# Patient Record
Sex: FEMALE | Race: WHITE | Employment: OTHER | URBAN - METROPOLITAN AREA
[De-identification: names, ages, dates, MRNs, and addresses within clinical notes are randomized per-mention and may not be internally consistent; named-entity substitution may affect disease eponyms.]

---

## 2017-01-20 ENCOUNTER — OFFICE VISIT (OUTPATIENT)
Dept: CARDIOLOGY CLINIC | Age: 72
End: 2017-01-20

## 2017-01-20 VITALS
BODY MASS INDEX: 29.68 KG/M2 | SYSTOLIC BLOOD PRESSURE: 130 MMHG | OXYGEN SATURATION: 99 % | DIASTOLIC BLOOD PRESSURE: 62 MMHG | HEART RATE: 73 BPM | RESPIRATION RATE: 18 BRPM | WEIGHT: 151.2 LBS | HEIGHT: 60 IN

## 2017-01-20 DIAGNOSIS — I10 ESSENTIAL HYPERTENSION, BENIGN: Primary | ICD-10-CM

## 2017-01-20 DIAGNOSIS — Z98.61 S/P PTCA (PERCUTANEOUS TRANSLUMINAL CORONARY ANGIOPLASTY): ICD-10-CM

## 2017-01-20 DIAGNOSIS — I25.10 CORONARY ARTERY DISEASE INVOLVING NATIVE CORONARY ARTERY OF NATIVE HEART WITHOUT ANGINA PECTORIS: ICD-10-CM

## 2017-01-20 NOTE — PROGRESS NOTES
[address], [address] [facility]  [phone]     Subjective:      Yamile Urbina is a 70 y.o. female is here for follow-up after increasing her valsartan to 320 mg. The patient denies chest pain/ shortness of breath, orthopnea, PND, LE edema, palpitations, syncope, or presyncope. Patient Active Problem List    Diagnosis Date Noted    CAD (coronary artery disease), native coronary artery 06/04/2015    S/P PTCA (percutaneous transluminal coronary angioplasty) 06/04/2015    Family history of ischemic heart disease 02/15/2013    Essential hypertension, benign 02/15/2013    SVT (supraventricular tachycardia) 02/15/2013    Hyposmolality and/or hyponatremia 02/15/2013    Tobacco use disorder 02/15/2013    Vertigo 05/11/2012      YUVAL Ruiz MD  Past Medical History   Diagnosis Date    CAD (coronary artery disease)      stenting cardiac 6/5/2015    Hypertension     Vertigo 5/11/2012      Past Surgical History   Procedure Laterality Date    Hx heent       tonsils    Hx tubal ligation      Pr breast surgery procedure unlisted       left breast biopsy     Allergies   Allergen Reactions    Eryc [Erythromycin] Hives    Influenza Virus Vaccine [facility] 2012-13 (18+) Cell Aguilar Other (comments)     \"felt like I was on fire\"    Pcn [Penicillins] Hives    Crestor [Rosuvastatin] Myalgia    Hydrochlorothiazide Other (comments)     hyponatremia    Lipitor [Atorvastatin] Myalgia    Proton Pump Inhibitors Other (comments)     dizziness      Family History   Problem Relation Age of Onset    Coronary Artery Disease       mother had stents in 63's or 66's    Heart Disease Mother     Heart Disease Father       Social History     Social History    Marital status: SINGLE     Spouse name: N/A    Number of children: N/A    Years of education: N/A     Occupational History    Not on file.      Social History Main Topics    Smoking status: Current Some Day Smoker     Packs/day: 0.25 Years: 30.00    Smokeless tobacco: Never Used    Alcohol use No    Drug use: No    Sexual activity: Not Currently     Other Topics Concern    Not on file     Social History Narrative      Current Outpatient Prescriptions   Medication Sig    valsartan (DIOVAN) 320 mg tablet TAKE ONE TABLET BY MOUTH ONCE DAILY.  metoprolol tartrate (LOPRESSOR) 25 mg tablet TAKE ONE TABLET BY MOUTH TWICE DAILY    lovastatin (MEVACOR) 40 mg tablet Take 1 Tab by mouth nightly. Increased 6/4/15 due to coronary artery disease    aspirin delayed-release 81 mg tablet Take  by mouth daily.  amLODIPine (NORVASC) 2.5 mg tablet TAKE 1 TABLET BY MOUTH EVERY DAY FOR BLOOD PRESSURE     No current facility-administered medications for this visit. Review of Symptoms:  11 systems reviewed, negative other than as stated in the HPI    Physical ExamPhysical Exam:    Vitals:    01/20/17 1315 01/20/17 1324   BP: 138/64 130/62   Pulse: 73    Resp: 18    SpO2: 99%    Weight: 151 lb 3.2 oz (68.6 kg)    Height: 5' (1.524 m)      Body mass index is 29.53 kg/(m^2). General PE   Gen:  NAD  Mental Status - Alert. General Appearance - Not in acute distress. Chest and Lung Exam   Inspection: Accessory muscles - No use of accessory muscles in breathing. Auscultation:   Breath sounds: - Normal.   Cardiovascular   Inspection: Jugular vein - Bilateral - Inspection Normal.   Palpation/Percussion:   Apical Impulse: - Normal.   Auscultation: Rhythm - Regular. Heart Sounds - S1 WNL and S2 WNL. No S3 or S4. Murmurs & Other Heart Sounds: Auscultation of the heart reveals - No Murmurs. Peripheral Vascular   Upper Extremity: Inspection - Bilateral - No Cyanotic nailbeds or Digital clubbing. Lower Extremity:   Palpation: Edema - Bilateral - No edema. Abdomen:   Soft, non-tender, bowel sounds are active.   Neuro: A&O times 3, CN and motor grossly WNL    Labs:   Lab Results   Component Value Date/Time    Cholesterol, total 128 10/23/2015 08:15 AM Cholesterol, total 128 07/03/2014 07:58 AM    Cholesterol, Total 155 08/28/2013 09:46 AM    HDL Cholesterol 51 10/23/2015 08:15 AM    HDL Cholesterol 56 07/03/2014 07:58 AM    HDL Cholesterol 49 08/28/2013 09:46 AM    LDL, calculated 66 10/23/2015 08:15 AM    LDL, calculated 62 07/03/2014 07:58 AM    LDL Cholesterol 90 08/28/2013 09:46 AM    Triglyceride 54 10/23/2015 08:15 AM    Triglyceride 50 07/03/2014 07:58 AM     Lab Results   Component Value Date/Time    CK 59 08/14/2015 06:08 PM     Lab Results   Component Value Date/Time    Sodium 131 08/14/2015 06:08 PM    Potassium 4.1 08/14/2015 06:08 PM    Chloride 98 08/14/2015 06:08 PM    CO2 26 08/14/2015 06:08 PM    Anion gap 7 08/14/2015 06:08 PM    Glucose 122 08/14/2015 06:08 PM    BUN 13 08/14/2015 06:08 PM    Creatinine 0.53 08/14/2015 06:08 PM    BUN/Creatinine ratio 25 08/14/2015 06:08 PM    GFR est AA >60 08/14/2015 06:08 PM    GFR est non-AA >60 08/14/2015 06:08 PM    Calcium 8.7 08/14/2015 06:08 PM    Bilirubin, total 0.6 08/14/2015 06:08 PM    ALT 36 08/14/2015 06:08 PM    AST 25 08/14/2015 06:08 PM    Alk. phosphatase 50 08/14/2015 06:08 PM    Protein, total 7.6 08/14/2015 06:08 PM    Albumin 3.9 08/14/2015 06:08 PM    Globulin 3.7 08/14/2015 06:08 PM    A-G Ratio 1.1 08/14/2015 06:08 PM      Assessment:     Assessment:      1. Essential hypertension, benign    2. Coronary artery disease involving native coronary artery of native heart without angina pectoris    3. S/P PTCA (percutaneous transluminal coronary angioplasty)        No orders of the defined types were placed in this encounter. Plan:     BP at goal.  Occasionally lightheaded with low BP. If recurrent on home monitoring, she will decrease diovan again. Follow up in 6 months, sooner PRN.       Oliver Solis MD

## 2017-01-20 NOTE — PROGRESS NOTES
Chief Complaint   Patient presents with    Other     1 month and BP check-pt denies any cardiac symptoms

## 2017-03-14 RX ORDER — METOPROLOL TARTRATE 25 MG/1
TABLET, FILM COATED ORAL
Qty: 180 TAB | Refills: 1 | Status: SHIPPED | OUTPATIENT
Start: 2017-03-14 | End: 2017-09-11 | Stop reason: SDUPTHER

## 2017-06-14 RX ORDER — LOVASTATIN 40 MG/1
TABLET ORAL
Qty: 90 TAB | Refills: 0 | Status: SHIPPED | OUTPATIENT
Start: 2017-06-14 | End: 2017-09-26 | Stop reason: SDUPTHER

## 2017-06-19 ENCOUNTER — HOSPITAL ENCOUNTER (OUTPATIENT)
Dept: MAMMOGRAPHY | Age: 72
Discharge: HOME | End: 2017-06-19
Attending: FAMILY MEDICINE
Payer: MEDICARE

## 2017-06-19 DIAGNOSIS — Z12.31 VISIT FOR SCREENING MAMMOGRAM: ICD-10-CM

## 2017-06-19 PROCEDURE — 77067 SCR MAMMO BI INCL CAD: CPT

## 2017-06-23 ENCOUNTER — OFFICE VISIT (OUTPATIENT)
Dept: CARDIOLOGY CLINIC | Age: 72
End: 2017-06-23

## 2017-06-23 VITALS
DIASTOLIC BLOOD PRESSURE: 70 MMHG | HEART RATE: 72 BPM | RESPIRATION RATE: 18 BRPM | HEIGHT: 60 IN | WEIGHT: 154.4 LBS | SYSTOLIC BLOOD PRESSURE: 156 MMHG | OXYGEN SATURATION: 97 % | BODY MASS INDEX: 30.31 KG/M2

## 2017-06-23 DIAGNOSIS — Z82.49 FAMILY HISTORY OF ISCHEMIC HEART DISEASE: ICD-10-CM

## 2017-06-23 DIAGNOSIS — I25.10 CORONARY ARTERY DISEASE INVOLVING NATIVE CORONARY ARTERY OF NATIVE HEART WITHOUT ANGINA PECTORIS: ICD-10-CM

## 2017-06-23 DIAGNOSIS — F17.200 TOBACCO USE DISORDER: ICD-10-CM

## 2017-06-23 DIAGNOSIS — Z98.61 S/P PTCA (PERCUTANEOUS TRANSLUMINAL CORONARY ANGIOPLASTY): ICD-10-CM

## 2017-06-23 DIAGNOSIS — E78.2 MIXED HYPERLIPIDEMIA: ICD-10-CM

## 2017-06-23 DIAGNOSIS — I10 ESSENTIAL HYPERTENSION, BENIGN: Primary | ICD-10-CM

## 2017-06-23 RX ORDER — AMLODIPINE BESYLATE 5 MG/1
TABLET ORAL
Qty: 90 TAB | Refills: 3 | Status: SHIPPED | OUTPATIENT
Start: 2017-06-23 | End: 2018-09-10 | Stop reason: SDUPTHER

## 2017-06-23 NOTE — PROGRESS NOTES
[address] [place] [address], [address] [facility]  [phone]     Subjective:      Placido Connors is a 70 y.o. female is here for routine f/u. The patient denies chest pain/ shortness of breath, orthopnea, PND, LE edema, palpitations, syncope, or presyncope. Patient Active Problem List    Diagnosis Date Noted    Mixed hyperlipidemia 06/23/2017    CAD (coronary artery disease), native coronary artery 06/04/2015    S/P PTCA (percutaneous transluminal coronary angioplasty) 06/04/2015    Family history of ischemic heart disease 02/15/2013    Essential hypertension, benign 02/15/2013    SVT (supraventricular tachycardia) ([facility] [address] 02/15/2013    Hyposmolality and/or hyponatremia 02/15/2013    Tobacco use disorder 02/15/2013    Vertigo 05/11/2012      Orly James MD  Past Medical History:   Diagnosis Date    CAD (coronary artery disease)     stenting cardiac 6/5/2015    Hypertension     Vertigo 5/11/2012      Past Surgical History:   Procedure Laterality Date    BREAST SURGERY PROCEDURE UNLISTED      left breast biopsy    HX BREAST BIOPSY Left     Long time ago --neg    HX HEENT      tonsils    HX TUBAL LIGATION       Allergies   Allergen Reactions    Eryc [Erythromycin] Hives    Influenza Virus Vaccine [facility] 2012-13 (18+) Cell Aguilar Other (comments)     \"felt like I was on fire\"    Pcn [Penicillins] Hives    Crestor [Rosuvastatin] Myalgia    Hydrochlorothiazide Other (comments)     hyponatremia    Lipitor [Atorvastatin] Myalgia    Proton Pump Inhibitors Other (comments)     dizziness      Family History   Problem Relation Age of Onset    Heart Disease Mother     Heart Disease Father     Coronary Artery Disease Other      mother had stents in 63's or 66's      Social History     Social History    Marital status: SINGLE     Spouse name: N/A    Number of children: N/A    Years of education: N/A     Occupational History    Not on file.      Social History Main Topics    Smoking status: Current Some Day Smoker     Packs/day: 0.25     Years: 30.00    Smokeless tobacco: Never Used    Alcohol use No    Drug use: No    Sexual activity: Not Currently     Other Topics Concern    Not on file     Social History Narrative      Current Outpatient Prescriptions   Medication Sig    amLODIPine (NORVASC) 5 mg tablet TAKE 1 TABLET BY MOUTH EVERY DAY FOR BLOOD PRESSURE    lovastatin (MEVACOR) 40 mg tablet TAKE 1 TABLET BY MOUTH NIGHTLY **INCREASED 06/04/15 DUE TO CORONARY ARTERY DISEASE**    metoprolol tartrate (LOPRESSOR) 25 mg tablet TAKE ONE TABLET BY MOUTH TWICE DAILY    valsartan (DIOVAN) 320 mg tablet TAKE ONE TABLET BY MOUTH ONCE DAILY.  aspirin delayed-release 81 mg tablet Take  by mouth daily. No current facility-administered medications for this visit. Review of Symptoms:  11 systems reviewed, negative other than as stated in the HPI    Physical ExamPhysical Exam:    Vitals:    06/23/17 1257   BP: 156/70   Pulse: 72   Resp: 18   SpO2: 97%   Weight: 154 lb 6.4 oz (70 kg)   Height: 5' (1.524 m)     Body mass index is 30.15 kg/(m^2). General PE   Gen:  NAD  Mental Status - Alert. General Appearance - Not in acute distress. Chest and Lung Exam   Inspection: Accessory muscles - No use of accessory muscles in breathing. Auscultation:   Breath sounds: - Normal.   Cardiovascular   Inspection: Jugular vein - Bilateral - Inspection Normal.   Palpation/Percussion:   Apical Impulse: - Normal.   Auscultation: Rhythm - Regular. Heart Sounds - S1 WNL and S2 WNL. No S3 or S4. Murmurs & Other Heart Sounds: Auscultation of the heart reveals - No Murmurs. Peripheral Vascular   Upper Extremity: Inspection - Bilateral - No Cyanotic nailbeds or Digital clubbing. Lower Extremity:   Palpation: Edema - Bilateral - No edema. Abdomen:   Soft, non-tender, bowel sounds are active.   Neuro: A&O times 3, CN and motor grossly WNL    Labs:   Lab Results   Component Value Date/Time Cholesterol, total 128 10/23/2015 08:15 AM    Cholesterol, total 128 07/03/2014 07:58 AM    Cholesterol, Total 155 08/28/2013 09:46 AM    HDL Cholesterol 51 10/23/2015 08:15 AM    HDL Cholesterol 56 07/03/2014 07:58 AM    HDL Cholesterol 49 08/28/2013 09:46 AM    LDL Cholesterol 90 08/28/2013 09:46 AM    LDL, calculated 66 10/23/2015 08:15 AM    LDL, calculated 62 07/03/2014 07:58 AM    Triglyceride 54 10/23/2015 08:15 AM    Triglyceride 50 07/03/2014 07:58 AM    Triglycerides 64 08/28/2013 09:46 AM     Lab Results   Component Value Date/Time    CK 59 08/14/2015 06:08 PM     Lab Results   Component Value Date/Time    Sodium 131 08/14/2015 06:08 PM    Potassium 4.1 08/14/2015 06:08 PM    Chloride 98 08/14/2015 06:08 PM    CO2 26 08/14/2015 06:08 PM    Anion gap 7 08/14/2015 06:08 PM    Glucose 122 08/14/2015 06:08 PM    BUN 13 08/14/2015 06:08 PM    Creatinine 0.53 08/14/2015 06:08 PM    BUN/Creatinine ratio 25 08/14/2015 06:08 PM    GFR est AA >60 08/14/2015 06:08 PM    GFR est non-AA >60 08/14/2015 06:08 PM    Calcium 8.7 08/14/2015 06:08 PM    Bilirubin, total 0.6 08/14/2015 06:08 PM    AST (SGOT) 25 08/14/2015 06:08 PM    Alk. phosphatase 50 08/14/2015 06:08 PM    Protein, total 7.6 08/14/2015 06:08 PM    Albumin 3.9 08/14/2015 06:08 PM    Globulin 3.7 08/14/2015 06:08 PM    A-G Ratio 1.1 08/14/2015 06:08 PM    ALT (SGPT) 36 08/14/2015 06:08 PM       EKG:  NSR     Assessment:        1. Essential hypertension, benign    2. Coronary artery disease involving native coronary artery of native heart without angina pectoris    3. S/P PTCA (percutaneous transluminal coronary angioplasty)    4. Family history of ischemic heart disease    5. Tobacco use disorder    6.  Mixed hyperlipidemia        Orders Placed This Encounter    AMB POC EKG ROUTINE W/ 12 LEADS, INTER & REP     Order Specific Question:   Reason for Exam:     Answer:   routine    amLODIPine (NORVASC) 5 mg tablet     Sig: TAKE 1 TABLET BY MOUTH EVERY DAY FOR BLOOD PRESSURE     Dispense:  90 Tab     Refill:  3        Plan:     Doing well with no cardiac symptoms. CAD stable and asymptomatic. HTN:  Uncontrolled off amlodipine for unclear reasons. Add back amlodipine 5. Follow at home, call if uncontrolled after 2 weeks. Lipids and labs followed by PCP, LDL 43 12/16. Continue current care and f/u in 6 months.     Carter Chester MD

## 2017-09-12 RX ORDER — METOPROLOL TARTRATE 25 MG/1
TABLET, FILM COATED ORAL
Qty: 180 TAB | Refills: 1 | Status: SHIPPED | OUTPATIENT
Start: 2017-09-12 | End: 2018-03-13 | Stop reason: SDUPTHER

## 2017-09-27 RX ORDER — LOVASTATIN 40 MG/1
TABLET ORAL
Qty: 90 TAB | Refills: 0 | Status: SHIPPED | OUTPATIENT
Start: 2017-09-27 | End: 2017-12-31 | Stop reason: SDUPTHER

## 2017-12-20 RX ORDER — VALSARTAN 320 MG/1
TABLET ORAL
Qty: 90 TAB | Refills: 3 | Status: SHIPPED | OUTPATIENT
Start: 2017-12-20 | End: 2018-07-19

## 2018-01-30 ENCOUNTER — HOSPITAL ENCOUNTER (OUTPATIENT)
Dept: CT IMAGING | Age: 73
Discharge: HOME | End: 2018-01-30
Attending: OTOLARYNGOLOGY
Payer: MEDICARE

## 2018-01-30 DIAGNOSIS — K14.8 TONGUE MASS: ICD-10-CM

## 2018-01-30 LAB — CREAT BLD-MCNC: 0.6 MG/DL (ref 0.6–1.3)

## 2018-01-30 PROCEDURE — 70491 CT SOFT TISSUE NECK W/DYE: CPT

## 2018-01-30 PROCEDURE — 74011250636 HC RX REV CODE- 250/636: Performed by: OTOLARYNGOLOGY

## 2018-01-30 PROCEDURE — 74011636320 HC RX REV CODE- 636/320: Performed by: OTOLARYNGOLOGY

## 2018-01-30 PROCEDURE — 82565 ASSAY OF CREATININE: CPT

## 2018-01-30 RX ORDER — SODIUM CHLORIDE 0.9 % (FLUSH) 0.9 %
10 SYRINGE (ML) INJECTION
Status: COMPLETED | OUTPATIENT
Start: 2018-01-30 | End: 2018-01-30

## 2018-01-30 RX ORDER — SODIUM CHLORIDE 9 MG/ML
50 INJECTION, SOLUTION INTRAVENOUS
Status: COMPLETED | OUTPATIENT
Start: 2018-01-30 | End: 2018-01-30

## 2018-01-30 RX ADMIN — SODIUM CHLORIDE 50 ML/HR: 900 INJECTION, SOLUTION INTRAVENOUS at 09:25

## 2018-01-30 RX ADMIN — IOPAMIDOL 100 ML: 755 INJECTION, SOLUTION INTRAVENOUS at 09:25

## 2018-01-30 RX ADMIN — Medication 10 ML: at 09:25

## 2018-03-13 RX ORDER — METOPROLOL TARTRATE 25 MG/1
TABLET, FILM COATED ORAL
Qty: 180 TAB | Refills: 1 | Status: SHIPPED | OUTPATIENT
Start: 2018-03-13 | End: 2018-09-07 | Stop reason: SDUPTHER

## 2018-06-08 ENCOUNTER — OFFICE VISIT (OUTPATIENT)
Dept: CARDIOLOGY CLINIC | Age: 73
End: 2018-06-08

## 2018-06-08 VITALS
OXYGEN SATURATION: 97 % | SYSTOLIC BLOOD PRESSURE: 160 MMHG | BODY MASS INDEX: 30.23 KG/M2 | DIASTOLIC BLOOD PRESSURE: 68 MMHG | RESPIRATION RATE: 18 BRPM | WEIGHT: 154 LBS | HEIGHT: 60 IN | HEART RATE: 72 BPM

## 2018-06-08 DIAGNOSIS — F17.200 TOBACCO USE DISORDER: ICD-10-CM

## 2018-06-08 DIAGNOSIS — E78.2 MIXED HYPERLIPIDEMIA: ICD-10-CM

## 2018-06-08 DIAGNOSIS — Z98.61 S/P PTCA (PERCUTANEOUS TRANSLUMINAL CORONARY ANGIOPLASTY): ICD-10-CM

## 2018-06-08 DIAGNOSIS — I10 ESSENTIAL HYPERTENSION, BENIGN: ICD-10-CM

## 2018-06-08 DIAGNOSIS — I25.10 CORONARY ARTERY DISEASE INVOLVING NATIVE CORONARY ARTERY OF NATIVE HEART WITHOUT ANGINA PECTORIS: Primary | ICD-10-CM

## 2018-06-08 DIAGNOSIS — I47.1 SVT (SUPRAVENTRICULAR TACHYCARDIA) (HCC): ICD-10-CM

## 2018-06-08 NOTE — PROGRESS NOTES
1. Have you been to the ER, urgent care clinic since your last visit? Hospitalized since your last visit? No.    2. Have you seen or consulted any other health care providers outside of the [address] [place] [address] since your last visit? Include any pap smears or colon screening. Seen by ENT for throat nodules.           Chief Complaint   Patient presents with    Other     6 month f/u-swelling in ankles and feet-denies any new cardiac symptoms-surgery has been canceled

## 2018-06-08 NOTE — PROGRESS NOTES
[address], [address] [place] [address] Ne, [address] [facility]  [phone]     Subjective:      Amina Sherman is a 67 y.o. female is here for routine f/u. The patient denies chest pain/ shortness of breath, orthopnea, PND, LE edema, palpitations, syncope, or presyncope. Patient Active Problem List    Diagnosis Date Noted    Mixed hyperlipidemia 06/23/2017    CAD (coronary artery disease), native coronary artery 06/04/2015    S/P PTCA (percutaneous transluminal coronary angioplasty) 06/04/2015    Family history of ischemic heart disease 02/15/2013    Essential hypertension, benign 02/15/2013    SVT (supraventricular tachycardia) ([facility] [address] 02/15/2013    Hyposmolality and/or hyponatremia 02/15/2013    Tobacco use disorder 02/15/2013    Vertigo 05/11/2012      Robert Salguero MD  Past Medical History:   Diagnosis Date    CAD (coronary artery disease)     stenting cardiac 6/5/2015    Hypertension     Vertigo 5/11/2012      Past Surgical History:   Procedure Laterality Date    BREAST SURGERY PROCEDURE UNLISTED      left breast biopsy    HX BREAST BIOPSY Left     Long time ago --neg    HX HEENT      tonsils    HX TUBAL LIGATION       Allergies   Allergen Reactions    Eryc [Erythromycin] Hives    Influenza Virus Vaccine [facility] 2012-13 (18+) Cell Aguilar Other (comments)     \"felt like I was on fire\"    Pcn [Penicillins] Hives    Crestor [Rosuvastatin] Myalgia    Hydrochlorothiazide Other (comments)     hyponatremia    Lipitor [Atorvastatin] Myalgia    Proton Pump Inhibitors Other (comments)     dizziness      Family History   Problem Relation Age of Onset    Heart Disease Mother     Heart Disease Father     Coronary Artery Disease Other      mother had stents in 63's or 66's      Social History     Social History    Marital status: SINGLE     Spouse name: N/A    Number of children: N/A    Years of education: N/A     Occupational History    Not on file.      Social History Main Topics    Smoking status: Current Some Day Smoker     Packs/day: 0.25     Years: 30.00     Types: Cigarettes    Smokeless tobacco: Never Used    Alcohol use No    Drug use: No    Sexual activity: Not Currently     Other Topics Concern    Not on file     Social History Narrative      Current Outpatient Prescriptions   Medication Sig    metoprolol tartrate (LOPRESSOR) 25 mg tablet TAKE ONE TABLET BY MOUTH TWICE DAILY    lovastatin (MEVACOR) 40 mg tablet Take 1 Tab by mouth nightly. Refills per PCP who is performing labs    valsartan (DIOVAN) 320 mg tablet TAKE ONE TABLET BY MOUTH ONCE DAILY (Patient taking differently: taking a half tab daily)    amLODIPine (NORVASC) 5 mg tablet TAKE 1 TABLET BY MOUTH EVERY DAY FOR BLOOD PRESSURE (Patient taking differently: TAKE 1 TABLET BY MOUTH EVERY DAY FOR BLOOD PRESSURE  Indications: pt taking as needed if BP is 150 or over)    aspirin delayed-release 81 mg tablet Take  by mouth daily. No current facility-administered medications for this visit. Review of Symptoms:  11 systems reviewed, negative other than as stated in the HPI    Physical ExamPhysical Exam:    Vitals:    06/08/18 1403   BP: 160/68   Pulse: 72   Resp: 18   SpO2: 97%   Weight: 154 lb (69.9 kg)   Height: 5' (1.524 m)     Body mass index is 30.08 kg/(m^2). General PE   Gen:  NAD  Mental Status - Alert. General Appearance - Not in acute distress. Chest and Lung Exam   Inspection: Accessory muscles - No use of accessory muscles in breathing. Auscultation:   Breath sounds: - Normal.   Cardiovascular   Inspection: Jugular vein - Bilateral - Inspection Normal.   Palpation/Percussion:   Apical Impulse: - Normal.   Auscultation: Rhythm - Regular. Heart Sounds - S1 WNL and S2 WNL. No S3 or S4. Murmurs & Other Heart Sounds: Auscultation of the heart reveals - No Murmurs. Peripheral Vascular   Upper Extremity: Inspection - Bilateral - No Cyanotic nailbeds or Digital clubbing.    Lower Extremity:   Palpation: Edema - Bilateral - No edema. Abdomen:   Soft, non-tender, bowel sounds are active. Neuro: A&O times 3, CN and motor grossly WNL    Labs:   Lab Results   Component Value Date/Time    Cholesterol, total 128 10/23/2015 08:15 AM    Cholesterol, total 128 07/03/2014 07:58 AM    Cholesterol, Total 155 08/28/2013 09:46 AM    HDL Cholesterol 51 10/23/2015 08:15 AM    HDL Cholesterol 56 07/03/2014 07:58 AM    HDL Cholesterol 49 08/28/2013 09:46 AM    LDL Cholesterol 90 08/28/2013 09:46 AM    LDL, calculated 66 10/23/2015 08:15 AM    LDL, calculated 62 07/03/2014 07:58 AM    Triglyceride 54 10/23/2015 08:15 AM    Triglyceride 50 07/03/2014 07:58 AM    Triglycerides 64 08/28/2013 09:46 AM     Lab Results   Component Value Date/Time    CK 59 08/14/2015 06:08 PM     Lab Results   Component Value Date/Time    Sodium 131 (L) 08/14/2015 06:08 PM    Potassium 4.1 08/14/2015 06:08 PM    Chloride 98 08/14/2015 06:08 PM    CO2 26 08/14/2015 06:08 PM    Anion gap 7 08/14/2015 06:08 PM    Glucose 122 (H) 08/14/2015 06:08 PM    BUN 13 08/14/2015 06:08 PM    Creatinine 0.53 08/14/2015 06:08 PM    BUN/Creatinine ratio 25 (H) 08/14/2015 06:08 PM    GFR est AA >60 08/14/2015 06:08 PM    GFR est non-AA >60 08/14/2015 06:08 PM    Calcium 8.7 08/14/2015 06:08 PM    Bilirubin, total 0.6 08/14/2015 06:08 PM    AST (SGOT) 25 08/14/2015 06:08 PM    Alk. phosphatase 50 08/14/2015 06:08 PM    Protein, total 7.6 08/14/2015 06:08 PM    Albumin 3.9 08/14/2015 06:08 PM    Globulin 3.7 08/14/2015 06:08 PM    A-G Ratio 1.1 08/14/2015 06:08 PM    ALT (SGPT) 36 08/14/2015 06:08 PM       EKG:  NSR     Assessment:      1. Coronary artery disease involving native coronary artery of native heart without angina pectoris    2. Essential hypertension, benign    3. Mixed hyperlipidemia    4. S/P PTCA (percutaneous transluminal coronary angioplasty)    5. SVT (supraventricular tachycardia) ([address]    6.  Tobacco use disorder        Orders Placed This Encounter    AMB POC EKG ROUTINE W/ 12 LEADS, INTER & REP     Order Specific Question:   Reason for Exam:     Answer:   routine        Plan:     Doing well with no cardiac symptoms.       CAD stable and asymptomatic. RAUL to the LAD in June 2015, 60% RCA lesion at that time that was negative by fractional flow reserve. No plans for ENT surgery yet, but advised that if there is ENT surgery, it would be ambriz to get a Renaye Fairchild prior to that. Cannot walk due to musculoskeletal issues as detailed below.     HTN:  Uncontrolled today, but she states usually controlled. Follow at home, call if uncontrolled after 2 weeks. She knows that if her blood pressure is above 140, it will be wise to start on amlodipine 5 mg daily. She is currently not taking amlodipine unless blood pressures above 160.     Lipids and labs followed by PCP, LDL 67 in March 2018, followed by Dr. Thai Coffman. Statin prescribed by him. Back, right leg pain- states has herniated discs. Advised to f/u with PCP or ortho for this. Tobacco abuse:  Counseled on smoking cessation and provided literature - 5 minutes spent.      Continue current care and f/u in 6 months.     Vernon Zaldivar MD

## 2018-06-08 NOTE — MR AVS SNAPSHOT
[facility] [address] 
[phone] Patient: Kandace Egan MRN: [id number] [id number] Visit Information Date & Time Provider Department Dept. Phone Encounter #  
 6/8/2018  2:15 PM Pedro Huang, [address] [place] [address] Cardiology Associate Willie Cisneros [phone] Upcoming Health Maintenance Date Due Hepatitis C Screening 1945 DTaP/Tdap/Td series (1 - Tdap) 11/10/1966 FOBT Q 1 YEAR AGE 50-75 11/10/1995 ZOSTER VACCINE AGE 60> 9/10/2005 GLAUCOMA SCREENING Q2Y 11/10/2010 Bone Densitometry (Dexa) Screening 11/10/2010 Pneumococcal 65+ Low/Medium Risk (2 of 2 - PPSV23) 12/1/2017 MEDICARE YEARLY EXAM 3/14/2018 Influenza Age 5 to Adult 8/1/2018 BREAST CANCER SCRN MAMMOGRAM 6/19/2019 Allergies as of 6/8/2018  Review Complete On: 6/8/2018 By: Pedro Huang MD  
  
 Severity Noted Reaction Type Reactions Eryc [Erythromycin] Medium 05/11/2012   Systemic Hives Influenza Virus Vaccine Tvs 2012-13 (18+) Cell Aguilar Medium 10/14/2012    Other (comments) \"felt like I was on fire\" Pcn [Penicillins] Medium 05/11/2012   Systemic Hives Crestor [Rosuvastatin]  05/11/2015    Myalgia Hydrochlorothiazide  03/08/2013   Intolerance Other (comments)  
 hyponatremia Lipitor [Atorvastatin]  07/10/2014   Intolerance Myalgia Proton Pump Inhibitors  05/11/2015    Other (comments)  
 dizziness Current Immunizations  Reviewed on 7/14/2015 Name Date Pneumococcal Vaccine (Unspecified Type) 12/1/2012 Not reviewed this visit You Were Diagnosed With   
  
 Codes Comments Coronary artery disease involving native coronary artery of native heart without angina pectoris    -  Primary ICD-10-CM: I25.10 ICD-9-CM: 414.01 Essential hypertension, benign     ICD-10-CM: I10 
ICD-9-CM: 401.1 Mixed hyperlipidemia     ICD-10-CM: E78.2 ICD-9-CM: 272.2 S/P PTCA (percutaneous transluminal coronary angioplasty)     ICD-10-CM: Z98.61 ICD-9-CM: V45.82   
 SVT (supraventricular tachycardia) (HCC)     ICD-10-CM: I47.1 ICD-9-CM: 427.89 Tobacco use disorder     ICD-10-CM: F17.200 ICD-9-CM: 305.1 Vitals BP Pulse Resp Height(growth percentile) Weight(growth percentile) SpO2  
 160/68 (BP 1 Location: Right arm, BP Patient Position: Sitting) 72 18 5' (1.524 m) 154 lb (69.9 kg) 97% BMI OB Status Smoking Status 30.08 kg/m2 Postmenopausal Current Some Day Smoker Vitals History BMI and BSA Data Body Mass Index Body Surface Area 30.08 kg/m 2 1.72 m 2 Preferred Pharmacy Pharmacy Name Phone [facility] [address], [address] [place] [address] Avenue [phone] Your Updated Medication List  
  
   
This list is accurate as of 6/8/18  2:56 PM.  Always use your most recent med list. amLODIPine 5 mg tablet Commonly known as:  Jus Emerald TAKE 1 TABLET BY MOUTH EVERY DAY FOR BLOOD PRESSURE  
  
 aspirin delayed-release 81 mg tablet Take  by mouth daily. lovastatin 40 mg tablet Commonly known as:  MEVACOR Take 1 Tab by mouth nightly. Refills per PCP who is performing labs  
  
 metoprolol tartrate 25 mg tablet Commonly known as:  LOPRESSOR  
TAKE ONE TABLET BY MOUTH TWICE DAILY  
  
 valsartan 320 mg tablet Commonly known as:  DIOVAN  
TAKE ONE TABLET BY MOUTH ONCE DAILY We Performed the Following AMB POC EKG ROUTINE W/ 12 LEADS, INTER & REP [10335 CPT(R)] Introducing \[facility] [place] [facility]\"" & HEALTH SERVICES! Akil Pal introduces [employer] patient portal. Now you can access parts of your medical record, email your doctor's office, and request medication refills online. 1. In your internet browser, go to [email or web address] 2. Click on the First Time User? Click Here link in the Sign In box. You will see the New Member Sign Up page. 3. Enter your [employer] Access Code exactly as it appears below. You will not need to use this code after youve completed the sign-up process. If you do not sign up before the expiration date, you must request a new code. · [employer] Access Code: [email or web address] Expires: 9/6/2018  2:56 PM 
 
4. Enter the last four digits of your Social Security Number (xxxx) and Date of Birth (mm/dd/yyyy) as indicated and click Submit. You will be taken to the next sign-up page. 5. Create a [employer]Learn ID. This will be your [employer] login ID and cannot be changed, so think of one that is secure and easy to remember. 6. Create a [employer] password. You can change your password at any time. 7. Enter your Password Reset Question and Answer. This can be used at a later time if you forget your password. 8. Enter your e-mail address. You will receive e-mail notification when new information is available in [address]. 9. Click Sign Up. You can now view and download portions of your medical record. 10. Click the Download Summary menu link to download a portable copy of your medical information. If you have questions, please visit the Frequently Asked Questions section of the [employer] website. Remember, [employer] is NOT to be used for urgent needs. For medical emergencies, dial 911. Now available from your iPhone and Android! Please provide this summary of care documentation to your next provider. Your primary care clinician is listed as Esther Hopkins. If you have any questions after today's visit, please call [phone].

## 2018-07-19 RX ORDER — LOSARTAN POTASSIUM 50 MG/1
50 TABLET ORAL DAILY
Qty: 30 TAB | Refills: 6 | Status: SHIPPED | OUTPATIENT
Start: 2018-07-19 | End: 2018-07-19 | Stop reason: SDUPTHER

## 2018-07-19 RX ORDER — LOSARTAN POTASSIUM 50 MG/1
50 TABLET ORAL DAILY
Qty: 90 TAB | Refills: 3 | Status: SHIPPED | OUTPATIENT
Start: 2018-07-19 | End: 2019-03-14 | Stop reason: ALTCHOICE

## 2018-07-19 RX ORDER — LOSARTAN POTASSIUM 50 MG/1
TABLET ORAL DAILY
COMMUNITY
End: 2018-07-19 | Stop reason: SDUPTHER

## 2018-07-19 RX ORDER — LOSARTAN POTASSIUM 50 MG/1
50 TABLET ORAL DAILY
Qty: 90 TAB | Refills: 3 | Status: SHIPPED | OUTPATIENT
Start: 2018-07-19 | End: 2018-07-19 | Stop reason: SDUPTHER

## 2018-09-07 RX ORDER — METOPROLOL TARTRATE 25 MG/1
TABLET, FILM COATED ORAL
Qty: 180 TAB | Refills: 1 | Status: SHIPPED | OUTPATIENT
Start: 2018-09-07 | End: 2019-03-07 | Stop reason: SDUPTHER

## 2018-09-10 RX ORDER — AMLODIPINE BESYLATE 5 MG/1
TABLET ORAL
Qty: 90 TAB | Refills: 3 | Status: SHIPPED | OUTPATIENT
Start: 2018-09-10 | End: 2020-01-02

## 2019-03-05 ENCOUNTER — HOSPITAL ENCOUNTER (EMERGENCY)
Age: 74
Discharge: HOME | End: 2019-03-05
Attending: EMERGENCY MEDICINE | Admitting: EMERGENCY MEDICINE
Payer: MEDICARE

## 2019-03-05 ENCOUNTER — APPOINTMENT (OUTPATIENT)
Dept: GENERAL RADIOLOGY | Age: 74
End: 2019-03-05
Attending: EMERGENCY MEDICINE
Payer: MEDICARE

## 2019-03-05 VITALS
TEMPERATURE: 97.2 F | HEART RATE: 71 BPM | OXYGEN SATURATION: 96 % | RESPIRATION RATE: 16 BRPM | DIASTOLIC BLOOD PRESSURE: 62 MMHG | HEIGHT: 62 IN | BODY MASS INDEX: 28.03 KG/M2 | WEIGHT: 152.34 LBS | SYSTOLIC BLOOD PRESSURE: 142 MMHG

## 2019-03-05 DIAGNOSIS — R07.9 ACUTE CHEST PAIN: Primary | ICD-10-CM

## 2019-03-05 DIAGNOSIS — F17.200 SMOKING ADDICTION: ICD-10-CM

## 2019-03-05 LAB
ALBUMIN SERPL-MCNC: 3.8 G/DL (ref 3.5–5)
ALBUMIN/GLOB SERPL: 1.1 {RATIO} (ref 1.1–2.2)
ALP SERPL-CCNC: 40 U/L (ref 45–117)
ALT SERPL-CCNC: 17 U/L (ref 12–78)
ANION GAP SERPL CALC-SCNC: 9 MMOL/L (ref 5–15)
AST SERPL-CCNC: 19 U/L (ref 15–37)
ATRIAL RATE: 57 BPM
ATRIAL RATE: 70 BPM
BASOPHILS # BLD: 0.1 K/UL (ref 0–0.1)
BASOPHILS NFR BLD: 1 % (ref 0–1)
BILIRUB SERPL-MCNC: 0.7 MG/DL (ref 0.2–1)
BUN SERPL-MCNC: 12 MG/DL (ref 6–20)
BUN/CREAT SERPL: 20 (ref 12–20)
CALCIUM SERPL-MCNC: 8.5 MG/DL (ref 8.5–10.1)
CALCULATED P AXIS, ECG09: -15 DEGREES
CALCULATED P AXIS, ECG09: 62 DEGREES
CALCULATED R AXIS, ECG10: 46 DEGREES
CALCULATED R AXIS, ECG10: 57 DEGREES
CALCULATED T AXIS, ECG11: 40 DEGREES
CALCULATED T AXIS, ECG11: 8 DEGREES
CHLORIDE SERPL-SCNC: 100 MMOL/L (ref 97–108)
CK MB CFR SERPL CALC: 2.1 % (ref 0–2.5)
CK MB SERPL-MCNC: 1.5 NG/ML (ref 5–25)
CK SERPL-CCNC: 71 U/L (ref 26–192)
CK SERPL-CCNC: 76 U/L (ref 26–192)
CO2 SERPL-SCNC: 23 MMOL/L (ref 21–32)
COMMENT, HOLDF: NORMAL
CREAT SERPL-MCNC: 0.61 MG/DL (ref 0.55–1.02)
DIAGNOSIS, 93000: NORMAL
DIAGNOSIS, 93000: NORMAL
DIFFERENTIAL METHOD BLD: NORMAL
EOSINOPHIL # BLD: 0.2 K/UL (ref 0–0.4)
EOSINOPHIL NFR BLD: 2 % (ref 0–7)
ERYTHROCYTE [DISTWIDTH] IN BLOOD BY AUTOMATED COUNT: 11.9 % (ref 11.5–14.5)
GLOBULIN SER CALC-MCNC: 3.5 G/DL (ref 2–4)
GLUCOSE SERPL-MCNC: 157 MG/DL (ref 65–100)
HCT VFR BLD AUTO: 36.5 % (ref 35–47)
HGB BLD-MCNC: 12.9 G/DL (ref 11.5–16)
IMM GRANULOCYTES # BLD AUTO: 0 K/UL (ref 0–0.04)
IMM GRANULOCYTES NFR BLD AUTO: 0 % (ref 0–0.5)
LIPASE SERPL-CCNC: 114 U/L (ref 73–393)
LYMPHOCYTES # BLD: 1.5 K/UL (ref 0.8–3.5)
LYMPHOCYTES NFR BLD: 17 % (ref 12–49)
MCH RBC QN AUTO: 33.7 PG (ref 26–34)
MCHC RBC AUTO-ENTMCNC: 35.3 G/DL (ref 30–36.5)
MCV RBC AUTO: 95.3 FL (ref 80–99)
MONOCYTES # BLD: 0.6 K/UL (ref 0–1)
MONOCYTES NFR BLD: 7 % (ref 5–13)
NEUTS SEG # BLD: 6.3 K/UL (ref 1.8–8)
NEUTS SEG NFR BLD: 73 % (ref 32–75)
NRBC # BLD: 0 K/UL (ref 0–0.01)
NRBC BLD-RTO: 0 PER 100 WBC
P-R INTERVAL, ECG05: 124 MS
P-R INTERVAL, ECG05: 140 MS
PLATELET # BLD AUTO: 182 K/UL (ref 150–400)
PMV BLD AUTO: 10.2 FL (ref 8.9–12.9)
POTASSIUM SERPL-SCNC: 3.7 MMOL/L (ref 3.5–5.1)
PROT SERPL-MCNC: 7.3 G/DL (ref 6.4–8.2)
Q-T INTERVAL, ECG07: 416 MS
Q-T INTERVAL, ECG07: 436 MS
QRS DURATION, ECG06: 82 MS
QRS DURATION, ECG06: 82 MS
QTC CALCULATION (BEZET), ECG08: 424 MS
QTC CALCULATION (BEZET), ECG08: 449 MS
RBC # BLD AUTO: 3.83 M/UL (ref 3.8–5.2)
SAMPLES BEING HELD,HOLD: NORMAL
SODIUM SERPL-SCNC: 132 MMOL/L (ref 136–145)
TROPONIN I SERPL-MCNC: <0.05 NG/ML
TROPONIN I SERPL-MCNC: <0.05 NG/ML
VENTRICULAR RATE, ECG03: 57 BPM
VENTRICULAR RATE, ECG03: 70 BPM
WBC # BLD AUTO: 8.7 K/UL (ref 3.6–11)

## 2019-03-05 PROCEDURE — 74011250636 HC RX REV CODE- 250/636: Performed by: EMERGENCY MEDICINE

## 2019-03-05 PROCEDURE — 36415 COLL VENOUS BLD VENIPUNCTURE: CPT

## 2019-03-05 PROCEDURE — 74011250637 HC RX REV CODE- 250/637: Performed by: EMERGENCY MEDICINE

## 2019-03-05 PROCEDURE — 80053 COMPREHEN METABOLIC PANEL: CPT

## 2019-03-05 PROCEDURE — 83690 ASSAY OF LIPASE: CPT

## 2019-03-05 PROCEDURE — 96374 THER/PROPH/DIAG INJ IV PUSH: CPT

## 2019-03-05 PROCEDURE — 82553 CREATINE MB FRACTION: CPT

## 2019-03-05 PROCEDURE — 84484 ASSAY OF TROPONIN QUANT: CPT

## 2019-03-05 PROCEDURE — 71045 X-RAY EXAM CHEST 1 VIEW: CPT

## 2019-03-05 PROCEDURE — 93005 ELECTROCARDIOGRAM TRACING: CPT

## 2019-03-05 PROCEDURE — 85025 COMPLETE CBC W/AUTO DIFF WBC: CPT

## 2019-03-05 PROCEDURE — 82550 ASSAY OF CK (CPK): CPT

## 2019-03-05 PROCEDURE — 99285 EMERGENCY DEPT VISIT HI MDM: CPT

## 2019-03-05 RX ORDER — FAMOTIDINE 10 MG/ML
20 INJECTION INTRAVENOUS
Status: COMPLETED | OUTPATIENT
Start: 2019-03-05 | End: 2019-03-05

## 2019-03-05 RX ORDER — GUAIFENESIN 100 MG/5ML
324 LIQUID (ML) ORAL
Status: COMPLETED | OUTPATIENT
Start: 2019-03-05 | End: 2019-03-05

## 2019-03-05 RX ADMIN — FAMOTIDINE 20 MG: 10 INJECTION, SOLUTION INTRAVENOUS at 05:54

## 2019-03-05 RX ADMIN — ASPIRIN 81 MG 324 MG: 81 TABLET ORAL at 05:54

## 2019-03-05 NOTE — DISCHARGE INSTRUCTIONS
Learning About Benefits From Quitting Smoking  How does quitting smoking make you healthier? If you're thinking about quitting smoking, you may have a few reasons to be smoke-free. Your health may be one of them. · When you quit smoking, you lower your risks for cancer, lung disease, heart attack, stroke, blood vessel disease, and blindness from macular degeneration. · When you're smoke-free, you get sick less often, and you heal faster. You are less likely to get colds, flu, bronchitis, and pneumonia. · As a nonsmoker, you may find that your mood is better and you are less stressed. When and how will you feel healthier? Quitting has real health benefits that start from day 1 of being smoke-free. And the longer you stay smoke-free, the healthier you get and the better you feel. The first hours  · After just 20 minutes, your blood pressure and heart rate go down. That means there's less stress on your heart and blood vessels. · Within 12 hours, the level of carbon monoxide in your blood drops back to normal. That makes room for more oxygen. With more oxygen in your body, you may notice that you have more energy than when you smoked. After 2 weeks  · Your lungs start to work better. · Your risk of heart attack starts to drop. After 1 month  · When your lungs are clear, you cough less and breathe deeper, so it's easier to be active. · Your sense of taste and smell return. That means you can enjoy food more than you have since you started smoking. Over the years  · After 1 year, your risk of heart disease is half what it would be if you kept smoking. · After 5 years, your risk of stroke starts to shrink. Within a few years after that, it's about the same as if you'd never smoked. · After 10 years, your risk of dying from lung cancer is cut by about half. And your risk for many other types of cancer is lower too. How would quitting help others in your life?   When you quit smoking, you improve the health of everyone who now breathes in your smoke. · Their heart, lung, and cancer risks drop, much like yours. · They are sick less. For babies and small children, living smoke-free means they're less likely to have ear infections, pneumonia, and bronchitis. · If you're a woman who is or will be pregnant someday, quitting smoking means a healthier . · Children who are close to you are less likely to become adult smokers. Where can you learn more? Go to http://ronn-bertin.info/. Enter 052 [id number] 72 11 in the search box to learn more about \"Learning About Benefits From Quitting Smoking. \"  Current as of: 2018  Content Version: 11.9  © 0744-4405 [employer]. Care instructions adapted under license by [employer] (which disclaims liability or warranty for this information). If you have questions about a medical condition or this instruction, always ask your healthcare professional. [place] [address] any warranty or liability for your use of this information. Patient Education        Chest Pain: Care Instructions  Your Care Instructions    There are many things that can cause chest pain. Some are not serious and will get better on their own in a few days. But some kinds of chest pain need more testing and treatment. Your doctor may have recommended a follow-up visit in the next 8 to 12 hours. If you are not getting better, you may need more tests or treatment. Even though your doctor has released you, you still need to watch for any problems. The doctor carefully checked you, but sometimes problems can develop later. If you have new symptoms or if your symptoms do not get better, get medical care right away. If you have worse or different chest pain or pressure that lasts more than 5 minutes or you passed out (lost consciousness), call 911 or seek other emergency help right away. A medical visit is only one step in your treatment.  Even if you feel better, you still need to do what your doctor recommends, such as going to all suggested follow-up appointments and taking medicines exactly as directed. This will help you recover and help prevent future problems. How can you care for yourself at home? · Rest until you feel better. · Take your medicine exactly as prescribed. Call your doctor if you think you are having a problem with your medicine. · Do not drive after taking a prescription pain medicine. When should you call for help? Call 911 if:    · You passed out (lost consciousness).     · You have severe difficulty breathing.     · You have symptoms of a heart attack. These may include:  ? Chest pain or pressure, or a strange feeling in your chest.  ? Sweating. ? Shortness of breath. ? Nausea or vomiting. ? Pain, pressure, or a strange feeling in your back, neck, jaw, or upper belly or in one or both shoulders or arms. ? Lightheadedness or sudden weakness. ? A fast or irregular heartbeat. After you call 911, the  may tell you to chew 1 adult-strength or 2 to 4 low-dose aspirin. Wait for an ambulance. Do not try to drive yourself.    Call your doctor today if:    · You have any trouble breathing.     · Your chest pain gets worse.     · You are dizzy or lightheaded, or you feel like you may faint.     · You are not getting better as expected.     · You are having new or different chest pain. Where can you learn more? Go to http://ronn-bertin.info/. Enter A120 in the search box to learn more about \"Chest Pain: Care Instructions. \"  Current as of: September 23, 2018  Content Version: 11.9  © 5474-9236 [employer]. Care instructions adapted under license by [employer] (which disclaims liability or warranty for this information).  If you have questions about a medical condition or this instruction, always ask your healthcare professional. Kristyn Berger disclaims any warranty or liability for your use of this information.

## 2019-03-05 NOTE — ED PROVIDER NOTES
EMERGENCY DEPARTMENT HISTORY AND PHYSICAL EXAM      Date: 3/5/2019  Patient Name: Silas Daniels    History of Presenting Illness     Chief Complaint   Patient presents with    Chest Pain     Thinks it might be acid reflux but ahs a hx of stents and was afraid to wait. History Provided By: Patient and EMS    HPI: Silas Daniels, 68 y.o. female with PMHx significant for HTN, CAD with stents, presents via EMS to the ED with cc of sudden onset substernal chest pain that woke her from her sleep at 0200. She states upon getting up the pain lasted ~5 minutes and has since resolved. EMS states upon arrival pt was able to ambulate to the ambulance, where EKG showed NSR. Pt reports her pain was c/w prior hx of GERD. She notes she was diaphoretic upon waking with the pain, however pt reports she has been waking with night sweats over the past month. Pt reports her last cardiac catheterization was several years ago, during which she had one stent placed, but has not had a stress test since. She denies prior hx of cholecystitis, pancreatitis, or PUD. Pt denies any calf pain or leg swelling. Pt has not yet had any ASA prior to ED arrival.     She specifically denies any recent fever, chills, nausea, vomiting, diarrhea, abd pain, SOB, lightheadedness, dizziness, numbness, weakness, tingling, BLE swelling, HA, heart palpitations, urinary sxs, changes in BM, changes in PO intake, melena, hematochezia, cough, or congestion. Allergies: Erythromycin, PCN, Crestor, HCTZ, Lipitor, PPI  PMHx: Significant for HTN, CAD  PSHx: Significant for tubal ligation, cardiac cath  Social Hx: + tobacco (0.25ppd x 30 years), - EtOH, - Illicit Drugs     There are no other complaints, changes, or physical findings at this time.     PCP: Cleo Frost MD  Cardiology: Star Suggs MD     Current Outpatient Medications   Medication Sig Dispense Refill    amLODIPine (NORVASC) 5 mg tablet TAKE 1 TABLET BY MOUTH EVERY DAY FOR BLOOD PRESSURE 90 Tab 3    metoprolol tartrate (LOPRESSOR) 25 mg tablet TAKE 1 TABLET BY MOUTH TWICE DAILY 180 Tab 1    losartan (COZAAR) 50 mg tablet Take 1 Tab by mouth daily. Replaces Valsartan due to recall. 90 Tab 3    lovastatin (MEVACOR) 40 mg tablet Take 1 Tab by mouth nightly. Refills per PCP who is performing labs 60 Tab 0    aspirin delayed-release 81 mg tablet Take  by mouth daily. Past History     Past Medical History:  Past Medical History:   Diagnosis Date    CAD (coronary artery disease)     stenting cardiac 6/5/2015    Hypertension     Vertigo 5/11/2012       Past Surgical History:  Past Surgical History:   Procedure Laterality Date    BREAST SURGERY PROCEDURE UNLISTED      left breast biopsy    HX BREAST BIOPSY Left     Long time ago --neg    HX HEENT      tonsils    HX TUBAL LIGATION         Family History:  Family History   Problem Relation Age of Onset    Heart Disease Mother     Heart Disease Father     Coronary Artery Disease Other         mother had stents in 63's or 66's       Social History:  Social History     Tobacco Use    Smoking status: Current Some Day Smoker     Packs/day: 0.25     Years: 30.00     Pack years: 7.50     Types: Cigarettes    Smokeless tobacco: Never Used   Substance Use Topics    Alcohol use: No    Drug use: No       Allergies: Allergies   Allergen Reactions    Eryc [Erythromycin] Hives    Influenza Virus Vaccine Tvs 2012-13 (18+) Cell Aguilar Other (comments)     \"felt like I was on fire\"    Pcn [Penicillins] Hives    Crestor [Rosuvastatin] Myalgia    Hydrochlorothiazide Other (comments)     hyponatremia    Lipitor [Atorvastatin] Myalgia    Proton Pump Inhibitors Other (comments)     dizziness         Review of Systems   Review of Systems   Constitutional: Positive for diaphoresis. Negative for appetite change, chills and fever. HENT: Negative. Negative for congestion. Eyes: Negative. Respiratory: Negative.   Negative for cough and shortness of breath. Cardiovascular: Positive for chest pain. Negative for palpitations and leg swelling. Gastrointestinal: Negative. Negative for abdominal pain, blood in stool, constipation, diarrhea, nausea and vomiting. Genitourinary: Negative. Negative for dysuria and frequency. Musculoskeletal: Negative. Skin: Negative. Neurological: Negative. Negative for dizziness, weakness, light-headedness, numbness and headaches. Psychiatric/Behavioral: Negative. All other systems reviewed and are negative.       Physical Exam   General appearance - overweight, well appearing, and in no distress  Eyes - pupils equal and reactive, extraocular eye movements intact  ENT - mucous membranes moist, pharynx normal without lesions  Neck - supple, no significant adenopathy; non-tender to palpation  Chest - clear to auscultation, no wheezes, rales or rhonchi; non-tender to palpation  Heart - normal rate and regular rhythm, S1 and S2 normal, no murmurs noted  Abdomen - soft, mild epigastric tenderness, nondistended, no masses or organomegaly  Musculoskeletal - no joint tenderness, deformity or swelling; normal ROM  Extremities - peripheral pulses normal, no pedal edema  Skin - normal coloration and turgor, no rashes  Neurological - alert, oriented x3, normal speech, no focal findings or movement disorder noted    Diagnostic Study Results     Labs -     Recent Results (from the past 12 hour(s))   EKG, 12 LEAD, INITIAL    Collection Time: 03/05/19  4:11 AM   Result Value Ref Range    Ventricular Rate 57 BPM    Atrial Rate 57 BPM    P-R Interval 140 ms    QRS Duration 82 ms    Q-T Interval 436 ms    QTC Calculation (Bezet) 424 ms    Calculated P Axis 62 degrees    Calculated R Axis 57 degrees    Calculated T Axis 40 degrees    Diagnosis       Sinus bradycardia with sinus arrhythmia  Nonspecific ST abnormality  When compared with ECG of 14-AUG-2015 17:14,  No significant change was found     CBC WITH AUTOMATED DIFF Collection Time: 03/05/19  5:00 AM   Result Value Ref Range    WBC 8.7 3.6 - 11.0 K/uL    RBC 3.83 3.80 - 5.20 M/uL    HGB 12.9 11.5 - 16.0 g/dL    HCT 36.5 35.0 - 47.0 %    MCV 95.3 80.0 - 99.0 FL    MCH 33.7 26.0 - 34.0 PG    MCHC 35.3 30.0 - 36.5 g/dL    RDW 11.9 11.5 - 14.5 %    PLATELET 509 957 - 030 K/uL    MPV 10.2 8.9 - 12.9 FL    NRBC 0.0 0  WBC    ABSOLUTE NRBC 0.00 0.00 - 0.01 K/uL    NEUTROPHILS 73 32 - 75 %    LYMPHOCYTES 17 12 - 49 %    MONOCYTES 7 5 - 13 %    EOSINOPHILS 2 0 - 7 %    BASOPHILS 1 0 - 1 %    IMMATURE GRANULOCYTES 0 0.0 - 0.5 %    ABS. NEUTROPHILS 6.3 1.8 - 8.0 K/UL    ABS. LYMPHOCYTES 1.5 0.8 - 3.5 K/UL    ABS. MONOCYTES 0.6 0.0 - 1.0 K/UL    ABS. EOSINOPHILS 0.2 0.0 - 0.4 K/UL    ABS. BASOPHILS 0.1 0.0 - 0.1 K/UL    ABS. IMM. GRANS. 0.0 0.00 - 0.04 K/UL    DF AUTOMATED     METABOLIC PANEL, COMPREHENSIVE    Collection Time: 03/05/19  5:00 AM   Result Value Ref Range    Sodium 132 (L) 136 - 145 mmol/L    Potassium 3.7 3.5 - 5.1 mmol/L    Chloride 100 97 - 108 mmol/L    CO2 23 21 - 32 mmol/L    Anion gap 9 5 - 15 mmol/L    Glucose 157 (H) 65 - 100 mg/dL    BUN 12 6 - 20 MG/DL    Creatinine 0.61 0.55 - 1.02 MG/DL    BUN/Creatinine ratio 20 12 - 20      GFR est AA >60 >60 ml/min/1.73m2    GFR est non-AA >60 >60 ml/min/1.73m2    Calcium 8.5 8.5 - 10.1 MG/DL    Bilirubin, total 0.7 0.2 - 1.0 MG/DL    ALT (SGPT) 17 12 - 78 U/L    AST (SGOT) 19 15 - 37 U/L    Alk.  phosphatase 40 (L) 45 - 117 U/L    Protein, total 7.3 6.4 - 8.2 g/dL    Albumin 3.8 3.5 - 5.0 g/dL    Globulin 3.5 2.0 - 4.0 g/dL    A-G Ratio 1.1 1.1 - 2.2     CK W/ REFLX CKMB    Collection Time: 03/05/19  5:00 AM   Result Value Ref Range    CK 76 26 - 192 U/L   TROPONIN I    Collection Time: 03/05/19  5:00 AM   Result Value Ref Range    Troponin-I, Qt. <0.05 <0.05 ng/mL   SAMPLES BEING HELD    Collection Time: 03/05/19  5:00 AM   Result Value Ref Range    SAMPLES BEING HELD 1BLUE     COMMENT        Add-on orders for these samples will be processed based on acceptable specimen integrity and analyte stability, which may vary by analyte. LIPASE    Collection Time: 03/05/19  5:00 AM   Result Value Ref Range    Lipase 114 73 - 393 U/L       Radiologic Studies -   XR CHEST PORT   Final Result   IMPRESSION: No acute findings        CT Results  (Last 48 hours)    None        CXR Results  (Last 48 hours)               03/05/19 0510  XR CHEST PORT Final result    Impression:  IMPRESSION: No acute findings       Narrative:  EXAM: XR CHEST PORT       INDICATION: chest pain       COMPARISON: 2015       FINDINGS: A portable AP radiograph of the chest was obtained at 0458 hours. The   patient is on a cardiac monitor. There is atherosclerosis of the aorta. The   lungs are clear. The cardiac and mediastinal contours and pulmonary vascularity   are normal.  There are degenerative changes of the shoulders. Medical Decision Making   I am the first provider for this patient. I reviewed the vital signs, available nursing notes, past medical history, past surgical history, family history and social history. Vital Signs-Reviewed the patient's vital signs. Patient Vitals for the past 12 hrs:   Temp Pulse Resp BP SpO2   03/05/19 0600  63 16 139/63 99 %   03/05/19 0530  66 18 136/82 99 %   03/05/19 0500  60 16 131/60 98 %   03/05/19 0451  64 18 139/70 99 %   03/05/19 0402 97.2 °F (36.2 °C) 64 14 163/63 100 %       Pulse Oximetry Analysis - 100% on RA    Cardiac Monitor:   Rate: 64 bpm  Rhythm: Sinus Rhythm      EKG interpretation: (Preliminary) 0411  Rhythm: sinus bradycardia; and regular . Rate (approx.): 57; Axis: normal; QRS interval: normal; QTc: normal; ST/T wave: non-specific changes  Written by RADHA Garrido, as dictated by Keon Loredo MD    EKG interpretation: (Repeat) 8429  Rhythm: normal sinus rhythm; and regular . Rate (approx.): 70;  Axis: normal; IA interval: normal; QRS interval: normal ; ST/T wave: non specific ST depressions in inferior lateral leads, no STEMI; Other findings: unchanged from previous EKG at 0411. Records Reviewed: Nursing Notes, Old Medical Records, Previous electrocardiograms, Ambulance Run Sheet, Previous Radiology Studies and Previous Laboratory Studies    Provider Notes (Medical Decision Making):   DDx: ACS, GERD, pancreatitis, cholecystitis     ED Course:   Initial assessment performed. The patients presenting problems have been discussed, and they are in agreement with the care plan formulated and outlined with them. I have encouraged them to ask questions as they arise throughout their visit. Medications Given in the ED:    Medications   aspirin chewable tablet 324 mg (324 mg Oral Given 3/5/19 0554)   famotidine (PF) (PEPCID) injection 20 mg (20 mg IntraVENous Given 3/5/19 0554)            Tobacco Counseling  Discussed the risks of smoking and the benefits of smoking cessation as well as the long term sequelae of smoking with the patient. The patient verbalized their understanding. Counseled patient for approximately 3 minutes. Progress note:  9:33 AM  Pt noted to be feeling better and has continued to be pain free, ready for discharge. Updated pt and/or family on all final lab and imaging findings. Pt has upcoming appointment with Dr. Lenard Medrano, she was advised to follow up as planned. Specific return precautions provided as well as instructions to return to the ED should sx worsen at any time. Vital signs stable for discharge. Critical Care Time:   None    Disposition:  Discharge Note:  9:32 AM  The patient is ready for discharge. The patient's signs, symptoms, diagnosis, and discharge instruction have been discussed and the patient has conveyed their understanding. The patient is to follow up as recommended or return to the ER should their symptoms worsen. Plan has been discussed and the patient is in agreement. Written by Rigoberto Yen ED Scribe, as dictated by Lon Barrera. Jhoan Sadler MD    PLAN:  1. Current Discharge Medication List        2. Follow-up Information     Follow up With Specialties Details Why Contact Info    [facility] EMERGENCY DEPT Emergency Medicine  If symptoms worsen [address] Cornelius Cooks, MD Cardiology, [address] [facility] Vascular Surgery, Internal Medicine Call today for outpatient walking stress test [address]  [address] [address] [phone]          Return to ED if worse     Diagnosis     Clinical Impression:   1. Acute chest pain    2. Smoking addiction        Attestations: This note is prepared by Nancylee Spatz, acting as Scribe for Sofiya Martinez MD.    Dorene Zepeda MD: The scribe's documentation has been prepared under my direction and personally reviewed by me in its entirety. I confirm that the note above accurately reflects all work, treatment, procedures, and medical decision making performed by me. This note will not be viewable in [address]

## 2019-03-05 NOTE — ED TRIAGE NOTES
Pt presents from home via walk in triage c/o mid sternal chest \"burning\" since 0330. Pt reports it woke her up from sleep. Denies nausea or vomiting or Sob. Reports periodic episodes of night sweats for a month. NSR on telemetry. VSS. NAD currently.

## 2019-03-05 NOTE — ED NOTES
Patient discharged by Dr. Carlos Eduardo Courtney. Patient provided with discharge instructions Rx and instructions on follow up care. Patient out of ED ambulatory accompanied by family.

## 2019-03-07 RX ORDER — METOPROLOL TARTRATE 25 MG/1
TABLET, FILM COATED ORAL
Qty: 180 TAB | Refills: 1 | Status: SHIPPED | OUTPATIENT
Start: 2019-03-07 | End: 2019-08-25 | Stop reason: SDUPTHER

## 2019-03-14 ENCOUNTER — TELEPHONE (OUTPATIENT)
Dept: CARDIOLOGY CLINIC | Age: 74
End: 2019-03-14

## 2019-03-14 ENCOUNTER — OFFICE VISIT (OUTPATIENT)
Dept: CARDIOLOGY CLINIC | Age: 74
End: 2019-03-14

## 2019-03-14 VITALS
DIASTOLIC BLOOD PRESSURE: 70 MMHG | OXYGEN SATURATION: 98 % | WEIGHT: 153.1 LBS | HEART RATE: 70 BPM | HEIGHT: 61 IN | SYSTOLIC BLOOD PRESSURE: 150 MMHG | BODY MASS INDEX: 28.9 KG/M2 | RESPIRATION RATE: 16 BRPM

## 2019-03-14 DIAGNOSIS — I25.10 CORONARY ARTERY DISEASE INVOLVING NATIVE CORONARY ARTERY OF NATIVE HEART WITHOUT ANGINA PECTORIS: ICD-10-CM

## 2019-03-14 DIAGNOSIS — I10 ESSENTIAL HYPERTENSION, BENIGN: ICD-10-CM

## 2019-03-14 DIAGNOSIS — F17.200 TOBACCO USE DISORDER: ICD-10-CM

## 2019-03-14 DIAGNOSIS — I25.10 ASHD (ARTERIOSCLEROTIC HEART DISEASE): Primary | ICD-10-CM

## 2019-03-14 DIAGNOSIS — E78.2 MIXED HYPERLIPIDEMIA: ICD-10-CM

## 2019-03-14 DIAGNOSIS — Z82.49 FAMILY HISTORY OF ISCHEMIC HEART DISEASE: ICD-10-CM

## 2019-03-14 RX ORDER — IRBESARTAN 75 MG/1
75 TABLET ORAL
Qty: 90 TAB | Refills: 3 | Status: SHIPPED | OUTPATIENT
Start: 2019-03-14 | End: 2019-09-16

## 2019-03-14 RX ORDER — RANITIDINE HCL 75 MG
TABLET ORAL 2 TIMES DAILY
COMMUNITY
End: 2019-04-10

## 2019-03-14 NOTE — PROGRESS NOTES
1. Have you been to the ER, urgent care clinic since your last visit? Hospitalized since your last visit? 3/5/19 chest discomfort    2. Have you seen or consulted any other health care providers outside of the [address] [place] [address] since your last visit? Yes PCP       Chief Complaint   Patient presents with   [facility] Follow Up     Chest discomfort & palpitations      Patient has noted some stomach distress seen in ED recently due to this here for further evaluation.

## 2019-03-14 NOTE — PROGRESS NOTES
Herberth Gordon DNP, ANP-BC  Subjective/HPI:     Tracy Foley is a 68 y.o. female is here for emergency room follow-up where patient presented with intermittent anterior chest discomfort which she reports unsure of cardiac versus indigestion. To recall she has a history of PTCA stenting 2015, hypertension hyperlipidemia. Remains an active smoker. In review of systems she admits to a persistent level of fatigue and generalized weakness for more than 1 year. She denies any excessive nausea vomiting or diaphoresis. Discussed her amlodipine dosing. She reports she will take it as needed if her systolic blood pressure is running greater than 150 mmHg. PCP Provider  Wellington Valdez MD  Past Medical History:   Diagnosis Date    CAD (coronary artery disease)     stenting cardiac 6/5/2015    Hypertension     Vertigo 5/11/2012      Past Surgical History:   Procedure Laterality Date    BREAST SURGERY PROCEDURE UNLISTED      left breast biopsy    HX BREAST BIOPSY Left     Long time ago --neg    HX HEENT      tonsils    HX TUBAL LIGATION       Allergies   Allergen Reactions    Eryc [Erythromycin] Hives    Influenza Virus Vaccine [facility] 2012-13 (18+) Cell Aguilar Other (comments)     \"felt like I was on fire\"    Pcn [Penicillins] Hives    Crestor [Rosuvastatin] Myalgia    Hydrochlorothiazide Other (comments)     hyponatremia    Lipitor [Atorvastatin] Myalgia    Proton Pump Inhibitors Other (comments)     dizziness      Family History   Problem Relation Age of Onset    Heart Disease Mother     Heart Disease Father     Coronary Artery Disease Other         mother had stents in 63's or 66's      Current Outpatient Medications   Medication Sig    raNITIdine (ZANTAC 75) 75 mg tab Take  by mouth two (2) times a day.     metoprolol tartrate (LOPRESSOR) 25 mg tablet TAKE 1 TABLET BY MOUTH TWICE DAILY    amLODIPine (NORVASC) 5 mg tablet TAKE 1 TABLET BY MOUTH EVERY DAY FOR BLOOD PRESSURE (Patient taking differently: as needed. TAKE 1 TABLET BY MOUTH EVERY DAY FOR BLOOD PRESSURE If systolic >256/82 will 2.5 mg)    losartan (COZAAR) 50 mg tablet Take 1 Tab by mouth daily. Replaces Valsartan due to recall.  lovastatin (MEVACOR) 40 mg tablet Take 1 Tab by mouth nightly. Refills per PCP who is performing labs    aspirin delayed-release 81 mg tablet Take  by mouth daily. No current facility-administered medications for this visit. Vitals:    03/14/19 1139 03/14/19 1140   BP: 140/60 150/70   Pulse: 70    Resp: 16    SpO2: 98%    Weight: 153 lb 1.6 oz (69.4 kg)    Height: 5' 1\" (1.549 m)      Social History     Socioeconomic History    Marital status: SINGLE     Spouse name: Not on file    Number of children: Not on file    Years of education: Not on file    Highest education level: Not on file   Social Needs    Financial resource strain: Not on file    Food insecurity - worry: Not on file    Food insecurity - inability: Not on file   [employer] needs - medical: Not on file   [employer] needs - non-medical: Not on file   Occupational History    Not on file   Tobacco Use    Smoking status: Current Some Day Smoker     Packs/day: 0.25     Years: 30.00     Pack years: 7.50     Types: Cigarettes    Smokeless tobacco: Never Used   Substance and Sexual Activity    Alcohol use: No    Drug use: No    Sexual activity: Not Currently   Other Topics Concern    Not on file   Social History Narrative    Not on file       I have reviewed the nurses notes, vitals, problem list, allergy list, medical history, family, social history and medications. Review of Symptoms:    General: Pt denies excessive weight gain or loss. Pt is able to conduct ADL's  HEENT: Denies blurred vision, headaches, epistaxis and difficulty swallowing. Respiratory: Denies shortness of breath, + intermittent WILEY, wheezing or stridor.   Cardiovascular: + Chest discomfort, denies palpitations, edema or PND  Gastrointestinal: Denies poor appetite, indigestion, abdominal pain or blood in stool  Musculoskeletal: Denies pain or swelling from muscles or joints  Neurologic: Denies tremor, paresthesias, or sensory motor disturbance  Skin: Denies rash, itching or texture change. Physical Exam:      General: Well developed, in no acute distress, cooperative and alert  HEENT: No carotid bruits, no JVD, trach is midline. Neck Supple, PEERL, EOM intact. Heart:  Normal S1/S2 negative S3 or S4. Regular, no murmur, gallop or rub.   Respiratory: Clear bilaterally x 4, no wheezing or rales  Abdomen:   Soft, non-tender, no masses, bowel sounds are active.   Extremities:  No edema, normal cap refill, no cyanosis, atraumatic. Neuro: A&Ox3, speech clear, gait stable. Skin: Skin color is normal. No rashes or lesions.  Non diaphoretic  Vascular: 2+ pulses symmetric bi lateral radial arteries  Cardiographics    ECG: Sinus rhythm  Results for orders placed or performed during the hospital encounter of 03/05/19   EKG, 12 LEAD, INITIAL   Result Value Ref Range    Ventricular Rate 57 BPM    Atrial Rate 57 BPM    P-R Interval 140 ms    QRS Duration 82 ms    Q-T Interval 436 ms    QTC Calculation (Bezet) 424 ms    Calculated P Axis 62 degrees    Calculated R Axis 57 degrees    Calculated T Axis 40 degrees    Diagnosis       Sinus bradycardia with sinus arrhythmia  Nonspecific ST abnormality  Confirmed by Chucho Flores ([id number]) on 3/5/2019 9:04:17 PM     Results for orders placed or performed in visit on 02/13/13   HOLTER MONITOR, 24 HOURS    Narrative    ECG Monitor/24 hours, Complete    Reason for Holter Monitor   palpitations    Heartbeat    Slowest 51  Average 74  Fastest  143    Results:   Underlying Rhythm: Normal sinus rhythm      Atrial Arrhythmias: premature atrial contractions; rare, one  6-beat run of PSVT at about 160 BPM.            AV Conduction: normal    Ventricular Arrhythmias: premature ventricular contractions; rare    ST Segment Analysis:non-specific changes     Symptom Correlation:  Dizziness and arm pain correspond to NSR    Comment:   Normal sinus rhythm with one 6 beat run of PSVT, rare PAC's and  PVC's without symptoms dring PSVT. Gagan Barth MD               Cardiology Labs:  Lab Results   Component Value Date/Time    Cholesterol, total 128 10/23/2015 08:15 AM    HDL Cholesterol 51 10/23/2015 08:15 AM    LDL, calculated 66 10/23/2015 08:15 AM    Triglyceride 54 10/23/2015 08:15 AM       Lab Results   Component Value Date/Time    Sodium 132 (L) 03/05/2019 05:00 AM    Potassium 3.7 03/05/2019 05:00 AM    Chloride 100 03/05/2019 05:00 AM    CO2 23 03/05/2019 05:00 AM    Anion gap 9 03/05/2019 05:00 AM    Glucose 157 (H) 03/05/2019 05:00 AM    BUN 12 03/05/2019 05:00 AM    Creatinine 0.61 03/05/2019 05:00 AM    BUN/Creatinine ratio 20 03/05/2019 05:00 AM    GFR est AA >60 03/05/2019 05:00 AM    GFR est non-AA >60 03/05/2019 05:00 AM    Calcium 8.5 03/05/2019 05:00 AM    Bilirubin, total 0.7 03/05/2019 05:00 AM    AST (SGOT) 19 03/05/2019 05:00 AM    Alk. phosphatase 40 (L) 03/05/2019 05:00 AM    Protein, total 7.3 03/05/2019 05:00 AM    Albumin 3.8 03/05/2019 05:00 AM    Globulin 3.5 03/05/2019 05:00 AM    A-G Ratio 1.1 03/05/2019 05:00 AM    ALT (SGPT) 17 03/05/2019 05:00 AM           Assessment:     Assessment:     Diagnoses and all orders for this visit:    1. ASHD (arteriosclerotic heart disease)    2. Coronary artery disease involving native coronary artery of native heart without angina pectoris  -     AMB POC EKG ROUTINE W/ 12 LEADS, INTER & REP  -     ECHO ADULT COMPLETE; Future  -     NUCLEAR CARDIAC STRESS TEST; Future    3. Essential hypertension, benign    4. Family history of ischemic heart disease    5. Mixed hyperlipidemia    6. Tobacco use disorder        ICD-10-CM ICD-9-CM    1. ASHD (arteriosclerotic heart disease) I25.10 414.00    2.  Coronary artery disease involving native coronary artery of native heart without angina pectoris I25.10 414.01 AMB POC EKG ROUTINE W/ 12 LEADS, INTER & REP      ECHO ADULT COMPLETE      NUCLEAR CARDIAC STRESS TEST   3. Essential hypertension, benign I10 401.1    4. Family history of ischemic heart disease Z82.49 V17.3    5. Mixed hyperlipidemia E78.2 272.2    6. Tobacco use disorder F17.200 305.1      Orders Placed This Encounter    AMB POC EKG ROUTINE W/ 12 LEADS, INTER & REP     Order Specific Question:   Reason for Exam:     Answer:   routine    raNITIdine (ZANTAC 75) 75 mg tab     Sig: Take  by mouth two (2) times a day. Plan:     1. Atherosclerotic heart disease: Post ER visit for anterior chest discomfort with history of PTCA stenting 2015 for remaining an active smoker will evaluate for ischemia with Lexiscan nuclear stress test and echocardiogram.  2.  Hypertension: Discussed function of amlodipine for hypertension and antianginal effects and she is willing to take on a daily basis and will monitor her blood pressure daily. 3.  Hyperlipidemia: On statin therapy she plans to follow-up with primary care for repeat labs  4. Tobacco abuse: Discussed cessation with patient she is trying to work on it. Follow-up with Dr. Lisa Fox when testing complete    Mary Swann NP    This note was created using voice recognition software. Despite editing, there may be syntax errors.

## 2019-04-02 ENCOUNTER — TELEPHONE (OUTPATIENT)
Dept: CARDIOLOGY CLINIC | Age: 74
End: 2019-04-02

## 2019-04-02 RX ORDER — LOVASTATIN 40 MG/1
40 TABLET ORAL
Qty: 90 TAB | Refills: 1 | Status: SHIPPED | OUTPATIENT
Start: 2019-04-02

## 2019-04-02 NOTE — TELEPHONE ENCOUNTER
Pt wants our office to fill her lovastatin script instead of her pcp.   Thanks, [employer] Maya

## 2019-04-10 ENCOUNTER — OFFICE VISIT (OUTPATIENT)
Dept: CARDIOLOGY CLINIC | Age: 74
End: 2019-04-10

## 2019-04-10 VITALS
WEIGHT: 154.1 LBS | SYSTOLIC BLOOD PRESSURE: 138 MMHG | HEART RATE: 80 BPM | BODY MASS INDEX: 29.09 KG/M2 | OXYGEN SATURATION: 96 % | RESPIRATION RATE: 16 BRPM | HEIGHT: 61 IN | DIASTOLIC BLOOD PRESSURE: 70 MMHG

## 2019-04-10 DIAGNOSIS — I10 ESSENTIAL HYPERTENSION, BENIGN: ICD-10-CM

## 2019-04-10 DIAGNOSIS — F17.200 TOBACCO USE DISORDER: ICD-10-CM

## 2019-04-10 DIAGNOSIS — Z98.61 S/P PTCA (PERCUTANEOUS TRANSLUMINAL CORONARY ANGIOPLASTY): ICD-10-CM

## 2019-04-10 DIAGNOSIS — I25.10 CORONARY ARTERY DISEASE INVOLVING NATIVE CORONARY ARTERY OF NATIVE HEART WITHOUT ANGINA PECTORIS: Primary | ICD-10-CM

## 2019-04-10 DIAGNOSIS — E78.2 MIXED HYPERLIPIDEMIA: ICD-10-CM

## 2019-04-10 RX ORDER — DOXYCYCLINE HYCLATE 100 MG
100 TABLET ORAL 2 TIMES DAILY
Refills: 0 | COMMUNITY
Start: 2019-04-08 | End: 2019-10-11

## 2019-04-10 NOTE — PROGRESS NOTES
Moni Pope, ARGENIS-BC Subjective/HPI:  
 
Ms. Gricel Gilliland is a 68 y.o. female is here to discuss results of stress tests. She has a PMHx of CAD, HTN, HLD and tobacco abuse. She is doing well. She has not had recurrent symptoms of chest pain since last visit. She reports no shortness of breath symptoms or edema. She reports poor eating habits. She eats the same meal every day: peanut butter on a slice of bread, red rice, frozen vegetables and shredded cheese. Some nights, she'll indulge in coffee ice cream. 
    
 
PCP Provider Travis May MD 
 
Patient Active Problem List  
Diagnosis Code  Vertigo R42  Family history of ischemic heart disease Z82.49  
 Essential hypertension, benign I10  
 SVT (supraventricular tachycardia) (HCC) I47.1  Hyposmolality and/or hyponatremia E87.1  Tobacco use disorder F17.200  CAD (coronary artery disease), native coronary artery I25.10  
 S/P PTCA (percutaneous transluminal coronary angioplasty) Z98.61  
 Mixed hyperlipidemia E78.2 Past Surgical History:  
Procedure Laterality Date  BREAST SURGERY PROCEDURE UNLISTED    
 left breast biopsy  HX BREAST BIOPSY Left Long time ago --neg  HX HEENT    
 tonsils  HX TUBAL LIGATION Family History Problem Relation Age of Onset  Heart Disease Mother  Heart Disease Father  Coronary Artery Disease Other   
     mother had stents in 60's or 66's Social History Socioeconomic History  Marital status: SINGLE Spouse name: Not on file  Number of children: Not on file  Years of education: Not on file  Highest education level: Not on file Occupational History  Not on file Social Needs  Financial resource strain: Not on file  Food insecurity:  
  Worry: Not on file Inability: Not on file  Transportation needs:  
  Medical: Not on file Non-medical: Not on file Tobacco Use  Smoking status: Current Some Day Smoker Packs/day: 0.25 Years: 30.00 Pack years: 7.50 Types: Cigarettes  Smokeless tobacco: Never Used Substance and Sexual Activity  Alcohol use: No  
 Drug use: No  
 Sexual activity: Not Currently Lifestyle  Physical activity:  
  Days per week: Not on file Minutes per session: Not on file  Stress: Not on file Relationships  Social connections:  
  Talks on phone: Not on file Gets together: Not on file Attends Buddhist service: Not on file Active member of club or organization: Not on file Attends meetings of clubs or organizations: Not on file Relationship status: Not on file  Intimate partner violence:  
  Fear of current or ex partner: Not on file Emotionally abused: Not on file Physically abused: Not on file Forced sexual activity: Not on file Other Topics Concern  Not on file Social History Narrative  Not on file Allergies Allergen Reactions  Eryc [Erythromycin] Hives  Influenza Virus Vaccine [facility] 2012-13 (18+) Cell Aguilar Other (comments) \"felt like I was on fire\"  Pcn [Penicillins] Hives  Crestor [Rosuvastatin] Myalgia  Hydrochlorothiazide Other (comments)  
  hyponatremia  Lipitor [Atorvastatin] Myalgia  Proton Pump Inhibitors Other (comments)  
  dizziness Current Outpatient Medications Medication Sig  
 doxycycline (VIBRA-TABS) 100 mg tablet Take 100 mg by mouth two (2) times a day.  lovastatin (MEVACOR) 40 mg tablet Take 1 Tab by mouth nightly. Refills per PCP who is performing labs  irbesartan (AVAPRO) 75 mg tablet Take 1 Tab by mouth nightly. Replaces Valsartan and Losartan due to recall  metoprolol tartrate (LOPRESSOR) 25 mg tablet TAKE 1 TABLET BY MOUTH TWICE DAILY  amLODIPine (NORVASC) 5 mg tablet TAKE 1 TABLET BY MOUTH EVERY DAY FOR BLOOD PRESSURE (Patient taking differently: 5 mg. TAKE 1 TABLET BY MOUTH EVERY DAY FOR BLOOD PRESSURE)  aspirin delayed-release 81 mg tablet Take  by mouth daily. No current facility-administered medications for this visit. I have reviewed the problem list, allergy list, medical history, family, social history and medications. Review of Symptoms: 
 
Review of Systems Constitutional: Negative for chills, fever and weight loss. HENT: Negative for nosebleeds. Eyes: Negative for blurred vision and double vision. Respiratory: Negative for cough, shortness of breath and wheezing. Cardiovascular: Negative for chest pain, palpitations, orthopnea, leg swelling and PND. Gastrointestinal: Negative for abdominal pain, blood in stool, diarrhea, nausea and vomiting. Musculoskeletal: Negative for joint pain. Skin: Negative for rash. Neurological: Negative for dizziness, tingling and loss of consciousness. Endo/Heme/Allergies: Does not bruise/bleed easily. Physical Exam:   
 
General: Well developed, in no acute distress, cooperative and alert HEENT: No carotid bruits, no JVD, trach is midline. Neck Supple, PEERL, EOM intact. Heart:  reg rate and rhythm; normal S1/S2; no murmurs, gallops or rubs. Respiratory: Clear bilaterally x 4, no wheezing or rales Abdomen:   Soft, non-tender, no distention, no masses. + BS. Extremities:  Normal cap refill, no cyanosis, atraumatic. No edema. Neuro: A&Ox3, speech clear, gait stable. Skin: Skin color is normal. No rashes or lesions. Non diaphoretic Vascular: 2+ pulses symmetric in all extremities Vitals:  
 04/10/19 1504 04/10/19 1517 BP: 130/64 138/70 Pulse: 80 Resp: 16 SpO2: 96% Weight: 154 lb 1.6 oz (69.9 kg) Height: 5' 1\" (1.549 m) Cardiographics Results for orders placed or performed during the hospital encounter of 03/05/19 EKG, 12 LEAD, INITIAL Result Value Ref Range Ventricular Rate 57 BPM  
 Atrial Rate 57 BPM  
 P-R Interval 140 ms QRS Duration 82 ms  Q-T Interval 436 ms  
 QTC Calculation (Bezet) 424 ms Calculated P Axis 62 degrees Calculated R Axis 57 degrees Calculated T Axis 40 degrees Diagnosis Sinus bradycardia with sinus arrhythmia Nonspecific ST abnormality Confirmed by Damian Song ([id number]) on 3/5/2019 9:04:17 PM 
  
Results for orders placed or performed in visit on 02/13/13 HOLTER MONITOR, 24 HOURS Narrative ECG Monitor/24 hours, Complete Reason for Holter Monitor   palpitations Heartbeat Slowest 51 Average 74 Fastest  143 Results:  
Underlying Rhythm: Normal sinus rhythm Atrial Arrhythmias: premature atrial contractions; rare, one 
6-beat run of PSVT at about 160 BPM. AV Conduction: normal 
 
Ventricular Arrhythmias: premature ventricular contractions; rare ST Segment Analysis:non-specific changes Symptom Correlation: 
Dizziness and arm pain correspond to NSR Comment:  
Normal sinus rhythm with one 6 beat run of PSVT, rare PAC's and PVC's without symptoms dring PSVT. Laverne Ratliff MD   
 
  
 
 
Cardiology Labs: 
Lab Results Component Value Date/Time Cholesterol, total 128 10/23/2015 08:15 AM  
 HDL Cholesterol 51 10/23/2015 08:15 AM  
 LDL, calculated 66 10/23/2015 08:15 AM  
 Triglyceride 54 10/23/2015 08:15 AM  
 
 
Lab Results Component Value Date/Time Sodium 132 (L) 03/05/2019 05:00 AM  
 Potassium 3.7 03/05/2019 05:00 AM  
 Chloride 100 03/05/2019 05:00 AM  
 CO2 23 03/05/2019 05:00 AM  
 Anion gap 9 03/05/2019 05:00 AM  
 Glucose 157 (H) 03/05/2019 05:00 AM  
 BUN 12 03/05/2019 05:00 AM  
 Creatinine 0.61 03/05/2019 05:00 AM  
 BUN/Creatinine ratio 20 03/05/2019 05:00 AM  
 GFR est AA >60 03/05/2019 05:00 AM  
 GFR est non-AA >60 03/05/2019 05:00 AM  
 Calcium 8.5 03/05/2019 05:00 AM  
 Bilirubin, total 0.7 03/05/2019 05:00 AM  
 AST (SGOT) 19 03/05/2019 05:00 AM  
 Alk.  phosphatase 40 (L) 03/05/2019 05:00 AM  
 Protein, total 7.3 03/05/2019 05:00 AM  
 Albumin 3.8 03/05/2019 05:00 AM  
 Globulin 3.5 03/05/2019 05:00 AM  
 A-G Ratio 1.1 03/05/2019 05:00 AM  
 ALT (SGPT) 17 03/05/2019 05:00 AM  
  
 
Orders Placed This Encounter  doxycycline (VIBRA-TABS) 100 mg tablet Sig: Take 100 mg by mouth two (2) times a day. Refill:  0 Assessment: 
 
 Assessment: ICD-10-CM ICD-9-CM 1. Coronary artery disease involving native coronary artery of native heart without angina pectoris I25.10 414.01   
2. Essential hypertension, benign I10 401.1 3. Mixed hyperlipidemia E78.2 272.2 Plan: 1. Coronary artery disease involving native coronary artery of native heart without angina pectoris S/p RAUL to mid LAD in 6/2015 [place] 4/2019 without evidence of ischemia with apical apex infarct, which is known. Echo in 4/2019 with preserved LVEF 56-60% with grade 1 DD and mild-mod TR. Continue with medical management. Discussed again, importance of amlodipine daily with all other medications for CV benefit. 2. Essential hypertension, benign BP controlled; continue anti-hypertensive therapy and low sodium diet. 3. Mixed hyperlipidemia LDL 65 in 4/2019. See scanned documents. Continue statin therapy. Discussed low fat, low cholesterol diet. Recommended she avoid ice cream regularly. Encouraged more fruits and vegetables. 4.  Tobacco abuse Continue to smoke daily. We discussed smoking cessation again today. She had quit once using hypnosis therapy and is interested in this. Given phone numbers for three places in the [facility] that perform hypnosis therapy with specializing in smoking cessation. 5 minutes spent on smoking cessation. Follow up in 6 months, sooner PRN.   
 
 
Gama Justin MD

## 2019-04-10 NOTE — PROGRESS NOTES
1. Have you been to the ER, urgent care clinic since your last visit? Hospital since your last visit? NO 
 
2. Have you seen or consulted any other health care providers outside of the [address] [place] [address] since your last visit? Include any pap smears or colon screening. YES, PCP. RESULTS. NO ADDITIONAL CARDIAC C/O.

## 2019-08-26 RX ORDER — METOPROLOL TARTRATE 25 MG/1
TABLET, FILM COATED ORAL
Qty: 180 TAB | Refills: 1 | Status: SHIPPED | OUTPATIENT
Start: 2019-08-26 | End: 2020-02-25

## 2019-09-27 ENCOUNTER — TELEPHONE (OUTPATIENT)
Dept: CARDIOLOGY CLINIC | Age: 74
End: 2019-09-27

## 2019-09-27 NOTE — TELEPHONE ENCOUNTER
----- Message from Michelle Collins NP sent at 9/16/2019  4:31 PM EDT -----  I received a request for Losartan which I just filled. I discontinued the Irbesartan. Please make sure she doesn't take both. She was only place on Irbesartan d/t Losartan recall which I guess is now resolved.

## 2019-10-10 NOTE — PROGRESS NOTES
[address] [place] [address], [place], [address] [facility]  [phone]     Subjective:      Stephanie Pandya is a 68 y.o. female is here for routine f/u on ASHD HTN HLD  Her main concern is her recurrent UTI x 2-3 mos----now seeing Dr Bee Myers urologist who wants to get CT. She tells me that  She did not take her antibiotic today. Her BP has been up when she was sick but then improves when well. Reports unchanged occasional brandon, continues to smoke. She is able to do her housework and yardwork with no exertional cp. The patient denies chest pain,  orthopnea, PND, LE edema, palpitations, syncope, or presyncope.        Patient Active Problem List    Diagnosis Date Noted    Mixed hyperlipidemia 06/23/2017    CAD (coronary artery disease), native coronary artery 06/04/2015    S/P PTCA (percutaneous transluminal coronary angioplasty) 06/04/2015    Family history of ischemic heart disease 02/15/2013    Essential hypertension, benign 02/15/2013    SVT (supraventricular tachycardia) ([facility] [address] 02/15/2013    Hyposmolality and/or hyponatremia 02/15/2013    Tobacco use disorder 02/15/2013    Vertigo 05/11/2012      Wendy Daley MD  Past Medical History:   Diagnosis Date    CAD (coronary artery disease)     stenting cardiac 6/5/2015    Hypertension     Vertigo 5/11/2012      Past Surgical History:   Procedure Laterality Date    BREAST SURGERY PROCEDURE UNLISTED      left breast biopsy    HX BREAST BIOPSY Left     Long time ago --neg    HX HEENT      tonsils    HX TUBAL LIGATION       Allergies   Allergen Reactions    Eryc [Erythromycin] Hives    Influenza Virus Vaccine [facility] 2012-13 (18+) Cell Aguilar Other (comments)     \"felt like I was on fire\"    Pcn [Penicillins] Hives    Crestor [Rosuvastatin] Myalgia    Hydrochlorothiazide Other (comments)     hyponatremia    Lipitor [Atorvastatin] Myalgia    Proton Pump Inhibitors Other (comments)     dizziness      Family History   Problem Relation Age of Onset  Heart Disease Mother     Heart Disease Father     Coronary Artery Disease Other         mother had stents in 63's or 66's      Social History     Socioeconomic History    Marital status: SINGLE     Spouse name: Not on file    Number of children: Not on file    Years of education: Not on file    Highest education level: Not on file   Occupational History    Not on file   Social Needs    Financial resource strain: Not on file    Food insecurity:     Worry: Not on file     Inability: Not on file    Transportation needs:     Medical: Not on file     Non-medical: Not on file   Tobacco Use    Smoking status: Current Some Day Smoker     Packs/day: 0.25     Years: 30.00     Pack years: 7.50     Types: Cigarettes    Smokeless tobacco: Never Used   Substance and Sexual Activity    Alcohol use: No    Drug use: No    Sexual activity: Not Currently   Lifestyle    Physical activity:     Days per week: Not on file     Minutes per session: Not on file    Stress: Not on file   Relationships    Social connections:     Talks on phone: Not on file     Gets together: Not on file     Attends Gnosticist service: Not on file     Active member of club or organization: Not on file     Attends meetings of clubs or organizations: Not on file     Relationship status: Not on file    Intimate partner violence:     Fear of current or ex partner: Not on file     Emotionally abused: Not on file     Physically abused: Not on file     Forced sexual activity: Not on file   Other Topics Concern    Not on file   Social History Narrative    Not on file      Current Outpatient Medications   Medication Sig    losartan (COZAAR) 50 mg tablet TAKE 1 TABLET BY MOUTH ONCE DAILY    metoprolol tartrate (LOPRESSOR) 25 mg tablet TAKE 1 TABLET BY MOUTH TWICE DAILY    doxycycline (VIBRA-TABS) 100 mg tablet Take 100 mg by mouth two (2) times a day.  lovastatin (MEVACOR) 40 mg tablet Take 1 Tab by mouth nightly.  Refills per PCP who is performing labs    amLODIPine (NORVASC) 5 mg tablet TAKE 1 TABLET BY MOUTH EVERY DAY FOR BLOOD PRESSURE (Patient taking differently: 5 mg. TAKE 1 TABLET BY MOUTH EVERY DAY FOR BLOOD PRESSURE)    aspirin delayed-release 81 mg tablet Take  by mouth daily. No current facility-administered medications for this visit. Review of Symptoms:  11 systems reviewed, negative other than as stated in the HPI    Physical ExamPhysical Exam:    There were no vitals filed for this visit. There is no height or weight on file to calculate BMI. General PE  Gen:  NAD  Mental Status - Alert. General Appearance - Not in acute distress. HEENT:  PERRL, no carotid bruits or JVD  Chest and Lung Exam   Inspection: Accessory muscles - No use of accessory muscles in breathing. Auscultation:   Breath sounds: - some scattered wheezing  Cardiovascular   Inspection: Jugular vein - Bilateral - Inspection Normal.   Palpation/Percussion:   Apical Impulse: - Normal.   Auscultation: Rhythm - Regular. Heart Sounds - S1 WNL and S2 WNL. No S3 or S4. Murmurs & Other Heart Sounds: Auscultation of the heart reveals - No Murmurs. Peripheral Vascular   Upper Extremity: Inspection - Bilateral - No Cyanotic nailbeds or Digital clubbing. Lower Extremity:   Palpation: Edema - Bilateral - No edema. Abdomen:   Soft, non-tender, bowel sounds are active.   Neuro: A&O times 3, CN and motor grossly WNL    Labs:   Lab Results   Component Value Date/Time    Cholesterol, total 128 10/23/2015 08:15 AM    Cholesterol, total 128 07/03/2014 07:58 AM    Cholesterol, Total 155 08/28/2013 09:46 AM    HDL Cholesterol 51 10/23/2015 08:15 AM    HDL Cholesterol 56 07/03/2014 07:58 AM    HDL Cholesterol 49 08/28/2013 09:46 AM    LDL, calculated 66 10/23/2015 08:15 AM    LDL, calculated 62 07/03/2014 07:58 AM    LDL Cholesterol 90 08/28/2013 09:46 AM    Triglyceride 54 10/23/2015 08:15 AM    Triglyceride 50 07/03/2014 07:58 AM    Triglycerides 64 08/28/2013 09:46 AM     Lab Results   Component Value Date/Time    CK 71 03/05/2019 07:51 AM     Lab Results   Component Value Date/Time    Sodium 132 (L) 03/05/2019 05:00 AM    Potassium 3.7 03/05/2019 05:00 AM    Chloride 100 03/05/2019 05:00 AM    CO2 23 03/05/2019 05:00 AM    Anion gap 9 03/05/2019 05:00 AM    Glucose 157 (H) 03/05/2019 05:00 AM    BUN 12 03/05/2019 05:00 AM    Creatinine 0.61 03/05/2019 05:00 AM    BUN/Creatinine ratio 20 03/05/2019 05:00 AM    GFR est AA >60 03/05/2019 05:00 AM    GFR est non-AA >60 03/05/2019 05:00 AM    Calcium 8.5 03/05/2019 05:00 AM    Bilirubin, total 0.7 03/05/2019 05:00 AM    AST (SGOT) 19 03/05/2019 05:00 AM    Alk. phosphatase 40 (L) 03/05/2019 05:00 AM    Protein, total 7.3 03/05/2019 05:00 AM    Albumin 3.8 03/05/2019 05:00 AM    Globulin 3.5 03/05/2019 05:00 AM    A-G Ratio 1.1 03/05/2019 05:00 AM    ALT (SGPT) 17 03/05/2019 05:00 AM       EKG:  NSR      Assessment:     Assessment:      1. S/P PTCA (percutaneous transluminal coronary angioplasty)    2. Mixed hyperlipidemia    3. Coronary artery disease involving native coronary artery of native heart without angina pectoris    4. Tobacco use disorder        No orders of the defined types were placed in this encounter. Plan:     Patient presents for f/u, doing well and stable from cardiac standpoint. Her main concern is her recurrent UTI x 2-3 mos----now seeing Dr Jazmín Ledesma urologist who wants to get CT. She tells me that  She did not take her antibiotic today, I explained to her the importance of taking her abx as precribed, because not doing so  Can cause  Bacteria resistance. She verbalized understanding. Her BP has been up when she was sick but then improves when well. Reports unchanged occasional brandon, continues to smoke.   She is able to do her housework and yardwork with no exertional cp.       ASHD  S/p RAUL to mid LAD in 6/2015  Lexiscan 4/2019 without evidence of ischemia with apical apex infarct, which is known.  Continue with medical management: ASA, BB, CCB, statin    Echo in 4/2019 with preserved LVEF 56-60% with grade 1 DD and mild-mod TR. HTN  Controlled with current therapy    HLD  7/19 LDL at 63. On statin. Labs and lipids per PCP    Tobacco abuse  Continue to smoke < 0.5 PPD  Encouraged to quit smoking       Continue current care and f/u in 6 months.     Lorri Cobb, NP

## 2019-10-11 ENCOUNTER — OFFICE VISIT (OUTPATIENT)
Dept: CARDIOLOGY CLINIC | Age: 74
End: 2019-10-11

## 2019-10-11 VITALS
WEIGHT: 153.6 LBS | HEIGHT: 61 IN | OXYGEN SATURATION: 98 % | RESPIRATION RATE: 18 BRPM | BODY MASS INDEX: 29 KG/M2 | SYSTOLIC BLOOD PRESSURE: 136 MMHG | DIASTOLIC BLOOD PRESSURE: 78 MMHG | HEART RATE: 64 BPM

## 2019-10-11 DIAGNOSIS — Z98.61 S/P PTCA (PERCUTANEOUS TRANSLUMINAL CORONARY ANGIOPLASTY): ICD-10-CM

## 2019-10-11 DIAGNOSIS — I25.10 ASHD (ARTERIOSCLEROTIC HEART DISEASE): Primary | ICD-10-CM

## 2019-10-11 DIAGNOSIS — E78.2 MIXED HYPERLIPIDEMIA: ICD-10-CM

## 2019-10-11 DIAGNOSIS — I25.10 CORONARY ARTERY DISEASE INVOLVING NATIVE CORONARY ARTERY OF NATIVE HEART WITHOUT ANGINA PECTORIS: ICD-10-CM

## 2019-10-11 DIAGNOSIS — F17.200 TOBACCO USE DISORDER: ICD-10-CM

## 2019-10-11 RX ORDER — ACETAMINOPHEN 500 MG
TABLET ORAL
COMMUNITY

## 2019-10-11 RX ORDER — NITROFURANTOIN (MACROCRYSTALS) 100 MG/1
100 CAPSULE ORAL 2 TIMES DAILY
COMMUNITY
End: 2020-12-18 | Stop reason: ALTCHOICE

## 2019-10-11 NOTE — PROGRESS NOTES
1. Have you been to the ER, urgent care clinic since your last visit? Hospitalized since your last visit? No    2. Have you seen or consulted any other health care providers outside of the [address] [place] [address] since your last visit? Include any pap smears or colon screening. No    Chief Complaint   Patient presents with    Coronary Artery Disease     6 mo appt. C/O ^ BP. Denied cardiac symptoms. MyChart Activation    Thank you for requesting access to [employer]. Please follow the instructions below to securely access and download your online medical record. [facility] allows you to send messages to your doctor, view your test results, renew your prescriptions, schedule appointments, and more. How Do I Sign Up? 1. In your internet browser, go to [email or web address]. 2. Click on the First Time User? Click Here link in the Sign In box. You will see the New Member Sign Up page. 3. Enter your [facility] Access Code exactly as it appears below. You will not need to use this code after youve completed the sign-up process. If you do not sign up before the expiration date, you must request a new code. [facility] Access Code: [unfilled] (This is the date your [facility]ARDZ access code will )    4. Enter the last four digits of your Social Security Number (xxxx) and Date of Birth (mm/dd/yyyy) as indicated and click Submit. You will be taken to the next sign-up page. 5. Create a BOARDZ ID. This will be your [facility]ARD[facility] login ID and cannot be changed, so think of one that is secure and easy to remember. 6. Create a BOARDZ password. You can change your password at any time. 7. Enter your Password Reset Question and Answer. This can be used at a later time if you forget your password. 8. Enter your e-mail address. You will receive e-mail notification when new information is available in [address] 9. Click Sign Up. You can now view and download portions of your medical record.   10. Click the Download Summary menu link to download a portable copy of your medical information. Additional Information    If you have questions, please visit the Frequently Asked Questions section of the [employer] website at [email or web address] Remember, [employer] is NOT to be used for urgent needs. For medical emergencies, dial 911.

## 2020-01-02 RX ORDER — AMLODIPINE BESYLATE 5 MG/1
TABLET ORAL
Qty: 90 TAB | Refills: 0 | Status: SHIPPED | OUTPATIENT
Start: 2020-01-02 | End: 2020-06-02

## 2020-02-25 RX ORDER — METOPROLOL TARTRATE 25 MG/1
TABLET, FILM COATED ORAL
Qty: 180 TAB | Refills: 0 | Status: SHIPPED | OUTPATIENT
Start: 2020-02-25 | End: 2020-05-25

## 2020-06-02 ENCOUNTER — VIRTUAL VISIT (OUTPATIENT)
Dept: CARDIOLOGY CLINIC | Age: 75
End: 2020-06-02

## 2020-06-02 DIAGNOSIS — I47.1 SVT (SUPRAVENTRICULAR TACHYCARDIA) (HCC): ICD-10-CM

## 2020-06-02 DIAGNOSIS — E78.2 MIXED HYPERLIPIDEMIA: ICD-10-CM

## 2020-06-02 DIAGNOSIS — I10 ESSENTIAL HYPERTENSION, BENIGN: ICD-10-CM

## 2020-06-02 DIAGNOSIS — I25.10 ASHD (ARTERIOSCLEROTIC HEART DISEASE): Primary | ICD-10-CM

## 2020-06-02 DIAGNOSIS — Z98.61 S/P PTCA (PERCUTANEOUS TRANSLUMINAL CORONARY ANGIOPLASTY): ICD-10-CM

## 2020-06-02 DIAGNOSIS — F17.200 TOBACCO USE DISORDER: ICD-10-CM

## 2020-06-02 NOTE — PROGRESS NOTES
Galina Oneil is a 76 y.o. female who was seen by telephone due to lack of video capability on 6/2/2020     [address] [place] [address], [place], [address] [facility]  [phone]     Subjective:      Galina Oneil is a 76 y.o. female is here for routine f/u. The patient denies chest pain/ shortness of breath, orthopnea, PND, LE edema, palpitations, syncope, or presyncope.        Patient Active Problem List    Diagnosis Date Noted    Mixed hyperlipidemia 06/23/2017    CAD (coronary artery disease), native coronary artery 06/04/2015    S/P PTCA (percutaneous transluminal coronary angioplasty) 06/04/2015    Family history of ischemic heart disease 02/15/2013    Essential hypertension, benign 02/15/2013    SVT (supraventricular tachycardia) ([facility] [address] 02/15/2013    Hyposmolality and/or hyponatremia 02/15/2013    Tobacco use disorder 02/15/2013    Vertigo 05/11/2012      Humberto Fernandez MD  Past Medical History:   Diagnosis Date    CAD (coronary artery disease)     stenting cardiac 6/5/2015    Hypertension     Vertigo 5/11/2012      Past Surgical History:   Procedure Laterality Date    BREAST SURGERY PROCEDURE UNLISTED      left breast biopsy    HX BREAST BIOPSY Left     Long time ago --neg    HX HEENT      tonsils    HX TUBAL LIGATION       Allergies   Allergen Reactions    Eryc [Erythromycin] Hives    Influenza Virus Vaccine [facility] 2012-13 (18+) Cell Aguilar Other (comments)     \"felt like I was on fire\"    Pcn [Penicillins] Hives    Crestor [Rosuvastatin] Myalgia    Hydrochlorothiazide Other (comments)     hyponatremia    Lipitor [Atorvastatin] Myalgia    Proton Pump Inhibitors Other (comments)     dizziness      Family History   Problem Relation Age of Onset    Heart Disease Mother     Heart Disease Father     Coronary Artery Disease Other         mother had stents in 63's or 66's      Social History     Socioeconomic History    Marital status: SINGLE     Spouse name: Not on file    Number of children: Not on file    Years of education: Not on file    Highest education level: Not on file   Occupational History    Not on file   Social Needs    Financial resource strain: Not on file    Food insecurity     Worry: Not on file     Inability: Not on file    Transportation needs     Medical: Not on file     Non-medical: Not on file   Tobacco Use    Smoking status: Current Some Day Smoker     Packs/day: 0.25     Years: 30.00     Pack years: 7.50     Types: Cigarettes    Smokeless tobacco: Never Used   Substance and Sexual Activity    Alcohol use: No    Drug use: No    Sexual activity: Not Currently   Lifestyle    Physical activity     Days per week: Not on file     Minutes per session: Not on file    Stress: Not on file   Relationships    Social connections     Talks on phone: Not on file     Gets together: Not on file     Attends Christianity service: Not on file     Active member of club or organization: Not on file     Attends meetings of clubs or organizations: Not on file     Relationship status: Not on file    Intimate partner violence     Fear of current or ex partner: Not on file     Emotionally abused: Not on file     Physically abused: Not on file     Forced sexual activity: Not on file   Other Topics Concern    Not on file   Social History Narrative    Not on file      Current Outpatient Medications   Medication Sig    metoprolol tartrate (LOPRESSOR) 25 mg tablet Take 1 tablet by mouth twice daily    amLODIPine (NORVASC) 5 mg tablet TAKE 1 TABLET BY MOUTH ONCE DAILY FOR BLOOD PRESSURE    vit A/vit C/vit E/zinc/copper (ICAPS AREDS PO) Take  by mouth daily.  acetaminophen (TYLENOL EXTRA STRENGTH) 500 mg tablet Take  by mouth every six (6) hours as needed for Pain.  nitrofurantoin (MACRODANTIN) 100 mg capsule Take 100 mg by mouth two (2) times a day.     losartan (COZAAR) 50 mg tablet TAKE 1 TABLET BY MOUTH ONCE DAILY    lovastatin (MEVACOR) 40 mg tablet Take 1 Tab by mouth nightly. Refills per PCP who is performing labs    aspirin delayed-release 81 mg tablet Take  by mouth daily. No current facility-administered medications for this visit. Review of Symptoms:  11 systems reviewed, negative other than as stated in the HPI    Physical Exam:    Telephone onlythe patient does not sound short of breath, speaking in full sentences. Labs:   Lab Results   Component Value Date/Time    Cholesterol, total 128 10/23/2015 08:15 AM    Cholesterol, total 128 07/03/2014 07:58 AM    Cholesterol, Total 155 08/28/2013 09:46 AM    HDL Cholesterol 51 10/23/2015 08:15 AM    HDL Cholesterol 56 07/03/2014 07:58 AM    HDL Cholesterol 49 08/28/2013 09:46 AM    LDL, calculated 66 10/23/2015 08:15 AM    LDL, calculated 62 07/03/2014 07:58 AM    LDL Cholesterol 90 08/28/2013 09:46 AM    Triglyceride 54 10/23/2015 08:15 AM    Triglyceride 50 07/03/2014 07:58 AM    Triglycerides 64 08/28/2013 09:46 AM     Lab Results   Component Value Date/Time    CK 71 03/05/2019 07:51 AM     Lab Results   Component Value Date/Time    Sodium 132 (L) 03/05/2019 05:00 AM    Potassium 3.7 03/05/2019 05:00 AM    Chloride 100 03/05/2019 05:00 AM    CO2 23 03/05/2019 05:00 AM    Anion gap 9 03/05/2019 05:00 AM    Glucose 157 (H) 03/05/2019 05:00 AM    BUN 12 03/05/2019 05:00 AM    Creatinine 0.61 03/05/2019 05:00 AM    BUN/Creatinine ratio 20 03/05/2019 05:00 AM    GFR est AA >60 03/05/2019 05:00 AM    GFR est non-AA >60 03/05/2019 05:00 AM    Calcium 8.5 03/05/2019 05:00 AM    Bilirubin, total 0.7 03/05/2019 05:00 AM    Alk. phosphatase 40 (L) 03/05/2019 05:00 AM    Protein, total 7.3 03/05/2019 05:00 AM    Albumin 3.8 03/05/2019 05:00 AM    Globulin 3.5 03/05/2019 05:00 AM    A-G Ratio 1.1 03/05/2019 05:00 AM    ALT (SGPT) 17 03/05/2019 05:00 AM          Assessment:      1. ASHD (arteriosclerotic heart disease)    2. S/P PTCA (percutaneous transluminal coronary angioplasty)    3. Mixed hyperlipidemia    4.  Essential hypertension, benign    5. Tobacco use disorder    6. SVT (supraventricular tachycardia) (HCC)        No orders of the defined types were placed in this encounter. Plan:     Patient presents for a telemedicine visit. Last seen in clinic 10/19--At that time she reported unchanged occasional brandon, continues to smoke. She was able to do her housework and yardwork with no exertional cp. Her main concern then was recurrent UTI, followed by Dr Sheridan Jeff (urologist). Recently, she still has problems with UTIs, but no cardiac complaints. She states she has decreased smoking to just a few cigarettes a day, but is not highly optimistic that she will be able to completely quit. She has tried nicotine lozenges in the past and they did not help that much. ASHD  S/p RAUL to mid LAD in 6/2015  Lexiscan 4/2019 without evidence of ischemia with apical apex infarct, which is known. Continue with medical management: ASA, BB, CCB, statin     Echo in 4/2019 with preserved LVEF 56-60% with grade 1 DD and mild-mod TR.     HTN  Controlled with current therapyshe will call if blood pressure generally greater than 140/85. Stable kidney fxn Cr 0.6 in 7/19     HLD  7/19 LDL at 63. On statin. Labs and lipids per PCP     Tobacco abuse  Continue to smoke < 0.5 PPD  Counseled on smoking cessation using strict sequential weaning with a quit date using nicotine losenges - 5 minutes spent. Advised smoking is especially and immediately life-threatening in the setting of the COVID-19 pandemic. Counseled on diet and exercise- eventual goal of 30-60 minutes 5-7 times a week as per AHA guidelines.      Continue current care and f/u in 6 months.         Jenni Ferreira MD    This virtual visit was conducted with audiovisual connection using telephone (greater than 20 minutes spent) due to lack of video capability.     Pursuant to the emergency declaration under the [address] [address] [facility] waiver authority and the Coronavirus Preparedness and Response Supplemental Appropriations Act, this Virtual Visit was conducted, with patient's consent, to reduce the patient's risk of exposure to COVID-19 and provide continuity of care for an established patient. Services were provided through a video telephone to substitute for in-person clinic visit.

## 2020-12-17 NOTE — PROGRESS NOTES
[address] [facility] – [place], [place], [address] [facility]  [phone]     Subjective:      Rosa Ramirez is a 76 y.o. female is here for routine f/u. She complains of right lower extremity swelling in the ankle and up to the mid calf that has been present ever since she was kicked by a cow and got a blood clot in the distant past.  She states it is somewhat worse and bothersome lately. No significant swelling in the left lower extremity. The patient denies chest pain/ shortness of breath, orthopnea, PND, palpitations, syncope, or presyncope.         Patient Active Problem List    Diagnosis Date Noted    Mixed hyperlipidemia 06/23/2017    CAD (coronary artery disease), native coronary artery 06/04/2015    S/P PTCA (percutaneous transluminal coronary angioplasty) 06/04/2015    Family history of ischemic heart disease 02/15/2013    Essential hypertension, benign 02/15/2013    SVT (supraventricular tachycardia) ([facility] [address] 02/15/2013    Hyposmolality and/or hyponatremia 02/15/2013    Tobacco use disorder 02/15/2013    Vertigo 05/11/2012      Daniel Partida MD  Past Medical History:   Diagnosis Date    CAD (coronary artery disease)     stenting cardiac 6/5/2015    Hypertension     UTI (urinary tract infection) 2020    Vertigo 5/11/2012      Past Surgical History:   Procedure Laterality Date    BREAST SURGERY PROCEDURE UNLISTED      left breast biopsy    HX BREAST BIOPSY Left     Long time ago --neg    HX HEENT      tonsils    HX TUBAL LIGATION       Allergies   Allergen Reactions    Eryc [Erythromycin] Hives    Influenza Virus Vaccine [facility] 2012-13 (18+) Cell Aguilar Other (comments)     \"felt like I was on fire\"    Pcn [Penicillins] Hives    Crestor [Rosuvastatin] Myalgia    Hydrochlorothiazide Other (comments)     hyponatremia    Keflex [Cephalexin] Other (comments)     Aching muscles    Lipitor [Atorvastatin] Myalgia    Proton Pump Inhibitors Other (comments)     dizziness      Family History Problem Relation Age of Onset    Heart Disease Mother     Heart Disease Father     Coronary Artery Disease Other         mother had stents in 63's or 66's      Social History     Socioeconomic History    Marital status: SINGLE     Spouse name: Not on file    Number of children: Not on file    Years of education: Not on file    Highest education level: Not on file   Occupational History    Not on file   Social Needs    Financial resource strain: Not on file    Food insecurity     Worry: Not on file     Inability: Not on file    Transportation needs     Medical: Not on file     Non-medical: Not on file   Tobacco Use    Smoking status: Current Some Day Smoker     Packs/day: 0.25     Years: 30.00     Pack years: 7.50     Types: Cigarettes    Smokeless tobacco: Never Used    Tobacco comment: Hasn't smoked lately    Substance and Sexual Activity    Alcohol use: No    Drug use: No    Sexual activity: Not Currently   Lifestyle    Physical activity     Days per week: Not on file     Minutes per session: Not on file    Stress: Not on file   Relationships    Social connections     Talks on phone: Not on file     Gets together: Not on file     Attends Gnosticism service: Not on file     Active member of club or organization: Not on file     Attends meetings of clubs or organizations: Not on file     Relationship status: Not on file    Intimate partner violence     Fear of current or ex partner: Not on file     Emotionally abused: Not on file     Physically abused: Not on file     Forced sexual activity: Not on file   Other Topics Concern    Not on file   Social History Narrative    Not on file      Current Outpatient Medications   Medication Sig    losartan (COZAAR) 50 mg tablet TAKE 1 TABLET BY MOUTH ONCE DAILY .  APPOINTMENT REQUIRED FOR FUTURE REFILLS    metoprolol tartrate (LOPRESSOR) 25 mg tablet Take 1 tablet by mouth twice daily    amLODIPine (NORVASC) 5 mg tablet Take 1 tablet by mouth once daily for blood pressure    vit A/vit C/vit E/zinc/copper (ICAPS AREDS PO) Take  by mouth daily.  acetaminophen (TYLENOL EXTRA STRENGTH) 500 mg tablet Take  by mouth every six (6) hours as needed for Pain.  lovastatin (MEVACOR) 40 mg tablet Take 1 Tab by mouth nightly. Refills per PCP who is performing labs    aspirin delayed-release 81 mg tablet Take  by mouth daily. No current facility-administered medications for this visit. Review of Symptoms:  11 systems reviewed, negative other than as stated in the HPI    Physical Exam:  Vitals:    12/18/20 1133 12/18/20 1134   BP: (!) 156/62 (!) 154/62   Pulse: 73    Resp: 18    SpO2: 98%    Weight: 154 lb (69.9 kg)    Height: 5' 1\" (1.549 m)          General PE  Gen:  NAD  Mental Status - Alert. General Appearance - Not in acute distress. HEENT:  PERRL, no carotid bruits or JVD  Chest and Lung Exam   Inspection: Accessory muscles - No use of accessory muscles in breathing. Auscultation:   Breath sounds: - some scattered wheezing  Cardiovascular   Inspection: Jugular vein - Bilateral - Inspection Normal.   Palpation/Percussion:   Apical Impulse: - Normal.   Auscultation: Rhythm - Regular. Heart Sounds - S1 WNL and S2 WNL. No S3 or S4. Murmurs & Other Heart Sounds: Auscultation of the heart reveals - No Murmurs. Peripheral Vascular   Upper Extremity: Inspection - Bilateral - No Cyanotic nailbeds or Digital clubbing. Lower Extremity:   Palpation: Edema - Bilateral  1+ RLE, no LLE edema. Abdomen:   Soft, non-tender, bowel sounds are active.   Neuro: A&O times 3, CN and motor grossly WNL    Labs:   Lab Results   Component Value Date/Time    Cholesterol, total 128 10/23/2015 08:15 AM    Cholesterol, total 128 07/03/2014 07:58 AM    Cholesterol, Total 155 08/28/2013 09:46 AM    HDL Cholesterol 51 10/23/2015 08:15 AM    HDL Cholesterol 56 07/03/2014 07:58 AM    HDL Cholesterol 49 08/28/2013 09:46 AM    LDL, calculated 66 10/23/2015 08:15 AM    LDL, calculated 62 07/03/2014 07:58 AM    LDL Cholesterol 90 08/28/2013 09:46 AM    Triglyceride 54 10/23/2015 08:15 AM    Triglyceride 50 07/03/2014 07:58 AM    Triglycerides 64 08/28/2013 09:46 AM     Lab Results   Component Value Date/Time    CK 71 03/05/2019 07:51 AM     Lab Results   Component Value Date/Time    Sodium 132 (L) 03/05/2019 05:00 AM    Potassium 3.7 03/05/2019 05:00 AM    Chloride 100 03/05/2019 05:00 AM    CO2 23 03/05/2019 05:00 AM    Anion gap 9 03/05/2019 05:00 AM    Glucose 157 (H) 03/05/2019 05:00 AM    BUN 12 03/05/2019 05:00 AM    Creatinine 0.61 03/05/2019 05:00 AM    BUN/Creatinine ratio 20 03/05/2019 05:00 AM    GFR est AA >60 03/05/2019 05:00 AM    GFR est non-AA >60 03/05/2019 05:00 AM    Calcium 8.5 03/05/2019 05:00 AM    Bilirubin, total 0.7 03/05/2019 05:00 AM    Alk. phosphatase 40 (L) 03/05/2019 05:00 AM    Protein, total 7.3 03/05/2019 05:00 AM    Albumin 3.8 03/05/2019 05:00 AM    Globulin 3.5 03/05/2019 05:00 AM    A-G Ratio 1.1 03/05/2019 05:00 AM    ALT (SGPT) 17 03/05/2019 05:00 AM          Assessment:      1. Bilateral edema of lower extremity    2. Coronary artery disease involving native coronary artery of native heart without angina pectoris    3. Mixed hyperlipidemia    4. Essential hypertension, benign        Orders Placed This Encounter    AMB POC EKG ROUTINE W/ 12 LEADS, INTER & REP     Order Specific Question:   Reason for Exam:     Answer:   routine    DUPLEX LOWER EXT VENOUS BILAT        Plan:     ASHD  S/p RAUL to mid LAD in 6/2015  Lexiscan 4/2019 without evidence of ischemia with apical apex infarct, which is known. Echo in 4/2019 with preserved LVEF 56-60% with grade 1 DD and mild-mod TR. Continue with medical management: ASA, BB, CCB, statin    RLE>LLE swelling, states worse than in the past.  1+, not painful.   Check venous reflux study, refer to Dr. Edna Vee if abnormal.     HTN  Controlled with current therapyshe will call if blood pressure generally greater than 140/85.     HLD  7/19 LDL at 63. On statin. Labs and lipids per PCP     Tobacco abuse  Continue to smoke < 0.5 PPD  Counseled on smoking cessation using strict sequential weaning with a quit date using nicotine losenges - 5 minutes spent. Advised smoking is especially and immediately life-threatening in the setting of the COVID-19 pandemic. Counseled on diet and exercise- eventual goal of 30-60 minutes 5-7 times a week as per AHA guidelines.      Continue current care and f/u in 1 year, sooner PRN.     Pablito Jaquez MD

## 2020-12-18 ENCOUNTER — OFFICE VISIT (OUTPATIENT)
Dept: CARDIOLOGY CLINIC | Age: 75
End: 2020-12-18
Payer: MEDICARE

## 2020-12-18 VITALS
SYSTOLIC BLOOD PRESSURE: 154 MMHG | HEART RATE: 73 BPM | WEIGHT: 154 LBS | DIASTOLIC BLOOD PRESSURE: 62 MMHG | BODY MASS INDEX: 29.07 KG/M2 | OXYGEN SATURATION: 98 % | RESPIRATION RATE: 18 BRPM | HEIGHT: 61 IN

## 2020-12-18 DIAGNOSIS — I10 ESSENTIAL HYPERTENSION, BENIGN: ICD-10-CM

## 2020-12-18 DIAGNOSIS — I25.10 CORONARY ARTERY DISEASE INVOLVING NATIVE CORONARY ARTERY OF NATIVE HEART WITHOUT ANGINA PECTORIS: ICD-10-CM

## 2020-12-18 DIAGNOSIS — E78.2 MIXED HYPERLIPIDEMIA: ICD-10-CM

## 2020-12-18 DIAGNOSIS — R60.0 BILATERAL EDEMA OF LOWER EXTREMITY: Primary | ICD-10-CM

## 2020-12-18 PROCEDURE — 93005 ELECTROCARDIOGRAM TRACING: CPT | Performed by: INTERNAL MEDICINE

## 2020-12-18 PROCEDURE — 1101F PT FALLS ASSESS-DOCD LE1/YR: CPT | Performed by: INTERNAL MEDICINE

## 2020-12-18 PROCEDURE — 99214 OFFICE O/P EST MOD 30 MIN: CPT | Performed by: INTERNAL MEDICINE

## 2020-12-18 PROCEDURE — G0463 HOSPITAL OUTPT CLINIC VISIT: HCPCS | Performed by: INTERNAL MEDICINE

## 2020-12-18 PROCEDURE — G8753 SYS BP > OR = 140: HCPCS | Performed by: INTERNAL MEDICINE

## 2020-12-18 PROCEDURE — G8400 PT W/DXA NO RESULTS DOC: HCPCS | Performed by: INTERNAL MEDICINE

## 2020-12-18 PROCEDURE — G8427 DOCREV CUR MEDS BY ELIG CLIN: HCPCS | Performed by: INTERNAL MEDICINE

## 2020-12-18 PROCEDURE — G8419 CALC BMI OUT NRM PARAM NOF/U: HCPCS | Performed by: INTERNAL MEDICINE

## 2020-12-18 PROCEDURE — G8754 DIAS BP LESS 90: HCPCS | Performed by: INTERNAL MEDICINE

## 2020-12-18 PROCEDURE — 3017F COLORECTAL CA SCREEN DOC REV: CPT | Performed by: INTERNAL MEDICINE

## 2020-12-18 PROCEDURE — 93010 ELECTROCARDIOGRAM REPORT: CPT | Performed by: INTERNAL MEDICINE

## 2020-12-18 PROCEDURE — G8536 NO DOC ELDER MAL SCRN: HCPCS | Performed by: INTERNAL MEDICINE

## 2020-12-18 PROCEDURE — 1090F PRES/ABSN URINE INCON ASSESS: CPT | Performed by: INTERNAL MEDICINE

## 2020-12-18 PROCEDURE — G8510 SCR DEP NEG, NO PLAN REQD: HCPCS | Performed by: INTERNAL MEDICINE

## 2020-12-18 NOTE — LETTER
12/18/2020 Patient: Ada Kinney YOB: 1945 Date of Visit: 12/18/2020 Sukh Cummings [address] [facility] [address] [address] [employer] Dear Anaya Dutta MD, Thank you for referring Ms. Ada Kinney to [facility] for evaluation. My notes for this consultation are attached. If you have questions, please do not hesitate to call me. I look forward to following your patient along with you.  
 
 
Sincerely, 
 
Ana Sosa MD

## 2021-02-17 ENCOUNTER — ANCILLARY PROCEDURE (OUTPATIENT)
Dept: CARDIOLOGY CLINIC | Age: 76
End: 2021-02-17
Payer: MEDICARE

## 2021-02-17 PROCEDURE — 93970 EXTREMITY STUDY: CPT | Performed by: INTERNAL MEDICINE

## 2021-02-18 LAB
LEFT GSV AT KNEE DIAM: 0.47 CM
LEFT GSV AT KNEE RFX: 0 S
LEFT GSV BK MID DIAM: 0.34 CM
LEFT GSV BK MID RFX: 0 S
LEFT GSV JUNC DIAM: 0.73 CM
LEFT GSV JUNC RFX: 0 S
LEFT GSV THIGH PROX DIAM: 0.89 CM
LEFT GSV THIGH PROX RFX: 0 S
LEFT SSV PROX DIAM: 0.2 CM
LEFT SSV PROX RFX: 0 S
RIGHT GSV AK RFX: 2026 S
RIGHT GSV AT KNEE DIAM: 0.37 CM
RIGHT GSV BK MID DIAM: 0.29 CM
RIGHT GSV BK MID RFX: 1998 S
RIGHT GSV JUNC DIAM: 0.76 CM
RIGHT GSV JUNC RFX: 0 S
RIGHT GSV THIGH PROX DIAM: 0.77 CM
RIGHT GSV THIGH PROX RFX: 0 S
RIGHT SSV PROX DIAM: 0.26 CM
RIGHT SSV PROX RFX: 0 S

## 2021-03-16 ENCOUNTER — OFFICE VISIT (OUTPATIENT)
Dept: CARDIOLOGY CLINIC | Age: 76
End: 2021-03-16
Payer: MEDICARE

## 2021-03-16 VITALS
OXYGEN SATURATION: 98 % | HEART RATE: 60 BPM | HEIGHT: 61 IN | SYSTOLIC BLOOD PRESSURE: 134 MMHG | WEIGHT: 150.1 LBS | DIASTOLIC BLOOD PRESSURE: 76 MMHG | RESPIRATION RATE: 18 BRPM | BODY MASS INDEX: 28.34 KG/M2

## 2021-03-16 DIAGNOSIS — F17.200 TOBACCO USE DISORDER: ICD-10-CM

## 2021-03-16 DIAGNOSIS — E78.2 MIXED HYPERLIPIDEMIA: ICD-10-CM

## 2021-03-16 DIAGNOSIS — I10 ESSENTIAL HYPERTENSION, BENIGN: ICD-10-CM

## 2021-03-16 DIAGNOSIS — I87.2 VENOUS INSUFFICIENCY: Primary | ICD-10-CM

## 2021-03-16 DIAGNOSIS — I73.9 CLAUDICATION (HCC): ICD-10-CM

## 2021-03-16 PROCEDURE — 99204 OFFICE O/P NEW MOD 45 MIN: CPT | Performed by: INTERNAL MEDICINE

## 2021-03-16 PROCEDURE — 3017F COLORECTAL CA SCREEN DOC REV: CPT | Performed by: INTERNAL MEDICINE

## 2021-03-16 PROCEDURE — G0463 HOSPITAL OUTPT CLINIC VISIT: HCPCS | Performed by: INTERNAL MEDICINE

## 2021-03-16 PROCEDURE — 1101F PT FALLS ASSESS-DOCD LE1/YR: CPT | Performed by: INTERNAL MEDICINE

## 2021-03-16 PROCEDURE — G8510 SCR DEP NEG, NO PLAN REQD: HCPCS | Performed by: INTERNAL MEDICINE

## 2021-03-16 PROCEDURE — G8400 PT W/DXA NO RESULTS DOC: HCPCS | Performed by: INTERNAL MEDICINE

## 2021-03-16 PROCEDURE — G8427 DOCREV CUR MEDS BY ELIG CLIN: HCPCS | Performed by: INTERNAL MEDICINE

## 2021-03-16 PROCEDURE — G8419 CALC BMI OUT NRM PARAM NOF/U: HCPCS | Performed by: INTERNAL MEDICINE

## 2021-03-16 PROCEDURE — G8754 DIAS BP LESS 90: HCPCS | Performed by: INTERNAL MEDICINE

## 2021-03-16 PROCEDURE — 1090F PRES/ABSN URINE INCON ASSESS: CPT | Performed by: INTERNAL MEDICINE

## 2021-03-16 PROCEDURE — G8752 SYS BP LESS 140: HCPCS | Performed by: INTERNAL MEDICINE

## 2021-03-16 PROCEDURE — G8536 NO DOC ELDER MAL SCRN: HCPCS | Performed by: INTERNAL MEDICINE

## 2021-03-16 NOTE — PROGRESS NOTES
1. Have you been to the ER, urgent care clinic since your last visit? Hospitalized since your last visit? No    2. Have you seen or consulted any other health care providers outside of the [address] [place] [address] since your last visit? Include any pap smears or colon screening.  No           Chief Complaint   Patient presents with    Hypertension     C/O Right leg swelling, Discuss LE reflux study

## 2021-03-16 NOTE — PROGRESS NOTES
3/16/2021 10:05 AM      Subjective:     Madelyn Cooks   Is a 75 YO F with CAD s/p stent, HTN, HLD and tobacco abuse. She is followed by Dr Teresa Blackmon for general cardiology. Referred to vascular d/t    RLE>LLE swelling, states worse than in the past.  1+, not painful. LE venous duplex showed right GSV reflux. Also c/o occasional leg cramps, theraworks helps. Also c/o some leg / joint pain when walking. 1.  Have you ever had vein stripping surgery NO       2. Have you ever had vein injections? NO        3. Have you ever had a blood clot? YES 40 yrs ago right leg       4. Have you ever had phlebitis? NO                                                                        Does anyone in your family have (or used to have) varicose veins, spider veins, leg ulcers or swollen legs? Father  NO  Mother NO  Brother(s) NO  Sister(s) NO  Other NO           1. Do you experience any of the following in your legs? Aching/pain? [] One leg [] Both legs  Heaviness? Rt [x] One leg [] Both legs  Tiredness/fatigue? [] One leg [] Both legs  Itching/burning? [] One leg [] Both legs  Swollen ankles? [x] One leg [] Both legs  Leg cramps? [x] One leg [] Both legs  Restless legs? [] One leg [] Both legs  Throbbing? [] One leg [] Both legs  Other? 2.  Have your veins gotten worse in recent months? NO        3. Do you take any medication for pain (i.e., Advil, Motrin)  NO           4. Do you elevate your legs to relieve discomfort? YES     5. Do you exercise? YES        6. Do you wear prescription compression stockings? NO       7. Do you wear light support hose (i.e., [employer])? NO                    8.    Do you have any problem walking? NO                                 9.   What type of work do you do? Retired                8.  Have you ever had any test(s) done on your veins? YES          11.   Were you diagnosed with saphenous vein reflux? NO     She denies chest pain, chest pressure/discomfort, dyspnea, palpitations, irregular heart beats, near-syncope, syncope, fatigue, orthopnea, paroxysmal nocturnal dyspnea, exertional chest pressure/discomfort, tachypnea. Visit Vitals  /76   Pulse 60   Resp 18   Ht 5' 1\" (1.549 m)   Wt 150 lb 1.6 oz (68.1 kg)   SpO2 98%   BMI 28.36 kg/m²       Current Outpatient Medications   Medication Sig    losartan (COZAAR) 50 mg tablet Take 1 Tab by mouth daily.  metoprolol tartrate (LOPRESSOR) 25 mg tablet Take 1 tablet by mouth twice daily    amLODIPine (NORVASC) 5 mg tablet Take 1 tablet by mouth once daily for blood pressure    vit A/vit C/vit E/zinc/copper (ICAPS AREDS PO) Take  by mouth daily.  acetaminophen (TYLENOL EXTRA STRENGTH) 500 mg tablet Take  by mouth every six (6) hours as needed for Pain.  lovastatin (MEVACOR) 40 mg tablet Take 1 Tab by mouth nightly. Refills per PCP who is performing labs    aspirin delayed-release 81 mg tablet Take  by mouth daily. No current facility-administered medications for this visit. Objective:      Visit Vitals  /76   Pulse 60   Resp 18   Ht 5' 1\" (1.549 m)   Wt 150 lb 1.6 oz (68.1 kg)   SpO2 98%   BMI 28.36 kg/m²       Data Review:   Labs:  No results found for this or any previous visit (from the past 24 hour(s)).     Review and summarization of old records and/or discussion of case with another healthcare provider    Past Medical History:   Diagnosis Date    CAD (coronary artery disease)     stenting cardiac 6/5/2015    Hypertension     UTI (urinary tract infection) 2020    Vertigo 5/11/2012      Past Surgical History:   Procedure Laterality Date    HX BREAST BIOPSY Left     Long time ago --neg    HX HEENT      tonsils    HX TUBAL LIGATION      MD BREAST SURGERY PROCEDURE UNLISTED      left breast biopsy     Allergies   Allergen Reactions    Eryc [Erythromycin] Hives    Influenza Virus Vaccine [facility] 2012-13 (18+) Cell Aguilar Other (comments)     \"felt like I was on fire\"    Pcn [Penicillins] Hives    Crestor [Rosuvastatin] Myalgia    Hydrochlorothiazide Other (comments)     hyponatremia    Keflex [Cephalexin] Other (comments)     Aching muscles    Lipitor [Atorvastatin] Myalgia    Proton Pump Inhibitors Other (comments)     dizziness      Family History   Problem Relation Age of Onset    Heart Disease Mother     Heart Disease Father     Coronary Artery Disease Other         mother had stents in 63's or 66's      Social History     Socioeconomic History    Marital status: SINGLE     Spouse name: Not on file    Number of children: Not on file    Years of education: Not on file    Highest education level: Not on file   Occupational History    Not on file   Social Needs    Financial resource strain: Not on file    Food insecurity     Worry: Not on file     Inability: Not on file    Transportation needs     Medical: Not on file     Non-medical: Not on file   Tobacco Use    Smoking status: Current Some Day Smoker     Packs/day: 0.25     Years: 30.00     Pack years: 7.50     Types: Cigarettes    Smokeless tobacco: Never Used    Tobacco comment: Hasn't smoked lately    Substance and Sexual Activity    Alcohol use: No    Drug use: No    Sexual activity: Not Currently   Lifestyle    Physical activity     Days per week: Not on file     Minutes per session: Not on file    Stress: Not on file   Relationships    Social connections     Talks on phone: Not on file     Gets together: Not on file     Attends Presybeterian service: Not on file     Active member of club or organization: Not on file     Attends meetings of clubs or organizations: Not on file     Relationship status: Not on file    Intimate partner violence     Fear of current or ex partner: Not on file     Emotionally abused: Not on file     Physically abused: Not on file     Forced sexual activity: Not on file   Other Topics Concern    Not on file   Social History Narrative    Not on file       Review of Systems     General: Not Present- Anorexia, Chills, Dietary Changes, Fatigue, Fever, Medication Changes, Night Sweats, Weight Gain > 10lbs. and Weight Loss > 10lbs. .  Skin: Not Present- Bruising and Excessive Sweating. HEENT: Not Present- Headache, Visual Loss and Vertigo. Respiratory: Not Present- Cough, Decreased Exercise Tolerance, Difficulty Breathing, Snoring and Wheezing. Cardiovascular: Not Present- Abnormal Blood Pressure, Chest Pain,  Difficulty Breathing On Exertion,  Fainting / Blacking Out, Irregular Heart Beat,  Orthopnea, Palpitations, Paroxysmal Nocturnal Dyspnea, Rapid Heart Rate, Shortness of Breath   Gastrointestinal: Not Present- Black, Tarry Stool, Bloody Stool, Diarrhea, Hematemesis, Rectal Bleeding and Vomiting. Musculoskeletal: Not Present- Muscle Pain and Muscle Weakness. Neurological: Not Present- Dizziness. Psychiatric: Not Present- Depression. Endocrine: Not Present- Cold Intolerance, Heat Intolerance and Thyroid Problems. Hematology: Not Present- Abnormal Bleeding, Anemia, Blood Clots and Easy Bruising. Physical Exam   The physical exam findings are as follows:       General   Mental Status - Alert. General Appearance - Not in acute distress. Chest and Lung Exam   Inspection: Accessory muscles - No use of accessory muscles in breathing. Auscultation:   Breath sounds: - Normal.      Cardiovascular   Inspection: Jugular vein - Bilateral - Inspection Normal.  Palpation/Percussion:   Apical Impulse: - Normal.  Auscultation: Rhythm - Regular. Heart Sounds - S1 WNL and S2 WNL. No S3 or S4. Murmurs & Other Heart Sounds: Auscultation of the heart reveals - No Murmurs. Carotid arteries - No Carotid bruit. Abdomen   Palpation/Percussion: Palpation and Percussion of the abdomen reveal - No Palpable abdominal masses.   Auscultation: Auscultation of the abdomen reveals - Bowel sounds normal.      Neurologic   Mental Status: Affect - normal.  Motor: - Normal. Gait - Normal.      Peripheral Vascular   Upper Extremity: Inspection - Bilateral - No Cyanotic nailbeds or Digital clubbing. Lower Extremity:   Palpation:       Dorsalis pedis pulse - Rt.  [] normal  [x] weak  [] non palpable     Lt.   [] normal  [x] weak  [] non palpable    Posterior tibia pulse - Rt.  [] normal  [x] weak  [] non palpable     Lt.   [] normal  [x] weak  [] non palpable                                             Edema:       Rt. Leg  [x]             Lt. Leg  []  Telangiectasia & spider veins: Rt. Leg  [x]             Lt. Leg  []  Varicose veins:   Rt. Leg  []             Lt. Leg  []   Skin pigmentation:   Rt. Leg  []             Lt. Leg  []   Healed venous ulcer:   Rt. Leg  []             Lt. Leg  []                                            Assessment:       ICD-10-CM ICD-9-CM    1. Venous insufficiency  I87.2 459.81    2. Essential hypertension, benign  I10 401.1    3. Mixed hyperlipidemia  E78.2 272.2    4. Tobacco use disorder  F17.200 305.1    5. Claudication ([facility])  I73.9 443.9        Plan:     1. Venous insufficiency: Right GSV reflux 2/2021. Will prescribe 20-30 mmHg compression stockings x 2 mos  Will get javy c/o claudication    1.  - Instruction given on daily leg elevation   2.  - Instruction given for mild exercise  3.  - Instruction given for weight reduction    2. Bp controlled, repeat 134/76. Home /70s per patient  3. HLD 7/19 LDL at 63. On statin. Labs and lipids per PCP  4. Tobacco abuse: Continue to smoke < 0.5 PPD  5.  CAD, other cardiac care per Dr Elana Oneil current care and f/u in  2 mos    Jamie Goel MD

## 2021-03-29 ENCOUNTER — TELEPHONE (OUTPATIENT)
Dept: CARDIOLOGY CLINIC | Age: 76
End: 2021-03-29

## 2021-03-29 NOTE — TELEPHONE ENCOUNTER
Patient thinks compression socks is causing her bp to increase. Today bp 169/77, please call.      Thanks

## 2021-03-29 NOTE — TELEPHONE ENCOUNTER
Returned call,verified pt with two pt identifiers, pt advised she thinks her compression stockings are causing her BP to elevated. She tried the compression stockings about 1 week after her appt with mikey. Pt advised she takes her BP 4-5 times a day. Advised she only needs to take her BP 2 times daily an hour after taking her BP meds. Advised compression stockings shouldn't raise her BP that high. It should ideally keep her BP from dropping and steady. Advised her BP will depend on what , how, when takes it. Not sure when pt is taking BP-unclear. Advised per last office note with mikey 3/16/21 to use compression stockings for 2 months and f/u on 5/11/21 to discuss how they worked. Pt advised she would try again and if any issues she would call back. Pt verbalized understanding.

## 2021-04-20 ENCOUNTER — TELEPHONE (OUTPATIENT)
Dept: SURGERY | Age: 76
End: 2021-04-20

## 2021-04-21 NOTE — TELEPHONE ENCOUNTER
Attempted to call patient twice to introduce myself as nurse navigator, however she did not answer, and her name was not on the voicemail.

## 2021-04-27 ENCOUNTER — TELEPHONE (OUTPATIENT)
Dept: SURGERY | Age: 76
End: 2021-04-27

## 2021-04-27 NOTE — TELEPHONE ENCOUNTER
Third call to patient to introduce myself and explain my role. She did not answer at home or on her cell, no voicemail with her name. I did not leave a message for her.

## 2021-04-28 ENCOUNTER — OFFICE VISIT (OUTPATIENT)
Dept: SURGERY | Age: 76
End: 2021-04-28
Payer: MEDICARE

## 2021-04-28 VITALS
SYSTOLIC BLOOD PRESSURE: 177 MMHG | WEIGHT: 151 LBS | HEART RATE: 60 BPM | DIASTOLIC BLOOD PRESSURE: 76 MMHG | BODY MASS INDEX: 28.51 KG/M2 | HEIGHT: 61 IN

## 2021-04-28 DIAGNOSIS — Z17.0 MALIGNANT NEOPLASM OF UPPER-OUTER QUADRANT OF LEFT BREAST IN FEMALE, ESTROGEN RECEPTOR POSITIVE (HCC): Primary | ICD-10-CM

## 2021-04-28 DIAGNOSIS — C50.412 MALIGNANT NEOPLASM OF UPPER-OUTER QUADRANT OF LEFT BREAST IN FEMALE, ESTROGEN RECEPTOR POSITIVE (HCC): Primary | ICD-10-CM

## 2021-04-28 PROCEDURE — 76642 ULTRASOUND BREAST LIMITED: CPT | Performed by: SURGERY

## 2021-04-28 PROCEDURE — 1090F PRES/ABSN URINE INCON ASSESS: CPT | Performed by: SURGERY

## 2021-04-28 PROCEDURE — G8753 SYS BP > OR = 140: HCPCS | Performed by: SURGERY

## 2021-04-28 PROCEDURE — 99205 OFFICE O/P NEW HI 60 MIN: CPT | Performed by: SURGERY

## 2021-04-28 PROCEDURE — G8754 DIAS BP LESS 90: HCPCS | Performed by: SURGERY

## 2021-04-28 NOTE — PATIENT INSTRUCTIONS
Breast Cancer: Care Instructions Your Care Instructions Breast cancer occurs when abnormal cells grow out of control in the breast. These cancer cells can spread within the breast, to nearby lymph nodes and other tissues, and to other parts of the body. Being treated for cancer can weaken your body, and you may feel very tired. Get the rest your body needs so you can feel better. Finding out that you have cancer is scary. You may feel many emotions and may need some help coping. Seek out family, friends, and counselors for support. You also can do things at home to make yourself feel better while you go through treatment. Call the [facility] ([phone]) or visit its website at [employer] for more information. Follow-up care is a key part of your treatment and safety. Be sure to make and go to all appointments, and call your doctor if you are having problems. It's also a good idea to know your test results and keep a list of the medicines you take. How can you care for yourself at home? · Take your medicines exactly as prescribed. Call your doctor if you think you are having a problem with your medicine. You may get medicine for nausea and vomiting if you have these side effects. · Follow your doctor's instructions to relieve pain. Pain from cancer and surgery can almost always be controlled. Use pain medicine when you first notice pain, before it becomes severe. · Eat healthy food. If you do not feel like eating, try to eat food that has protein and extra calories to keep up your strength and prevent weight loss. Drink liquid meal replacements for extra calories and protein. Try to eat your main meal early. · Get some physical activity every day, but do not get too tired. Keep doing the hobbies you enjoy as your energy allows. · Do not smoke. Smoking can make your cancer worse. If you need help quitting, talk to your doctor about stop-smoking programs and medicines.  These can increase your chances of quitting for good. · Take steps to control your stress and workload. Learn relaxation techniques. ? Share your feelings. Stress and tension affect our emotions. By expressing your feelings to others, you may be able to understand and cope with them. ? Consider joining a support group. Talking about a problem with your spouse, a good friend, or other people with similar problems is a good way to reduce tension and stress. ? Express yourself through art. Try writing, crafts, dance, or art to relieve stress. Some dance, writing, or art groups may be available just for people who have cancer. ? Be kind to your body and mind. Getting enough sleep, eating a healthy diet, and taking time to do things you enjoy can contribute to an overall feeling of balance in your life and can help reduce stress. ? Get help if you need it. Discuss your concerns with your doctor or counselor. · If you are vomiting or have diarrhea: ? Drink plenty of fluids to prevent dehydration. Choose water and other caffeine-free clear liquids. If you have kidney, heart, or liver disease and have to limit fluids, talk with your doctor before you increase the amount of fluids you drink. ? When you are able to eat, try clear soups, mild foods, and liquids until all symptoms are gone for 12 to 48 hours. Other good choices include dry toast, crackers, cooked cereal, and gelatin dessert, such as Jell-O. · If you have not already done so, prepare a list of advance directives. Advance directives are instructions to your doctor and family members about what kind of care you want if you become unable to speak or express yourself. When should you call for help? Call 911 anytime you think you may need emergency care. For example, call if: 
  · You passed out (lost consciousness).   
Call your doctor now or seek immediate medical care if: 
  · You have a fever.  
  · You have abnormal bleeding.  
  · You think you have an infection.  
  · You have new or worse pain.  
  · You have new symptoms, such as a cough, belly pain, vomiting, diarrhea, or a rash. Watch closely for changes in your health, and be sure to contact your doctor if: 
  · You are much more tired than usual.  
  · You have swollen glands in your armpits, groin, or neck.  
  · You do not get better as expected. Where can you learn more? Go to http://www.Plink.com/ Enter V321 in the search box to learn more about \"Breast Cancer: Care Instructions. \" Current as of: December 17, 2020               Content Version: 12.8 © 2684-6863 [employer]. Care instructions adapted under license by [employer] (which disclaims liability or warranty for this information). If you have questions about a medical condition or this instruction, always ask your healthcare professional. [address] any warranty or liability for your use of this information.

## 2021-04-28 NOTE — H&P (VIEW-ONLY)
HISTORY OF PRESENT ILLNESS Surinder Rey is a 76 y.o. female. HPI NEW patient consult referred by  Dr. Charissa Lu for RIGHT breast cancer IDC. Patient states that she was horned by her goat in late Fall and asked her dermatologist about the bruise, advised to get mammogram. Cancer found on mammogram. Denies palpable lump. Breast Cancer hx-  
4-8-21- RIGHT breast bx- RIGHT IDC, GR2, ER99, PR99, Ki-67-3%, Her2- Family History: None Mammogram, 3-31-21 [facility], BIRADS 5 right breast  3.5 cm mass. No adenopathy Past Medical History:  
Diagnosis Date  CAD (coronary artery disease) stenting cardiac 6/5/2015  Hypertension  UTI (urinary tract infection) 2020  Vertigo 5/11/2012 Past Surgical History:  
Procedure Laterality Date  HX BREAST BIOPSY Left Long time ago --neg  HX HEENT    
 tonsils  HX TUBAL LIGATION    
 LA BREAST SURGERY PROCEDURE UNLISTED    
 left breast biopsy Social History Socioeconomic History  Marital status: SINGLE Spouse name: Not on file  Number of children: Not on file  Years of education: Not on file  Highest education level: Not on file Occupational History  Not on file Social Needs  Financial resource strain: Not on file  Food insecurity Worry: Not on file Inability: Not on file  Transportation needs Medical: Not on file Non-medical: Not on file Tobacco Use  Smoking status: Current Some Day Smoker Packs/day: 0.25 Years: 30.00 Pack years: 7.50 Types: Cigarettes  Smokeless tobacco: Never Used  Tobacco comment: Hasn't smoked lately Substance and Sexual Activity  Alcohol use: No  
 Drug use: No  
 Sexual activity: Not Currently Lifestyle  Physical activity Days per week: Not on file Minutes per session: Not on file  Stress: Not on file Relationships  Social connections Talks on phone: Not on file Gets together: Not on file   Attends Caodaism service: Not on file Active member of club or organization: Not on file Attends meetings of clubs or organizations: Not on file Relationship status: Not on file  Intimate partner violence Fear of current or ex partner: Not on file Emotionally abused: Not on file Physically abused: Not on file Forced sexual activity: Not on file Other Topics Concern  Not on file Social History Narrative  Not on file OB History No obstetric history on file. Obstetric Comments Menarche 6, LMP 52, # of children 0, age of 1st delivery N/A, Hysterectomy/oophorectomy No/No, Breast bx Yes, history of breast feeding N/A, BCP Yes, Hormone therapy No  
  
  
 
 
Current Outpatient Medications:  
  losartan (COZAAR) 50 mg tablet, Take 1 Tab by mouth daily. , Disp: 90 Tab, Rfl: 0 
  metoprolol tartrate (LOPRESSOR) 25 mg tablet, Take 1 tablet by mouth twice daily, Disp: 180 Tab, Rfl: 2 
  amLODIPine (NORVASC) 5 mg tablet, Take 1 tablet by mouth once daily for blood pressure, Disp: 90 Tab, Rfl: 3   vit A/vit C/vit E/zinc/copper (ICAPS AREDS PO), Take  by mouth daily. , Disp: , Rfl:  
  acetaminophen (TYLENOL EXTRA STRENGTH) 500 mg tablet, Take  by mouth every six (6) hours as needed for Pain., Disp: , Rfl:  
  lovastatin (MEVACOR) 40 mg tablet, Take 1 Tab by mouth nightly. Refills per PCP who is performing labs, Disp: 90 Tab, Rfl: 1 
  aspirin delayed-release 81 mg tablet, Take  by mouth daily. , Disp: , Rfl:  
Allergies Allergen Reactions  Eryc [Erythromycin] Hives  Influenza Virus Vaccine Tvs 2012-13 (18+) Cell Aguilar Other (comments) \"felt like I was on fire\"  Pcn [Penicillins] Hives  Crestor [Rosuvastatin] Myalgia  Hydrochlorothiazide Other (comments)  
  hyponatremia  Keflex [Cephalexin] Other (comments) Aching muscles  Lipitor [Atorvastatin] Myalgia  Proton Pump Inhibitors Other (comments)  
  dizziness Review of Systems Cardiovascular: Positive for leg swelling. Musculoskeletal: Positive for joint pain and neck pain. All other systems reviewed and are negative. Physical Exam 
Vitals signs and nursing note reviewed. Chest:  
   Breasts: Breasts are symmetrical.  
      Right: Mass (3.5 cm mass behind nipple) present. No inverted nipple, nipple discharge, skin change or tenderness. Left: No inverted nipple, mass, nipple discharge, skin change or tenderness. Lymphadenopathy:  
   Cervical: No cervical adenopathy. Upper Body:  
   Right upper body: No supraclavicular, axillary or pectoral adenopathy. Left upper body: No supraclavicular, axillary or pectoral adenopathy. BREAST ULTRASOUND, Preop planning Indication:preop planning  right Breast RA Technique: The area was scanned using a high-frequency linear-array near-field transducer Findings: 3.5 cm  irregular mass with dropout Impression: Biopsy site visible with ultrasound Disposition:  Will schedule lumpectomy with sentinel lymph node biopsy ASSESSMENT and PLAN 
  ICD-10-CM ICD-9-CM 1. Malignant neoplasm of upper-outer quadrant of left breast in female, estrogen receptor positive ([facility] [address]  C50.412 174.4   
 Z17.0 V86.0   
75 yo female with right breast T2 N0 IDC, GR2, ER99, PR99, Ki-67-3%, Her2- Here with her niece I have reviewed the imaging and pathology with her and she was given copies of these reports. 90 minutes were spent face-to-face with the patient during this encounter and 90% of that time was spent on counseling and coordination of care. 1. Discussed lumpectomy and radiation vs mastectomy. Discussed reconstruction. Patient not candidate for mri due to positioning. 2. Discussed sentinel lymph node biopsy. 3. Discussed external beam radiation. 4. Discussed hormone therapy. 5. Discussed the possibility of chemotherapy. Will plan for right breast us guided lumpectomy, sln biopsy Will send message to Dr. Red Iyer in cardiology for clearance. Will need to hold asa preop Will schedule.

## 2021-04-28 NOTE — Clinical Note
Hi! I think you saw her in December  She has breast cancer needs surgery. Lumpectomy node biopsy will need gen anesthesia.   Please eval her thanks

## 2021-04-28 NOTE — PROGRESS NOTES
HISTORY OF PRESENT ILLNESS  Ashley Remi is a 76 y.o. female. HPI NEW patient consult referred by  Dr. Heena Miller for RIGHT breast cancer IDC. Patient states that she was horned by her goat in late Fall and asked her dermatologist about the bruise, advised to get mammogram. Cancer found on mammogram. Denies palpable lump. Breast Cancer hx-   4-8-21- RIGHT breast bx- RIGHT IDC, GR2, ER99, PR99, Ki-67-3%, Her2-      Family History: None      Mammogram, 3-31-21 [facility], BIRADS 5 right breast  3.5 cm mass.  No adenopathy     Past Medical History:   Diagnosis Date    CAD (coronary artery disease)     stenting cardiac 6/5/2015    Hypertension     UTI (urinary tract infection) 2020    Vertigo 5/11/2012     Past Surgical History:   Procedure Laterality Date    HX BREAST BIOPSY Left     Long time ago --neg    HX HEENT      tonsils    HX TUBAL LIGATION      MT BREAST SURGERY PROCEDURE UNLISTED      left breast biopsy     Social History     Socioeconomic History    Marital status: SINGLE     Spouse name: Not on file    Number of children: Not on file    Years of education: Not on file    Highest education level: Not on file   Occupational History    Not on file   Social Needs    Financial resource strain: Not on file    Food insecurity     Worry: Not on file     Inability: Not on file    Transportation needs     Medical: Not on file     Non-medical: Not on file   Tobacco Use    Smoking status: Current Some Day Smoker     Packs/day: 0.25     Years: 30.00     Pack years: 7.50     Types: Cigarettes    Smokeless tobacco: Never Used    Tobacco comment: Hasn't smoked lately    Substance and Sexual Activity    Alcohol use: No    Drug use: No    Sexual activity: Not Currently   Lifestyle    Physical activity     Days per week: Not on file     Minutes per session: Not on file    Stress: Not on file   Relationships    Social connections     Talks on phone: Not on file     Gets together: Not on file     Attends Jehovah's witness service: Not on file     Active member of club or organization: Not on file     Attends meetings of clubs or organizations: Not on file     Relationship status: Not on file    Intimate partner violence     Fear of current or ex partner: Not on file     Emotionally abused: Not on file     Physically abused: Not on file     Forced sexual activity: Not on file   Other Topics Concern    Not on file   Social History Narrative    Not on file     OB History    No obstetric history on file. Obstetric Comments   Menarche 6, LMP 52, # of children 0, age of 1st delivery N/A, Hysterectomy/oophorectomy No/No, Breast bx Yes, history of breast feeding N/A, BCP Yes, Hormone therapy No             Current Outpatient Medications:     losartan (COZAAR) 50 mg tablet, Take 1 Tab by mouth daily. , Disp: 90 Tab, Rfl: 0    metoprolol tartrate (LOPRESSOR) 25 mg tablet, Take 1 tablet by mouth twice daily, Disp: 180 Tab, Rfl: 2    amLODIPine (NORVASC) 5 mg tablet, Take 1 tablet by mouth once daily for blood pressure, Disp: 90 Tab, Rfl: 3    vit A/vit C/vit E/zinc/copper (ICAPS AREDS PO), Take  by mouth daily. , Disp: , Rfl:     acetaminophen (TYLENOL EXTRA STRENGTH) 500 mg tablet, Take  by mouth every six (6) hours as needed for Pain., Disp: , Rfl:     lovastatin (MEVACOR) 40 mg tablet, Take 1 Tab by mouth nightly. Refills per PCP who is performing labs, Disp: 90 Tab, Rfl: 1    aspirin delayed-release 81 mg tablet, Take  by mouth daily. , Disp: , Rfl:   Allergies   Allergen Reactions    Eryc [Erythromycin] Hives    Influenza Virus Vaccine Tvs 2012-13 (18+) Cell Aguilar Other (comments)     \"felt like I was on fire\"    Pcn [Penicillins] Hives    Crestor [Rosuvastatin] Myalgia    Hydrochlorothiazide Other (comments)     hyponatremia    Keflex [Cephalexin] Other (comments)     Aching muscles    Lipitor [Atorvastatin] Myalgia    Proton Pump Inhibitors Other (comments)     dizziness     Review of Systems Cardiovascular: Positive for leg swelling. Musculoskeletal: Positive for joint pain and neck pain. All other systems reviewed and are negative. Physical Exam  Vitals signs and nursing note reviewed. Chest:      Breasts: Breasts are symmetrical.         Right: Mass (3.5 cm mass behind nipple) present. No inverted nipple, nipple discharge, skin change or tenderness. Left: No inverted nipple, mass, nipple discharge, skin change or tenderness. Lymphadenopathy:      Cervical: No cervical adenopathy. Upper Body:      Right upper body: No supraclavicular, axillary or pectoral adenopathy. Left upper body: No supraclavicular, axillary or pectoral adenopathy. BREAST ULTRASOUND, Preop planning  Indication:preop planning  right Breast RA   Technique: The area was scanned using a high-frequency linear-array near-field transducer  Findings: 3.5 cm  irregular mass with dropout  Impression: Biopsy site visible with ultrasound  Disposition:  Will schedule lumpectomy with sentinel lymph node biopsy  ASSESSMENT and PLAN    ICD-10-CM ICD-9-CM    1. Malignant neoplasm of upper-outer quadrant of left breast in female, estrogen receptor positive ([facility])  C50.412 174.4     Z17.0 V86.0    77 yo female with right breast T2 N0 IDC, GR2, ER99, PR99, Ki-67-3%, Her2-    Here with her niece  I have reviewed the imaging and pathology with her and she was given copies of these reports. 90 minutes were spent face-to-face with the patient during this encounter and 90% of that time was spent on counseling and coordination of care. 1. Discussed lumpectomy and radiation vs mastectomy. Discussed reconstruction. Patient not candidate for mri due to positioning. 2. Discussed sentinel lymph node biopsy. 3. Discussed external beam radiation. 4. Discussed hormone therapy. 5. Discussed the possibility of chemotherapy.      Will plan for right breast us guided lumpectomy, sln biopsy  Will send message to Dr. Jameel Villar in cardiology for clearance. Will need to hold asa preop  Will schedule.

## 2021-04-28 NOTE — LETTER
4/28/2021 Patient: Ben Singh YOB: 1945 Date of Visit: 4/28/2021 Irlanda Hewitt MD 
[address] Counts include 234 beds at the [facility] [address] Via Fax: [phone] Dear Irlanda Hewitt MD, Thank you for referring Ms. Ben Singh to [address] [facility] [place] MAIN OFFICE [address] [place] [address] for evaluation. My notes for this consultation are attached. If you have questions, please do not hesitate to call me. I look forward to following your patient along with you. Sincerely, Dimitris Carson MD

## 2021-05-03 ENCOUNTER — TELEPHONE (OUTPATIENT)
Dept: SURGERY | Age: 76
End: 2021-05-03

## 2021-05-03 ENCOUNTER — NURSE NAVIGATOR (OUTPATIENT)
Dept: SURGERY | Age: 76
End: 2021-05-03

## 2021-05-03 NOTE — PROGRESS NOTES
[address]  Breast Navigator Intake Assessment    Name:  Ben Singh  MRN:  [id number]  Age:  76 y.o. Sex:  female  YOB: 1945  Referring Provider:   Insurance:   Payor: [place] MEDICARE / Plan: VA MEDICARE PART A & B / Product Type: Medicare /   Diagnosis:   LEFT breast cancer    Understanding of Diagnosis:   Only discussed next steps being surgery. Personal/Family History of Cancer:  []No [x]Yes; type:  Niece. Relationship Status:  [x]Single [] []Significant Other/Life Partner [] [] []    Support System:    [x]Identified Support Person(s):  Niece    Living Circumstances:  [x]Alone  []w/ Significant Other []Children []Parents []Caregivers  []Assisted Living Facility/Group Home  []Nursing Facility []Homeless []Environmental/Care Concerns:  Has 40 acres, a stubborn goat and an old dog to care for. Is unable to get tasks done at this time due to shoulder pain, which was exacerbated by recent imaging. Employment Status:  []Full-time []Part-time  []Retired []Short-Term Disability []Long-Term Disability []Unemployed   Has her own farm. Learning:    Barriers: []Mental Status  []Hearing Impairment  []Unable to Read/Write  []Challenges Understanding Medical Jargon []No Barriers Identified    Preferences: []Reading []Listening []Videos/Pictures  []Other:    Barriers to Care and Financial/Legal Concerns:    []Transportation []Citizenship status []Income/Resources  []Food/Clothing Insecurity []Caregiver Fate []Adcanced Care Planning  []Concerns Identified:    Coping with Illness:   []Coping Well  []Challenges Coping with Serious Illness []Situational Depression []Situational Anxiety []Anticipatory Grief  []Recent Loss       Interdisciplinary Team:  Med-Onc:  Surg-Onc:  Rad-Onc:  Plastics:  :   Nurse Navigator:  Angella Verdugo RN, BSN, CBCN      Narrative:   Called patient after visit with Dr. Tonya Leonardo.   I was unable to reach her prior to her appointment. She is waiting to get surgery scheduled, thinks this will be the 25th of this month. Is most concerned about her shoulder pain, which she says was exacerbated by positioning for imaging. 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 I reminded her to let the staff at the hospital know that she has shoulder pain so they can be careful with placement during surgery. Also would like to avoid general anesthesia. I told her that this may not be possible, but to speak to anesthesia about this. She has someone to bring her home from the hospital and someone she can stay with the first night. She had no additional concerns at this time. She was provided my contact information. Referrals/Handouts:  None at this time.       Carly Dodd, RN  May 3, 2021          [facility], RN, BSN, [facility]  Oncology Breast Navigator     [facility]  [address] [place] [address]  W: [phone]  F: [phone]  [email or web address]  Good Help to Those in [facility]

## 2021-05-06 DIAGNOSIS — Z17.0 MALIGNANT NEOPLASM OF UPPER-OUTER QUADRANT OF LEFT BREAST IN FEMALE, ESTROGEN RECEPTOR POSITIVE (HCC): Primary | ICD-10-CM

## 2021-05-06 DIAGNOSIS — C50.412 MALIGNANT NEOPLASM OF UPPER-OUTER QUADRANT OF LEFT BREAST IN FEMALE, ESTROGEN RECEPTOR POSITIVE (HCC): Primary | ICD-10-CM

## 2021-05-07 DIAGNOSIS — Z17.0 MALIGNANT NEOPLASM OF UPPER-OUTER QUADRANT OF LEFT BREAST IN FEMALE, ESTROGEN RECEPTOR POSITIVE (HCC): Primary | ICD-10-CM

## 2021-05-07 DIAGNOSIS — C50.412 MALIGNANT NEOPLASM OF UPPER-OUTER QUADRANT OF LEFT BREAST IN FEMALE, ESTROGEN RECEPTOR POSITIVE (HCC): Primary | ICD-10-CM

## 2021-05-18 ENCOUNTER — HOSPITAL ENCOUNTER (OUTPATIENT)
Dept: PREADMISSION TESTING | Age: 76
Discharge: HOME | End: 2021-05-18
Payer: MEDICARE

## 2021-05-18 VITALS
WEIGHT: 147.71 LBS | DIASTOLIC BLOOD PRESSURE: 70 MMHG | BODY MASS INDEX: 27.89 KG/M2 | HEIGHT: 61 IN | SYSTOLIC BLOOD PRESSURE: 145 MMHG | HEART RATE: 75 BPM | TEMPERATURE: 98.4 F

## 2021-05-18 LAB
ANION GAP SERPL CALC-SCNC: 8 MMOL/L (ref 5–15)
ATRIAL RATE: 72 BPM
BUN SERPL-MCNC: 21 MG/DL (ref 6–20)
BUN/CREAT SERPL: 35 (ref 12–20)
CALCIUM SERPL-MCNC: 9.4 MG/DL (ref 8.5–10.1)
CALCULATED P AXIS, ECG09: 55 DEGREES
CALCULATED R AXIS, ECG10: 62 DEGREES
CALCULATED T AXIS, ECG11: 46 DEGREES
CHLORIDE SERPL-SCNC: 98 MMOL/L (ref 97–108)
CO2 SERPL-SCNC: 24 MMOL/L (ref 21–32)
CREAT SERPL-MCNC: 0.6 MG/DL (ref 0.55–1.02)
DIAGNOSIS, 93000: NORMAL
ERYTHROCYTE [DISTWIDTH] IN BLOOD BY AUTOMATED COUNT: 12.1 % (ref 11.5–14.5)
GLUCOSE SERPL-MCNC: 186 MG/DL (ref 65–100)
HCT VFR BLD AUTO: 36.9 % (ref 35–47)
HGB BLD-MCNC: 12.6 G/DL (ref 11.5–16)
MCH RBC QN AUTO: 33.2 PG (ref 26–34)
MCHC RBC AUTO-ENTMCNC: 34.1 G/DL (ref 30–36.5)
MCV RBC AUTO: 97.4 FL (ref 80–99)
NRBC # BLD: 0 K/UL (ref 0–0.01)
NRBC BLD-RTO: 0 PER 100 WBC
P-R INTERVAL, ECG05: 140 MS
PLATELET # BLD AUTO: 215 K/UL (ref 150–400)
PMV BLD AUTO: 10.6 FL (ref 8.9–12.9)
POTASSIUM SERPL-SCNC: 4.3 MMOL/L (ref 3.5–5.1)
Q-T INTERVAL, ECG07: 396 MS
QRS DURATION, ECG06: 80 MS
QTC CALCULATION (BEZET), ECG08: 433 MS
RBC # BLD AUTO: 3.79 M/UL (ref 3.8–5.2)
SODIUM SERPL-SCNC: 130 MMOL/L (ref 136–145)
VENTRICULAR RATE, ECG03: 72 BPM
WBC # BLD AUTO: 11.3 K/UL (ref 3.6–11)

## 2021-05-18 PROCEDURE — 85027 COMPLETE CBC AUTOMATED: CPT

## 2021-05-18 PROCEDURE — 80048 BASIC METABOLIC PNL TOTAL CA: CPT

## 2021-05-18 PROCEDURE — 93005 ELECTROCARDIOGRAM TRACING: CPT

## 2021-05-18 PROCEDURE — 36415 COLL VENOUS BLD VENIPUNCTURE: CPT

## 2021-05-18 NOTE — PERIOP NOTES
PREOPERATIVE INSTRUCTIONS REVIEWED WITH PATIENT. PATIENT GIVEN TWO-- BOTTLES OF CHG SOAPS  INSTRUCTIONS REVIEWED ON USE OF CHG SOAPS   PATIENT GIVEN SSI INFECTIONS SHEET. PATIENT WAS GIVEN THE OPPORTUNITY TO ASK QUESTIONS ON THE INFORMATION PROVIDED. PT  MADE AWARE OF COVID 19 TESTING NEEDED TO BE DONE WITHIN 96 HOURS OF SURGERY. PT INSTRUCTED ON SELF QUARANTINE  BETWEEN TESTING AND ARRIVAL TIME  ON DAY OF SURGERY. INSTRUCTION PROVIDED FOR CELL PHONE WAITING AREA, PT INFORMATION AND INSTRUCTIONS FOR APPOINTMENTS AT [facility] ON DAY OF SURGERY.

## 2021-05-18 NOTE — PERIOP NOTES
PER TIMING NOTE FROM SARAH ALLEN :  SPOKE TO PTS NEICE AND COVID TEST WILL BE DONE LOCALLY. GAVE PROCEDURES AND FAX #.

## 2021-05-19 NOTE — PERIOP NOTES
ABNORMAL LAB RESULTS FROM PAT ON 5/18/21 CALLED TO  NURSE'S LINE IN DR Jessica Corey OFFICE, [facility].  *Na: 130    1231: ABNORMAL Na+ FAXED TO PCP'S OFFICE: DR Yoana Pierce OFFICE.   Na: 130

## 2021-05-21 ENCOUNTER — HOSPITAL ENCOUNTER (OUTPATIENT)
Dept: PREADMISSION TESTING | Age: 76
Discharge: HOME | End: 2021-05-21
Payer: MEDICARE

## 2021-05-21 ENCOUNTER — TELEPHONE (OUTPATIENT)
Dept: CARDIOLOGY CLINIC | Age: 76
End: 2021-05-21

## 2021-05-21 LAB — SARS-COV-2, COV2: NORMAL

## 2021-05-21 PROCEDURE — U0005 INFEC AGEN DETEC AMPLI PROBE: HCPCS

## 2021-05-21 NOTE — TELEPHONE ENCOUNTER
Pt having breast surgery on 5/25 and is calling wanting to know if she should stop the baby aspirin and if so when should she stop- please contact her at [phone].     Thanks  [employer]

## 2021-05-22 LAB — SARS-COV-2, COV2NT: NOT DETECTED

## 2021-05-24 NOTE — TELEPHONE ENCOUNTER
Patient calling to check on status of aspirin    [phone]      Leave message on voicemail if no answer    Thanks  Valdez Thomas

## 2021-05-24 NOTE — TELEPHONE ENCOUNTER
She hasn't had ASA since 5/23/21 haven't heard from us she is still requesting if ok for lumpectomy 5/24/21. Friend home # [facility] [phone] to leave message.

## 2021-05-25 ENCOUNTER — HOSPITAL ENCOUNTER (OUTPATIENT)
Age: 76
Setting detail: OUTPATIENT SURGERY
Discharge: HOME | End: 2021-05-25
Attending: SURGERY | Admitting: SURGERY
Payer: MEDICARE

## 2021-05-25 ENCOUNTER — ANESTHESIA EVENT (OUTPATIENT)
Dept: MEDSURG UNIT | Age: 76
End: 2021-05-25
Payer: MEDICARE

## 2021-05-25 ENCOUNTER — ANESTHESIA (OUTPATIENT)
Dept: MEDSURG UNIT | Age: 76
End: 2021-05-25
Payer: MEDICARE

## 2021-05-25 ENCOUNTER — HOSPITAL ENCOUNTER (OUTPATIENT)
Dept: NUCLEAR MEDICINE | Age: 76
Discharge: HOME | End: 2021-05-25
Attending: SURGERY
Payer: MEDICARE

## 2021-05-25 VITALS
RESPIRATION RATE: 13 BRPM | BODY MASS INDEX: 27.89 KG/M2 | SYSTOLIC BLOOD PRESSURE: 139 MMHG | HEIGHT: 61 IN | TEMPERATURE: 97.5 F | WEIGHT: 147.71 LBS | HEART RATE: 61 BPM | OXYGEN SATURATION: 99 % | DIASTOLIC BLOOD PRESSURE: 66 MMHG

## 2021-05-25 DIAGNOSIS — Z17.0 MALIGNANT NEOPLASM OF UPPER-OUTER QUADRANT OF LEFT BREAST IN FEMALE, ESTROGEN RECEPTOR POSITIVE (HCC): ICD-10-CM

## 2021-05-25 DIAGNOSIS — C50.412 MALIGNANT NEOPLASM OF UPPER-OUTER QUADRANT OF LEFT BREAST IN FEMALE, ESTROGEN RECEPTOR POSITIVE (HCC): ICD-10-CM

## 2021-05-25 DIAGNOSIS — Z17.0 MALIGNANT NEOPLASM OF UPPER-OUTER QUADRANT OF RIGHT BREAST IN FEMALE, ESTROGEN RECEPTOR POSITIVE (HCC): ICD-10-CM

## 2021-05-25 DIAGNOSIS — C50.411 MALIGNANT NEOPLASM OF UPPER-OUTER QUADRANT OF RIGHT BREAST IN FEMALE, ESTROGEN RECEPTOR POSITIVE (HCC): ICD-10-CM

## 2021-05-25 PROCEDURE — 19301 PARTIAL MASTECTOMY: CPT | Performed by: SURGERY

## 2021-05-25 PROCEDURE — 38525 BIOPSY/REMOVAL LYMPH NODES: CPT | Performed by: SURGERY

## 2021-05-25 PROCEDURE — 76210000036 HC AMBSU PH I REC 1.5 TO 2 HR: Performed by: SURGERY

## 2021-05-25 PROCEDURE — 74011250636 HC RX REV CODE- 250/636: Performed by: SURGERY

## 2021-05-25 PROCEDURE — 74011250637 HC RX REV CODE- 250/637: Performed by: ANESTHESIOLOGY

## 2021-05-25 PROCEDURE — 78195 LYMPH SYSTEM IMAGING: CPT

## 2021-05-25 PROCEDURE — 74011250636 HC RX REV CODE- 250/636: Performed by: NURSE ANESTHETIST, CERTIFIED REGISTERED

## 2021-05-25 PROCEDURE — 76060000062 HC AMB SURG ANES 1 TO 1.5 HR: Performed by: SURGERY

## 2021-05-25 PROCEDURE — 77030031139 HC SUT VCRL2 J&J -A: Performed by: SURGERY

## 2021-05-25 PROCEDURE — 77030041680 HC PNCL ELECSURG SMK EVAC CNMD -B: Performed by: SURGERY

## 2021-05-25 PROCEDURE — 74011250636 HC RX REV CODE- 250/636: Performed by: ANESTHESIOLOGY

## 2021-05-25 PROCEDURE — 77030010509 HC AIRWY LMA MSK TELE -A: Performed by: NURSE ANESTHETIST, CERTIFIED REGISTERED

## 2021-05-25 PROCEDURE — 77030011825 HC SUPP SURG PSTOP S2SG -B: Performed by: SURGERY

## 2021-05-25 PROCEDURE — 76030000001 HC AMB SURG OR TIME 1 TO 1.5: Performed by: SURGERY

## 2021-05-25 PROCEDURE — 77030040922 HC BLNKT HYPOTHRM STRY -A

## 2021-05-25 PROCEDURE — 77030040361 HC SLV COMPR DVT MDII -B: Performed by: SURGERY

## 2021-05-25 PROCEDURE — 77030010507 HC ADH SKN DERMBND J&J -B: Performed by: SURGERY

## 2021-05-25 PROCEDURE — 77030011267 HC ELECTRD BLD COVD -A: Performed by: SURGERY

## 2021-05-25 PROCEDURE — 74011000250 HC RX REV CODE- 250: Performed by: NURSE ANESTHETIST, CERTIFIED REGISTERED

## 2021-05-25 PROCEDURE — 2709999900 HC NON-CHARGEABLE SUPPLY: Performed by: SURGERY

## 2021-05-25 PROCEDURE — 77030034626 HC LIGASURE SM JAW SEAL OPN SURG COVD -E: Performed by: SURGERY

## 2021-05-25 PROCEDURE — C9290 INJ, BUPIVACAINE LIPOSOME: HCPCS | Performed by: SURGERY

## 2021-05-25 PROCEDURE — 77030002933 HC SUT MCRYL J&J -A: Performed by: SURGERY

## 2021-05-25 PROCEDURE — 74011000250 HC RX REV CODE- 250: Performed by: SURGERY

## 2021-05-25 PROCEDURE — 88307 TISSUE EXAM BY PATHOLOGIST: CPT

## 2021-05-25 PROCEDURE — 77030002996 HC SUT SLK J&J -A: Performed by: SURGERY

## 2021-05-25 PROCEDURE — 2709999900 HC NON-CHARGEABLE SUPPLY

## 2021-05-25 RX ORDER — OXYCODONE HYDROCHLORIDE 5 MG/1
5 TABLET ORAL AS NEEDED
Status: DISCONTINUED | OUTPATIENT
Start: 2021-05-25 | End: 2021-05-25 | Stop reason: HOSPADM

## 2021-05-25 RX ORDER — SODIUM CHLORIDE 0.9 % (FLUSH) 0.9 %
5-40 SYRINGE (ML) INJECTION AS NEEDED
Status: DISCONTINUED | OUTPATIENT
Start: 2021-05-25 | End: 2021-05-25 | Stop reason: HOSPADM

## 2021-05-25 RX ORDER — MORPHINE SULFATE 2 MG/ML
2 INJECTION, SOLUTION INTRAMUSCULAR; INTRAVENOUS
Status: DISCONTINUED | OUTPATIENT
Start: 2021-05-25 | End: 2021-05-25 | Stop reason: HOSPADM

## 2021-05-25 RX ORDER — SODIUM CHLORIDE, SODIUM LACTATE, POTASSIUM CHLORIDE, CALCIUM CHLORIDE 600; 310; 30; 20 MG/100ML; MG/100ML; MG/100ML; MG/100ML
25 INJECTION, SOLUTION INTRAVENOUS CONTINUOUS
Status: DISCONTINUED | OUTPATIENT
Start: 2021-05-25 | End: 2021-05-25 | Stop reason: HOSPADM

## 2021-05-25 RX ORDER — ACETAMINOPHEN 325 MG/1
650 TABLET ORAL ONCE
Status: COMPLETED | OUTPATIENT
Start: 2021-05-25 | End: 2021-05-25

## 2021-05-25 RX ORDER — SODIUM CHLORIDE 9 MG/ML
25 INJECTION, SOLUTION INTRAVENOUS CONTINUOUS
Status: DISCONTINUED | OUTPATIENT
Start: 2021-05-25 | End: 2021-05-25 | Stop reason: HOSPADM

## 2021-05-25 RX ORDER — FENTANYL CITRATE 50 UG/ML
25 INJECTION, SOLUTION INTRAMUSCULAR; INTRAVENOUS
Status: DISCONTINUED | OUTPATIENT
Start: 2021-05-25 | End: 2021-05-25 | Stop reason: HOSPADM

## 2021-05-25 RX ORDER — DIPHENHYDRAMINE HYDROCHLORIDE 50 MG/ML
12.5 INJECTION, SOLUTION INTRAMUSCULAR; INTRAVENOUS AS NEEDED
Status: DISCONTINUED | OUTPATIENT
Start: 2021-05-25 | End: 2021-05-25 | Stop reason: HOSPADM

## 2021-05-25 RX ORDER — FENTANYL CITRATE 50 UG/ML
INJECTION, SOLUTION INTRAMUSCULAR; INTRAVENOUS AS NEEDED
Status: DISCONTINUED | OUTPATIENT
Start: 2021-05-25 | End: 2021-05-25 | Stop reason: HOSPADM

## 2021-05-25 RX ORDER — FENTANYL CITRATE 50 UG/ML
50 INJECTION, SOLUTION INTRAMUSCULAR; INTRAVENOUS AS NEEDED
Status: DISCONTINUED | OUTPATIENT
Start: 2021-05-25 | End: 2021-05-25 | Stop reason: HOSPADM

## 2021-05-25 RX ORDER — MIDAZOLAM HYDROCHLORIDE 1 MG/ML
1 INJECTION, SOLUTION INTRAMUSCULAR; INTRAVENOUS AS NEEDED
Status: DISCONTINUED | OUTPATIENT
Start: 2021-05-25 | End: 2021-05-25 | Stop reason: HOSPADM

## 2021-05-25 RX ORDER — SODIUM CHLORIDE 0.9 % (FLUSH) 0.9 %
5-40 SYRINGE (ML) INJECTION EVERY 8 HOURS
Status: DISCONTINUED | OUTPATIENT
Start: 2021-05-25 | End: 2021-05-25 | Stop reason: HOSPADM

## 2021-05-25 RX ORDER — TRAMADOL HYDROCHLORIDE 50 MG/1
50 TABLET ORAL
Qty: 20 TABLET | Refills: 0 | Status: SHIPPED | OUTPATIENT
Start: 2021-05-25 | End: 2021-05-30

## 2021-05-25 RX ORDER — ONDANSETRON 4 MG/1
4 TABLET, ORALLY DISINTEGRATING ORAL
Qty: 5 TABLET | Refills: 1 | Status: SHIPPED | OUTPATIENT
Start: 2021-05-25 | End: 2021-11-08

## 2021-05-25 RX ORDER — DEXAMETHASONE SODIUM PHOSPHATE 4 MG/ML
INJECTION, SOLUTION INTRA-ARTICULAR; INTRALESIONAL; INTRAMUSCULAR; INTRAVENOUS; SOFT TISSUE AS NEEDED
Status: DISCONTINUED | OUTPATIENT
Start: 2021-05-25 | End: 2021-05-25 | Stop reason: HOSPADM

## 2021-05-25 RX ORDER — PROPOFOL 10 MG/ML
INJECTION, EMULSION INTRAVENOUS AS NEEDED
Status: DISCONTINUED | OUTPATIENT
Start: 2021-05-25 | End: 2021-05-25 | Stop reason: HOSPADM

## 2021-05-25 RX ORDER — ONDANSETRON 2 MG/ML
INJECTION INTRAMUSCULAR; INTRAVENOUS AS NEEDED
Status: DISCONTINUED | OUTPATIENT
Start: 2021-05-25 | End: 2021-05-25 | Stop reason: HOSPADM

## 2021-05-25 RX ORDER — LIDOCAINE HYDROCHLORIDE 20 MG/ML
INJECTION, SOLUTION EPIDURAL; INFILTRATION; INTRACAUDAL; PERINEURAL AS NEEDED
Status: DISCONTINUED | OUTPATIENT
Start: 2021-05-25 | End: 2021-05-25 | Stop reason: HOSPADM

## 2021-05-25 RX ORDER — MIDAZOLAM HYDROCHLORIDE 1 MG/ML
0.5 INJECTION, SOLUTION INTRAMUSCULAR; INTRAVENOUS
Status: DISCONTINUED | OUTPATIENT
Start: 2021-05-25 | End: 2021-05-25 | Stop reason: HOSPADM

## 2021-05-25 RX ORDER — SODIUM CHLORIDE, SODIUM LACTATE, POTASSIUM CHLORIDE, CALCIUM CHLORIDE 600; 310; 30; 20 MG/100ML; MG/100ML; MG/100ML; MG/100ML
125 INJECTION, SOLUTION INTRAVENOUS CONTINUOUS
Status: DISCONTINUED | OUTPATIENT
Start: 2021-05-25 | End: 2021-05-25 | Stop reason: HOSPADM

## 2021-05-25 RX ORDER — HYDROMORPHONE HYDROCHLORIDE 1 MG/ML
0.2 INJECTION, SOLUTION INTRAMUSCULAR; INTRAVENOUS; SUBCUTANEOUS
Status: DISCONTINUED | OUTPATIENT
Start: 2021-05-25 | End: 2021-05-25 | Stop reason: HOSPADM

## 2021-05-25 RX ORDER — LIDOCAINE HYDROCHLORIDE 10 MG/ML
0.1 INJECTION, SOLUTION EPIDURAL; INFILTRATION; INTRACAUDAL; PERINEURAL AS NEEDED
Status: DISCONTINUED | OUTPATIENT
Start: 2021-05-25 | End: 2021-05-25 | Stop reason: HOSPADM

## 2021-05-25 RX ORDER — ONDANSETRON 2 MG/ML
4 INJECTION INTRAMUSCULAR; INTRAVENOUS AS NEEDED
Status: DISCONTINUED | OUTPATIENT
Start: 2021-05-25 | End: 2021-05-25 | Stop reason: HOSPADM

## 2021-05-25 RX ADMIN — DEXAMETHASONE SODIUM PHOSPHATE 4 MG: 4 INJECTION, SOLUTION INTRAMUSCULAR; INTRAVENOUS at 13:16

## 2021-05-25 RX ADMIN — SODIUM CHLORIDE, POTASSIUM CHLORIDE, SODIUM LACTATE AND CALCIUM CHLORIDE 25 ML/HR: 600; 310; 30; 20 INJECTION, SOLUTION INTRAVENOUS at 12:15

## 2021-05-25 RX ADMIN — ONDANSETRON HYDROCHLORIDE 4 MG: 2 INJECTION, SOLUTION INTRAMUSCULAR; INTRAVENOUS at 13:16

## 2021-05-25 RX ADMIN — ACETAMINOPHEN 650 MG: 325 TABLET ORAL at 11:51

## 2021-05-25 RX ADMIN — FENTANYL CITRATE 50 MCG: 50 INJECTION, SOLUTION INTRAMUSCULAR; INTRAVENOUS at 13:34

## 2021-05-25 RX ADMIN — VANCOMYCIN HYDROCHLORIDE 1000 MG: 1 INJECTION, POWDER, LYOPHILIZED, FOR SOLUTION INTRAVENOUS at 12:34

## 2021-05-25 RX ADMIN — PROPOFOL 150 MG: 10 INJECTION, EMULSION INTRAVENOUS at 13:00

## 2021-05-25 RX ADMIN — LIDOCAINE HYDROCHLORIDE 100 MG: 20 INJECTION, SOLUTION EPIDURAL; INFILTRATION; INTRACAUDAL; PERINEURAL at 13:00

## 2021-05-25 NOTE — PERIOP NOTES
I have reviewed discharge instructions with the patient and niece. The patient and niece verbalized understanding. All questions addressed at this time. A paper copy of these instructions have been given to the patient to take home.

## 2021-05-25 NOTE — BRIEF OP NOTE
Brief Postoperative Note    Patient: Erasto Grimes  YOB: 1945  MRN: [id number]    Date of Procedure: 5/25/2021     Pre-Op Diagnosis: RIGHT BREAST CANCER    Post-Op Diagnosis: Same as preoperative diagnosis. Procedure(s):  RIGHT BREAST LUMPECTOMY WITH ULTRASOUND, RIGHT BREAST SENTINEL NODE BIOPSY    Surgeon(s):   Elizabeth Morris MD    Surgical Assistant: Surg Asst-1: Willie Hardy    Anesthesia: General     Estimated Blood Loss (mL): Minimal    Complications: None    Specimens:   ID Type Source Tests Collected by Time Destination   1 : RIGHT AXILLARY SENTINEL NODE #1  Fresh Axillary  Elizabeth Morris MD 5/25/2021 1318 Pathology   2 : RIGHT AXILLARY SENTINEL NODE #2  Yun Axillary  Elizabeth Morris MD 5/25/2021 1319 Pathology   3 : RIGHT AXILLARY SENTINEL NODE #3  Yun Axillary  Elizabeth Morris MD 5/25/2021 1320 Pathology   4 : RIGHT BREAST LUMPECTOMY, SHORT STITCH SUPERIOR, LONG IS LATERAL  Fresh Breast  Elizabeth Morris MD 5/25/2021 1338 Pathology        Implants: * No implants in log *    Drains: * No LDAs found *    Findings: 3 sln removed     Electronically Signed by Ivis Cotton MD on 5/25/2021 at 1:58 PM  Dictated stat

## 2021-05-25 NOTE — ROUTINE PROCESS
Patient: Shahana Orlando MRN: [id number]  SSN: [id number]   YOB: 1945  Age: 76 y.o. Sex: female     Patient is status post Procedure(s):  RIGHT BREAST LUMPECTOMY WITH ULTRASOUND, RIGHT BREAST SENTINEL NODE BIOPSY. Surgeon(s) and Role:     Mary Grace Gresham MD - Primary    Local/Dose/Irrigation:                    Peripheral IV 05/25/21 Left Wrist (Active)   Site Assessment Clean, dry, & intact 05/25/21 1214   Phlebitis Assessment 0 05/25/21 1214   Infiltration Assessment 1 05/25/21 1214   Dressing Status Clean, dry, & intact 05/25/21 1214   Dressing Type Transparent;Tape 05/25/21 1214   Hub Color/Line Status Blue; Infusing 05/25/21 1214            Airway - Endotracheal Tube 05/25/21 Oral (Active)                   Dressing/Packing:  Incision 05/25/21 Breast Right-Dressing/Treatment:  (DERMABOND, 4X4, SURGI BRA) (05/25/21 1300)    Splint/Cast:  ]    Other:

## 2021-05-25 NOTE — INTERVAL H&P NOTE
Update History & Physical    The Patient's History and Physical of 4/28/2021   Right us guided lumpectomy, sln biopsy  was reviewed with the patient and I examined the patient. There was no change. The surgical site was confirmed by the patient and me. Plan:  The risk, benefits, expected outcome, and alternative to the recommended procedure have been discussed with the patient. Patient understands and wants to proceed with the procedure.     Electronically signed by Mandeep Tucker MD on 5/25/2021 at 10:41 AM

## 2021-05-25 NOTE — OP NOTES
[address]   OPERATIVE REPORT    Name:  Iván Salmeron  MR#:  [id number]  :  1945  ACCOUNT #:  [de-identified]  DATE OF SERVICE:  2021    PREOPERATIVE DIAGNOSIS:  Right breast cancer, upper outer quadrant. POSTOPERATIVE DIAGNOSIS:  Right breast cancer, upper outer quadrant. PROCEDURES PERFORMED:  Right breast central lumpectomy, ultrasound-guided, right deep axillary sentinel node biopsy. SURGEON:  Pieter Umana MD    ASSISTANT:  Imelda Pradhan SA    ANESTHESIA:  General.    COMPLICATIONS:  None. SPECIMENS REMOVED:  1. Right axillary sentinel node #1.  2.  Right axillary sentinel node #2.  3.  Right axillary sentinel node #3. 4.  Right breast lumpectomy. IMPLANTS:  No implants. ESTIMATED BLOOD LOSS:  Minimal.    DRAINS:  No drains. FINDINGS:  Three sentinel lymph nodes removed. INDICATIONS:  A 75-year-old female with a tumor that was just right behind the nipple-areolar complex. She wished to have a central lumpectomy and deep axillary sentinel node biopsy. PROCEDURE:  The patient initially went to Nuclear Medicine where technetium-99 was injected to the right breast.  She tolerated this well. She went to the preoperative holding area where surgical site was marked by surgeon. Informed consent was obtained. She was taken to the operating room, laid in supine position where LMA anesthesia was induced. Right breast prepped and draped in usual fashion and time-out was performed. Attention was turned to the right axilla where an inferior axillary hairline incision was made with a 15-blade. Bovie cautery was used to dissect through the axillary fascia. Three sentinel nodes were identified using Neoprobe guidance, excised with LigaSure and sent for permanent pathology. These were normal in gross size and appearance. Next, the cavity was irrigated.   A mixture of 20 mL of Exparel with 30 mL of 0.25% Marcaine was mixed together and 20 mL of this was injected into the right axilla. The axillary fascia was closed with interrupted 3-0 Vicryl and the skin with 4-0 subcuticular Monocryl. Attention was turned to the right breast where an elliptical incision was made with a 10-blade around the nipple-areolar complex. Bovie cautery was used to dissect down all the way to the chest wall and excised the lesion and this was marked short superior, long stitch lateral and sent for permanent pathology. The cavity was irrigated. The rest of Exparel, 30 mL of the Exparel mixture was injected in the breast tissue and skin. The deep tissues were closed with interrupted 2-0 Vicryl and the skin with running 3-0 Vicryl and running 4-0 subcuticular Monocryl. Skin glue was placed on the incision as well as a pressure dressing and a bra. All sponge, needle, and instruments counts were correct. The patient went to recovery in stable condition.         Мария Arango MD      [id number]  D:  05/25/2021 14:01  T:  05/25/2021 14:37  JOB #:  [id number]

## 2021-05-25 NOTE — ANESTHESIA PREPROCEDURE EVALUATION
Relevant Problems   CARDIOVASCULAR   (+) CAD (coronary artery disease), native coronary artery   (+) Essential hypertension, benign   (+) SVT (supraventricular tachycardia) (HCC)       Anesthetic History   No history of anesthetic complications            Review of Systems / Medical History  Patient summary reviewed, nursing notes reviewed and pertinent labs reviewed    Pulmonary  Within defined limits                 Neuro/Psych   Within defined limits           Cardiovascular    Hypertension        Dysrhythmias   CAD         GI/Hepatic/Renal  Within defined limits              Endo/Other        Arthritis     Other Findings              Physical Exam    Airway  Mallampati: II  TM Distance: > 6 cm  Neck ROM: normal range of motion   Mouth opening: Normal     Cardiovascular  Regular rate and rhythm,  S1 and S2 normal,  no murmur, click, rub, or gallop             Dental  No notable dental hx       Pulmonary  Breath sounds clear to auscultation               Abdominal  GI exam deferred       Other Findings            Anesthetic Plan    ASA: 3  Anesthesia type: general          Induction: Intravenous  Anesthetic plan and risks discussed with: Patient

## 2021-05-25 NOTE — DISCHARGE INSTRUCTIONS
Discharge Instructions from Dr. Taryn West    · I will call you with the pathology results, typically within 1 week from today. · You may shower, but no hot tubs, swimming pools, or baths until your incision is healed. · No heavy lifting with the affected extremity (nothing greater than 5 pounds), and limit its use for the next 4-5 days. · You may use an ice pack for comfort for the next couple of days, but do not place ice directly on the skin. Rather, use a towel or clothing to serve as a barrier between skin and ice to prevent injury. · If I placed a drain, follow the drain instructions provided, especially as you keep a record of the drain output. · Follow medication instructions carefully. No aspirin, ibuprofen or aleve. May take tylenol. · Wear surgical bra and dressing for 24 hours, then remove. Wear supportive bra at all times. · You will have bruising and swelling  · Watch for signs of infection as listed below. · Redness  · Swelling  · Drainage from the incision or from your nipple that appears infected  · Fever over 101.5 degrees for consecutive readings, or over 99.5 if you are currently undergoing chemotherapy. · Call our office (number is below) for a follow-up appointment.   · If you have any problems, our phone number is [phone]

## 2021-05-26 ENCOUNTER — TELEPHONE (OUTPATIENT)
Dept: CARDIOLOGY CLINIC | Age: 76
End: 2021-05-26

## 2021-05-26 ENCOUNTER — NURSE NAVIGATOR (OUTPATIENT)
Dept: CASE MANAGEMENT | Age: 76
End: 2021-05-26

## 2021-05-26 NOTE — PROGRESS NOTES
POD #1 RIGHT lumpectomy with SNBX. Is doing well today, does notice a red area near the incision; it is not painful and not itchy. I told her to keep an eye on this and call the office if it becomes larger, painful or itchy. She has not been using her ice. Recommended that she do this today and tomorrow, 20 mins on and then 20-30 mins off. Asked about starting back on her aspirin. Dr. Henri Martell told her to check with Dr. Anaya Whipple office. I told her to start back on the aspirin on Sunday, and she will do this. She has started back all of her other meds.

## 2021-05-26 NOTE — ANESTHESIA POSTPROCEDURE EVALUATION
Post-Anesthesia Evaluation and Assessment    Patient: Sierra Hightower MRN: [id number]  SSN: [id number]    YOB: 1945  Age: 76 y.o. Sex: female       Cardiovascular Function/Vital Signs  Visit Vitals  /66   Pulse 61   Temp 36.4 °C (97.5 °F)   Resp 13   Ht 5' 1\" (1.549 m)   Wt 67 kg (147 lb 11.3 oz)   SpO2 99%   BMI 27.91 kg/m²       Patient is status post General anesthesia for Procedure(s):  RIGHT BREAST LUMPECTOMY WITH ULTRASOUND, RIGHT BREAST SENTINEL NODE BIOPSY. Nausea/Vomiting: None    Postoperative hydration reviewed and adequate. Pain:  Pain Scale 1: Numeric (0 - 10) (05/25/21 1500)  Pain Intensity 1: 0 (05/25/21 1500)   Managed    Neurological Status:   Neuro (WDL): Within Defined Limits (05/25/21 1500)  Neuro  LUE Motor Response: Purposeful (05/25/21 1500)  LLE Motor Response: Purposeful (05/25/21 1500)  RUE Motor Response: Purposeful (05/25/21 1500)  RLE Motor Response: Purposeful (05/25/21 1500)   At baseline    Mental Status and Level of Consciousness: Alert and oriented to person, place, and time    Pulmonary Status:   O2 Device: None (Room air) (05/25/21 1500)   Adequate oxygenation and airway patent    Complications related to anesthesia: None    Post-anesthesia assessment completed. No concerns    Signed By: Ariane Smith MD     May 26, 2021              Procedure(s):  RIGHT BREAST LUMPECTOMY WITH ULTRASOUND, RIGHT BREAST SENTINEL NODE BIOPSY.     general    <BSHSIANPOST>    INITIAL Post-op Vital signs:   Vitals Value Taken Time   /66 05/25/21 1530   Temp 36.4 °C (97.5 °F) 05/25/21 1530   Pulse 61 05/25/21 1530   Resp 13 05/25/21 1530   SpO2 99 % 05/25/21 1530

## 2021-05-26 NOTE — TELEPHONE ENCOUNTER
Patient needs to know when she can resume her aspirin. Leave message on machine. [phone].     Thanks  Pk Monique

## 2021-05-28 ENCOUNTER — TELEPHONE (OUTPATIENT)
Dept: SURGERY | Age: 76
End: 2021-05-28

## 2021-05-28 NOTE — TELEPHONE ENCOUNTER
Spoke to Garrett mcgarry after Dr. Komal Nolan called the patient. She had further questions. I let her know that all orders for screening scans and med/onc will take place after patient's post op appointment. I reviewed pathology with her    Michael Edge per [employer]. She was appreciative.

## 2021-05-28 NOTE — PROGRESS NOTES
Spoke with patient   2/5 nodes +   Tumor 4.3 cm  Margins clear  Will need xrt  May need chemo?   Needs staging scans   Sees me 6/7 will see her first then set up scans and med onc, rad onc  Patient has bruising   Tight bra, ice  No swelling  No aspirin or ibuprofen

## 2021-06-10 NOTE — PROGRESS NOTES
HISTORY OF PRESENT ILLNESS  Gorge Babinski is a 76 y.o. female. HPI ESTABLISHED patient here POD 17 s/p RIGHT breast lumpectomy and RIGHT SLNB. She is having some tenderness and numbness in her right axilla and upper arm. She has been wearing bra post operatively and continues to hold her aspirin. Breast cancer-  4/8/21 - 77 yo female with right breast T2 N0 IDC, GR2, ER99, PR99, Ki-67-3%, Her2-  5/25/21 - RIGHT breast lumpectomy and RIGHT SLNB. Spoke with patient 2/5 nodes +, Tumor 4.3 cm. Margins clear. Will need xrt. May need chemo? Needs staging scans      No family history of breast or ovarian cancer. Breast imaging-  Mammogram, 3-31-21 [facility], BIRADS 5 right breast  3.5 cm mass. No adenopathy     FINAL PATHOLOGIC DIAGNOSIS   1. Right axilla, sentinel lymph node #1; dissection:   Metastatic carcinoma identified in one lymph node (1/1). Largest dimension metastatic of tumor deposits: 0.7 cm. Extranodal tumor extension identified. 2. Right axilla, sentinel lymph node #2; dissection:   Metastatic carcinoma identified in one lymph node (1/1). Largest dimension of metastatic tumor deposits: 0.8 cm. Extranodal tumor extension identified. 3. Right axilla, sentinel lymph node #3; dissection:   No metastatic carcinoma identified in three lymph nodes (0/3). 4. 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 Ductal carcinoma in situ (DCIS), nuclear grade 2, cribriform type. Patchy stromal fibrosis and adenosis with apocrine metaplasia. Previous procedures/biopsy cavity-related changes noted. All surgical resection margins are negative for invasive and in situ carcinoma. SYNOPTIC REPORT: INVASIVE CARCINOMA OF THE BREAST: Resection   Procedure: Excision/ lumpectomy   Specimen Laterality: Right   TUMOR   Histologic Type:  Invasive carcinoma of no special type (ductal)   Histologic Type Comments: with cribriform features Glandular (Acinar) / Tubular Differentiation: Score 2   Nuclear Pleomorphism: Score 2-3   Mitotic Rate: Score 1   Overall Grade: Grade 2 (scores of 6 or 7)   Tumor Size: 43 x 40 x 26 mm   Tumor Focality: Single focus of invasive carcinoma   Ductal Carcinoma In Situ (DCIS): Present   Ductal Carcinoma In Situ (DCIS): Negative for extensive intraductal component (EIC)   Number of Blocks with DCIS: 2   Number of Blocks Examined: 9   Architectural Patterns: Cribriform   Nuclear Grade: Grade II (intermediate)   Necrosis: Not identified   Lobular Carcinoma In Situ (LCIS): Not identified   Tumor Extent   Skin: Not involved by tumor   Nipple DCIS: DCIS does not involve the nipple epidermis   Lymphovascular Invasion: Suspicious foci   Perineural invasion: Present   Dermal Lymphovascular Invasion: Not identified   Microcalcifications: Present in invasive carcinoma and in non-neoplastic tissue   Treatment Effect in the Breast: No known presurgical therapy   MARGINS   Invasive Carcinoma Margins: Uninvolved by invasive carcinoma   Distance from Closest Margin (Millimeters): 4 mm   Closest Margin(s): Inferior   DCIS Margins: Uninvolved by DCIS   Distance from Closest Margin (Millimeters): Greater than: 10 mm   Closest Margin(s): Inferior   LYMPH NODES   Regional Lymph Nodes: Involved by tumor cells   Number of Lymph Nodes with Macrometastases (> 2 mm): 2   Number of Lymph Nodes with Micrometastases (> 0.2 mm to 2 mm and/ or > 200 cells): 0   Number of Lymph Nodes with Isolated Tumor Cells (<= 0.2 mm or <= 200 cells): 0   Size of Largest Metastatic Deposit (Millimeters): 8 mm   Extranodal Extension: Present   Total Number of Lymph Nodes Examined: 5   Number of [place] Nodes Examined: 5   PATHOLOGIC STAGE CLASSIFICATION (pTNM, AJCC 8th Edition)   Primary Tumor (pT): pT2   Regional Lymph Nodes Modifier: (sn): [place] node(s) evaluated   Regional Lymph Nodes (pN): pN1a   SPECIAL STUDIES   Breast Biomarker Testing Performed on Previous Biopsy: Estrogen Receptor (ER), Progesterone Receptor (PgR), HER2 (by immunohistochemistry), Ki-67   Estrogen Receptor (ER) Status: Positive   Progesterone Receptor (PgR) Status: Positive   HER2 (by immunohistochemistry): Negative (Score 1+)   Ki-67 Percentage of Positive Nuclei: 3%   Testing Performed on Case Number: [id number], outside case     Review of Systems   All other systems reviewed and are negative. Physical Exam  Vitals and nursing note reviewed. Chest:          Comments: Incisions healing well         ASSESSMENT and PLAN    ICD-10-CM ICD-9-CM    1. S/P lumpectomy, right breast  Z98.890 V45.89 gabapentin (NEURONTIN) 100 mg capsule   2. Breast cancer metastasized to axillary lymph node, right (HCC)  C50.911 174.9 CT CHEST W CONT    C77.3 196.3 CT ABD W CONT      CT PELV W CONT      NM BONE SCAN WH BODY     - will send genomic profiling. Refer to med onc, rad onc  dont plan on node dissection. She isn't very sold on additional treatment  Will order staging scans. Currently Stage 2.

## 2021-06-11 ENCOUNTER — OFFICE VISIT (OUTPATIENT)
Dept: SURGERY | Age: 76
End: 2021-06-11
Payer: MEDICARE

## 2021-06-11 VITALS
BODY MASS INDEX: 27.75 KG/M2 | SYSTOLIC BLOOD PRESSURE: 158 MMHG | HEIGHT: 61 IN | HEART RATE: 69 BPM | DIASTOLIC BLOOD PRESSURE: 81 MMHG | WEIGHT: 147 LBS

## 2021-06-11 DIAGNOSIS — C77.3 BREAST CANCER METASTASIZED TO AXILLARY LYMPH NODE, RIGHT (HCC): ICD-10-CM

## 2021-06-11 DIAGNOSIS — C50.911 BREAST CANCER METASTASIZED TO AXILLARY LYMPH NODE, RIGHT (HCC): ICD-10-CM

## 2021-06-11 DIAGNOSIS — Z98.890 S/P LUMPECTOMY, RIGHT BREAST: Primary | ICD-10-CM

## 2021-06-11 PROCEDURE — 99024 POSTOP FOLLOW-UP VISIT: CPT | Performed by: SURGERY

## 2021-06-11 RX ORDER — GABAPENTIN 100 MG/1
100 CAPSULE ORAL 3 TIMES DAILY
Qty: 30 CAPSULE | Refills: 0 | Status: SHIPPED | OUTPATIENT
Start: 2021-06-11

## 2021-06-11 NOTE — PATIENT INSTRUCTIONS
Lumpectomy: What to Expect at Home  Your Recovery     Breast-conserving surgery (lumpectomy) removes the cancer and just enough tissue to get all the cancer. For 1 or 2 days after the surgery, you will probably feel tired and have some pain. The skin around the cut (incision) may feel firm, swollen, and tender, and be bruised. Tenderness should go away in about 2 or 3 days, and the bruising within 2 weeks. Firmness and swelling may last for 3 to 6 months. You may feel a soft lump in your breast that gradually turns hard. This is the incision healing. It is not cancer. Women should wear a well-fitted and supportive bra, even during the night, for 1 week. You will probably be able to go back to work or your normal routine in 1 to 3 weeks after the surgery. This may depend on whether you have more treatment. Your doctor may have removed some lymph nodes in your armpit to see if the cancer has spread. If so, you may feel either numbness or tingling (\"pins and needles\") in your armpit or on the inside of your upper arm. This should improve over the next several weeks. Some people have numbness for a longer time. When you find out that you have cancer, you may feel many emotions and may need some help coping. Seek out family, friends, and counselors for support. You also can do things at home to make yourself feel better while you go through treatment. Call the [employer] (9-213.777.4921) or visit its website at [address] for more information. This care sheet gives you a general idea about how long it will take for you to recover. But each person recovers at a different pace. Follow the steps below to get better as quickly as possible. How can you care for yourself at home? Activity    · Rest when you feel tired. Getting enough sleep will help you recover. You may want to sleep on the side that has not been operated on.  A woman may want to use a pillow to support the affected breast while lying on her side.     · Avoid strenuous activities, such as biking, jogging, weightlifting, or aerobic exercise, for 1 month or until your doctor says it is okay. This may include housework, such as washing windows, especially if you have to use the arm next to the affected breast.     · Most people can return to their normal activities within 2 weeks.     · Try to walk each day. Start out by walking a little more than you did the day before. Bit by bit, increase the amount you walk. Walking boosts blood flow and helps prevent pneumonia and constipation.     · For 1 to 2 weeks, avoid lifting anything over 10 to 15 pounds or that would make you strain. This may include heavy grocery bags and milk containers, a heavy briefcase or backpack, cat litter or dog food bags, a vacuum , or a child.     · You may drive when you are no longer taking pain medicine and can use your arm without pain. Talk to your doctor about when to start driving, especially if you are having radiation treatments.     · You will probably be able to go back to work or your normal routine in 1 to 3 weeks. It may be longer, depending on the type of work you do and whether you are having radiation or chemotherapy.     · You may shower 24 to 48 hours after surgery, if your doctor okays it. Pat the incision dry. Do not take a bath for the first 2 weeks, or until your doctor tells you it is okay. Diet    · You can eat your normal diet. If your stomach is upset, try bland, low-fat foods like plain rice, broiled chicken, toast, and yogurt.     · You may notice that your bowel movements are not regular right after your surgery. This is common. Try to avoid constipation and straining with bowel movements. You may want to take a fiber supplement every day. If you have not had a bowel movement after a couple of days, ask your doctor about taking a mild laxative. Medicines    · Your doctor will tell you if and when you can restart your medicines. He or she will also give you instructions about taking any new medicines.     · If you take aspirin or some other blood thinner, ask your doctor if and when to start taking it again. Make sure that you understand exactly what your doctor wants you to do.     · Take pain medicines exactly as directed. ? Your doctor may have given you a medicine to numb the area inside and around your cut (incision). The numbness will last from 6 to 12 hours. If you went home right after the surgery, you may want to take pain medicine before this wears off.  ? If the doctor gave you a prescription medicine for pain, take it as prescribed. ? If you are not taking a prescription pain medicine, ask your doctor if you can take an over-the-counter medicine.     · If your doctor prescribed antibiotics, take them as directed. Do not stop taking them just because you feel better. You need to take the full course of antibiotics.     · If you think your pain medicine is making you sick to your stomach:  ? Take your medicine after meals (unless your doctor has told you not to). ? Ask your doctor for a different pain medicine. Incision care    · If you have strips of tape on the cut the doctor made (incision), leave the tape on for a week or until it falls off.     · When you can shower, wash the area daily with warm, soapy water and pat it dry. Follow-up care is a key part of your treatment and safety. Be sure to make and go to all appointments, and call your doctor if you are having problems. It's also a good idea to know your test results and keep a list of the medicines you take. When should you call for help? Call 911 anytime you think you may need emergency care. For example, call if:    · You passed out (lost consciousness).     · You have chest pain, are short of breath, or cough up blood.    Call your doctor now or seek immediate medical care if:    · You are sick to your stomach or cannot drink fluids.     · You cannot pass stools or gas.     · You have pain that does not get better after you take your pain medicine.     · You have loose stitches, or your incision comes open.     · Bright red blood has soaked through the bandage over your incision.     · You have signs of a blood clot in your leg (called a deep vein thrombosis), such as:  ? Pain in your calf, back of the knee, thigh, or groin. ? Redness or swelling in your leg.     · You have signs of infection, such as:  ? Increased pain, swelling, warmth, or redness. ? Red streaks leading from the incision. ? Pus draining from the incision. ? A fever. Watch closely for changes in your health, and be sure to contact your doctor if:    · You have any problems.     · You have new or worse swelling or pain in your arm. Where can you learn more? Go to http://www.gray.com/  Enter D222 in the search box to learn more about \"Lumpectomy: What to Expect at Home. \"  Current as of: December 17, 2020               Content Version: 12.8  © 2006-2021 [employer]. Care instructions adapted under license by [employer] (which disclaims liability or warranty for this information). If you have questions about a medical condition or this instruction, always ask your healthcare professional. [address] any warranty or liability for your use of this information.

## 2021-06-11 NOTE — LETTER
6/11/2021 Patient: Malinda Yu YOB: 1945 Date of Visit: 6/11/2021 Ruben Pinedo MD 
[address] [facility] [address] [address] Fax: [phone] Dear Ruben Pinedo MD, Thank you for referring Ms. Malinda Yu to [address] MAIN OFFICE [address] [place] [address] for evaluation. My notes for this consultation are attached. If you have questions, please do not hesitate to call me. I look forward to following your patient along with you. Sincerely, Lula Swanson MD

## 2021-06-18 ENCOUNTER — HOSPITAL ENCOUNTER (OUTPATIENT)
Dept: NUCLEAR MEDICINE | Age: 76
Discharge: HOME | End: 2021-06-18
Attending: SURGERY
Payer: MEDICARE

## 2021-06-18 ENCOUNTER — HOSPITAL ENCOUNTER (OUTPATIENT)
Dept: CT IMAGING | Age: 76
Discharge: HOME | End: 2021-06-18
Attending: SURGERY
Payer: MEDICARE

## 2021-06-18 DIAGNOSIS — C50.911 BREAST CANCER METASTASIZED TO AXILLARY LYMPH NODE, RIGHT (HCC): ICD-10-CM

## 2021-06-18 DIAGNOSIS — C77.3 BREAST CANCER METASTASIZED TO AXILLARY LYMPH NODE, RIGHT (HCC): ICD-10-CM

## 2021-06-18 PROCEDURE — 78306 BONE IMAGING WHOLE BODY: CPT

## 2021-06-18 PROCEDURE — 71260 CT THORAX DX C+: CPT

## 2021-06-18 PROCEDURE — 74177 CT ABD & PELVIS W/CONTRAST: CPT

## 2021-06-18 PROCEDURE — 74011000636 HC RX REV CODE- 636: Performed by: SURGERY

## 2021-06-18 RX ADMIN — IOPAMIDOL 100 ML: 755 INJECTION, SOLUTION INTRAVENOUS at 12:37

## 2021-06-18 RX ADMIN — IOHEXOL 50 ML: 240 INJECTION, SOLUTION INTRATHECAL; INTRAVASCULAR; INTRAVENOUS; ORAL at 12:37

## 2021-06-22 ENCOUNTER — DOCUMENTATION ONLY (OUTPATIENT)
Dept: SURGERY | Age: 76
End: 2021-06-22

## 2021-06-23 DIAGNOSIS — C50.911 MALIGNANT NEOPLASM OF RIGHT FEMALE BREAST, UNSPECIFIED ESTROGEN RECEPTOR STATUS, UNSPECIFIED SITE OF BREAST (HCC): Primary | ICD-10-CM

## 2021-06-23 DIAGNOSIS — C77.3 SECONDARY MALIGNANT NEOPLASM OF AXILLARY LYMPH NODES (HCC): ICD-10-CM

## 2021-06-24 ENCOUNTER — NURSE NAVIGATOR (OUTPATIENT)
Dept: CASE MANAGEMENT | Age: 76
End: 2021-06-24

## 2021-06-24 NOTE — PROGRESS NOTES
[employer]  Breast Navigator Encounter    Name:    Ben Singh  Age:    76 y.o. Diagnosis:    RIGHT breast cancer, ER+, S/P lumpectomy     Interdisciplinary Team:  Med-Onc:    Dr. Thais Duvall  Surg-Onc:    Dr. Ernst Bolus:    Dr. Anatoly Posey  Plastics:    :     Nurse Navigator:  Angella Verdugo RN, BSN, [facility]      Encounter type:  [x]Patient Initiated  []Navigator Follow-up []Pre-op  []Post-op  []Check-in Prior to First Treatment []Treatment Modality Change  []Survivorship Transition []Other:       Narrative:   Niece called because patient has a 3:00 pm virtual visit with Dr. Anatoly Posey. They have not heard scan results and are concerned that Dr. Anatoly Posey would not have this information. I let her know that scans are fine, other than incidental finding of diverticulosis which [facility]  knows about. I also let them know that MP was low risk. I told her that Dr. Anatoly Posey should be able to see scan results in CC. If he needs a copy of her MammaPrint, I asked her to call our office back, and we could fax this to him.         Referrals/Handouts:            Angella Verdugo RN, BSN, [facility]  Oncology Breast Navigator     [employer]  [address] [place] [address] 22.  W: [phone]  F: [phone]  [email or web address]  Good Help to Those in [facility]

## 2021-07-13 NOTE — PROGRESS NOTES
HISTORY OF PRESENT ILLNESS  Lamonte Suh is a 76 y.o. female. HPI  ESTABLISHED patient here for post op follow up, s/p RIGHT breast lumpectomy in May. She is doing well. Dr. Ines Gusman - 6/24/21 -  Starts XRT next Friday. Dr. Charmel Schilder - 6/25/21 - AI    Breast cancer-  4/8/21 - 77 yo female with right breast T2 N0 IDC, GR2, ER99, PR99, Ki-67-3%, Her2-    5/25/21 - RIGHT breast lumpectomy and RIGHT SLNB. Spoke with patient 2/5 nodes +, Tumor 4.3 cm. Margins clear. Will need xrt. May need chemo? Needs staging scans     Mammaprint - low risk luminal type A.     No family history of breast or ovarian cancer. Staging scans negative  Review of Systems   All other systems reviewed and are negative. Physical Exam  Vitals and nursing note reviewed. Chest:      Comments: Incisions well healed         ASSESSMENT and PLAN    ICD-10-CM ICD-9-CM    1. S/P breast lumpectomy  Z98.890 V45.89      - healing well  Will need xrt  F/u in 6 months with np   15 minutes was spent with patient on counseling and coordination of care.

## 2021-07-14 ENCOUNTER — OFFICE VISIT (OUTPATIENT)
Dept: SURGERY | Age: 76
End: 2021-07-14
Payer: MEDICARE

## 2021-07-14 VITALS
HEART RATE: 86 BPM | DIASTOLIC BLOOD PRESSURE: 69 MMHG | WEIGHT: 147 LBS | HEIGHT: 61 IN | SYSTOLIC BLOOD PRESSURE: 146 MMHG | BODY MASS INDEX: 27.75 KG/M2

## 2021-07-14 DIAGNOSIS — Z98.890 S/P BREAST LUMPECTOMY: Primary | ICD-10-CM

## 2021-07-14 PROCEDURE — 99024 POSTOP FOLLOW-UP VISIT: CPT | Performed by: SURGERY

## 2021-07-14 RX ORDER — TAMOXIFEN CITRATE 20 MG/1
TABLET ORAL
Status: ON HOLD | COMMUNITY
Start: 2021-06-25 | End: 2022-06-16

## 2021-07-14 NOTE — PATIENT INSTRUCTIONS
Breast Cancer: Care Instructions  Your Care Instructions     Breast cancer occurs when abnormal cells grow out of control in the breast. These cancer cells can spread within the breast, to nearby lymph nodes and other tissues, and to other parts of the body. Being treated for cancer can weaken your body, and you may feel very tired. Get the rest your body needs so you can feel better. Finding out that you have cancer is scary. You may feel many emotions and may need some help coping. Seek out family, friends, and counselors for support. You also can do things at home to make yourself feel better while you go through treatment. Call the [employer] (4-218.105.1085) or visit its website at [employer]. org for more information. Follow-up care is a key part of your treatment and safety. Be sure to make and go to all appointments, and call your doctor if you are having problems. It's also a good idea to know your test results and keep a list of the medicines you take. How can you care for yourself at home? · Take your medicines exactly as prescribed. Call your doctor if you think you are having a problem with your medicine. You may get medicine for nausea and vomiting if you have these side effects. · Follow your doctor's instructions to relieve pain. Pain from cancer and surgery can almost always be controlled. Use pain medicine when you first notice pain, before it becomes severe. · Eat healthy food. If you do not feel like eating, try to eat food that has protein and extra calories to keep up your strength and prevent weight loss. Drink liquid meal replacements for extra calories and protein. Try to eat your main meal early. · Get some physical activity every day, but do not get too tired. Keep doing the hobbies you enjoy as your energy allows. · Do not smoke. Smoking can make your cancer worse. If you need help quitting, talk to your doctor about stop-smoking programs and medicines.  These can increase your chances of quitting for good. · Take steps to control your stress and workload. Learn relaxation techniques. ? Share your feelings. Stress and tension affect our emotions. By expressing your feelings to others, you may be able to understand and cope with them. ? Consider joining a support group. Talking about a problem with your spouse, a good friend, or other people with similar problems is a good way to reduce tension and stress. ? Express yourself through art. Try writing, crafts, dance, or art to relieve stress. Some dance, writing, or art groups may be available just for people who have cancer. ? Be kind to your body and mind. Getting enough sleep, eating a healthy diet, and taking time to do things you enjoy can contribute to an overall feeling of balance in your life and can help reduce stress. ? Get help if you need it. Discuss your concerns with your doctor or counselor. · If you are vomiting or have diarrhea:  ? Drink plenty of fluids to prevent dehydration. Choose water and other caffeine-free clear liquids. If you have kidney, heart, or liver disease and have to limit fluids, talk with your doctor before you increase the amount of fluids you drink. ? When you are able to eat, try clear soups, mild foods, and liquids until all symptoms are gone for 12 to 48 hours. Other good choices include dry toast, crackers, cooked cereal, and gelatin dessert, such as Jell-O.  · If you have not already done so, prepare a list of advance directives. Advance directives are instructions to your doctor and family members about what kind of care you want if you become unable to speak or express yourself. When should you call for help? Call 911 anytime you think you may need emergency care. For example, call if:    · You passed out (lost consciousness).    Call your doctor now or seek immediate medical care if:    · You have a fever.     · You have abnormal bleeding.     · You think you have an infection.     · You have new or worse pain.     · You have new symptoms, such as a cough, belly pain, vomiting, diarrhea, or a rash. Watch closely for changes in your health, and be sure to contact your doctor if:    · You are much more tired than usual.     · You have swollen glands in your armpits, groin, or neck.     · You do not get better as expected. Where can you learn more? Go to http://[email or web address]/  Enter V321 in the search box to learn more about \"Breast Cancer: Care Instructions. \"  Current as of: December 17, 2020               Content Version: 12.8  © 8313-8600 [employer]. Care instructions adapted under license by [employer] (which disclaims liability or warranty for this information). If you have questions about a medical condition or this instruction, always ask your healthcare professional. [address] any warranty or liability for your use of this information.

## 2021-07-14 NOTE — LETTER
7/19/2021    Patient: Allegra Mattson   YOB: 1945   Date of Visit: 7/14/2021     Susan Capps MD  [address]  [address] [facility] [address]  Via Fax: [phone]    Dear Susan Capps MD,      Thank you for referring Ms. Allegra Mattson to [address] [place] [address] for evaluation. My notes for this consultation are attached. If you have questions, please do not hesitate to call me. I look forward to following your patient along with you.       Sincerely,    Daniel Rios MD

## 2021-09-10 ENCOUNTER — OFFICE VISIT (OUTPATIENT)
Dept: SURGERY | Age: 76
End: 2021-09-10
Payer: MEDICARE

## 2021-09-10 VITALS
BODY MASS INDEX: 27.75 KG/M2 | WEIGHT: 147 LBS | HEART RATE: 88 BPM | HEIGHT: 61 IN | SYSTOLIC BLOOD PRESSURE: 164 MMHG | DIASTOLIC BLOOD PRESSURE: 75 MMHG

## 2021-09-10 DIAGNOSIS — R22.32 AXILLARY MASS, LEFT: Primary | ICD-10-CM

## 2021-09-10 PROCEDURE — G8432 DEP SCR NOT DOC, RNG: HCPCS | Performed by: SURGERY

## 2021-09-10 PROCEDURE — G8427 DOCREV CUR MEDS BY ELIG CLIN: HCPCS | Performed by: SURGERY

## 2021-09-10 PROCEDURE — 1090F PRES/ABSN URINE INCON ASSESS: CPT | Performed by: SURGERY

## 2021-09-10 PROCEDURE — G8754 DIAS BP LESS 90: HCPCS | Performed by: SURGERY

## 2021-09-10 PROCEDURE — 1101F PT FALLS ASSESS-DOCD LE1/YR: CPT | Performed by: SURGERY

## 2021-09-10 PROCEDURE — G8536 NO DOC ELDER MAL SCRN: HCPCS | Performed by: SURGERY

## 2021-09-10 PROCEDURE — 76642 ULTRASOUND BREAST LIMITED: CPT | Performed by: SURGERY

## 2021-09-10 PROCEDURE — G8753 SYS BP > OR = 140: HCPCS | Performed by: SURGERY

## 2021-09-10 PROCEDURE — G8400 PT W/DXA NO RESULTS DOC: HCPCS | Performed by: SURGERY

## 2021-09-10 PROCEDURE — 3017F COLORECTAL CA SCREEN DOC REV: CPT | Performed by: SURGERY

## 2021-09-10 PROCEDURE — 99214 OFFICE O/P EST MOD 30 MIN: CPT | Performed by: SURGERY

## 2021-09-10 PROCEDURE — G8419 CALC BMI OUT NRM PARAM NOF/U: HCPCS | Performed by: SURGERY

## 2021-09-10 NOTE — PROGRESS NOTES
HISTORY OF PRESENT ILLNESS  Prakash Dyer is a 76 y.o. female. HPI ESTABLISHED patient here for LEFT breast enlarged lymph nodes. Pain in LEFT axilla. Breast cancer-  4/8/21 - 64 yo female with right breast T2 N0 IDC, GR2, [id number], PR99, Ki-67-3%, Her2-     5/25/21 - RIGHT breast lumpectomy and RIGHT SLNB. Spoke with patient 2/5 nodes +, Tumor 4.3 cm. Margins clear. Will need xrt. May need chemo? Needs staging scans      Mammaprint - low risk luminal type A.     No family history of breast or ovarian cancer. No imaging    Review of Systems   All other systems reviewed and are negative. Physical Exam  Vitals and nursing note reviewed. Chest:      Breasts: Breasts are symmetrical.         Right: No inverted nipple, mass, nipple discharge, skin change or tenderness. Left: Tenderness present. No inverted nipple, mass, nipple discharge or skin change. Lymphadenopathy:      Cervical: No cervical adenopathy. Upper Body:      Right upper body: No supraclavicular, axillary or pectoral adenopathy. Left upper body: No supraclavicular, axillary or pectoral adenopathy. AXILLARY ULTRASOUND  Indication: Left  Axillary pain, mass   Technique: The area was scanned using a high-frequency linear-array near-field transducer  Findings: No abnormal mass, lesion, or shadowing noted. No cysts. No adenopathy   Impression: Normal dense fibrocystic breast tissue no adenopathy  Disposition: No worrisome finding on ultrasound  ASSESSMENT and PLAN    ICD-10-CM ICD-9-CM    1. Axillary mass, left  R22.32 782.2      - normal left ax us  Has shoulder pain needs to see ortho again for h/o injury to left shoulder. F/u in 6 months after mammogram.   30 minutes was spent with patient on counseling and coordination of care.

## 2021-09-10 NOTE — PATIENT INSTRUCTIONS

## 2021-10-05 ENCOUNTER — NURSE NAVIGATOR (OUTPATIENT)
Dept: CASE MANAGEMENT | Age: 76
End: 2021-10-05

## 2021-11-04 ENCOUNTER — TELEPHONE (OUTPATIENT)
Dept: CARDIOLOGY CLINIC | Age: 76
End: 2021-11-04

## 2021-11-04 NOTE — TELEPHONE ENCOUNTER
Adryan Holloway NP Please Advise     Pt needs cardiac clearance for Right Total Hip Arthroplasty on 11/22 by Dr. Mary Dickinson at [facility]. She wants to know if she can handle anaesthesia.

## 2021-11-04 NOTE — TELEPHONE ENCOUNTER
Pt needs cardiac clearance for hip replacement surgery on 11/22 by Dr. Katty Mascorro. He wants to know if she can handle anaesthesia.      Please call pt back at [phone]    Thanks  [employer]

## 2021-11-05 NOTE — TELEPHONE ENCOUNTER
See if she can come in on Monday and see Jona Batista. She hasnt been seen by Dr Aman Duong in 11 months.

## 2021-11-08 ENCOUNTER — OFFICE VISIT (OUTPATIENT)
Dept: CARDIOLOGY CLINIC | Age: 76
End: 2021-11-08
Payer: MEDICARE

## 2021-11-08 ENCOUNTER — TELEPHONE (OUTPATIENT)
Dept: CARDIOLOGY CLINIC | Age: 76
End: 2021-11-08

## 2021-11-08 VITALS
DIASTOLIC BLOOD PRESSURE: 80 MMHG | RESPIRATION RATE: 18 BRPM | SYSTOLIC BLOOD PRESSURE: 140 MMHG | BODY MASS INDEX: 28.21 KG/M2 | HEIGHT: 61 IN | HEART RATE: 66 BPM | OXYGEN SATURATION: 99 % | WEIGHT: 149.4 LBS

## 2021-11-08 DIAGNOSIS — F17.200 TOBACCO USE DISORDER: ICD-10-CM

## 2021-11-08 DIAGNOSIS — I87.2 VENOUS INSUFFICIENCY: ICD-10-CM

## 2021-11-08 DIAGNOSIS — Z98.61 S/P PTCA (PERCUTANEOUS TRANSLUMINAL CORONARY ANGIOPLASTY): ICD-10-CM

## 2021-11-08 DIAGNOSIS — Z01.818 PREOPERATIVE CLEARANCE: ICD-10-CM

## 2021-11-08 DIAGNOSIS — I25.10 CORONARY ARTERY DISEASE INVOLVING NATIVE CORONARY ARTERY OF NATIVE HEART WITHOUT ANGINA PECTORIS: Primary | ICD-10-CM

## 2021-11-08 DIAGNOSIS — I10 ESSENTIAL HYPERTENSION, BENIGN: ICD-10-CM

## 2021-11-08 DIAGNOSIS — E78.2 MIXED HYPERLIPIDEMIA: ICD-10-CM

## 2021-11-08 PROCEDURE — 99214 OFFICE O/P EST MOD 30 MIN: CPT | Performed by: INTERNAL MEDICINE

## 2021-11-08 PROCEDURE — 93005 ELECTROCARDIOGRAM TRACING: CPT | Performed by: INTERNAL MEDICINE

## 2021-11-08 PROCEDURE — G8400 PT W/DXA NO RESULTS DOC: HCPCS | Performed by: INTERNAL MEDICINE

## 2021-11-08 PROCEDURE — G8753 SYS BP > OR = 140: HCPCS | Performed by: INTERNAL MEDICINE

## 2021-11-08 PROCEDURE — G8427 DOCREV CUR MEDS BY ELIG CLIN: HCPCS | Performed by: INTERNAL MEDICINE

## 2021-11-08 PROCEDURE — G8419 CALC BMI OUT NRM PARAM NOF/U: HCPCS | Performed by: INTERNAL MEDICINE

## 2021-11-08 PROCEDURE — 3017F COLORECTAL CA SCREEN DOC REV: CPT | Performed by: INTERNAL MEDICINE

## 2021-11-08 PROCEDURE — G8510 SCR DEP NEG, NO PLAN REQD: HCPCS | Performed by: INTERNAL MEDICINE

## 2021-11-08 PROCEDURE — 1101F PT FALLS ASSESS-DOCD LE1/YR: CPT | Performed by: INTERNAL MEDICINE

## 2021-11-08 PROCEDURE — G8536 NO DOC ELDER MAL SCRN: HCPCS | Performed by: INTERNAL MEDICINE

## 2021-11-08 PROCEDURE — 93010 ELECTROCARDIOGRAM REPORT: CPT | Performed by: INTERNAL MEDICINE

## 2021-11-08 PROCEDURE — 1090F PRES/ABSN URINE INCON ASSESS: CPT | Performed by: INTERNAL MEDICINE

## 2021-11-08 PROCEDURE — G8754 DIAS BP LESS 90: HCPCS | Performed by: INTERNAL MEDICINE

## 2021-11-08 PROCEDURE — G0463 HOSPITAL OUTPT CLINIC VISIT: HCPCS | Performed by: INTERNAL MEDICINE

## 2021-11-08 RX ORDER — ASPIRIN 81 MG/1
TABLET ORAL
COMMUNITY
End: 2021-11-23

## 2021-11-08 RX ORDER — FLUTICASONE PROPIONATE 50 MCG
SPRAY, SUSPENSION (ML) NASAL
COMMUNITY

## 2021-11-08 NOTE — PROGRESS NOTES
1. Have you been to the ER, urgent care clinic since your last visit? Hospitalized since your last visit? No    2. Have you seen or consulted any other health care providers outside of the [address] [place] [address] since your last visit? Include any pap smears or colon screening.  No         Chief Complaint   Patient presents with    Surgical Clearance     Right Total Hip Arthroplasty on 11/22/21 by Dr. Sosa Alba at [facility]  C/O  Leg swelling somtimes

## 2021-11-08 NOTE — TELEPHONE ENCOUNTER
Faxed cardiac clearance for Right hip replacement on 11/22/21 by Dr. Azucena Monroy with [facility] fax # [phone].

## 2021-11-08 NOTE — PROGRESS NOTES
[address], [address] [facility] [address], [address] [facility]  [phone]     Subjective:      Delano Reid is a 76 y.o. female is here for routine f/u. Pmhx CAD s/p stent, HTN, HLD, venous insufficiency and tobacco abuse. Last seen by us in 12/2020. Today, she is also seeking cardiac clearance for pending right total hip replacement to be performed by Dr Edin Randall on 11/22/2021. Remains in usual state of health. C/o diffused joint pain and more so with right hip. Denies any cp or sob when she does housework or yardwork. Has not smoked in the last couple of weeks    The patient denies chest pain/ shortness of breath, orthopnea, PND, LE edema, palpitations, syncope, or presyncope.        Patient Active Problem List    Diagnosis Date Noted    Mixed hyperlipidemia 06/23/2017    CAD (coronary artery disease), native coronary artery 06/04/2015    S/P PTCA (percutaneous transluminal coronary angioplasty) 06/04/2015    Family history of ischemic heart disease 02/15/2013    Essential hypertension, benign 02/15/2013    SVT (supraventricular tachycardia) ([facility] 02/15/2013    Hyposmolality and/or hyponatremia 02/15/2013    Tobacco use disorder 02/15/2013    Vertigo 05/11/2012      Christina Johnson MD  Past Medical History:   Diagnosis Date    Anxiety     Arthritis     Breast cancer ([facility] [address] [facility]     RIGHT     CAD (coronary artery disease)     stenting cardiac 6/5/2015    Chronic pain     Hypertension     Squamous cell carcinoma in situ of skin of face     Squamous cell carcinoma of right female breast     UTI (urinary tract infection) 2020    Vertigo 5/11/2012      Past Surgical History:   Procedure Laterality Date    BX OF BREAST, NEEDLE CORE, IMAGE GUIDE Right 03/2021    HX ADENOIDECTOMY  1950    HX BREAST BIOPSY Left     Long time ago --neg    HX BREAST LUMPECTOMY Right 5/25/2021    RIGHT BREAST LUMPECTOMY WITH ULTRASOUND, RIGHT BREAST SENTINEL NODE BIOPSY performed by Odette Hull MD at [facility]    HX CATARACT REMOVAL Bilateral     HX CORONARY STENT PLACEMENT      HX GI      COLONOSCOPY    HX HEART CATHETERIZATION      HX HEENT      SQUAMIUS CELL REMOVED FROM FACE     HX TONSILLECTOMY  1950    HX TUBAL LIGATION  1982    OH BREAST SURGERY PROCEDURE UNLISTED      left breast biopsy    OH CARDIAC SURG PROCEDURE UNLIST  2013    STENT  PLACEMENT      Allergies   Allergen Reactions    Eryc [Erythromycin] Hives    Influenza Virus Vaccine [facility] 2012-13 (18+) Cell Aguilar Other (comments)     \"felt like I was on fire\"    Pcn [Penicillins] Hives    Bactrim [Sulfamethoprim] Other (comments)    Crestor [Rosuvastatin] Myalgia    Hydrochlorothiazide Other (comments)     hyponatremia    Keflex [Cephalexin] Other (comments)     Aching muscles    Lipitor [Atorvastatin] Myalgia    Proton Pump Inhibitors Other (comments)     dizziness      Family History   Problem Relation Age of Onset    Heart Disease Mother     Kidney Disease Father     Coronary Art Dis Other         mother had stents in 63's or 66's    No Known Problems Sister     Anesth Problems Neg Hx       Social History     Socioeconomic History    Marital status: SINGLE     Spouse name: Not on file    Number of children: Not on file    Years of education: Not on file    Highest education level: Not on file   Occupational History    Not on file   Tobacco Use    Smoking status: Current Some Day Smoker     Packs/day: 0.00     Years: 45.00     Pack years: 0.00     Types: Cigarettes    Smokeless tobacco: Never Used   Vaping Use    Vaping Use: Never used   Substance and Sexual Activity    Alcohol use: Not Currently    Drug use: Never    Sexual activity: Not Currently   Other Topics Concern    Not on file   Social History Narrative    Not on file     Social Determinants of Health     Financial Resource Strain:     Difficulty of Paying Living Expenses: Not on file   Food Insecurity:     Worried About [facility] in the Last Year: Not on file    Ran Out of Food in the Last Year: Not on file   Transportation Needs:     Lack of Transportation (Medical): Not on file    Lack of Transportation (Non-Medical): Not on file   Physical Activity:     Days of Exercise per Week: Not on file    Minutes of Exercise per Session: Not on file   Stress:     Feeling of Stress : Not on file   Social Connections:     Frequency of Communication with Friends and Family: Not on file    Frequency of Social Gatherings with Friends and Family: Not on file    Attends Mosque Services: Not on file    Active Member of [address] [place] [address] [employer] or Organizations: Not on file    Attends Club or Organization Meetings: Not on file    Marital Status: Not on file   Intimate Partner Violence:     Fear of Current or Ex-Partner: Not on file    Emotionally Abused: Not on file    Physically Abused: Not on file    Sexually Abused: Not on file   Housing Stability:     Unable to Pay for Housing in the Last Year: Not on file    Number of [place] in the Last Year: Not on file    Unstable Housing in the Last Year: Not on file      Current Outpatient Medications   Medication Sig    aspirin delayed-release 81 mg tablet 1 tablet    fluticasone propionate (Flonase Allergy Relief) 50 mcg/actuation nasal spray 1 spray in each nostril    tamoxifen (NOLVADEX) 20 mg tablet TAKE 1 TABLET BY MOUTH ONCE DAILY    amLODIPine (NORVASC) 5 mg tablet Take 1 tablet by mouth once daily for blood pressure    gabapentin (NEURONTIN) 100 mg capsule Take 1 Capsule by mouth three (3) times daily. Max Daily Amount: 300 mg.    losartan (COZAAR) 50 mg tablet Take 1 Tablet by mouth daily.  metoprolol tartrate (LOPRESSOR) 25 mg tablet Take 1 tablet by mouth twice daily (Patient taking differently: two (2) times a day.)    vit A/vit C/vit E/zinc/copper (ICAPS AREDS PO) Take  by mouth two (2) times a day.     acetaminophen (TYLENOL EXTRA STRENGTH) 500 mg tablet Take  by mouth every six (6) hours as needed for Pain.  lovastatin (MEVACOR) 40 mg tablet Take 1 Tab by mouth nightly. Refills per PCP who is performing labs     No current facility-administered medications for this visit. Review of Symptoms:  11 systems reviewed, negative other than as stated in the HPI    Physical ExamPhysical Exam:    Vitals:    11/08/21 0957 11/08/21 1016   BP: (!) 144/82 (!) 140/80   Pulse: 66    Resp: 18    SpO2: 99%    Weight: 149 lb 6.4 oz (67.8 kg)    Height: 5' 1\" (1.549 m)      Body mass index is 28.23 kg/m². General PE  Gen:  NAD  Mental Status - Alert. General Appearance - Not in acute distress. HEENT:  PERRL, no carotid bruits or JVD  Chest and Lung Exam   Inspection: Accessory muscles - No use of accessory muscles in breathing. Auscultation:   Breath sounds: - Normal.   Cardiovascular   Inspection: Jugular vein - Bilateral - Inspection Normal.   Palpation/Percussion:   Apical Impulse: - Normal.   Auscultation: Rhythm - Regular. Heart Sounds - S1 WNL and S2 WNL. No S3 or S4. Murmurs & Other Heart Sounds: Auscultation of the heart reveals - No Murmurs. Peripheral Vascular   Upper Extremity: Inspection - Bilateral - No Cyanotic nailbeds or Digital clubbing. Lower Extremity:   Palpation: Edema - Bilateral - No edema. Abdomen:   Soft, non-tender, bowel sounds are active.   Neuro: A&O times 3, CN and motor grossly WNL    Labs:   Lab Results   Component Value Date/Time    Cholesterol, total 128 10/23/2015 08:15 AM    Cholesterol, total 128 07/03/2014 07:58 AM    Cholesterol, Total 155 08/28/2013 09:46 AM    HDL Cholesterol 51 10/23/2015 08:15 AM    HDL Cholesterol 56 07/03/2014 07:58 AM    HDL Cholesterol 49 08/28/2013 09:46 AM    LDL, calculated 66 10/23/2015 08:15 AM    LDL, calculated 62 07/03/2014 07:58 AM    LDL Cholesterol 90 08/28/2013 09:46 AM    Triglyceride 54 10/23/2015 08:15 AM    Triglyceride 50 07/03/2014 07:58 AM    Triglycerides 64 08/28/2013 09:46 AM     Lab Results   Component Value Date/Time    CK 71 2019 07:51 AM     Lab Results   Component Value Date/Time    Sodium 130 (L) 2021 03:30 PM    Potassium 4.3 2021 03:30 PM    Chloride 98 2021 03:30 PM    CO2 24 2021 03:30 PM    Anion gap 8 2021 03:30 PM    Glucose 186 (H) 2021 03:30 PM    BUN 21 (H) 2021 03:30 PM    Creatinine 0.60 2021 03:30 PM    BUN/Creatinine ratio 35 (H) 2021 03:30 PM    GFR est AA >60 2021 03:30 PM    GFR est non-AA >60 2021 03:30 PM    Calcium 9.4 2021 03:30 PM    Bilirubin, total 0.7 2019 05:00 AM    Alk. phosphatase 40 (L) 2019 05:00 AM    Protein, total 7.3 2019 05:00 AM    Albumin 3.8 2019 05:00 AM    Globulin 3.5 2019 05:00 AM    A-G Ratio 1.1 2019 05:00 AM    ALT (SGPT) 17 2019 05:00 AM       EKG:  NSR     Assessment:        ICD-10-CM ICD-9-CM    1. Coronary artery disease involving native coronary artery of native heart without angina pectoris  I25.10 414.01 AMB POC EKG ROUTINE W/ 12 LEADS, INTER & REP   2. S/P PTCA (percutaneous transluminal coronary angioplasty)  Z98.61 V45.82    3. Mixed hyperlipidemia  E78.2 272.2    4. Essential hypertension, benign  I10 401.1    5. Tobacco use disorder  F17.200 305.1    6. Venous insufficiency  I87.2 459.81    7. Preoperative clearance  Z01.818 V72.84        Orders Placed This Encounter    AMB POC EKG ROUTINE W/ 12 LEADS, INTER & REP     Order Specific Question:   Reason for Exam:     Answer:   routine    aspirin delayed-release 81 mg tablet     Si tablet    fluticasone propionate (Flonase Allergy Relief) 50 mcg/actuation nasal spray     Si spray in each nostril        Plan:     ASHD  S/p RAUL to mid LAD in 2015  Lexiscan 2019 without evidence of ischemia with apical apex infarct, which is known. Echo in 2019 with preserved LVEF 56-60% with grade 1 DD and mild-mod TR.   Continue with medical management: ASA, BB, CCB, statin       HTN  Controlled with current therapy-Amlodipine, BB and Losartan  she will call if blood pressure generally greater than 140/85. Stable kidney fxn Serum Cr 0.61 in 10/2021    HLD  10/2021 LDL 49 . On statin. Labs and lipids per PCP       Venous Insufficiency  Right GSV reflux 2/2021  Followed by Dr Jenny Quijano     Right breast ca  S/p RIGHT BREAST LUMPECTOMY WITH ULTRASOUND on 5/2021  Followed by Dr Kulwant Palacios abuse  She is trying to quit, has not smoked any in the last couple of weeks  Counseled on smoking cessation using strict sequential weaning with a quit date using nicotine losenges - 5 minutes spent. Advised smoking is especially and immediately life-threatening in the setting of the COVID-19 pandemic.          Counseled on diet and exercise- eventual goal of 30-60 minutes 5-7 times a week as per AHA guidelines.        Preopclearance  Lexiscan 4/2019 without evidence of ischemia with apical apex infarct, which is known. Echo in 4/2019 with preserved LVEF 56-60% with grade 1 DD and mild-mod TR. She is at low cardiac risk to proceed with surgery may hold aspirin for  5 to 7 days if necessary, resume thereafter    Continue BB lima-op d/t hx CAD       Continue current care and f/u in 1 year, sooner as needed. Kathrine Cruz NP    Patient seen and examined by me with the above nurse practitioner. I personally performed all components of the history, physical, and medical decision making and agree with the assessment and plan with minor modifications as noted. Today the patient presents with hip pain and stable cardiac status. .    General PE  Gen:  NAD  Mental Status - Alert. General Appearance - Not in acute distress. HEENT:  PERRL, no carotid bruits or JVD  Chest and Lung Exam   Inspection: Accessory muscles - No use of accessory muscles in breathing.    Auscultation:   Breath sounds: - Normal.   Cardiovascular   Inspection: Jugular vein - Bilateral - Inspection Normal. Palpation/Percussion:   Apical Impulse: - Normal.   Auscultation: Rhythm - Regular. Heart Sounds - S1 WNL and S2 WNL. No S3 or S4. Murmurs & Other Heart Sounds: Auscultation of the heart reveals - No Murmurs. Peripheral Vascular   Upper Extremity: Inspection - Bilateral - No Cyanotic nailbeds or Digital clubbing. Lower Extremity:   Palpation: Edema - Bilateral - No edema. Abdomen:   Soft, non-tender, bowel sounds are active. Neuro: A&O times 3, CN and motor grossly WNL    Low cardiac risk to proceed with hip surgery as noted above. May hold aspirin for 5 to 7 days as needed. Follow up in 1 year, sooner if any concerning cardiac symptoms.

## 2021-11-08 NOTE — LETTER
11/8/2021    Patient: Madelyn Cooks   YOB: 1945   Date of Visit: 11/8/2021     Carol Velásquez MD  [address]  [address] [facility] [place] [address]  Via Fax: [phone]    Dear Carol Velásquez MD,      Thank you for referring Ms. Madelyn Cooks to [facility] for evaluation. My notes for this consultation are attached. If you have questions, please do not hesitate to call me. I look forward to following your patient along with you.       Sincerely,    David Travis MD

## 2021-11-15 ENCOUNTER — HOSPITAL ENCOUNTER (OUTPATIENT)
Dept: PREADMISSION TESTING | Age: 76
Discharge: HOME | End: 2021-11-15
Attending: ORTHOPAEDIC SURGERY
Payer: MEDICARE

## 2021-11-15 VITALS — WEIGHT: 151.68 LBS | BODY MASS INDEX: 29.78 KG/M2 | HEIGHT: 60 IN

## 2021-11-15 LAB
ABO + RH BLD: NORMAL
ALBUMIN SERPL-MCNC: 3.7 G/DL (ref 3.5–5)
ALBUMIN/GLOB SERPL: 1 {RATIO} (ref 1.1–2.2)
ALP SERPL-CCNC: 53 U/L (ref 45–117)
ALT SERPL-CCNC: 27 U/L (ref 12–78)
ANION GAP SERPL CALC-SCNC: 5 MMOL/L (ref 5–15)
APPEARANCE UR: CLEAR
AST SERPL-CCNC: 16 U/L (ref 15–37)
BACTERIA URNS QL MICRO: NEGATIVE /HPF
BILIRUB SERPL-MCNC: 0.9 MG/DL (ref 0.2–1)
BILIRUB UR QL: NEGATIVE
BLOOD GROUP ANTIBODIES SERPL: NORMAL
BUN SERPL-MCNC: 14 MG/DL (ref 6–20)
BUN/CREAT SERPL: 21 (ref 12–20)
CALCIUM SERPL-MCNC: 9 MG/DL (ref 8.5–10.1)
CHLORIDE SERPL-SCNC: 101 MMOL/L (ref 97–108)
CO2 SERPL-SCNC: 24 MMOL/L (ref 21–32)
COLOR UR: NORMAL
CREAT SERPL-MCNC: 0.67 MG/DL (ref 0.55–1.02)
EPITH CASTS URNS QL MICRO: NORMAL /LPF
ERYTHROCYTE [DISTWIDTH] IN BLOOD BY AUTOMATED COUNT: 12.3 % (ref 11.5–14.5)
EST. AVERAGE GLUCOSE BLD GHB EST-MCNC: 128 MG/DL
GLOBULIN SER CALC-MCNC: 3.6 G/DL (ref 2–4)
GLUCOSE SERPL-MCNC: 169 MG/DL (ref 65–100)
GLUCOSE UR STRIP.AUTO-MCNC: NEGATIVE MG/DL
HBA1C MFR BLD: 6.1 % (ref 4–5.6)
HCT VFR BLD AUTO: 37.8 % (ref 35–47)
HGB BLD-MCNC: 12.4 G/DL (ref 11.5–16)
HGB UR QL STRIP: NEGATIVE
HYALINE CASTS URNS QL MICRO: NORMAL /LPF (ref 0–5)
INR PPP: 1 (ref 0.9–1.1)
KETONES UR QL STRIP.AUTO: NEGATIVE MG/DL
LEUKOCYTE ESTERASE UR QL STRIP.AUTO: NEGATIVE
MCH RBC QN AUTO: 33.5 PG (ref 26–34)
MCHC RBC AUTO-ENTMCNC: 32.8 G/DL (ref 30–36.5)
MCV RBC AUTO: 102.2 FL (ref 80–99)
NITRITE UR QL STRIP.AUTO: NEGATIVE
NRBC # BLD: 0 K/UL (ref 0–0.01)
NRBC BLD-RTO: 0 PER 100 WBC
PH UR STRIP: 7.5 [PH] (ref 5–8)
PLATELET # BLD AUTO: 159 K/UL (ref 150–400)
PMV BLD AUTO: 9.9 FL (ref 8.9–12.9)
POTASSIUM SERPL-SCNC: 4.2 MMOL/L (ref 3.5–5.1)
PROT SERPL-MCNC: 7.3 G/DL (ref 6.4–8.2)
PROT UR STRIP-MCNC: NEGATIVE MG/DL
PROTHROMBIN TIME: 10.9 SEC (ref 9–11.1)
RBC # BLD AUTO: 3.7 M/UL (ref 3.8–5.2)
RBC #/AREA URNS HPF: NORMAL /HPF (ref 0–5)
SODIUM SERPL-SCNC: 130 MMOL/L (ref 136–145)
SP GR UR REFRACTOMETRY: 1.01 (ref 1–1.03)
SPECIMEN EXP DATE BLD: NORMAL
UA: UC IF INDICATED,UAUC: NORMAL
UROBILINOGEN UR QL STRIP.AUTO: 1 EU/DL (ref 0.2–1)
WBC # BLD AUTO: 8.6 K/UL (ref 3.6–11)
WBC URNS QL MICRO: NORMAL /HPF (ref 0–4)

## 2021-11-15 PROCEDURE — 83036 HEMOGLOBIN GLYCOSYLATED A1C: CPT

## 2021-11-15 PROCEDURE — 85027 COMPLETE CBC AUTOMATED: CPT

## 2021-11-15 PROCEDURE — 81001 URINALYSIS AUTO W/SCOPE: CPT

## 2021-11-15 PROCEDURE — 86901 BLOOD TYPING SEROLOGIC RH(D): CPT

## 2021-11-15 PROCEDURE — 36415 COLL VENOUS BLD VENIPUNCTURE: CPT

## 2021-11-15 PROCEDURE — 80053 COMPREHEN METABOLIC PANEL: CPT

## 2021-11-15 PROCEDURE — 85610 PROTHROMBIN TIME: CPT

## 2021-11-15 RX ORDER — ACETAMINOPHEN 500 MG
1000 TABLET ORAL ONCE
Status: CANCELLED | OUTPATIENT
Start: 2021-11-22 | End: 2021-11-22

## 2021-11-15 RX ORDER — CELECOXIB 200 MG/1
400 CAPSULE ORAL ONCE
Status: CANCELLED | OUTPATIENT
Start: 2021-11-22 | End: 2021-11-22

## 2021-11-15 RX ORDER — SODIUM CHLORIDE, SODIUM LACTATE, POTASSIUM CHLORIDE, CALCIUM CHLORIDE 600; 310; 30; 20 MG/100ML; MG/100ML; MG/100ML; MG/100ML
25 INJECTION, SOLUTION INTRAVENOUS CONTINUOUS
Status: CANCELLED | OUTPATIENT
Start: 2021-11-22

## 2021-11-15 RX ORDER — PREGABALIN 150 MG/1
150 CAPSULE ORAL ONCE
Status: CANCELLED | OUTPATIENT
Start: 2021-11-22 | End: 2021-11-22

## 2021-11-15 NOTE — PERIOP NOTES
[facility]  Joint/Spine Preoperative Instructions    Reminder COVID test on Thursday No 18 between o7 and 1145am- [place] side of [address] [place] [address],[address]    Surgery Date Novemebr 22         Time of Arrival 0815  Contact # Marlyn Carroll [phone]    1. On the day of your surgery, please report to the Surgical Services Registration Desk and sign in at your designated time. The Surgery Center is located to the right of the Emergency Room. 2. You must have someone with you to drive you home. You should not drive a car for 24 hours following surgery. Please make arrangements for a friend or family member to stay with you for the first 24 hours after your surgery. 3. No food after midnight Nov 21. Medications morning of surgery should be taken with a sip of water. Please follow pre-surgery drink instructions that were given at your Pre Admission Testing appointment. 4. We recommend you do not drink any alcoholic beverages for 24 hours before and after your surgery. 5. Contact your surgeons office for instructions on the following medications: non-steroidal anti-inflammatory drugs (i.e. Advil, Aleve), vitamins, and supplements. (Some surgeons will want you to stop these medications prior to surgery and others may allow you to take them)  **If you are currently taking Plavix, Coumadin, Aspirin and/or other blood-thinning agents, contact your surgeon for instructions. ** Your surgeon will partner with the physician prescribing these medications to determine if it is safe to stop or if you need to continue taking. Please do not stop taking these medications without instructions from your surgeon    6. Wear comfortable clothes. Wear glasses instead of contacts. Do not bring any money or jewelry. Please bring picture ID, insurance card, and any prearranged co-payment or hospital payment. Do not wear make-up, particularly mascara the morning of your surgery.   Do not wear nail polish, particularly if you are having foot /hand surgery. Wear your hair loose or down, no ponytails, buns, kassie pins or clips. All body piercings must be removed. Please shower with antibacterial soap for three consecutive days before and on the morning of surgery, but do not apply any lotions, powders or deodorants after the shower on the day of surgery. Please use a fresh towels after each shower. Please sleep in clean clothes and change bed linens the night before surgery. Please do not shave for 48 hours prior to surgery. Shaving of the face is acceptable. 7. You should understand that if you do not follow these instructions your surgery may be cancelled. If your physical condition changes (I.e. fever, cold or flu) please contact your surgeon as soon as possible. 8. It is important that you be on time. If a situation occurs where you may be late, please call [phone] (OR Holding Area). 9. If you have any questions and or problems, please call [phone] (Pre-admission Testing). 10. Your surgery time may be subject to change. You will receive a phone call the evening prior if your time changes. 11.  If having outpatient surgery, you must have someone to drive you here, stay with you during the duration of your stay, and to drive you home at time of discharge. 12. The following link is for the educational video for patients and/or families. http://silva-croft.org/. [email or web address]-tieyryj-ujlheui/[email or web address]-materials    Special Instructions: Follow your physician/surgeon instructions for holding all non-steroidal anti-inflammatory drugs/NSAIDs, blood-thinning agents, vitamins & supplements prior to surgery. Practice incentive spirometry twice a day- ten times each      TAKE ALL MEDICATIONS THE DAY OF SURGERY EXCEPT:  Vitamins    I understand a pre-operative phone call will be made to verify my surgery time.   In the event that I am not available, I give permission for a message to be left on my answering service and/or with another person?  yes         ___________________      __________   _________    (Signature of Patient)             (Witness)                (Date and Time)

## 2021-11-15 NOTE — ADVANCED PRACTICE NURSE
PAT Nurse Practitioner   Pre-Operative Chart Review/Assessment:-ORTHOPEDIC/NEUROSURGICAL SPINE                Patient Name:  Myla Alcazar                                                           Age:   68 y.o.    :  1945     Today's Date:  2021     Date of PAT:   11/15/21     Date of Surgery:    2021      Procedure(s):  Right  Total Hip Arthroplasty     Surgeon:   Fior Tam     Medical Clearance:  Dr. Chandler Mejia                   PLAN:      1)  Cardiac Clearance:  Dr. Verena De Leon       2)  Program for Diabetes Health Consult:  Not indicated-A1C 6.1      3)  Sleep Apnea evaluation:   Not indicated-CARMEN 2      4) Treatment for MRSA/Staph Aureus:  Negative      5) Additional Concerns:  CAD s/p RAUL , hx of DVT, anxiety, vertigo, chronic pain, hx of R breast CA s/p lumpectomy 2021, current smoker                 Vital Signs:         Visit Vitals  BP (!) (P) 148/67 (BP 1 Location: Left upper arm, BP Patient Position: At rest;Sitting)   Pulse (P) 66   Temp (P) 97.7 °F (36.5 °C)   Resp (P) 20   Ht (P) 5' (1.524 m)   Wt (P) 68.8 kg (151 lb 10.8 oz)   SpO2 (P) 100%   BMI (P) 29.62 kg/m²                        ____________________________________________  PAST MEDICAL HISTORY  Past Medical History:   Diagnosis Date    Anxiety     Arthritis     Breast cancer ([address]     RIGHT     CAD (coronary artery disease)     stenting cardiac 2015    Chronic pain     GERD (gastroesophageal reflux disease)     Hypertension     Hyponatremia     Squamous cell carcinoma in situ of skin of face     Squamous cell carcinoma of right female breast     Thromboembolus ([address]     left leg injury- DVT    UTI (urinary tract infection)     Vertigo 2012      ____________________________________________  PAST SURGICAL HISTORY  Past Surgical History:   Procedure Laterality Date    BX OF BREAST, NEEDLE CORE, IMAGE GUIDE Right 2021    HX ADENOIDECTOMY  1950    HX BREAST BIOPSY Left     Long time ago --neg    HX BREAST LUMPECTOMY Right 5/25/2021    RIGHT BREAST LUMPECTOMY WITH ULTRASOUND, RIGHT BREAST SENTINEL NODE BIOPSY performed by Sean Field MD at [address] HX CATARACT REMOVAL Bilateral     HX CORONARY STENT PLACEMENT      HX GI      COLONOSCOPY    HX HEART CATHETERIZATION      HX HEENT      SQUAMIUS CELL REMOVED FROM FACE     HX TONSILLECTOMY  1950    HX TUBAL LIGATION  1982    MO BREAST SURGERY PROCEDURE UNLISTED      left breast biopsy    MO CARDIAC SURG PROCEDURE UNLIST  2013    STENT  PLACEMENT       ____________________________________________  HOME MEDICATIONS    Current Outpatient Medications   Medication Sig    aspirin delayed-release 81 mg tablet 1 tablet    amLODIPine (NORVASC) 5 mg tablet Take 1 tablet by mouth once daily for blood pressure    losartan (COZAAR) 50 mg tablet Take 1 Tablet by mouth daily.  vit A/vit C/vit E/zinc/copper (ICAPS AREDS PO) Take  by mouth two (2) times a day.  acetaminophen (TYLENOL EXTRA STRENGTH) 500 mg tablet Take  by mouth every six (6) hours as needed for Pain.  lovastatin (MEVACOR) 40 mg tablet Take 1 Tab by mouth nightly. Refills per PCP who is performing labs    metoprolol tartrate (LOPRESSOR) 25 mg tablet Take 1 tablet by mouth twice daily    fluticasone propionate (Flonase Allergy Relief) 50 mcg/actuation nasal spray 1 spray in each nostril (Patient not taking: Reported on 11/15/2021)    tamoxifen (NOLVADEX) 20 mg tablet TAKE 1 TABLET BY MOUTH ONCE DAILY (Patient not taking: Reported on 11/15/2021)    gabapentin (NEURONTIN) 100 mg capsule Take 1 Capsule by mouth three (3) times daily. Max Daily Amount: 300 mg.  (Patient not taking: Reported on 11/15/2021)     No current facility-administered medications for this encounter.      ____________________________________________  ALLERGIES  Allergies   Allergen Reactions    Eryc [Erythromycin] Hives    Influenza Virus Vaccine [facility] 2012-13 (18+) Cell [place] Other (comments)     \"felt like I was on fire\"    Pcn [Penicillins] Hives     Has had both as an adult and child- -     Bactrim [Sulfamethoprim] Other (comments)    Crestor [Rosuvastatin] Myalgia    Hydrochlorothiazide Other (comments)     hyponatremia    Keflex [Cephalexin] Other (comments)     Aching muscles    Lipitor [Atorvastatin] Myalgia    Proton Pump Inhibitors Other (comments)     dizziness      ____________________________________________  SOCIAL HISTORY  Social History     Tobacco Use    Smoking status: Current Some Day Smoker     Packs/day: 0.00     Years: 45.00     Pack years: 0.00     Types: Cigarettes    Smokeless tobacco: Never Used    Tobacco comment: \"no lately\"   Substance Use Topics    Alcohol use: Not Currently      ____________________________________________  COVID VACCINATION STATUS:      Internal Administration   First Dose      Second Dose         Last COVID Lab SARS-CoV-2 ( )   Date Value   05/21/2021 Please find results under separate order   05/21/2021 Not Detected                      Labs:     Hospital Outpatient Visit on 11/15/2021   Component Date Value Ref Range Status    WBC 11/15/2021 8.6  3.6 - 11.0 K/uL Final    RBC 11/15/2021 3.70* 3.80 - 5.20 M/uL Final    HGB 11/15/2021 12.4  11.5 - 16.0 g/dL Final    HCT 11/15/2021 37.8  35.0 - 47.0 % Final    MCV 11/15/2021 102.2* 80.0 - 99.0 FL Final    MCH 11/15/2021 33.5  26.0 - 34.0 PG Final    MCHC 11/15/2021 32.8  30.0 - 36.5 g/dL Final    RDW 11/15/2021 12.3  11.5 - 14.5 % Final    PLATELET 01/80/3205 094  150 - 400 K/uL Final    MPV 11/15/2021 9.9  8.9 - 12.9 FL Final    NRBC 11/15/2021 0.0  0  WBC Final    ABSOLUTE NRBC 11/15/2021 0.00  0.00 - 0.01 K/uL Final    Hemoglobin A1c 11/15/2021 6.1* 4.0 - 5.6 % Final    Comment: NEW METHOD  PLEASE NOTE NEW REFERENCE RANGE  (NOTE)  HbA1C Interpretive Ranges  <5.7              Normal  5.7 - 6.4         Consider Prediabetes  >6.5              Consider Diabetes      Est. average glucose 11/15/2021 128  mg/dL Final    INR 11/15/2021 1.0  0.9 - 1.1   Final    A single therapeutic range for Vit K antagonists may not be optimal for all indications - see June, 2008 issue of Chest, American College of Chest Physicians Evidence-Based Clinical Practice Guidelines, 8th Edition.  Prothrombin time 11/15/2021 10.9  9.0 - 11.1 sec Final    Color 11/15/2021 YELLOW/STRAW    Final    Color Reference Range: Straw, Yellow or Dark Yellow    Appearance 11/15/2021 CLEAR  CLEAR   Final    Specific gravity 11/15/2021 1.014  1.003 - 1.030   Final    pH (UA) 11/15/2021 7.5  5.0 - 8.0   Final    Protein 11/15/2021 Negative  NEG mg/dL Final    Glucose 11/15/2021 Negative  NEG mg/dL Final    Ketone 11/15/2021 Negative  NEG mg/dL Final    Bilirubin 11/15/2021 Negative  NEG   Final    Blood 11/15/2021 Negative  NEG   Final    Urobilinogen 11/15/2021 1.0  0.2 - 1.0 EU/dL Final    Nitrites 11/15/2021 Negative  NEG   Final    Leukocyte Esterase 11/15/2021 Negative  NEG   Final    WBC 11/15/2021 0-4  0 - 4 /hpf Final    RBC 11/15/2021 0-5  0 - 5 /hpf Final    Epithelial cells 11/15/2021 FEW  FEW /lpf Final    Epithelial cell category consists of squamous cells and /or transitional urothelial cells. Renal tubular cells, if present, are separately identified as such.     Bacteria 11/15/2021 Negative  NEG /hpf Final    UA:UC IF INDICATED 11/15/2021 CULTURE NOT INDICATED BY UA RESULT  CNI   Final    Hyaline cast 11/15/2021 0-2  0 - 5 /lpf Final    Sodium 11/15/2021 130* 136 - 145 mmol/L Final    Potassium 11/15/2021 4.2  3.5 - 5.1 mmol/L Final    Chloride 11/15/2021 101  97 - 108 mmol/L Final    CO2 11/15/2021 24  21 - 32 mmol/L Final    Anion gap 11/15/2021 5  5 - 15 mmol/L Final    Glucose 11/15/2021 169* 65 - 100 mg/dL Final    BUN 11/15/2021 14  6 - 20 MG/DL Final    Creatinine 11/15/2021 0.67  0.55 - 1.02 MG/DL Final    BUN/Creatinine ratio 11/15/2021 21* 12 - 20   Final    GFR est AA 11/15/2021 >60 >60 ml/min/1.73m2 Final    GFR est non-AA 11/15/2021 >60  >60 ml/min/1.73m2 Final    Estimated GFR is calculated using the IDMS-traceable Modification of Diet in Renal Disease (MDRD) Study equation, reported for both  Americans (GFRAA) and non- Americans (GFRNA), and normalized to 1.73m2 body surface area. The physician must decide which value applies to the patient.  Calcium 11/15/2021 9.0  8.5 - 10.1 MG/DL Final    Bilirubin, total 11/15/2021 0.9  0.2 - 1.0 MG/DL Final    ALT (SGPT) 11/15/2021 27  12 - 78 U/L Final    AST (SGOT) 11/15/2021 16  15 - 37 U/L Final    Alk. phosphatase 11/15/2021 53  45 - 117 U/L Final    Protein, total 11/15/2021 7.3  6.4 - 8.2 g/dL Final    Albumin 11/15/2021 3.7  3.5 - 5.0 g/dL Final    Globulin 11/15/2021 3.6  2.0 - 4.0 g/dL Final    A-G Ratio 11/15/2021 1.0* 1.1 - 2.2   Final    Crossmatch Expiration 11/15/2021 11/25/2021,2359   Final    ABO/Rh(D) 11/15/2021 A POSITIVE   Final    Antibody screen 11/15/2021 NEG   Final        XR Results (most recent):    Results from Hospital Encounter encounter on 03/05/19    XR CHEST PORT    Narrative  EXAM: XR CHEST PORT    INDICATION: chest pain    COMPARISON: 2015    FINDINGS: A portable AP radiograph of the chest was obtained at 0458 hours. The  patient is on a cardiac monitor. There is atherosclerosis of the aorta. The  lungs are clear. The cardiac and mediastinal contours and pulmonary vascularity  are normal.  There are degenerative changes of the shoulders. Impression  IMPRESSION: No acute findings         Skin:   Denies open wounds, cuts, sores, rashes or other areas of concern in PAT assessment. Vane Coates NP     11/16/21  in PAT. Pt has chronic hyponatremia per previous results. Results faxed to PCP for FU/further recommendations prior to surgery. 11/16/21 1320 PC from pt's niece, Chad Faria, who is pt's primary caregiver.   States she's unaware if hyponatremia has ever been evaluated by PCP and pt has not been referred to specialist.  Pt is not taking diuretics daily, and per niece, does not have excessive water intake daily and consumes very little sodium daily. Will add small amount of table salt to diet, add gatorade/powerade daily, and FU w/ PCP after surgery regarding hyponatremia.

## 2021-11-15 NOTE — PERIOP NOTES
Orthopedic and Spine Patients: Instructions on When You Can   Eat or Drink Before Surgery      You have been provided 2 pre-surgery drinks received at your pre-admission testing appointment.  Night before surgery:  o You should drink one bottle of the  pre-surgery drink at bedtime. No food after midnight!  Day of Surgery:  o Complete 2nd bottle of the pre-surgery drink 1 hour prior to arrival at hospital.  For questions call Pre-Admission Testing at [phone]. They are available from 8:00am-5:00pm, Monday through Friday.

## 2021-11-15 NOTE — PERIOP NOTES
Hibiclens/Chlorhexidine    Preventing Infections Before and After  Your Surgery    IMPORTANT INSTRUCTIONS    Please read and follow these instructions carefully. If you are unable to comply with the below instructions your procedure will be cancelled. Every Night for Three (3) nights before your surgery:  1. Shower with an antibacterial soap, such as Dial, or the soap provided at your preassessment appointment. A shower is better than a bath for cleaning your skin. 2. If needed, ask someone to help you reach all areas of your body. Dont forget to clean your belly button with every shower. The night before your surgery: If you lose your Hibiclens/chlorhexidine please contact surgery center or you can purchase it at a local pharmacy  1. On the night before your surgery, shower with an antibacterial soap, such as Dial, or the soap provided at your preassessment appointment. 2. With one packet of Hibiclens/Chlorhexidine in hand, turn water off.  3. Apply Hibiclens antiseptic skin cleanser with a clean, freshly washed washcloth. ? Gently apply to your body from chin to toes (except the genital area) and especially the area(s) where your incision(s) will be. ? Leave Hibiclens/Chlorhexidine on your skin for at least 20 seconds. CAUTION: If needed, Hibiclens/chlorhexidine may be used to clean the folds of skin of the legs (such as in the area of the groin) and on your buttocks and hips. However, do not use Hibiclens/Chlorhexidine above the neck or in the genital area (your bottom) or put inside any area of your body. 4. Turn the water back on and rinse. 5. Dry gently with a clean, freshly washed towel. 6. After your shower, do not use any powder, deodorant, perfumes or lotion. 7. Use clean, freshly washed towels and washcloths every time you shower. 8. Wear clean, freshly washed pajamas to bed the night before surgery. 9. Sleep on clean, freshly washed sheets.   10. Do not allow pets to sleep in your bed with you. The Morning of your surgery:  1. Shower again thoroughly with an antibacterial soap, such as Dial or the soap provided at your preassessment appointment. If needed, ask someone for help to reach all areas of your body. Dont forget to clean your belly button! Rinse. 2. Dry gently with a clean, freshly washed towel. 3. After your shower, do not use any powder, deodorant, perfumes or lotion prior to surgery. 4. Put on clean, freshly washed clothing. Tips to help prevent infections after your surgery:  1. Protect your surgical wound from germs:  ? Hand washing is the most important thing you and your caregivers can do to prevent infections. ? Keep your bandage clean and dry! ? Do not touch your surgical wound. 2. Use clean, freshly washed towels and washcloths every time you shower; do not share bath linens with others. 3. Until your surgical wound is healed, wear clothing and sleep on bed linens each day that are clean and freshly washed. 4. Do not allow pets to sleep in your bed with you or touch your surgical wound. 5. Do not smoke  smoking delays wound healing. This may be a good time to stop smoking. 6. If you have diabetes, it is important for you to manage your blood sugar levels properly before your surgery as well as after your surgery. Poorly managed blood sugar levels slow down wound healing and prevent you from healing completely. If you lose your Hibiclens/chlorhexidine, please call the [facility], or it is available for purchase at your pharmacy.                ___________________      ___________________      ________________  (Signature of Patient)          (Witness)                   (Date and Time)

## 2021-11-15 NOTE — PERIOP NOTES
The [facility] \"Your Path to a More Active Life\" orthopedic total knee or total hip educational video and the [facility] patient handbook provided & reviewed during the patients pre-admission testing (PAT) appointment. An opportunity for questions was provided, patient verbalized understanding.

## 2021-11-15 NOTE — PERIOP NOTES
Incentive Spirometer        Using the incentive spirometer helps expand the small air sacs of your lungs, helps you breathe deeply, and helps improve your lung function. Use your incentive spirometer twice a day (10 breaths each time) prior to surgery. How to Use Your Incentive Spirometer:  1. Hold the incentive spirometer in an upright position. 2. Breathe out as usual.   3. Place the mouthpiece in your mouth and seal your lips tightly around it. 4. Take a deep breath. Breathe in slowly and as deeply as possible. Keep the blue flow rate guide between the arrows. 5. Hold your breath as long as possible. Then exhale slowly and allow the piston to fall to the bottom of the column. 6. Rest for a few seconds and repeat steps one through five at least 10 times. PAT Tidal Volume______1250____________  x_________2_______  Date____11/15/21___________________    Cristiane Blanchards THE INCENTIVE SPIROMETER WITH YOU TO THE HOSPITAL ON THE DAY OF YOUR SURGERY. Opportunity given to ask and answer questions as well as to observe return demonstration.     Patient signature_____________________________    Witness____________________________

## 2021-11-16 LAB
BACTERIA SPEC CULT: NORMAL
BACTERIA SPEC CULT: NORMAL
SERVICE CMNT-IMP: NORMAL

## 2021-11-18 ENCOUNTER — HOSPITAL ENCOUNTER (OUTPATIENT)
Dept: PREADMISSION TESTING | Age: 76
Discharge: HOME | End: 2021-11-18
Payer: MEDICARE

## 2021-11-18 LAB
SARS-COV-2, XPLCVT: NOT DETECTED
SOURCE, COVRS: NORMAL

## 2021-11-18 PROCEDURE — U0005 INFEC AGEN DETEC AMPLI PROBE: HCPCS

## 2021-11-19 NOTE — PERIOP NOTES
Dr. Nelson Rodríguez office states will proceed with surgery. Last office note from PCP 10/22/21. Office has given instructions related to low sodium pre-op and will recheck day of surgery.

## 2021-11-22 ENCOUNTER — ANESTHESIA EVENT (OUTPATIENT)
Dept: SURGERY | Age: 76
DRG: 470 | End: 2021-11-22
Payer: MEDICARE

## 2021-11-22 ENCOUNTER — APPOINTMENT (OUTPATIENT)
Dept: GENERAL RADIOLOGY | Age: 76
DRG: 470 | End: 2021-11-22
Attending: ORTHOPAEDIC SURGERY
Payer: MEDICARE

## 2021-11-22 ENCOUNTER — ANESTHESIA (OUTPATIENT)
Dept: SURGERY | Age: 76
DRG: 470 | End: 2021-11-22
Payer: MEDICARE

## 2021-11-22 ENCOUNTER — HOSPITAL ENCOUNTER (INPATIENT)
Age: 76
LOS: 1 days | Discharge: HOME HEALTH | DRG: 470 | End: 2021-11-23
Attending: ORTHOPAEDIC SURGERY | Admitting: ORTHOPAEDIC SURGERY
Payer: MEDICARE

## 2021-11-22 DIAGNOSIS — Z96.641 S/P TOTAL RIGHT HIP ARTHROPLASTY: Primary | ICD-10-CM

## 2021-11-22 LAB
GLUCOSE BLD STRIP.AUTO-MCNC: 228 MG/DL (ref 65–117)
SERVICE CMNT-IMP: ABNORMAL

## 2021-11-22 PROCEDURE — 74011250637 HC RX REV CODE- 250/637: Performed by: PHYSICIAN ASSISTANT

## 2021-11-22 PROCEDURE — 2709999900 HC NON-CHARGEABLE SUPPLY

## 2021-11-22 PROCEDURE — 2709999900 HC NON-CHARGEABLE SUPPLY: Performed by: ORTHOPAEDIC SURGERY

## 2021-11-22 PROCEDURE — 76210000017 HC OR PH I REC 1.5 TO 2 HR: Performed by: ORTHOPAEDIC SURGERY

## 2021-11-22 PROCEDURE — 77030035236 HC SUT PDS STRATFX BARB J&J -B: Performed by: ORTHOPAEDIC SURGERY

## 2021-11-22 PROCEDURE — 77030031139 HC SUT VCRL2 J&J -A: Performed by: ORTHOPAEDIC SURGERY

## 2021-11-22 PROCEDURE — 73501 X-RAY EXAM HIP UNI 1 VIEW: CPT

## 2021-11-22 PROCEDURE — 76010000161 HC OR TIME 1 TO 1.5 HR INTENSV-TIER 1: Performed by: ORTHOPAEDIC SURGERY

## 2021-11-22 PROCEDURE — 74011250636 HC RX REV CODE- 250/636: Performed by: PHYSICIAN ASSISTANT

## 2021-11-22 PROCEDURE — 77030033138 HC SUT PGA STRATFX J&J -B: Performed by: ORTHOPAEDIC SURGERY

## 2021-11-22 PROCEDURE — 97165 OT EVAL LOW COMPLEX 30 MIN: CPT

## 2021-11-22 PROCEDURE — 74011000250 HC RX REV CODE- 250: Performed by: ORTHOPAEDIC SURGERY

## 2021-11-22 PROCEDURE — 74011250636 HC RX REV CODE- 250/636: Performed by: ANESTHESIOLOGY

## 2021-11-22 PROCEDURE — 77030035643 HC BLD SAW OSC PRECIS STRY -C: Performed by: ORTHOPAEDIC SURGERY

## 2021-11-22 PROCEDURE — 74011250636 HC RX REV CODE- 250/636: Performed by: ORTHOPAEDIC SURGERY

## 2021-11-22 PROCEDURE — 65270000029 HC RM PRIVATE

## 2021-11-22 PROCEDURE — 74011000250 HC RX REV CODE- 250: Performed by: ANESTHESIOLOGY

## 2021-11-22 PROCEDURE — 77030034479 HC ADH SKN CLSR PRINEO J&J -B: Performed by: ORTHOPAEDIC SURGERY

## 2021-11-22 PROCEDURE — 77030018673: Performed by: ORTHOPAEDIC SURGERY

## 2021-11-22 PROCEDURE — 8E0WXBF COMPUTER ASSISTED PROCEDURE OF TRUNK REGION, WITH FLUOROSCOPY: ICD-10-PCS | Performed by: ORTHOPAEDIC SURGERY

## 2021-11-22 PROCEDURE — 77030026438 HC STYL ET INTUB CARD -A: Performed by: ANESTHESIOLOGY

## 2021-11-22 PROCEDURE — 82962 GLUCOSE BLOOD TEST: CPT

## 2021-11-22 PROCEDURE — 77030036722: Performed by: ORTHOPAEDIC SURGERY

## 2021-11-22 PROCEDURE — 77030008684 HC TU ET CUF COVD -B: Performed by: ANESTHESIOLOGY

## 2021-11-22 PROCEDURE — 77030022704 HC SUT VLOC COVD -B: Performed by: ORTHOPAEDIC SURGERY

## 2021-11-22 PROCEDURE — C1776 JOINT DEVICE (IMPLANTABLE): HCPCS | Performed by: ORTHOPAEDIC SURGERY

## 2021-11-22 PROCEDURE — 77030014647 HC SEAL FBRN TISSL BAXT -D: Performed by: ORTHOPAEDIC SURGERY

## 2021-11-22 PROCEDURE — 0SR904Z REPLACEMENT OF RIGHT HIP JOINT WITH CERAMIC ON POLYETHYLENE SYNTHETIC SUBSTITUTE, OPEN APPROACH: ICD-10-PCS | Performed by: ORTHOPAEDIC SURGERY

## 2021-11-22 PROCEDURE — 77030038269 HC DRN EXT URIN PURWCK BARD -A

## 2021-11-22 PROCEDURE — 77030018723 HC ELCTRD BLD COVD -A: Performed by: ORTHOPAEDIC SURGERY

## 2021-11-22 PROCEDURE — 97116 GAIT TRAINING THERAPY: CPT

## 2021-11-22 PROCEDURE — 97162 PT EVAL MOD COMPLEX 30 MIN: CPT

## 2021-11-22 PROCEDURE — 77030003666 HC NDL SPINAL BD -A: Performed by: ORTHOPAEDIC SURGERY

## 2021-11-22 PROCEDURE — 76060000033 HC ANESTHESIA 1 TO 1.5 HR: Performed by: ORTHOPAEDIC SURGERY

## 2021-11-22 PROCEDURE — 74011000250 HC RX REV CODE- 250: Performed by: PHYSICIAN ASSISTANT

## 2021-11-22 PROCEDURE — 76000 FLUOROSCOPY <1 HR PHYS/QHP: CPT

## 2021-11-22 PROCEDURE — 74011250637 HC RX REV CODE- 250/637: Performed by: ORTHOPAEDIC SURGERY

## 2021-11-22 PROCEDURE — 77030013079 HC BLNKT BAIR HGGR 3M -A: Performed by: ANESTHESIOLOGY

## 2021-11-22 PROCEDURE — 51798 US URINE CAPACITY MEASURE: CPT

## 2021-11-22 PROCEDURE — 97535 SELF CARE MNGMENT TRAINING: CPT

## 2021-11-22 DEVICE — HEAD FEM TAPR 3.5- MM 36 MM HIP BIOLOX DELT STRL: Type: IMPLANTABLE DEVICE | Site: HIP | Status: FUNCTIONAL

## 2021-11-22 DEVICE — IMPLANTABLE DEVICE: Type: IMPLANTABLE DEVICE | Site: HIP | Status: FUNCTIONAL

## 2021-11-22 DEVICE — COMPONENT TOT HIP CAPPED H2 ADV: Type: IMPLANTABLE DEVICE | Status: FUNCTIONAL

## 2021-11-22 DEVICE — STEM FEM OD10MM 12/14 STD OFFSET CLLRD TAPR REDUC NK LEN: Type: IMPLANTABLE DEVICE | Site: HIP | Status: FUNCTIONAL

## 2021-11-22 RX ORDER — FENTANYL CITRATE 50 UG/ML
INJECTION, SOLUTION INTRAMUSCULAR; INTRAVENOUS AS NEEDED
Status: DISCONTINUED | OUTPATIENT
Start: 2021-11-22 | End: 2021-11-22 | Stop reason: HOSPADM

## 2021-11-22 RX ORDER — FENTANYL CITRATE 50 UG/ML
25 INJECTION, SOLUTION INTRAMUSCULAR; INTRAVENOUS
Status: DISCONTINUED | OUTPATIENT
Start: 2021-11-22 | End: 2021-11-22 | Stop reason: HOSPADM

## 2021-11-22 RX ORDER — PREGABALIN 75 MG/1
150 CAPSULE ORAL ONCE
Status: COMPLETED | OUTPATIENT
Start: 2021-11-22 | End: 2021-11-22

## 2021-11-22 RX ORDER — TRAMADOL HYDROCHLORIDE 50 MG/1
100 TABLET ORAL
Status: DISCONTINUED | OUTPATIENT
Start: 2021-11-22 | End: 2021-11-23 | Stop reason: HOSPADM

## 2021-11-22 RX ORDER — FACIAL-BODY WIPES
10 EACH TOPICAL DAILY PRN
Status: DISCONTINUED | OUTPATIENT
Start: 2021-11-24 | End: 2021-11-23 | Stop reason: HOSPADM

## 2021-11-22 RX ORDER — FENTANYL CITRATE 50 UG/ML
50 INJECTION, SOLUTION INTRAMUSCULAR; INTRAVENOUS AS NEEDED
Status: DISCONTINUED | OUTPATIENT
Start: 2021-11-22 | End: 2021-11-22 | Stop reason: HOSPADM

## 2021-11-22 RX ORDER — ACETAMINOPHEN 325 MG/1
650 TABLET ORAL EVERY 6 HOURS
Status: DISCONTINUED | OUTPATIENT
Start: 2021-11-22 | End: 2021-11-23 | Stop reason: HOSPADM

## 2021-11-22 RX ORDER — NEOSTIGMINE METHYLSULFATE 1 MG/ML
INJECTION, SOLUTION INTRAVENOUS AS NEEDED
Status: DISCONTINUED | OUTPATIENT
Start: 2021-11-22 | End: 2021-11-22 | Stop reason: HOSPADM

## 2021-11-22 RX ORDER — SODIUM CHLORIDE 0.9 % (FLUSH) 0.9 %
5-40 SYRINGE (ML) INJECTION AS NEEDED
Status: DISCONTINUED | OUTPATIENT
Start: 2021-11-22 | End: 2021-11-23 | Stop reason: HOSPADM

## 2021-11-22 RX ORDER — SODIUM CHLORIDE, SODIUM LACTATE, POTASSIUM CHLORIDE, CALCIUM CHLORIDE 600; 310; 30; 20 MG/100ML; MG/100ML; MG/100ML; MG/100ML
25 INJECTION, SOLUTION INTRAVENOUS CONTINUOUS
Status: DISCONTINUED | OUTPATIENT
Start: 2021-11-22 | End: 2021-11-22 | Stop reason: HOSPADM

## 2021-11-22 RX ORDER — AMOXICILLIN 250 MG
1 CAPSULE ORAL 2 TIMES DAILY
Qty: 14 TABLET | Refills: 0 | Status: SHIPPED | OUTPATIENT
Start: 2021-11-22 | End: 2021-11-29

## 2021-11-22 RX ORDER — TRAMADOL HYDROCHLORIDE 50 MG/1
50 TABLET ORAL
Status: DISCONTINUED | OUTPATIENT
Start: 2021-11-22 | End: 2021-11-23 | Stop reason: HOSPADM

## 2021-11-22 RX ORDER — EPHEDRINE SULFATE/0.9% NACL/PF 50 MG/5 ML
SYRINGE (ML) INTRAVENOUS AS NEEDED
Status: DISCONTINUED | OUTPATIENT
Start: 2021-11-22 | End: 2021-11-22 | Stop reason: HOSPADM

## 2021-11-22 RX ORDER — PROPOFOL 10 MG/ML
INJECTION, EMULSION INTRAVENOUS AS NEEDED
Status: DISCONTINUED | OUTPATIENT
Start: 2021-11-22 | End: 2021-11-22 | Stop reason: HOSPADM

## 2021-11-22 RX ORDER — ACETAMINOPHEN 500 MG
1000 TABLET ORAL ONCE
Status: COMPLETED | OUTPATIENT
Start: 2021-11-22 | End: 2021-11-22

## 2021-11-22 RX ORDER — DEXAMETHASONE SODIUM PHOSPHATE 4 MG/ML
INJECTION, SOLUTION INTRA-ARTICULAR; INTRALESIONAL; INTRAMUSCULAR; INTRAVENOUS; SOFT TISSUE AS NEEDED
Status: DISCONTINUED | OUTPATIENT
Start: 2021-11-22 | End: 2021-11-22 | Stop reason: HOSPADM

## 2021-11-22 RX ORDER — DEXAMETHASONE SODIUM PHOSPHATE 4 MG/ML
10 INJECTION, SOLUTION INTRA-ARTICULAR; INTRALESIONAL; INTRAMUSCULAR; INTRAVENOUS; SOFT TISSUE ONCE
Status: COMPLETED | OUTPATIENT
Start: 2021-11-23 | End: 2021-11-23

## 2021-11-22 RX ORDER — ONDANSETRON 2 MG/ML
INJECTION INTRAMUSCULAR; INTRAVENOUS AS NEEDED
Status: DISCONTINUED | OUTPATIENT
Start: 2021-11-22 | End: 2021-11-22 | Stop reason: HOSPADM

## 2021-11-22 RX ORDER — NALOXONE HYDROCHLORIDE 0.4 MG/ML
0.4 INJECTION, SOLUTION INTRAMUSCULAR; INTRAVENOUS; SUBCUTANEOUS AS NEEDED
Status: DISCONTINUED | OUTPATIENT
Start: 2021-11-22 | End: 2021-11-23 | Stop reason: HOSPADM

## 2021-11-22 RX ORDER — OXYCODONE HYDROCHLORIDE 5 MG/1
5 TABLET ORAL
Status: DISCONTINUED | OUTPATIENT
Start: 2021-11-22 | End: 2021-11-22

## 2021-11-22 RX ORDER — ONDANSETRON 2 MG/ML
4 INJECTION INTRAMUSCULAR; INTRAVENOUS AS NEEDED
Status: DISCONTINUED | OUTPATIENT
Start: 2021-11-22 | End: 2021-11-22 | Stop reason: HOSPADM

## 2021-11-22 RX ORDER — AMOXICILLIN 250 MG
1 CAPSULE ORAL 2 TIMES DAILY
Status: DISCONTINUED | OUTPATIENT
Start: 2021-11-22 | End: 2021-11-23 | Stop reason: HOSPADM

## 2021-11-22 RX ORDER — LIDOCAINE HYDROCHLORIDE 20 MG/ML
INJECTION, SOLUTION EPIDURAL; INFILTRATION; INTRACAUDAL; PERINEURAL AS NEEDED
Status: DISCONTINUED | OUTPATIENT
Start: 2021-11-22 | End: 2021-11-22 | Stop reason: HOSPADM

## 2021-11-22 RX ORDER — POLYETHYLENE GLYCOL 3350 17 G/17G
17 POWDER, FOR SOLUTION ORAL DAILY
Qty: 7 PACKET | Refills: 0 | Status: SHIPPED | OUTPATIENT
Start: 2021-11-23 | End: 2021-11-30

## 2021-11-22 RX ORDER — FAMOTIDINE 20 MG/1
20 TABLET, FILM COATED ORAL 2 TIMES DAILY
Status: DISCONTINUED | OUTPATIENT
Start: 2021-11-22 | End: 2021-11-23 | Stop reason: HOSPADM

## 2021-11-22 RX ORDER — MORPHINE SULFATE 2 MG/ML
2 INJECTION, SOLUTION INTRAMUSCULAR; INTRAVENOUS
Status: ACTIVE | OUTPATIENT
Start: 2021-11-22 | End: 2021-11-23

## 2021-11-22 RX ORDER — LIDOCAINE HYDROCHLORIDE 10 MG/ML
0.1 INJECTION, SOLUTION EPIDURAL; INFILTRATION; INTRACAUDAL; PERINEURAL AS NEEDED
Status: DISCONTINUED | OUTPATIENT
Start: 2021-11-22 | End: 2021-11-22 | Stop reason: HOSPADM

## 2021-11-22 RX ORDER — HYDROMORPHONE HYDROCHLORIDE 2 MG/ML
INJECTION, SOLUTION INTRAMUSCULAR; INTRAVENOUS; SUBCUTANEOUS AS NEEDED
Status: DISCONTINUED | OUTPATIENT
Start: 2021-11-22 | End: 2021-11-22 | Stop reason: HOSPADM

## 2021-11-22 RX ORDER — ONDANSETRON 2 MG/ML
4 INJECTION INTRAMUSCULAR; INTRAVENOUS
Status: ACTIVE | OUTPATIENT
Start: 2021-11-22 | End: 2021-11-23

## 2021-11-22 RX ORDER — POLYETHYLENE GLYCOL 3350 17 G/17G
17 POWDER, FOR SOLUTION ORAL DAILY
Status: DISCONTINUED | OUTPATIENT
Start: 2021-11-23 | End: 2021-11-23 | Stop reason: HOSPADM

## 2021-11-22 RX ORDER — SUCCINYLCHOLINE CHLORIDE 20 MG/ML
INJECTION INTRAMUSCULAR; INTRAVENOUS AS NEEDED
Status: DISCONTINUED | OUTPATIENT
Start: 2021-11-22 | End: 2021-11-22 | Stop reason: HOSPADM

## 2021-11-22 RX ORDER — TRAMADOL HYDROCHLORIDE 50 MG/1
50 TABLET ORAL
Qty: 30 TABLET | Refills: 0 | Status: SHIPPED | OUTPATIENT
Start: 2021-11-22 | End: 2021-11-29

## 2021-11-22 RX ORDER — FAMOTIDINE 20 MG/1
20 TABLET, FILM COATED ORAL 2 TIMES DAILY
Qty: 60 TABLET | Refills: 0 | Status: SHIPPED | OUTPATIENT
Start: 2021-11-22 | End: 2021-12-22

## 2021-11-22 RX ORDER — ATORVASTATIN CALCIUM 10 MG/1
10 TABLET, FILM COATED ORAL
Status: DISCONTINUED | OUTPATIENT
Start: 2021-11-22 | End: 2021-11-23 | Stop reason: HOSPADM

## 2021-11-22 RX ORDER — DIPHENHYDRAMINE HCL 12.5MG/5ML
12.5 LIQUID (ML) ORAL
Status: DISCONTINUED | OUTPATIENT
Start: 2021-11-22 | End: 2021-11-23 | Stop reason: HOSPADM

## 2021-11-22 RX ORDER — ROCURONIUM BROMIDE 10 MG/ML
INJECTION, SOLUTION INTRAVENOUS AS NEEDED
Status: DISCONTINUED | OUTPATIENT
Start: 2021-11-22 | End: 2021-11-22 | Stop reason: HOSPADM

## 2021-11-22 RX ORDER — GLYCOPYRROLATE 0.2 MG/ML
INJECTION INTRAMUSCULAR; INTRAVENOUS AS NEEDED
Status: DISCONTINUED | OUTPATIENT
Start: 2021-11-22 | End: 2021-11-22 | Stop reason: HOSPADM

## 2021-11-22 RX ORDER — MIDAZOLAM HYDROCHLORIDE 1 MG/ML
INJECTION, SOLUTION INTRAMUSCULAR; INTRAVENOUS AS NEEDED
Status: DISCONTINUED | OUTPATIENT
Start: 2021-11-22 | End: 2021-11-22 | Stop reason: HOSPADM

## 2021-11-22 RX ORDER — MIDAZOLAM HYDROCHLORIDE 1 MG/ML
1 INJECTION, SOLUTION INTRAMUSCULAR; INTRAVENOUS AS NEEDED
Status: DISCONTINUED | OUTPATIENT
Start: 2021-11-22 | End: 2021-11-22 | Stop reason: HOSPADM

## 2021-11-22 RX ORDER — HYDROMORPHONE HYDROCHLORIDE 1 MG/ML
.2-.5 INJECTION, SOLUTION INTRAMUSCULAR; INTRAVENOUS; SUBCUTANEOUS
Status: DISCONTINUED | OUTPATIENT
Start: 2021-11-22 | End: 2021-11-22 | Stop reason: HOSPADM

## 2021-11-22 RX ORDER — CELECOXIB 200 MG/1
400 CAPSULE ORAL ONCE
Status: COMPLETED | OUTPATIENT
Start: 2021-11-22 | End: 2021-11-22

## 2021-11-22 RX ORDER — OXYCODONE HYDROCHLORIDE 5 MG/1
10 TABLET ORAL
Status: DISCONTINUED | OUTPATIENT
Start: 2021-11-22 | End: 2021-11-22

## 2021-11-22 RX ORDER — ASPIRIN 81 MG/1
81 TABLET ORAL 2 TIMES DAILY
Qty: 60 TABLET | Refills: 0 | Status: SHIPPED
Start: 2021-11-22 | End: 2021-12-22

## 2021-11-22 RX ORDER — SODIUM CHLORIDE 9 MG/ML
125 INJECTION, SOLUTION INTRAVENOUS CONTINUOUS
Status: DISPENSED | OUTPATIENT
Start: 2021-11-22 | End: 2021-11-23

## 2021-11-22 RX ORDER — METOPROLOL TARTRATE 25 MG/1
25 TABLET, FILM COATED ORAL 2 TIMES DAILY
Status: DISCONTINUED | OUTPATIENT
Start: 2021-11-22 | End: 2021-11-23 | Stop reason: HOSPADM

## 2021-11-22 RX ORDER — ROPIVACAINE HYDROCHLORIDE 5 MG/ML
INJECTION, SOLUTION EPIDURAL; INFILTRATION; PERINEURAL AS NEEDED
Status: DISCONTINUED | OUTPATIENT
Start: 2021-11-22 | End: 2021-11-22 | Stop reason: HOSPADM

## 2021-11-22 RX ORDER — KETOROLAC TROMETHAMINE 30 MG/ML
15 INJECTION, SOLUTION INTRAMUSCULAR; INTRAVENOUS EVERY 6 HOURS
Status: COMPLETED | OUTPATIENT
Start: 2021-11-22 | End: 2021-11-23

## 2021-11-22 RX ORDER — ONDANSETRON 4 MG/1
4 TABLET, ORALLY DISINTEGRATING ORAL
Status: DISCONTINUED | OUTPATIENT
Start: 2021-11-24 | End: 2021-11-23 | Stop reason: HOSPADM

## 2021-11-22 RX ORDER — ASPIRIN 81 MG/1
81 TABLET ORAL 2 TIMES DAILY
Status: DISCONTINUED | OUTPATIENT
Start: 2021-11-22 | End: 2021-11-23 | Stop reason: HOSPADM

## 2021-11-22 RX ORDER — SODIUM CHLORIDE 0.9 % (FLUSH) 0.9 %
5-40 SYRINGE (ML) INJECTION EVERY 8 HOURS
Status: DISCONTINUED | OUTPATIENT
Start: 2021-11-22 | End: 2021-11-23 | Stop reason: HOSPADM

## 2021-11-22 RX ADMIN — MIDAZOLAM HYDROCHLORIDE 1 MG: 1 INJECTION, SOLUTION INTRAMUSCULAR; INTRAVENOUS at 09:45

## 2021-11-22 RX ADMIN — LIDOCAINE HYDROCHLORIDE 60 MG: 20 INJECTION, SOLUTION EPIDURAL; INFILTRATION; INTRACAUDAL; PERINEURAL at 09:50

## 2021-11-22 RX ADMIN — DEXAMETHASONE SODIUM PHOSPHATE 10 MG: 4 INJECTION, SOLUTION INTRAMUSCULAR; INTRAVENOUS at 09:55

## 2021-11-22 RX ADMIN — CELECOXIB 400 MG: 200 CAPSULE ORAL at 09:04

## 2021-11-22 RX ADMIN — SODIUM CHLORIDE 125 ML/HR: 9 INJECTION, SOLUTION INTRAVENOUS at 20:20

## 2021-11-22 RX ADMIN — Medication 1 AMPULE: at 13:54

## 2021-11-22 RX ADMIN — Medication 5 MG: at 10:27

## 2021-11-22 RX ADMIN — FENTANYL CITRATE 25 MCG: 0.05 INJECTION, SOLUTION INTRAMUSCULAR; INTRAVENOUS at 11:36

## 2021-11-22 RX ADMIN — NEOSTIGMINE METHYLSULFATE 3 MG: 1 INJECTION, SOLUTION INTRAVENOUS at 10:49

## 2021-11-22 RX ADMIN — Medication 3 AMPULE: at 09:03

## 2021-11-22 RX ADMIN — Medication 15 MG: at 10:42

## 2021-11-22 RX ADMIN — SODIUM CHLORIDE 125 ML/HR: 9 INJECTION, SOLUTION INTRAVENOUS at 12:17

## 2021-11-22 RX ADMIN — SUCCINYLCHOLINE CHLORIDE 100 MG: 20 INJECTION, SOLUTION INTRAMUSCULAR; INTRAVENOUS at 09:50

## 2021-11-22 RX ADMIN — FENTANYL CITRATE 50 MCG: 50 INJECTION, SOLUTION INTRAMUSCULAR; INTRAVENOUS at 10:11

## 2021-11-22 RX ADMIN — DOCUSATE SODIUM 50MG AND SENNOSIDES 8.6MG 1 TABLET: 8.6; 5 TABLET, FILM COATED ORAL at 17:05

## 2021-11-22 RX ADMIN — ACETAMINOPHEN 650 MG: 325 TABLET ORAL at 15:33

## 2021-11-22 RX ADMIN — ASPIRIN 81 MG: 81 TABLET, COATED ORAL at 17:05

## 2021-11-22 RX ADMIN — Medication 10 MG: at 10:06

## 2021-11-22 RX ADMIN — PROPOFOL 100 MG: 10 INJECTION, EMULSION INTRAVENOUS at 09:50

## 2021-11-22 RX ADMIN — FENTANYL CITRATE 25 MCG: 0.05 INJECTION, SOLUTION INTRAMUSCULAR; INTRAVENOUS at 11:45

## 2021-11-22 RX ADMIN — WATER 2 G: 1 INJECTION INTRAMUSCULAR; INTRAVENOUS; SUBCUTANEOUS at 17:06

## 2021-11-22 RX ADMIN — METOPROLOL TARTRATE 25 MG: 25 TABLET, FILM COATED ORAL at 17:05

## 2021-11-22 RX ADMIN — FENTANYL CITRATE 25 MCG: 0.05 INJECTION, SOLUTION INTRAMUSCULAR; INTRAVENOUS at 11:50

## 2021-11-22 RX ADMIN — WATER 2 G: 1 INJECTION INTRAMUSCULAR; INTRAVENOUS; SUBCUTANEOUS at 10:00

## 2021-11-22 RX ADMIN — GLYCOPYRROLATE 0.4 MG: 0.2 INJECTION, SOLUTION INTRAMUSCULAR; INTRAVENOUS at 10:49

## 2021-11-22 RX ADMIN — ROCURONIUM BROMIDE 20 MG: 10 INJECTION INTRAVENOUS at 09:55

## 2021-11-22 RX ADMIN — HYDROMORPHONE HYDROCHLORIDE 0.5 MG: 2 INJECTION, SOLUTION INTRAMUSCULAR; INTRAVENOUS; SUBCUTANEOUS at 10:18

## 2021-11-22 RX ADMIN — Medication 10 MG: at 11:01

## 2021-11-22 RX ADMIN — ATORVASTATIN CALCIUM 10 MG: 10 TABLET, FILM COATED ORAL at 22:06

## 2021-11-22 RX ADMIN — FAMOTIDINE 20 MG: 20 TABLET, FILM COATED ORAL at 17:05

## 2021-11-22 RX ADMIN — SODIUM CHLORIDE, POTASSIUM CHLORIDE, SODIUM LACTATE AND CALCIUM CHLORIDE 25 ML/HR: 600; 310; 30; 20 INJECTION, SOLUTION INTRAVENOUS at 09:23

## 2021-11-22 RX ADMIN — FENTANYL CITRATE 50 MCG: 50 INJECTION, SOLUTION INTRAMUSCULAR; INTRAVENOUS at 09:50

## 2021-11-22 RX ADMIN — KETOROLAC TROMETHAMINE 15 MG: 30 INJECTION, SOLUTION INTRAMUSCULAR; INTRAVENOUS at 23:50

## 2021-11-22 RX ADMIN — PREGABALIN 150 MG: 75 CAPSULE ORAL at 09:04

## 2021-11-22 RX ADMIN — FENTANYL CITRATE 25 MCG: 0.05 INJECTION, SOLUTION INTRAMUSCULAR; INTRAVENOUS at 12:17

## 2021-11-22 RX ADMIN — Medication 1000 MG: at 09:04

## 2021-11-22 RX ADMIN — Medication 10 ML: at 13:55

## 2021-11-22 RX ADMIN — KETOROLAC TROMETHAMINE 15 MG: 30 INJECTION, SOLUTION INTRAMUSCULAR; INTRAVENOUS at 20:00

## 2021-11-22 RX ADMIN — ACETAMINOPHEN 650 MG: 325 TABLET ORAL at 23:51

## 2021-11-22 RX ADMIN — ROCURONIUM BROMIDE 5 MG: 10 INJECTION INTRAVENOUS at 09:50

## 2021-11-22 RX ADMIN — Medication 10 ML: at 22:15

## 2021-11-22 RX ADMIN — ONDANSETRON HYDROCHLORIDE 4 MG: 2 INJECTION, SOLUTION INTRAMUSCULAR; INTRAVENOUS at 10:42

## 2021-11-22 RX ADMIN — Medication 1 AMPULE: at 22:06

## 2021-11-22 NOTE — H&P
Date of Surgery Update:  Floresita Bustos was seen and examined on the day of surgery prior to the procedure. There were no significant clinical changes since the completion of the History and Physical.    Exam today prior to surgery showed no acute cardiac findings, no respiratory difficulty, and no abdominal complaints or pain. This patient is a candidate for TXA. The patient was counseled at length about the risks of yudi Covid-19 during their perioperative period and any recovery window from their procedure. The patient was made aware that yudi Covid-19  may worsen their prognosis for recovering from their procedure and lend to a higher morbidity and/or mortality risk. All material risks, benefits, and reasonable alternatives including postponing the procedure were discussed. The patient does wish to proceed with the procedure at this time. Documentation of Medical Necessity:    Symptoms: pain with activity and at rest, antalgia, interferes with ADLs    Conservative Treatment: activity modification, multiple medications, injection    Physical Findings: painful AROM/PROM, antalgia on ambulation, no trochanteric pain    Imaging: significant OA, sclerosis and osteophytes    Indications:   Failure of conservative treatments with daily pain and functional limitations. Appropriate imaging demonstrating significant disease. Appropriate physical findings consistent with significant degenerative joint disease. All pertinent risks, benefits, and alternatives to operative management including continued conservative care were explained at length. The patient has elected to proceed with appropriately indicated and medically necessary total joint arthroplasty. They understand no guarantees can be given about the outcome.     Signed By: ALEJO Molina     November 22, 2021 7:37 AM

## 2021-11-22 NOTE — PERIOP NOTES
[facility] from Operating Room to PACU    Report received from [facility] and A Wass CRNA regarding Misael Joshua. Surgeon(s):  Yaya Ellison MD  And Procedure(s) (LRB):  RIGHT TOTAL HIP ARTHROPLASTY ANTERIOR APPROACH WITH NAVIGATION (Right)  confirmed   with allergies and dressings discussed. Anesthesia type, drugs, patient history, complications, estimated blood loss, vital signs, intake and output, and last pain medication, lines, reversal medications and temperature were reviewed. 1222 Updated patient's sister, Eduardo Harris, via phone and waiting on room assignment. 1305 TRANSFER - OUT REPORT:    Verbal report given to [facility] RN(name) on Misael Joshua  being transferred to Ortho(unit) for routine post - op       Report consisted of patients Situation, Background, Assessment and   Recommendations(SBAR). Information from the following report(s) SBAR, Kardex, OR Summary, Procedure Summary, Intake/Output and MAR was reviewed with the receiving nurse. Opportunity for questions and clarification was provided. Patient transported with:  Purkinje  Room Air  Patient chart  Patient belongings and walking stick    12 Sister made aware of room assignment via phone.

## 2021-11-22 NOTE — DISCHARGE INSTRUCTIONS
Discharge Instructions: How to [facility]  Surgery: Total Hip Replacement  Surgeon:   Arturo Bella MD  Surgery Date:  11/22/2021     To relieve pain:   Use ice/gel packs.  Put the ice pack directly over the wound, or anywhere you are hurting or swollen.  To control pain and swelling, keep ice on regularly, especially after physical activity.  The packs should stay cold for 3-4 hours. When it is not cold anymore, rotate with the packs in the freezer.  Elevate your leg. This will also keep swelling down.  Rest for at least 20 minutes between activity or exercises.  To keep track of your pain medications, write down what you take and when you take it.  The last dose of pain medication you got in the hospital was:       Medication    Dose    Date & Time           Choose your medications based on the pain scale below:     To keep your pain under control, take Tylenol every 6 hours for 14 days - even if you feel like you dont need it.  For mild to moderate pain (4-6 on pain scale), take one pain pill every 3-4 hours as needed.  For severe pain (7-10 on pain scale), take two pain pills every 3-4 hours as needed.  To prevent nausea, take your pain medications with food. Pain Scale              As your pain lessens:     Slowly start taking less pain medication. You may do this by waiting longer between doses or by taking smaller doses.  Stop using the pain medications as soon as you no longer need it, usually in 2-3 weeks.  Aspirin   To prevent blood clots, you will need to take Aspirin 81 mg twice a day for 30 days.  To prevent stomach upset or bleeding:   Do not take non-steroidal anti-inflammatory medications (Ibuprofen, Advil, Motrin, Naproxen, etc.)    Take Pepcid 20 mg twice a day, or a similar home medication, while you are taking a blood thinner. DRESSING: Silver Dressing      Keep your waterproof dressing in place after your leave the hospital for 5-7 days.  You will be given an extra dressing to change after 5-7 days.  You will have some swelling, warmth, and bruising around the knee and up and down the leg after surgery. This will may get worse in the first few days you are home and will slowly get better over the next few weeks. OPTIFOAM DRESSING INSTRUCTIONS  (extra dressing)                   PRINEO DRESSING INSTRUCTIONS      Prineo is a type of skin glue and mesh that is keeping your wound together.  Leave this covering alone. Do not peel it off.  Do not apply oils or lotions over it.  You may shower with this dressing but do not soak it. Gently pat your wound dry when you get out of the shower.  DO NOTs:   Do not rub your surgical wound   Do not put lotion or oils on your wound.  Do not take a tub bath or go swimming until your doctor says it is ok.  You may shower with this dressing over your wound.  After showering pat the dressing dry.  To increase and promote healing:     Stop Smoking (or at least cut back on       Smoking).  Eat a well-balanced diet (high in protein       and vitamin C).  If you have a poor appetite, drink Ensure, Glucerna, or [place] Instant Breakfast for the next 30 days.  If you are diabetic, you should control your blood                                                sugars to prevent infection and help your wound                                                to heal.     Prevent Infection    1. Wash your hands.  This is the most important thing you or your caregiver can do.  Wash your hands with soap and water (or use the hand  we gave you) before you touch any wounds. 2. Shower.  Use the antibacterial soap we gave you when you take a shower.       Shower with this soap until your wounds are healed. 3.  Use clean sheets.  Put freshly cleaned sheets on your bed after surgery.  To keep the surgery site clean, do not allow pets to sleep with you while your wound is still healing.  To prevent constipation, stay active and drink plenty of fluid.  While using pain medications, you should also take stool softeners and laxatives, such as Pericolace       and Miralax.  If you are having too many bowel       Movements, then you may need       to stop taking the laxatives.  You should have a bowel movement 3-4 days        after surgery and then at least every other day while       on pain medication.  To improve your recovery, you must be active!  Use your walker and take short walks         (in your home). about every 2 hours during the day.  Try to increase how far you walk each day.  You can put as much weight on your leg as you can tolerate while walking.  Home health physical therapy will come to your       home the day after you leave the hospital.  The       therapist will visit about 4 times over the next couple of       weeks to teach you how to get out of bed, to safely walk       in your home, and to do your exercises.  NO DRIVING until your surgeon tells you it is ok.  You can return to work when cleared by a physician.  Please call your physician immediately if you have:     Constant bleeding from your wound.  Increasing redness or swelling around your wound (Some warmth, bruising and swelling is normal).  Change in wound drainage (increase in amount, color, or bad smell).  Change in mental status (unusual behavior   Temperature over 101.5 degrees Fahrenheit      Pain or redness in the calf (back of your lower leg - see picture)     Increased swelling of the thigh, ankle, calf, or foot.      Emergency: CALL 911 if you have:     Shortness of breath     Chest pain when you cough or taking a deep breath       Please call your surgeons office at [phone] for a follow up appointment. _   You should call as soon as you get home or the next day after discharge. Ask to make an appointment in 2 weeks.  If you have questions or concerns during normal business hours, you may reach Dr. Jeffy Mann' Team at [phone].

## 2021-11-22 NOTE — PROGRESS NOTES
Problem: Mobility Impaired (Adult and Pediatric)  Goal: *Acute Goals and Plan of Care (Insert Text)  Description: FUNCTIONAL STATUS PRIOR TO ADMISSION: Patient was independent and active without use of DME per chart and pt report. Pt drives. HOME SUPPORT PRIOR TO ADMISSION: Pt lived alone with family close-by as support system. Physical Therapy Goals  Initiated 11/22/2021    1. Patient will move from supine to sit and sit to supine , scoot up and down, and roll side to side in bed with supervision/set-up within 4 days. 2. Patient will perform sit to stand with supervision/set-up within 4 days. 3. Patient will ambulate with supervision/set-up for 100 feet with the least restrictive device within 4 days. 4. Patient will ascend/descend 4 stairs with handrail(s) with minimal assistance/contact guard assist within 4 days. 5. Patient will verbalize and demonstrate understanding of anterior hip precautions per protocol within 4 days. 6. Patient will perform hip home exercise program per protocol with modified independence within 4 days. Outcome: Progressing Towards Goal     PHYSICAL THERAPY EVALUATION  Patient: Berlin Shell (55 y.o. female)  Date: 11/22/2021  Primary Diagnosis: S/P total right hip arthroplasty [Z96.641]  Procedure(s) (LRB):  RIGHT TOTAL HIP ARTHROPLASTY ANTERIOR APPROACH WITH NAVIGATION (Right) Day of Surgery   Precautions:   WBAT      ASSESSMENT:  Nurse clears pt for mobility efforts. Based on the objective data described below, the patient presents with POD #0 increased pain RLE and low BP today with brief increase in standing (see below). She is mildly symptomatic with reports of \"some\" lightheadedness/dizziness with sitting/standing. She is with RLE slight knee instability, mild buckling with taking steps to head of bed this date. .. she reports R knee was painful/swollen prior to surgery too. Edema RLE hip to foot observed.   Will continue to follow for progression with anticipation of pt needing assist at discharge (pt's plan is to receive this assist from her niece). Patient Vitals for the past 4 hrs:   Temp Pulse Resp BP SpO2   11/22/21 1531 97.5 °F (36.4 °C) 81 18 (!) 108/44 99 %   11/22/21 1529 -- -- -- (!) 89/44, back in bed --   11/22/21 1523 -- 81 -- (!) 110/46, standing --   11/22/21 1511 -- 78 -- (!) 97/47, sitting --   11/22/21 1430 97.5 °F (36.4 °C) 72 17 (!) 100/49 98 %       Current Level of Function Impacting Discharge (mobility/balance): bed mob./transfers/gait min. Assist with RW use    Functional Outcome Measure: The patient scored Total Score: 12/28 on the Tinetti outcome measure which is indicative of high fall risk. Other factors to consider for discharge: pt lives alone but reports she will stay with her niece initially upon discharge      Patient will benefit from skilled therapy intervention to address the above noted impairments. PLAN :  Recommendations and Planned Interventions: bed mobility training, transfer training, gait training, therapeutic exercises, edema management/control, patient and family training/education, and therapeutic activities      Frequency/Duration: Patient will be followed by physical therapy:  twice daily to address goals. Recommendation for discharge: (in order for the patient to meet his/her long term goals)  Physical therapy at least 2 days/week in the home AND ensure assist and/or supervision for safety with mobility    This discharge recommendation:  A follow-up discussion with the attending provider and/or case management is planned    IF patient discharges home will need the following DME: rolling walker         SUBJECTIVE:   Patient stated I don't get along with \"the boss\" who is my sister.     OBJECTIVE DATA SUMMARY:   HISTORY:    Past Medical History:   Diagnosis Date    Anxiety     Arthritis     Breast cancer ([address]     RIGHT     CAD (coronary artery disease)     stenting cardiac 6/5/2015    Chronic pain     GERD (gastroesophageal reflux disease)     Hypertension     Hyponatremia     Squamous cell carcinoma in situ of skin of face     Squamous cell carcinoma of right female breast     Thromboembolus ([address]     left leg injury- DVT    UTI (urinary tract infection) 2020    Vertigo 5/11/2012     Past Surgical History:   Procedure Laterality Date    BX OF BREAST, NEEDLE CORE, IMAGE GUIDE Right 03/2021    HX ADENOIDECTOMY  1950    HX BREAST BIOPSY Left     Long time ago --neg    HX BREAST LUMPECTOMY Right 5/25/2021    RIGHT BREAST LUMPECTOMY WITH ULTRASOUND, RIGHT BREAST SENTINEL NODE BIOPSY performed by Lukasz Colorado MD at [facility]    HX CATARACT REMOVAL Bilateral     HX CORONARY STENT PLACEMENT      HX GI      COLONOSCOPY    HX HEART CATHETERIZATION      HX HEENT      [facility] CELL REMOVED FROM FACE     HX TONSILLECTOMY  1950    HX TUBAL [place]      left breast biopsy    [facility] CARDIAC SURG PROCEDURE UNLIST  2013    STENT  PLACEMENT        Personal factors and/or comorbidities impacting plan of care:     Home Situation  Home Environment: Private residence  # Steps to Enter: 3  One/Two Story Residence: One story  Living Alone: Yes  Support Systems: Other Family Member(s)  Patient Expects to be Discharged to[de-identified] Unknown (sister wants us to look into rehab)  Current DME Used/Available at Home: Walker, rolling  Tub or Shower Type: Tub/Shower combination    EXAMINATION/PRESENTATION/DECISION MAKING:   Critical Behavior:  Neurologic State: Alert  Orientation Level: Oriented X4  Cognition: Follows commands  Safety/Judgement: Awareness of environment, Decreased insight into deficits  Hearing:   Auditory  Auditory Impairment: Hard of hearing, bilateral  Skin:  Intact (incision clean/dry R hip)  Edema: BLE (RLE>LLE)  Range Of Motion:  AROM: Generally decreased, functional           PROM: Generally decreased, functional           Strength:    Strength: Generally decreased, functional                    Tone & Sensation:   Tone: Normal              Sensation: Impaired (RLE (post-op))               Coordination:  Coordination: Within functional limits  Vision:   Acuity: Within Defined Limits  Corrective Lenses: Glasses  Functional Mobility:  Bed Mobility:     Supine to Sit: Minimum assistance (RLE management)  Sit to Supine: Minimum assistance (RLE management)  Scooting: Contact guard assistance  Transfers:  Sit to Stand: Minimum assistance  Stand to Sit: Minimum assistance    Balance:   Sitting: Intact  Standing: Impaired; With support (RW)  Standing - Static: Good; Constant support (RW)  Standing - Dynamic : Fair; Constant support (RW)  Ambulation/Gait Training:  Distance (ft): 5 Feet (ft) (lateral side-steps toward head of bed)  Assistive Device: Gait belt; Walker, rolling  Ambulation - Level of Assistance: Minimal assistance     Gait Description (WDL): Exceptions to WDL  Gait Abnormalities: Antalgic; Decreased step clearance  Right Side Weight Bearing: As tolerated     Base of Support: Widened     Speed/Brandi: Slow           Interventions: Safety awareness training; Tactile cues; Verbal cues    Functional Measure:  Tinetti test:    Sitting Balance: 1  Arises: 1  Attempts to Rise: 2  Immediate Standing Balance: 1  Standing Balance: 1 (RW)  Nudged: 1  Eyes Closed: 0  Turn 360 Degrees - Continuous/Discontinuous: 0 (RW)  Turn 360 Degrees - Steady/Unsteady: 0 (RW)  Sitting Down: 1  Balance Score: 8 Balance total score  Indication of Gait: 0  R Step Length/Height: 0  L Step Length/Height: 0  R Foot Clearance: 1  L Foot Clearance: 1  Step Symmetry: 1  Step Continuity: 0  Path: 1  Trunk: 0  Walking Time: 0  Gait Score: 4 Gait total score  Total Score: 12/28 Overall total score         Tinetti Tool Score Risk of Falls  <19 = High Fall Risk  19-24 = Moderate Fall Risk  25-28 = Low Fall Risk  Mauryetti ME. Performance-Oriented Assessment of Mobility Problems in Elderly Patients.  [address]; 30:[phone]. (Scoring Description: PT Bulletin Feb. 10, 1993)    Older adults: Janeth Katz et al, 2009; n = 1000 [facility] elderly evaluated with ABC, DANDRE, ADL, and IADL)  · Mean DANDRE score for males aged 69-68 years = 26.21(3.40)  · Mean DANDRE score for females age 69-68 years = 25.16(4.30)  · Mean DANDRE score for males over 80 years = 23.29(6.02)  · Mean DANDRE score for females over 80 years = 17.20(8.32)        Physical Therapy Evaluation Charge Determination   History Examination Presentation Decision-Making   MEDIUM  Complexity : 1-2 comorbidities / personal factors will impact the outcome/ POC  MEDIUM Complexity : 3 Standardized tests and measures addressing body structure, function, activity limitation and / or participation in recreation  MEDIUM Complexity : Evolving with changing characteristics  Other outcome measures Tinetti  MEDIUM      Based on the above components, the patient evaluation is determined to be of the following complexity level: MEDIUM    Pain Rating:  C/o pain R knee and thigh rated 5/10    Activity Tolerance:   Fair, SpO2 stable on RA, and signs and symptoms of orthostatic hypotension      After treatment patient left in no apparent distress:   Supine in bed, Call bell within reach, Bed / chair alarm activated, and Side rails x 3    COMMUNICATION/EDUCATION:   The patients plan of care was discussed with: Occupational therapist and Registered nurse. Fall prevention education was provided and the patient/caregiver indicated understanding., Patient/family have participated as able in goal setting and plan of care. , and Patient/family agree to work toward stated goals and plan of care.     Thank you for this referral.  Andrés Queen, PT, DPT   Time Calculation: 29 mins

## 2021-11-22 NOTE — PROGRESS NOTES
Problem: Self Care Deficits Care Plan (Adult)  Goal: *Acute Goals and Plan of Care (Insert Text)  Description: FUNCTIONAL STATUS PRIOR TO ADMISSION: Patient was independent and active without use of DME. Patient was independent for basic and instrumental ADLs. HOME SUPPORT: Patient lived alone but has family nearby to assist if needed. Occupational Therapy Goals  Initiated 11/22/2021     1. Patient will perform lower body dressing using AE PRN at modified independence level within 7 days. 2. Patient will perform toilet transfers at modified independence level using rolling walker within 7 days. 3. Patient will perform all aspects of toileting at modified independence level within 7 days. 4. Patient will perform standing ADLs for 5 mins at modified independence within 7 days. Outcome: Not Met   OCCUPATIONAL THERAPY EVALUATION  Patient: Tee Trevino (3 y.o. female)  Date: 11/22/2021  Primary Diagnosis: S/P total right hip arthroplasty [Z96.641]  Procedure(s) (LRB):  RIGHT TOTAL HIP ARTHROPLASTY ANTERIOR APPROACH WITH NAVIGATION (Right) Day of Surgery   Precautions: WBAT    ASSESSMENT  Based on the objective data described below, the patient presents with decreased independence in self-care and functional mobility secondary to R hip/knee pain, general weakness, impaired balance, orthostatic hypotension, decreased insight, and decreased activity tolerance. Patient is s/p R hip pain POD 0. Patient is functioning below her independent baseline for self-care and functional mobility, now completing self-care with independence to min assist and functional mobility with contact guard assist to min assist using rolling walker. Patient received semisupine in bed and agreeable for therapy. Patient educated on supine > sit and demonstrated back with min assist. While seated, patient c/o dizziness with BP remaining soft but no noticeable drop.  Patient agreed to attempt sit <> stand and required min assist using rolling walker. Patient was able to take several lateral steps towards [facility] with min assist using rolling walker before requesting to sit. Once seated, patient educated on LB dressing and was unable to bend forward. Patient educated on AE for LB dressing and demonstrated back with min assist. Patient requested to return to supine and required min assist to manage RLE. Patient was left semisupine in bed with all needs met, bed alarmed, and RN present in room. Patient would benefit from additional acute OT session to focus on bathroom mobility, LB dressing, and transfers. Anticipate patient can return home with assist from [facility], pending progress. Vitals:    11/22/21 1511 11/22/21 1523 11/22/21 1529 11/22/21 1531   BP: (!) 97/47 (!) 110/46 (!) 89/44 (!) 108/44   BP 1 Location:       BP Patient Position: Sitting Standing Semi fowlers    Pulse: 78 81  81   Temp:    97.5 °F (36.4 °C)   Resp:    18   Height:       Weight:       SpO2:    99%         Current Level of Function Impacting Discharge (ADLs/self-care): independent to min assist for self-care, contact guard to min assist for functional mobility using rolling walker     Functional Outcome Measure: The patient scored 45/100 on the Barthel Index outcome measure which is indicative of 55% functional impairment in ADLs, partially dependent. Other factors to consider for discharge: fall risk, R SALVADOR, impulsive      Patient will benefit from skilled therapy intervention to address the above noted impairments. PLAN :  Recommendations and Planned Interventions: self care training, functional mobility training, therapeutic exercise, balance training, therapeutic activities, endurance activities, patient education, home safety training, and family training/education    Frequency/Duration: Patient will be followed by occupational therapy 5 times a week to address goals.     Recommendation for discharge: (in order for the patient to meet his/her long term goals)  Occupational therapy at least 2 days/week in the home     This discharge recommendation:  Has not yet been discussed the attending provider and/or case management    IF patient discharges home will need the following DME: AE: long handled dressing       SUBJECTIVE:   Patient stated I have my own way of doing things.     OBJECTIVE DATA SUMMARY:   HISTORY:   Past Medical History:   Diagnosis Date    Anxiety     Arthritis     Breast cancer ([facility] [address]     RIGHT     CAD (coronary artery disease)     stenting cardiac 6/5/2015    Chronic pain     GERD (gastroesophageal reflux disease)     Hypertension     Hyponatremia     Squamous cell carcinoma in situ of skin of face     Squamous cell carcinoma of right female breast     Thromboembolus ([facility] [address]     left leg injury- DVT    UTI (urinary tract infection) 2020    Vertigo 5/11/2012     Past Surgical History:   Procedure Laterality Date    BX OF BREAST, NEEDLE CORE, IMAGE GUIDE Right 03/2021    HX ADENOIDECTOMY  1950    HX BREAST BIOPSY Left     Long time ago --neg    HX BREAST LUMPECTOMY Right 5/25/2021    RIGHT BREAST LUMPECTOMY WITH ULTRASOUND, RIGHT BREAST SENTINEL NODE BIOPSY performed by Francisco Ball MD at [facility]    HX CATARACT REMOVAL Bilateral     HX CORONARY STENT PLACEMENT      HX GI      COLONOSCOPY    HX HEART CATHETERIZATION      HX HEENT      [facility] CELL REMOVED FROM FACE     HX TONSILLECTOMY  1950    HX TUBAL [place]      left breast biopsy    PA CARDIAC SURG PROCEDURE UNLIST  2013    STENT  PLACEMENT        Expanded or extensive additional review of patient history:     Home Situation  Home Environment: Private residence  # Steps to Enter: 3  One/Two Story Residence: One story  Living Alone: Yes  Support Systems: Other Family Member(s), Other (Comment) (niece)  Patient Expects to be Discharged to[de-identified] Unknown (sister wants us to look into rehab)  Current DME Used/Available at Home: Walker, rolling  Tub or Shower Type: Tub/Shower combination    Hand dominance: Right    EXAMINATION OF PERFORMANCE DEFICITS:  Cognitive/Behavioral Status:  Neurologic State: Alert  Orientation Level: Oriented X4  Cognition: Follows commands  Perception: Appears intact  Perseveration: No perseveration noted  Safety/Judgement: Awareness of environment; Decreased insight into deficits    Hearing: Auditory  Auditory Impairment: Hard of hearing, bilateral    Vision/Perceptual:                           Acuity: Within Defined Limits    Corrective Lenses: Glasses    Range of Motion:  AROM: Generally decreased, functional  PROM: Generally decreased, functional    Strength:  Strength: Generally decreased, functional    Coordination:  Coordination: Within functional limits  Fine Motor Skills-Upper: Left Intact; Right Intact    Gross Motor Skills-Upper: Left Intact; Right Intact    Tone & Sensation:  Tone: Normal  Sensation: Impaired (RLE (post-op))    Balance:  Sitting: Intact  Standing: Impaired; With support  Standing - Static: Good; Constant support  Standing - Dynamic : Fair; Constant support    Functional Mobility and Transfers for ADLs:  Bed Mobility:  Supine to Sit: Minimum assistance  Sit to Supine: Minimum assistance  Scooting: Contact guard assistance    Transfers:  Sit to Stand: Minimum assistance  Stand to Sit: Minimum assistance    ADL Assessment:  Feeding: Setup  Oral Facial Hygiene/Grooming: Setup; Supervision (seated)  Bathing: Minimum assistance  Upper Body Dressing: Independent  Lower Body Dressing: Minimum assistance  Toileting: Minimum assistance    ADL Intervention and task modifications:    Lower Body Dressing Assistance  Socks: Minimum assistance; Compensatory technique training  Leg Crossed Method Used: No  Position Performed: Seated edge of bed  Cues: Doff; Mahsa Crank; Physical assistance; Tactile cues provided; Verbal cues provided  Adaptive Equipment Used: Dressing stick;  Reacher; Sock aid    Cognitive Retraining  Safety/Judgement: Awareness of environment; Decreased insight into deficits    Dressing joint: Patient instructed and demonstrated to don/doff Right LE first/last verbal cues. Patient instructed and demonstrated to don all clothing while sitting prior to standing, doff all clothing to knees while standing, then sit to doff clothing off from knees to feet in order to facilitate fall prevention, pain management, and energy conservation with Minimum assistance. Home safety: Patient instructed on home modifications and safety (raise height of ADL objects, appropriate height of chair surfaces, recliner safety, change of floor surfaces, clear pathways) to increase independence and fall prevention. Patient indicated understanding. Functional Measure:    Barthel Index:  Bathin  Bladder: 10  Bowels: 10  Groomin  Dressin  Feeding: 10  Mobility: 0  Stairs: 0  Toilet Use: 5  Transfer (Bed to Chair and Back): 5  Total: 45/100      The Barthel ADL Index: Guidelines  1. The index should be used as a record of what a patient does, not as a record of what a patient could do. 2. The main aim is to establish degree of independence from any help, physical or verbal, however minor and for whatever reason. 3. The need for supervision renders the patient not independent. 4. A patient's performance should be established using the best available evidence. Asking the patient, friends/relatives and nurses are the usual sources, but direct observation and common sense are also important. However direct testing is not needed. 5. Usually the patient's performance over the preceding 24-48 hours is important, but occasionally longer periods will be relevant. 6. Middle categories imply that the patient supplies over 50 per cent of the effort. 7. Use of aids to be independent is allowed.     Score Interpretation (from [address] [address])    Independent   60-79 Minimally independent   40-59 Partially dependent 20-39 Very dependent   <20 Totally dependent     -Cesar Tobar, Barthel, D.W. ([id number]). Functional evaluation: the Barthel Index. [address] ([address] (1997). The Barthel activities of daily living index: self-reporting versus actual performance in the old (> or = 75 years). Journal of [facility]4 [facility] 45(7), [facility], ARTHURF, Mona Saldana., [facility]. (1999). Measuring the change in disability after inpatient rehabilitation; comparison of the responsiveness of the Barthel Index and Functional [place] Measure. Journal of Neurology, Neurosurgery, and Psychiatry, 66(4), 994-033. Basia Cheng, NMaxxJ.SUMAYA, RAQUEL Culver, & Ryann Be M.A. (2004) Assessment of post-stroke quality of life in cost-effectiveness studies: The usefulness of the Barthel Index and the EuroQoL-5D. Quality of Life Research, 13, 178-95     Based on the above components, the patient evaluation is determined to be of the following complexity level: LOW   Pain Rating:  Patient c/o 5/10 R hip/knee pain. Activity Tolerance:   Fair, SpO2 stable on RA, and signs and symptoms of orthostatic hypotension    After treatment patient left in no apparent distress:    Supine in bed, Call bell within reach, Bed / chair alarm activated, Caregiver / family present, and Side rails x 3    COMMUNICATION/EDUCATION:   The patients plan of care was discussed with: Physical therapist and Registered nurse. Home safety education was provided and the patient/caregiver indicated understanding., Patient/family have participated as able in goal setting and plan of care. , and Patient/family agree to work toward stated goals and plan of care. This patients plan of care is appropriate for delegation to [facility].     Thank you for this referral.  JASON Driver/L  Time Calculation: 28 mins

## 2021-11-22 NOTE — PERIOP NOTES
08:50= has had both Pfizer vaccinations, test results negative, has not been exposed or had any s/s virus. Warming blanket applied.

## 2021-11-22 NOTE — PERIOP NOTES
Floseal Hemostatic Matrix 10mL Reference: [id number] Lot number: [id number] Expiration date: 07/16/2023

## 2021-11-22 NOTE — ANESTHESIA POSTPROCEDURE EVALUATION
Procedure(s):  RIGHT TOTAL HIP ARTHROPLASTY ANTERIOR APPROACH WITH NAVIGATION. general    Anesthesia Post Evaluation      Multimodal analgesia: multimodal analgesia used between 6 hours prior to anesthesia start to PACU discharge  Patient location during evaluation: PACU  Patient participation: complete - patient participated  Level of consciousness: sleepy but conscious and responsive to verbal stimuli  Pain score: 2  Pain management: adequate  Airway patency: patent  Anesthetic complications: no  Cardiovascular status: acceptable  Respiratory status: acceptable  Hydration status: acceptable  Comments: +Post-Anesthesia Evaluation and Assessment    Patient: Emily Denver MRN: [id number]  SSN: [id number]   YOB: 1945  Age: 68 y.o. Sex: female      Cardiovascular Function/Vital Signs    BP (!) 114/43   Pulse 64   Temp 36.5 °C (97.7 °F)   Resp 12   Ht 5' (1.524 m)   Wt 67.7 kg (149 lb 4 oz)   SpO2 95%   BMI 29.15 kg/m²     Patient is status post Procedure(s):  RIGHT TOTAL HIP ARTHROPLASTY ANTERIOR APPROACH WITH NAVIGATION. Nausea/Vomiting: Controlled. Postoperative hydration reviewed and adequate. Pain:  Pain Scale 1: Numeric (0 - 10) (11/22/21 1130)  Pain Intensity 1: 5 (11/22/21 1130)   Managed. Neurological Status:   Neuro (WDL): Exceptions to WDL (11/22/21 1111)   At baseline. Mental Status and Level of Consciousness: Arousable. Pulmonary Status:   O2 Device: None (Room air) (11/22/21 1200)   Adequate oxygenation and airway patent. Complications related to anesthesia: None    Post-anesthesia assessment completed. No concerns.     Signed By: Sho Daniels MD    11/22/2021  Post anesthesia nausea and vomiting:  controlled  Final Post Anesthesia Temperature Assessment:  Normothermia (36.0-37.5 degrees C)      INITIAL Post-op Vital signs:   Vitals Value Taken Time   /43 11/22/21 1200   Temp 36.5 °C (97.7 °F) 11/22/21 1116   Pulse 76 11/22/21 1214   Resp 16 11/22/21 1214   SpO2 94 % 11/22/21 1214   Vitals shown include unvalidated device data.

## 2021-11-22 NOTE — ANESTHESIA PREPROCEDURE EVALUATION
Relevant Problems   CARDIOVASCULAR   (+) CAD (coronary artery disease), native coronary artery   (+) Essential hypertension, benign   (+) SVT (supraventricular tachycardia) (HCC)       Anesthetic History   No history of anesthetic complications            Review of Systems / Medical History  Patient summary reviewed, nursing notes reviewed and pertinent labs reviewed    Pulmonary  Within defined limits                 Neuro/Psych   Within defined limits           Cardiovascular    Hypertension: well controlled        Dysrhythmias   CAD    Exercise tolerance: >4 METS     GI/Hepatic/Renal     GERD: well controlled           Endo/Other        Arthritis     Other Findings   Comments: Hypertension  CAD (coronary artery disease)  UTI (urinary tract infection)  Arthritis           Physical Exam    Airway  Mallampati: II  TM Distance: 4 - 6 cm  Neck ROM: normal range of motion   Mouth opening: Normal     Cardiovascular  Regular rate and rhythm,  S1 and S2 normal,  no murmur, click, rub, or gallop  Rhythm: regular  Rate: normal         Dental  No notable dental hx       Pulmonary  Breath sounds clear to auscultation               Abdominal  GI exam deferred       Other Findings            Anesthetic Plan    ASA: 3  Anesthesia type: general    Monitoring Plan: BIS      Induction: Intravenous  Anesthetic plan and risks discussed with: Patient

## 2021-11-22 NOTE — PROGRESS NOTES
Transition of Care Plan:  RUR: 4% low   Disposition: family member home with home health- At [address]  Follow up appointments: ortho  DME needed: pt owns dme needed for d/c  Transportation at Discharge: family   Vena Oats or means to access home:  Family to provide  IM Medicare Letter: 1st IM provided 11/22  Is patient a BCPI-A Bundle: no      If yes, was Bundle Letter given?:     Caregiver Contact: stef Gonzalez [phone]  Discharge Caregiver contacted prior to discharge? Reason for Admission: right total hip arthroplasty                    RUR Score: 4%                    Plan for utilizing home health: per recommendation          PCP: First and Last name:  Shahrzad Herbert MD   Name of Practice: unable to recall    Are you a current patient: Yes/No: yes   Approximate date of last visit: 1 mo ago    Can you participate in a virtual visit with your PCP: yes                    Current Advanced Directive/Advance Care Plan: [place] 13 (ACP) Conversation      Date of Conversation: 11/22/21  Conducted with: Patient with Decision Making Capacity    Content/Action Overview:   DECLINED ACP conversation - will revisit periodically     Healthcare Decision Maker:   Click here to complete [address] including selection of the Healthcare Decision Maker Relationship (ie \"Primary\")                         Transition of Care Plan:                      CM made room visit with patient who was alert and oriented. Pt confirmed demographics, insurance, and emergency contact on file. Pt lives alone in a single level home with 4 rafaela. Pt has a RW. At baseline pt is independent with ADLs and driving. Pt denied any hx of HH, SNF, or IPR. Pt's plan is to d/c to her niece's home with home health. Pt reported she will be staying with her niece for a week (address: [address] [place], [place] [id number]). Pt only [facility] while at niece's home. Pt does not want [place] services in her own home. Pt signed [address] for [facility] to go to her niece's home only. CM sent referral to [facility] with niece's address and phone number. [facility] has accepted. AVS updated. Pt plans to have her sister transport pt to and from outpatient therapy once pt returns home. Family to provide transportation at d/c. Care Management Interventions  PCP Verified by CM: Yes  Mode of Transport at Discharge:  Other (see comment) (family )  Transition of Care Consult (CM Consult): Home Health, Discharge Planning  [place] [phone] [address] [address]: No  Reason Outside [place]: Out of service area  Discharge Durable Medical Equipment: No  Physical Therapy Consult: Yes  Occupational Therapy Consult: Yes  Speech Therapy Consult: No  Support Systems: Other Family Member(s)  Confirm Follow Up Transport: Family  The Plan for Transition of Care is Related to the Following Treatment Goals : [place]  The Patient and/or Patient Representative was Provided with a Choice of Provider and Agrees with the Discharge Plan?: Yes  Freedom of Choice List was Provided with Basic Dialogue that Supports the Patient's Individualized Plan of Care/Goals, Treatment Preferences and Shares the Quality Data Associated with the Providers?: Yes  Discharge Location  Discharge Placement: Home with home health    Rosa Maria Cobian, [address]

## 2021-11-22 NOTE — PROGRESS NOTES
Ortho / Neurosurgery NP Note    POD# 0  s/p RIGHT TOTAL HIP ARTHROPLASTY ANTERIOR APPROACH WITH NAVIGATION   Pt seen with sister at bedside, who stepped outside of room when I began my visit     Pt resting in bed, eating lunch. In NAD. No complaints. Reports dull pain, reports hallucinations with oxycodone and flexeril   Will change to Tramadol prn. Social dynamics discussed independently with patient then sister. VSS Afebrile. Room air. Visit Vitals  BP (!) 98/47   Pulse 62   Temp 97.5 °F (36.4 °C)   Resp 16   Ht 5' (1.524 m)   Wt 67.7 kg (149 lb 4 oz)   SpO2 96%   BMI 29.15 kg/m²       Voiding status: due to void   Unmeasurable Output  Urine Occurrence(s): 1 (voided in Pre Op) (11/22/21 0851)      Labs    Lab Results   Component Value Date/Time    HGB 12.4 11/15/2021 10:25 AM      Lab Results   Component Value Date/Time    INR 1.0 11/15/2021 10:25 AM      Lab Results   Component Value Date/Time    Sodium 130 (L) 11/15/2021 10:25 AM    Potassium 4.2 11/15/2021 10:25 AM    Chloride 101 11/15/2021 10:25 AM    CO2 24 11/15/2021 10:25 AM    Glucose 169 (H) 11/15/2021 10:25 AM    BUN 14 11/15/2021 10:25 AM    Creatinine 0.67 11/15/2021 10:25 AM    Calcium 9.0 11/15/2021 10:25 AM     Recent Glucose Results:   Lab Results   Component Value Date/Time    GLUCPOC 228 (H) 11/22/2021 12:15 PM           Body mass index is 29.15 kg/m². : A BMI > 30 is classified as obesity and > 40 is classified as morbid obesity. Dressing c.d.i  Cryotherapy in place over incision  Calves soft and supple; No pain with passive stretch  Sensation and motor intact - PF/DF/EHL intact 5/5  SCDs for mechanical DVT proph while in bed     PLAN:  1) PT BID, OT - WBAT.   2) Aspirin 81 mg PO BID for DVT Prophylaxis   3) GI Prophylaxis - Pepcid  4) Readniess for discharge:     [x] Vital Signs stable    [] Hgb stable    [] + Voiding    [x] Wound intact, drainage minimal    [x] Tolerating PO intake     [] Cleared by PT (OT if applicable) [] Stair training completed (if applicable)    [] Independent / Contact Guard Assist (household distance)     [] Bed mobility     [] Car transfers     [] ADLs    [x] Adequate pain control on oral medication alone     Plans to discharge to nephew's house for 1 week with [facility], then return to her house however she does not want HH at her home. Sister willing to provide transportation to OP therapy starting next week. Will provide OP Rx.      Vijay Mercado NP

## 2021-11-22 NOTE — H&P
Cleveland Chavez MD - Adult Reconstruction and Total Joint Replacement     Orthopaedic History and Physical        NAME: Mary Jo Sosa       :  1945       MRN:  [id number]        Subjective:   Patient ID: Florencia Gibbons is a 76 y.o. female. Chief Complaint: Follow-up of the Right Hip, Follow-up of the Left Knee, and Follow-up of the Right Knee    Following up today for multiple joint pain. She was seen recently by our sports team who recommended total shoulder but advised she proceed with her hip 1st. She has had longstanding right hip arthritis, severe, has had multiple intra-articular hip injections. Radiates to the right knee. Significantly limited from this and would like definitive care.  She is now status post treatment for breast cancer was treated by Dr. Anola Fleischer    Lives alone      Patient Active Problem List    Diagnosis Date Noted    Mixed hyperlipidemia 2017    CAD (coronary artery disease), native coronary artery 2015    S/P PTCA (percutaneous transluminal coronary angioplasty) 2015    Family history of ischemic heart disease 02/15/2013    Essential hypertension, benign 02/15/2013    SVT (supraventricular tachycardia) ([address] 02/15/2013    Hyposmolality and/or hyponatremia 02/15/2013    Tobacco use disorder 02/15/2013    Vertigo 2012     Past Medical History:   Diagnosis Date    Anxiety     Arthritis     Breast cancer ([address]     RIGHT     CAD (coronary artery disease)     stenting cardiac 2015    Chronic pain     GERD (gastroesophageal reflux disease)     Hypertension     Hyponatremia     Squamous cell carcinoma in situ of skin of face     Squamous cell carcinoma of right female breast     Thromboembolus ([address]     left leg injury- DVT    UTI (urinary tract infection)     Vertigo 2012      Past Surgical History:   Procedure Laterality Date    BX OF BREAST, NEEDLE CORE, IMAGE GUIDE Right 2021    HX ADENOIDECTOMY  1950    HX BREAST BIOPSY Left     Long time ago --neg    HX BREAST LUMPECTOMY Right 5/25/2021    RIGHT BREAST LUMPECTOMY WITH ULTRASOUND, RIGHT BREAST SENTINEL NODE BIOPSY performed by Dalia Darnell MD at [address] HX CATARACT REMOVAL Bilateral     HX CORONARY STENT PLACEMENT      HX GI      COLONOSCOPY    HX HEART CATHETERIZATION      HX HEENT      [facility] CELL REMOVED FROM FACE     HX TONSILLECTOMY  1950    HX TUBAL LIGATION  1982    OK BREAST SURGERY PROCEDURE UNLISTED      left breast biopsy    OK CARDIAC SURG PROCEDURE UNLIST  2013    STENT  PLACEMENT       Prior to Admission medications    Medication Sig Start Date End Date Taking? Authorizing Provider   metoprolol tartrate (LOPRESSOR) 25 mg tablet Take 1 tablet by mouth twice daily 11/16/21   Claribel Mendoza MD   aspirin delayed-release 81 mg tablet 1 tablet    Provider, Historical   fluticasone propionate (Flonase Allergy Relief) 50 mcg/actuation nasal spray 1 spray in each nostril  Patient not taking: Reported on 11/15/2021    Provider, Historical   tamoxifen (NOLVADEX) 20 mg tablet TAKE 1 TABLET BY MOUTH ONCE DAILY  Patient not taking: Reported on 11/15/2021 6/25/21   Provider, Historical   amLODIPine (NORVASC) 5 mg tablet Take 1 tablet by mouth once daily for blood pressure 7/9/21   Claribel Mendoza MD   gabapentin (NEURONTIN) 100 mg capsule Take 1 Capsule by mouth three (3) times daily. Max Daily Amount: 300 mg. Patient not taking: Reported on 11/15/2021 6/11/21   Dalia Darnell MD   losartan (COZAAR) 50 mg tablet Take 1 Tablet by mouth daily. 6/1/21   Yara Polanco MD   vit A/vit C/vit E/zinc/copper (ICAPS AREDS PO) Take  by mouth two (2) times a day. Provider, Historical   acetaminophen (TYLENOL EXTRA STRENGTH) 500 mg tablet Take  by mouth every six (6) hours as needed for Pain. Provider, Historical   lovastatin (MEVACOR) 40 mg tablet Take 1 Tab by mouth nightly.  Refills per PCP who is performing labs 4/2/19   Neel Ty Records, NP     Allergies   Allergen Reactions    Eryc [Erythromycin] Hives    Influenza Virus Vaccine [facility] 2012-13 (18+) Cell Aguilar Other (comments)     \"felt like I was on fire\"    Pcn [Penicillins] Hives     Has had both as an adult and child- -     Bactrim [Sulfamethoprim] Other (comments)    Crestor [Rosuvastatin] Myalgia    Hydrochlorothiazide Other (comments)     hyponatremia    Keflex [Cephalexin] Other (comments)     Aching muscles    Lipitor [Atorvastatin] Myalgia    Proton Pump Inhibitors Other (comments)     dizziness      Social History     Tobacco Use    Smoking status: Current Some Day Smoker     Packs/day: 0.00     Years: 45.00     Pack years: 0.00     Types: Cigarettes    Smokeless tobacco: Never Used    Tobacco comment: \"no lately\"   Substance Use Topics    Alcohol use: Not Currently      Family History   Problem Relation Age of Onset    Heart Disease Mother     Diabetes Mother     Cancer Father         bladder    Coronary Art Dis Other         mother had stents in 63's or 66's    No Known Problems Sister     Anesth Problems Neg Hx         REVIEW OF SYSTEMS: A comprehensive review of systems was negative except for that written in the HPI. Objective:   Constitutional:@ appears stated age. Pt is cooperative and is in no acute distress. Well nourished. Well developed. Body habitus is will. Body mass index is 28.12 kg/m². Eyes: Sclera are nonicteric. Respiratory: No labored breathing. Cardiovascular: No marked edema. Well perfused extremities bilaterally. Skin: No marked skin ulcers. No lymphedema or skin abnormalities. Neurological: No marked sensory loss noted. Grossly neurovascularly intact. Both lower extremities are intact to distal sensory and motor function. Psychiatric: Alert and oriented x3. MUSCULOSKELETAL:   Gait is antalgic on the right hip, flexion contracture right hip. Painless trunk ROM. No obvious LLD. 5/5 BLE strength.    Painful limited right hip range of motion    Radiographs:       No imaging obtained   I reviewed prior films of bilateral knees which show mild-to-moderate tricompartmental degenerative change with neutral alignment. I reviewed right hip films from May which show end-stage DJD of the right hip with cyst formation and coxa profunda    Assessment:     ICD-10-CM   1. Osteoarthritis of right hip, unspecified osteoarthritis type M16.11     There is no problem list on file for this patient. Plan:   Recommend right total hip replacement. Conservative treatments including activity modification, medication, and steroid injections have not successfully relieved pain. Surgery was discussed at length with the pt today. We went over all the pertinent risks, benefits, and alternatives to the procedure. They understand no guarantees can be made about the outcome and they would like to proceed. I discussed the pre-op total joint class and general recovery timeline with the pt. The pt understands the need for medical clearance. All questions were answered to her satisfaction.  Follow up prn    Orders Placed This Encounter   Procedures    BMI >=25 PATIENT INSTRUCTIONS & EDUCATION     MARISOL Nevarez, [id number]

## 2021-11-22 NOTE — OP NOTES
Marcio Salmeron MD - Adult Reconstruction and Total Joint Replacement    Michel Weber - MRN [id number] - : 1945 (68 y.o.)      Date: 2021    Pre-operative Diagnosis: Right Hip DJD     Post-operative Diagnosis: same     Procedure:  (1) Right Total Hip Arthroplasty, Direct Anterior Approach    (2) Intraoperative Fluoroscopy    (3) Imageless Computer Navigation     Implants Used:   Implant Name Type Inv. Item Serial No.  Lot No. LRB No. Used Action   SHELL ACET MH 50 MM HIP GRP 4 CUP ALTEON - [id number]  SHELL ACET MH 50 MM HIP GRP 4 CUP ALTEON [id number] [employer] NA Right 1 Implanted   LINER ACET NEUT 36X50 MM HIP GRP 4 XLE ALTEON - [id number]  LINER ACET NEUT 36X50 MM HIP GRP 4 XLE [employer]TEON [id number] [employer] NA Right 1 Implanted   STEM FEM OD10MM 12/14 STD OFFSET CLLRD TAPR REDUC NK ELIZABETH - [id number]  STEM FEM OD10MM 12/14 STD OFFSET CLLRD TAPR REDUC NK ELIZABETH [id number] [employer] NA Right 1 Implanted   HEAD FEM TAPR 3.5- MM 36 MM HIP BIOLOX DELT STRL - [id number]  HEAD FEM TAPR 3.5- MM 36 MM HIP BIOLOX DELT STRL [id number] [employer] NA Right 1 Implanted       Anesthesia: GETA + local    Pre-operative antibiotic: Ancef    Surgeon: Marcio Salmeron     Assist: ALEJO Willis (Performing all or most of the following assistant-at-surgery services including but not limited to: proper patient positioning, sterile/prep and draping, placement of instruments/trackers, operative exposure, minor portions of bone / soft tissue excision, final irrigation and debridement, deep and superficial closure, application of final dressings)    EBL: 250cc     Drains: none     Specimens: none     Complications: none     Condition: stable to PACU     Brief History: Longstanding hip pain, unresponsive to conservative treatment. The risks, benefits, and alternatives to total hip replacement were thoroughly explained.  The patient understood no guarantees could be given about the outcome of the procedure and wished to proceed. Description of Procedure: After being identified in the preoperative holding area and having their operative site marked, the patient was brought back to the operating room where they underwent anesthesia to good effect. They were  placed in the supine position with a bump under the hip. The operative extremity was then prepped and draped in the usual fashion using sterile technique. Preoperative antibiotics had been administered. An appropriate time-out was performed. We began by placing the pelvic tracker with two percutaneous Shanz pins into the ipsilateral ilium. The pelvis was then registered with the navigation system. An incision was made 2 fingerbreadths lateral and distal to the ASIS. The skin flaps were developed down to the tensor fascia. This was divided sharply. Blunt dissection was taken medially over the tensor muscle. Retractors were placed superior and inferior to the femoral neck. The anterior fat pad was debrided. Circumflex vessels were identified and cauterized. A provisional neck cut was performed. The head was removed from the acetabulum. We then excised the labrum. We sequentially reamed for the acetabular shell. This was placed in the appropriate abduction and version. Position was confirmed by navigation and fluoroscopy . The liner was then impacted into the locking mechanism. We then turned our attention to the femur. Elevators were used to gain access to the proximal femur. Soft tissue releases were performed as necessary. The lateralizer was utilized. We then sequentially broached up to the final size trial. We placed a trial neck and head and had excellent restoration of leg length and offset with good soft tissue balance. We selected these as our final implants. Final components were inserted and the hip was reduced after thorough lavage. Restoration of appropriate leg length was confirmed by navigation and fluoroscopy. We again irrigated.  The tensor fascia was closed. Periarticular injection was performed. Skin closure was performed in layers. A sterile dressing was applied. The patient was awakened, moved to the stretcher, and taken to the recovery room in stable condition. At the conclusion of the procedure, all counts were correct. There were no immediate complications. Additional Procedure:   Date: 11/22/2021    Preoperative diagnosis: RIGHT HIP OSTEOARTHRITIS    Postoperative diagnosis: RIGHT HIP OSTEOARTHRITIS    Procedure performed: Procedure(s):  RIGHT TOTAL HIP ARTHROPLASTY ANTERIOR APPROACH WITH NAVIGATION    Anesthesia: General    Surgeon(s) and Role:     Castillo Littlejohn MD - Primary      This describes the use of intraoperative fluoroscopy. Fluoroscopic imaging was utilized in orthogonal planes as well as using live technique for all phases of the procedure, described separately in the operative report, including hardware placement.     John Barrett MD

## 2021-11-23 VITALS
BODY MASS INDEX: 29.3 KG/M2 | DIASTOLIC BLOOD PRESSURE: 53 MMHG | HEIGHT: 60 IN | OXYGEN SATURATION: 98 % | WEIGHT: 149.25 LBS | HEART RATE: 87 BPM | TEMPERATURE: 97.6 F | SYSTOLIC BLOOD PRESSURE: 135 MMHG | RESPIRATION RATE: 18 BRPM

## 2021-11-23 LAB
ANION GAP SERPL CALC-SCNC: 7 MMOL/L (ref 5–15)
BUN SERPL-MCNC: 15 MG/DL (ref 6–20)
BUN/CREAT SERPL: 15 (ref 12–20)
CALCIUM SERPL-MCNC: 8.6 MG/DL (ref 8.5–10.1)
CHLORIDE SERPL-SCNC: 104 MMOL/L (ref 97–108)
CO2 SERPL-SCNC: 20 MMOL/L (ref 21–32)
CREAT SERPL-MCNC: 1.01 MG/DL (ref 0.55–1.02)
GLUCOSE BLD STRIP.AUTO-MCNC: 232 MG/DL (ref 65–117)
GLUCOSE BLD STRIP.AUTO-MCNC: 297 MG/DL (ref 65–117)
GLUCOSE SERPL-MCNC: 348 MG/DL (ref 65–100)
HGB BLD-MCNC: 9.3 G/DL (ref 11.5–16)
POTASSIUM SERPL-SCNC: 4.5 MMOL/L (ref 3.5–5.1)
SERVICE CMNT-IMP: ABNORMAL
SERVICE CMNT-IMP: ABNORMAL
SODIUM SERPL-SCNC: 131 MMOL/L (ref 136–145)

## 2021-11-23 PROCEDURE — 36415 COLL VENOUS BLD VENIPUNCTURE: CPT

## 2021-11-23 PROCEDURE — 74011250636 HC RX REV CODE- 250/636: Performed by: PHYSICIAN ASSISTANT

## 2021-11-23 PROCEDURE — 97530 THERAPEUTIC ACTIVITIES: CPT

## 2021-11-23 PROCEDURE — 51798 US URINE CAPACITY MEASURE: CPT

## 2021-11-23 PROCEDURE — 74011250637 HC RX REV CODE- 250/637: Performed by: NURSE PRACTITIONER

## 2021-11-23 PROCEDURE — 80048 BASIC METABOLIC PNL TOTAL CA: CPT

## 2021-11-23 PROCEDURE — 85018 HEMOGLOBIN: CPT

## 2021-11-23 PROCEDURE — 74011250637 HC RX REV CODE- 250/637: Performed by: PHYSICIAN ASSISTANT

## 2021-11-23 PROCEDURE — 74011250637 HC RX REV CODE- 250/637: Performed by: ORTHOPAEDIC SURGERY

## 2021-11-23 PROCEDURE — 74011636637 HC RX REV CODE- 636/637: Performed by: NURSE PRACTITIONER

## 2021-11-23 PROCEDURE — 97535 SELF CARE MNGMENT TRAINING: CPT

## 2021-11-23 PROCEDURE — 97116 GAIT TRAINING THERAPY: CPT

## 2021-11-23 PROCEDURE — 97110 THERAPEUTIC EXERCISES: CPT

## 2021-11-23 PROCEDURE — 94760 N-INVAS EAR/PLS OXIMETRY 1: CPT

## 2021-11-23 PROCEDURE — 82962 GLUCOSE BLOOD TEST: CPT

## 2021-11-23 PROCEDURE — 74011000250 HC RX REV CODE- 250: Performed by: PHYSICIAN ASSISTANT

## 2021-11-23 RX ORDER — MAGNESIUM SULFATE 100 %
4 CRYSTALS MISCELLANEOUS AS NEEDED
Status: DISCONTINUED | OUTPATIENT
Start: 2021-11-23 | End: 2021-11-23 | Stop reason: HOSPADM

## 2021-11-23 RX ORDER — DEXTROSE 50 % IN WATER (D50W) INTRAVENOUS SYRINGE
12.5-25 AS NEEDED
Status: DISCONTINUED | OUTPATIENT
Start: 2021-11-23 | End: 2021-11-23 | Stop reason: HOSPADM

## 2021-11-23 RX ORDER — INSULIN LISPRO 100 [IU]/ML
INJECTION, SOLUTION INTRAVENOUS; SUBCUTANEOUS
Status: DISCONTINUED | OUTPATIENT
Start: 2021-11-23 | End: 2021-11-23 | Stop reason: HOSPADM

## 2021-11-23 RX ADMIN — DEXAMETHASONE SODIUM PHOSPHATE 10 MG: 4 INJECTION INTRA-ARTICULAR; INTRALESIONAL; INTRAMUSCULAR; INTRAVENOUS; SOFT TISSUE at 09:10

## 2021-11-23 RX ADMIN — TRAMADOL HYDROCHLORIDE 50 MG: 50 TABLET, COATED ORAL at 14:02

## 2021-11-23 RX ADMIN — INSULIN LISPRO 5 UNITS: 100 INJECTION, SOLUTION INTRAVENOUS; SUBCUTANEOUS at 09:09

## 2021-11-23 RX ADMIN — DOCUSATE SODIUM 50MG AND SENNOSIDES 8.6MG 1 TABLET: 8.6; 5 TABLET, FILM COATED ORAL at 09:09

## 2021-11-23 RX ADMIN — KETOROLAC TROMETHAMINE 15 MG: 30 INJECTION, SOLUTION INTRAMUSCULAR; INTRAVENOUS at 05:37

## 2021-11-23 RX ADMIN — ASPIRIN 81 MG: 81 TABLET, COATED ORAL at 09:09

## 2021-11-23 RX ADMIN — KETOROLAC TROMETHAMINE 15 MG: 30 INJECTION, SOLUTION INTRAMUSCULAR; INTRAVENOUS at 12:36

## 2021-11-23 RX ADMIN — FAMOTIDINE 20 MG: 20 TABLET, FILM COATED ORAL at 09:09

## 2021-11-23 RX ADMIN — Medication 10 ML: at 12:38

## 2021-11-23 RX ADMIN — SODIUM CHLORIDE 125 ML/HR: 9 INJECTION, SOLUTION INTRAVENOUS at 04:54

## 2021-11-23 RX ADMIN — INSULIN LISPRO 3 UNITS: 100 INJECTION, SOLUTION INTRAVENOUS; SUBCUTANEOUS at 12:36

## 2021-11-23 RX ADMIN — ACETAMINOPHEN 650 MG: 325 TABLET ORAL at 05:38

## 2021-11-23 RX ADMIN — WATER 2 G: 1 INJECTION INTRAMUSCULAR; INTRAVENOUS; SUBCUTANEOUS at 01:43

## 2021-11-23 RX ADMIN — Medication 1 AMPULE: at 09:15

## 2021-11-23 RX ADMIN — Medication 10 ML: at 05:38

## 2021-11-23 RX ADMIN — ACETAMINOPHEN 650 MG: 325 TABLET ORAL at 12:36

## 2021-11-23 NOTE — DISCHARGE SUMMARY
Ortho Discharge Summary    Patient ID:  Francis Mcconnell  [id number]  female  68 y.o.  1945    Admit date: 11/22/2021    Discharge date: 11/23/2021    Admitting Physician: Srikanth Banerjee MD     Consulting Physician(s):   Treatment Team: Attending Provider: Winifred Gutierrez MD; Care Manager: Warren Daily; Utilization Review: Jp Yao RN    Date of Surgery:   11/22/2021     Preoperative Diagnosis:  RIGHT HIP OSTEOARTHRITIS    Postoperative Diagnosis:   RIGHT HIP OSTEOARTHRITIS    Procedure(s):   RIGHT TOTAL HIP ARTHROPLASTY ANTERIOR APPROACH WITH NAVIGATION     Anesthesia Type:   General     Surgeon: Winifred Gutierrez MD                            HPI:  Pt is a 68 y.o. female who has a history of RIGHT HIP OSTEOARTHRITIS  with pain and limitations of activities of daily living who presents at this time for a RIGHT TOTAL HIP ARTHROPLASTY [address] following the failure of conservative management. PMH:   Past Medical History:   Diagnosis Date    Anxiety     Arthritis     Breast [facility] ([facility]     RIGHT     CAD (coronary artery disease)     stenting cardiac 6/5/2015    Chronic pain     GERD (gastroesophageal reflux disease)     Hypertension     Hyponatremia     Squamous cell carcinoma in situ of skin of face     Squamous cell carcinoma of right female breast     Thromboembolus ([address]     left leg injury- DVT    UTI (urinary tract infection) 2020    Vertigo 5/11/2012       Body mass index is 29.15 kg/m². : A BMI > 30 is classified as obesity and > 40 is classified as morbid obesity. Medications upon admission :   Prior to Admission Medications   Prescriptions Last Dose Informant Patient Reported? Taking?   acetaminophen (TYLENOL EXTRA STRENGTH) 500 mg tablet 11/21/2021 at Unknown time  Yes Yes   Sig: Take  by mouth every six (6) hours as needed for Pain.    amLODIPine (NORVASC) 5 mg tablet 11/22/2021 at 0500  No Yes   Sig: Take 1 tablet by mouth once daily for blood pressure   aspirin delayed-release 81 mg tablet 11/15/2021 at Unknown time  Yes Yes   Si tablet   fluticasone propionate (Flonase Allergy Relief) 50 mcg/actuation nasal spray Not Taking at Unknown time  Yes No   Si spray in each nostril   Patient not taking: Reported on 11/15/2021   gabapentin (NEURONTIN) 100 mg capsule Not Taking at Unknown time  No No   Sig: Take 1 Capsule by mouth three (3) times daily. Max Daily Amount: 300 mg. Patient not taking: Reported on 11/15/2021   losartan (COZAAR) 50 mg tablet 2021 at 0400  No Yes   Sig: Take 1 Tablet by mouth daily. lovastatin (MEVACOR) 40 mg tablet 2021 at Unknown time  No Yes   Sig: Take 1 Tab by mouth nightly. Refills per PCP who is performing labs   metoprolol tartrate (LOPRESSOR) 25 mg tablet 2021 at 0400  No Yes   Sig: Take 1 tablet by mouth twice daily   tamoxifen (NOLVADEX) 20 mg tablet Not Taking at Unknown time  Yes No   Sig: TAKE 1 TABLET BY MOUTH ONCE DAILY   Patient not taking: Reported on 11/15/2021   vit A/vit C/vit E/zinc/copper (ICAPS AREDS PO) 11/15/2021 at Unknown time  Yes Yes   Sig: Take  by mouth two (2) times a day. Facility-Administered Medications: None        Allergies: Allergies   Allergen Reactions    Eryc [Erythromycin] Hives    Influenza Virus Vaccine [facility] - (18+) Cell Aguilar Other (comments)     \"felt like I was on fire\"    Pcn [Penicillins] Hives     Has had both as an adult and child- -     Bactrim [Sulfamethoprim] Other (comments)    Crestor [Rosuvastatin] Myalgia    Hydrochlorothiazide Other (comments)     hyponatremia    Keflex [Cephalexin] Other (comments)     Aching muscles    Lipitor [Atorvastatin] Myalgia    Proton Pump Inhibitors Other (comments)     dizziness        Hospital Course: The patient underwent surgery. Complications:  None; patient tolerated the procedure well. Was taken to the PACU in stable condition and then transferred to the ortho floor.       Perioperative Antibiotics:  Ancef     Postoperative Pain Management:  Tramadol & Tylenol     DVT Prophylaxis: Aspirin 81 BID    Postoperative transfusions:    Number of units banked PRBCs =   none     Post Op complications: none    Hemoglobin at discharge:    Lab Results   Component Value Date/Time    HGB 9.3 (L) 11/23/2021 01:40 AM    INR 1.0 11/15/2021 10:25 AM       Dressing remained clean, dry and intact. No significant erythema or swelling. Wound appears to be healing without any evidence of infection. Neurovascular exam found to be within normal limits. Physical Therapy started following surgery and participated in bed mobility, transfers and ambulation. Gait:  Gait  Base of Support: Widened  Speed/Brandi: Slow  Gait Abnormalities: Antalgic, Decreased step clearance  Ambulation - Level of Assistance: Minimal assistance  Distance (ft): 5 Feet (ft) (lateral side-steps toward head of bed)  Assistive Device: Gait belt, Walker, rolling  Interventions: Safety awareness training, Tactile cues, Verbal cues            Interventions: Safety awareness training, Tactile cues, Verbal cues      Discharged to: Home with  for 1 week than OP therapy. Condition on Discharge:   stable    Discharge instructions:  - Anticoagulate with Aspirin 81 BID  - Take pain medications as prescribed  - Resume pre hospital diet      - Discharge activity: activity as tolerated  - Ambulate with assistive device as needed. - Weight bearing status - WBAT  - Wound Care Keep wound clean and dry. See discharge instruction sheet. -DISCHARGE MEDICATION LIST     Current Discharge Medication List      START taking these medications    Details   famotidine (PEPCID) 20 mg tablet Take 1 Tablet by mouth two (2) times a day for 30 days. Qty: 60 Tablet, Refills: 0  Start date: 11/22/2021, End date: 12/22/2021      traMADoL (ULTRAM) 50 mg tablet Take 1 Tablet by mouth every six (6) hours as needed for Pain for up to 7 days.  Max Daily Amount: 200 mg. Qty: 30 Tablet, Refills: 0  Start date: 11/22/2021, End date: 11/29/2021    Associated Diagnoses: S/P total right hip arthroplasty      senna-docusate (PERICOLACE) 8.6-50 mg per tablet Take 1 Tablet by mouth two (2) times a day for 7 days. Qty: 14 Tablet, Refills: 0  Start date: 11/22/2021, End date: 11/29/2021      polyethylene glycol (MIRALAX) 17 gram packet Take 1 Packet by mouth daily for 7 days. Qty: 7 Packet, Refills: 0  Start date: 11/23/2021, End date: 11/30/2021         CONTINUE these medications which have CHANGED    Details   aspirin delayed-release 81 mg tablet Take 1 Tablet by mouth two (2) times a day for 30 days. Qty: 60 Tablet, Refills: 0  Start date: 11/22/2021, End date: 12/22/2021         CONTINUE these medications which have NOT CHANGED    Details   metoprolol tartrate (LOPRESSOR) 25 mg tablet Take 1 tablet by mouth twice daily  Qty: 180 Tablet, Refills: 0      amLODIPine (NORVASC) 5 mg tablet Take 1 tablet by mouth once daily for blood pressure  Qty: 90 Tablet, Refills: 3      losartan (COZAAR) 50 mg tablet Take 1 Tablet by mouth daily. Qty: 90 Tablet, Refills: 2      vit A/vit C/vit E/zinc/copper (ICAPS AREDS PO) Take  by mouth two (2) times a day. acetaminophen (TYLENOL EXTRA STRENGTH) 500 mg tablet Take  by mouth every six (6) hours as needed for Pain.      lovastatin (MEVACOR) 40 mg tablet Take 1 Tab by mouth nightly. Refills per PCP who is performing labs  Qty: 90 Tab, Refills: 1      fluticasone propionate (Flonase Allergy Relief) 50 mcg/actuation nasal spray 1 spray in each nostril      tamoxifen (NOLVADEX) 20 mg tablet TAKE 1 TABLET BY MOUTH ONCE DAILY      gabapentin (NEURONTIN) 100 mg capsule Take 1 Capsule by mouth three (3) times daily. Max Daily Amount: 300 mg.   Qty: 30 Capsule, Refills: 0    Associated Diagnoses: S/P lumpectomy, right breast          per medical continuation form      -Follow up in office in 2 weeks      Signed:  Libby Dalton NP  Orthopaedic Nurse Practitioner    11/23/2021  11:50 AM

## 2021-11-23 NOTE — PROGRESS NOTES
End of Shift Note    Bedside shift change report given to janet NOLAN RN (oncoming nurse) by Soraya Yao RN (offgoing nurse). Report included the following information SBAR, Kardex, Intake/Output, MAR and Recent Results    Shift worked:  7p-7a     Shift summary and any significant changes:     pts had straight catheter tonight at 2:20am.     Concerns for physician to address:       Zone phone for oncoming shift:   [phone]       Activity:  Activity Level: Up with Assistance  Number times ambulated in hallways past shift: 0  Number of times OOB to chair past shift: 0    Cardiac:   Cardiac Monitoring: No      Cardiac Rhythm: Sinus Rhythm    Access:   Current line(s): PIV     Genitourinary:   Urinary status: straight cath    Respiratory:   O2 Device: None (Room air)  Chronic home O2 use?: NO  Incentive spirometer at bedside: YES     GI:     Current diet:  ADULT DIET Regular  Passing flatus: YES  Tolerating current diet: YES       Pain Management:   Patient states pain is manageable on current regimen: YES    Skin:  Wayne Score: 19  Interventions: float heels, increase time out of bed and PT/OT consult    Patient Safety:  Fall Score:  Total Score: 3  Interventions: assistive device (walker, cane, etc), gripper socks and pt to call before getting OOB  High Fall Risk: Yes    Length of Stay:  Expected LOS: - - -  Actual LOS: 1      Jonnathan Trejo RN

## 2021-11-23 NOTE — PROGRESS NOTES
Ortho / Neurosurgery NP Note    POD# 1  s/p RIGHT TOTAL HIP ARTHROPLASTY ANTERIOR APPROACH WITH NAVIGATION   Pt seen with no visitor present. Pt resting on EOB, NAD. No complaints. Reports minimal hip c/o more of chronic left shoulder pain. Pain controlled with tylenol alone  Eager to get walking today   Tolerating regular diet. No nausea. VSS Afebrile. Room air. Visit Vitals  BP (!) 135/58   Pulse 86   Temp 97.8 °F (36.6 °C)   Resp 18   Ht 5' (1.524 m)   Wt 67.7 kg (149 lb 4 oz)   SpO2 96%   BMI 29.15 kg/m²       Voiding status: straight cath x1 - PVR today   Unmeasurable Output  Urine Occurrence(s): 1 (voided in Pre Op) (11/22/21 3854)  Straight Cath  Straight Cath: Nurse performed cath; Sterile technique used (11/23/21 0220)  Number of Attempts: 1 (11/23/21 0220)  Catheter Size: 14 FR (11/23/21 0220)  Time Catheter Inserted: 5943 (11/23/21 0220)  Time Catheter Removed: 4955 (11/23/21 0220)  Urine: 500 mL (11/23/21 0220)      Labs    Lab Results   Component Value Date/Time    HGB 9.3 (L) 11/23/2021 01:40 AM      Lab Results   Component Value Date/Time    INR 1.0 11/15/2021 10:25 AM      Lab Results   Component Value Date/Time    Sodium 131 (L) 11/23/2021 01:40 AM    Potassium 4.5 11/23/2021 01:40 AM    Chloride 104 11/23/2021 01:40 AM    CO2 20 (L) 11/23/2021 01:40 AM    Glucose 348 (H) 11/23/2021 01:40 AM    BUN 15 11/23/2021 01:40 AM    Creatinine 1.01 11/23/2021 01:40 AM    Calcium 8.6 11/23/2021 01:40 AM     Recent Glucose Results:   Lab Results   Component Value Date/Time     (H) 11/23/2021 01:40 AM    GLUCPOC 297 (H) 11/23/2021 08:56 AM    GLUCPOC 228 (H) 11/22/2021 12:15 PM           Body mass index is 29.15 kg/m². : A BMI > 30 is classified as obesity and > 40 is classified as morbid obesity. Dressing c.d.i  Cryotherapy in place over incision  Calves soft and supple;  No pain with passive stretch  Sensation and motor intact - PF/DF/EHL intact 5/5  SCDs for mechanical DVT proph while in bed     PLAN:  1) PT BID, OT - WBAT. 2) Aspirin 81 mg PO BID for DVT Prophylaxis   3) GI Prophylaxis - Pepcid  4) Elevated glucose - 297-348. A1C 6.1 (11/15). SSI while inpatient. Has been drinking regular sodas and nondiabetic diet. Also reports glucose has been elevated after receiving 2 steroid injections in left shoulder recently. F/u with PCP OP. 5) Readniess for discharge:     [x] Vital Signs stable    [x] Hgb stable    [x] + Voiding    [x] Wound intact, drainage minimal    [x] Tolerating PO intake     [] Cleared by PT (OT if applicable)     [] Stair training completed (if applicable)    [] Independent / Contact Guard Assist (household distance)     [] Bed mobility     [] Car transfers     [] ADLs    [x] Adequate pain control on oral medication alone     Discharge pending therapy clearance. Plans to discharge to nephew's house for 1 week with [place], then return to her house however she does not want [place] at her home. Sister willing to provide transportation to OP therapy starting next week. Will provide OP Rx.      [place] NP

## 2021-11-23 NOTE — PROGRESS NOTES
Problem: Patient Education: Go to Patient Education Activity  Goal: Patient/Family Education  Outcome: Progressing Towards Goal     Problem: Patient Education: Go to Patient Education Activity  Goal: Patient/Family Education  Outcome: Progressing Towards Goal     Problem: Falls - Risk of  Goal: *Absence of Falls  Description: Document Earma Lachelle Fall Risk and appropriate interventions in the flowsheet.   Outcome: Progressing Towards Goal  Note: Fall Risk Interventions:                                Problem: Patient Education: Go to Patient Education Activity  Goal: Patient/Family Education  Outcome: Progressing Towards Goal     Problem: Pain  Goal: *Control of Pain  Outcome: Progressing Towards Goal     Problem: Patient Education: Go to Patient Education Activity  Goal: Patient/Family Education  Outcome: Progressing Towards Goal     Problem: Patient Education: Go to Patient Education Activity  Goal: Patient/Family Education  Outcome: Progressing Towards Goal     Problem: Hip Replacement: Day of Surgery/Unit  Goal: Off Pathway (Use only if patient is Off Pathway)  Outcome: Resolved/Met  Goal: Activity/Safety  Outcome: Resolved/Met  Goal: Consults, if ordered  Outcome: Resolved/Met  Goal: Diagnostic Test/Procedures  Outcome: Resolved/Met  Goal: Nutrition/Diet  Outcome: Resolved/Met  Goal: Medications  Outcome: Resolved/Met  Goal: Respiratory  Outcome: Resolved/Met  Goal: Treatments/Interventions/Procedures  Outcome: Resolved/Met  Goal: Psychosocial  Outcome: Resolved/Met  Goal: *Initiate mobility  Outcome: Resolved/Met  Goal: *Optimal pain control at patient's stated goal  Outcome: Resolved/Met  Goal: *Hemodynamically stable  Outcome: Resolved/Met

## 2021-11-23 NOTE — PROGRESS NOTES
Problem: Self Care Deficits Care Plan (Adult)  Goal: *Acute Goals and Plan of Care (Insert Text)  Description: FUNCTIONAL STATUS PRIOR TO ADMISSION: Patient was independent and active without use of DME. Patient was independent for basic and instrumental ADLs. HOME SUPPORT: Patient lived alone but has family nearby to assist if needed. Occupational Therapy Goals  Initiated 11/22/2021     1. Patient will perform lower body dressing using AE PRN at modified independence level within 7 days. 2. Patient will perform toilet transfers at modified independence level using rolling walker within 7 days. 3. Patient will perform all aspects of toileting at modified independence level within 7 days. 4. Patient will perform standing ADLs for 5 mins at modified independence within 7 days. Outcome: Progressing Towards Goal    OCCUPATIONAL THERAPY TREATMENT  Patient: Floresita Bustos (47 y.o. female)  Date: 11/23/2021  Diagnosis: S/P total right hip arthroplasty [Z96.641]   <principal problem not specified>  Procedure(s) (LRB):  RIGHT TOTAL HIP ARTHROPLASTY ANTERIOR APPROACH WITH NAVIGATION (Right) 1 Day Post-Op  Precautions: WBAT  Chart, occupational therapy assessment, plan of care, and goals were reviewed. ASSESSMENT  Patient continues with skilled OT services and is progressing towards goals. Pt rc'd supine in bed agreeable to complete hallway mobility, stairs, car transfer, and tub transfers. Pt able to complete tasks without fatigue or difficulty, except for attempting tub transfer. Pt attempted to swing RLE over tub into abduction - educated pt on importance of adhering to hip prec and waiting for HHOT/family assist to complete bathing. Once pt returned to room, pt completed adaptive dressing using reacher and sock aid with overall min A and limited carryover. Pt reports family will be available to assist at home, however pt adamantly declining [facility] [place] although pt would likely benefit.  Pt continues to demonstrate impulsivity throughout session however reports confidence in her ability to complete ADLs with decreased assist. Continue to recommend 24/7 assist from family and [address] if pt is agreeable. Current Level of Function Impacting Discharge (ADLs): up to min A     Other factors to consider for discharge: fall risk, R [place], impulsive          PLAN :  Patient continues to benefit from skilled intervention to address the above impairments. Continue treatment per established plan of care to address goals. Recommend with staff: OOB for all meals, assist x1 w/ RW     Recommend next OT session: hip kit practice    Recommendation for discharge: (in order for the patient to meet his/her long term goals)  Occupational therapy at least 2 days/week in the home however pt reports she does not want therapy in the home. Reports she plans to got o OP PT \"if she thinks she needs it\"    This discharge recommendation:  Has been made in collaboration with the attending provider and/or case management    IF patient discharges home will need the following DME: patient owns DME required for discharge       SUBJECTIVE:   Patient stated Yael Tam got family that will help me.     OBJECTIVE DATA SUMMARY:   Cognitive/Behavioral Status:  Neurologic State: Alert  Orientation Level: Oriented X4  Cognition: Appropriate safety awareness; Follows commands             Functional Mobility and Transfers for ADLs:  Bed Mobility:       Transfers:     Functional Transfers  Shower Transfer: Other (comment) (pt attempting )       Balance:       ADL Intervention:                           Lower Body Dressing Assistance  Underpants: Supervision  Socks: Minimum assistance            Patient recalled and demonstrated avoiding extreme planes of movement with Right LE during ADLs and functional mobility with verbal cues.       Bathing: Patient instructed when bathing to not submerge wound in water, stand to shower or sponge bathe, cover wound with plastic and tape to ensure no water reaches bandage/wound without cues. Patient indicated understanding. Dressing joint: Patient instructed and demonstrated to don/doff Right LE first/last verbal cues. Patient instructed and demonstrated to don all clothing while sitting prior to standing, doff all clothing to knees while standing, then sit to doff clothing off from knees to feet in order to facilitate fall prevention, pain management, and energy conservation with Minimum assistance. Home safety: Patient instructed on home modifications and safety (raise height of ADL objects, appropriate height of chair surfaces, recliner safety, change of floor surfaces, clear pathways) to increase independence and fall prevention. Patient indicated understanding. Standing: Patient instructed and demonstrated to walk up to sink/counter top/surfaces, step into walker to increase safety of joint and fall prevention with Contact guard assistance. Patient educated about hip anatomy verbally and with pictures and educated to avoid internal rotation of Right LE. Instructed to apply concept to ADLs within the home (no reaching across body to Right side, square off while using objects, slide objects along surfaces). Patient instructed to increase amount of time standing, observe standing position during ADLs in order to increase even weight bearing through bilateral LEs in order to increase independence with ADLs. Goal to be reached 30 days post - op, per orthopedic surgeon or per PT. Patient indicated understanding. Tub transfer: Patient instructed regarding when it is safe to begin transfer into tub (complete stairs with PT, advance exercises with PT high enough to clear tub height). Patient instructed to use the same technique as used with stairs when entering and exiting tub (\"up with the non-surgical, down with the surgical leg\"). Patient indicated understanding.       Pain:  Pt did not endorse pain during session     Activity Tolerance:   Good    After treatment patient left in no apparent distress:   Sitting in chair, Call bell within reach, and Bed / chair alarm activated    COMMUNICATION/COLLABORATION:   The patients plan of care was discussed with: Physical therapist, Occupational therapist, and Registered nurse.      Raymundo Ricci OT  Time Calculation: 48 mins

## 2021-11-23 NOTE — PROGRESS NOTES
Problem: Mobility Impaired (Adult and Pediatric)  Goal: *Acute Goals and Plan of Care (Insert Text)  Description: FUNCTIONAL STATUS PRIOR TO ADMISSION: Patient was independent and active without use of DME per chart and pt report. Pt drives. HOME SUPPORT PRIOR TO ADMISSION: Pt lived alone with family close-by as support system. Physical Therapy Goals  Initiated 11/22/2021    1. Patient will move from supine to sit and sit to supine , scoot up and down, and roll side to side in bed with supervision/set-up within 4 days. 2. Patient will perform sit to stand with supervision/set-up within 4 days. 3. Patient will ambulate with supervision/set-up for 100 feet with the least restrictive device within 4 days. 4. Patient will ascend/descend 4 stairs with handrail(s) with minimal assistance/contact guard assist within 4 days. 5. Patient will verbalize and demonstrate understanding of anterior hip precautions per protocol within 4 days. 6. Patient will perform hip home exercise program per protocol with modified independence within 4 days. Outcome: Progressing Towards Goal   PHYSICAL THERAPY TREATMENT  Patient: Eula Robb (59 y.o. female)  Date: 11/23/2021  Diagnosis: S/P total right hip arthroplasty [Z96.641]   <principal problem not specified>  Procedure(s) (LRB):  RIGHT TOTAL HIP ARTHROPLASTY ANTERIOR APPROACH WITH NAVIGATION (Right) 1 Day Post-Op  Precautions: WBAT  Chart, physical therapy assessment, plan of care and goals were reviewed. ASSESSMENT  Patient continues with skilled PT services and is progressing towards goals. Pt presents with decreased strength and endurance. Pts BP stable with activity. Pt performed supine exercises. Pt performed bed mobility at SBA/Supervision. Pt performed sit to stand transfer at supervision. Pt ambulated 225ft x2 with RW at CGA/SBA. Pt requiring cueing to stay within the walker. Pt able to improve with time.  Pt ascended/descended 4 steps with both railings at CGA with cueing for sequencing. Pt performed a car transfer at [place] [id number] with cueing for sequencing. Pt moving well but requiring cueing to continue using the walker for safety. Pt reported the walker was getting in the way. From physical therapy stand point pt cleared for discharge. Current Level of Function Impacting Discharge (mobility/balance): mobility at CGA/Supervision     Other factors to consider for discharge: stiffness, decreased LE strength          PLAN :  Patient continues to benefit from skilled intervention to address the above impairments. Continue treatment per established plan of care. to address goals. Recommendation for discharge: (in order for the patient to meet his/her long term goals)  Physical therapy at least 2 days/week in the home although pt has declined    This discharge recommendation:  Has been made in collaboration with the attending provider and/or case management    IF patient discharges home will need the following DME: rolling walker       SUBJECTIVE:   Patient stated  My legs feel juicy.     OBJECTIVE DATA SUMMARY:   Critical Behavior:  Neurologic State: Alert  Orientation Level: Oriented X4  Cognition: Appropriate safety awareness, Follows commands  Safety/Judgement: Awareness of environment, Decreased insight into deficits  Functional Mobility Training:  Bed Mobility:     Supine to Sit: Stand-by assistance     Scooting: Stand-by assistance; Supervision        Transfers:  Sit to Stand: Supervision  Stand to Sit: Stand-by assistance                             Balance:  Sitting: Intact  Standing: Impaired  Standing - Static: Good; Constant support  Standing - Dynamic : Fair; Good; Constant support  Ambulation/Gait Training:  Distance (ft): 450 Feet (ft)  Assistive Device: Gait belt; Walker, rolling  Ambulation - Level of Assistance: Contact guard assistance; Stand-by assistance        Gait Abnormalities: Decreased step clearance;  Antalgic        Base of Support: Widened Speed/Brandi: Pace decreased (<100 feet/min)         Stairs:  Number of Stairs Trained: 4  Stairs - Level of Assistance: Contact guard assistance   Rail Use: Both    Therapeutic Exercises:   SUPINE  EXERCISES   Sets   Reps   Active Active Assist   Passive Self ROM   Comments   Ankle Pumps 1 10 [x]                                        []                                        []                                        []                                           Quad Sets 1 5 [x]                                        []                                        []                                        []                                           Heel Slides 1 10 [x]                                        []                                        []                                        []                                           Hip Abduction 1 10 [x]                                        []                                        []                                        []                                           Glut Sets 1 5 [x]                                        []                                        []                                        []                                              []                                        []                                        []                                        []                                              []                                        []                                        []                                        []                                               Pain Rating:  Pt reported increased pain but tolerable      Activity Tolerance:   WNL, Good, and requires rest breaks      After treatment patient left in no apparent distress:   Sitting in chair, Call bell within reach, and Bed / chair alarm activated    COMMUNICATION/COLLABORATION:   The patients plan of care was discussed with: Occupational therapist and Registered nurse.     Hannah Arteaga, PTA   Time Calculation: 38 mins

## 2022-03-18 ENCOUNTER — OFFICE VISIT (OUTPATIENT)
Dept: SURGERY | Age: 77
End: 2022-03-18
Payer: MEDICARE

## 2022-03-18 VITALS — WEIGHT: 149 LBS | HEIGHT: 60 IN | BODY MASS INDEX: 29.25 KG/M2

## 2022-03-18 DIAGNOSIS — Z98.890 S/P LUMPECTOMY, RIGHT BREAST: Primary | ICD-10-CM

## 2022-03-18 PROBLEM — Z96.641 S/P TOTAL RIGHT HIP ARTHROPLASTY: Status: ACTIVE | Noted: 2021-11-22

## 2022-03-18 PROCEDURE — 99213 OFFICE O/P EST LOW 20 MIN: CPT | Performed by: SURGERY

## 2022-03-18 PROCEDURE — G8536 NO DOC ELDER MAL SCRN: HCPCS | Performed by: SURGERY

## 2022-03-18 PROCEDURE — 1101F PT FALLS ASSESS-DOCD LE1/YR: CPT | Performed by: SURGERY

## 2022-03-18 PROCEDURE — G8756 NO BP MEASURE DOC: HCPCS | Performed by: SURGERY

## 2022-03-18 PROCEDURE — G8419 CALC BMI OUT NRM PARAM NOF/U: HCPCS | Performed by: SURGERY

## 2022-03-18 PROCEDURE — 1090F PRES/ABSN URINE INCON ASSESS: CPT | Performed by: SURGERY

## 2022-03-18 PROCEDURE — G8432 DEP SCR NOT DOC, RNG: HCPCS | Performed by: SURGERY

## 2022-03-18 PROCEDURE — G8400 PT W/DXA NO RESULTS DOC: HCPCS | Performed by: SURGERY

## 2022-03-18 PROCEDURE — G8427 DOCREV CUR MEDS BY ELIG CLIN: HCPCS | Performed by: SURGERY

## 2022-03-18 NOTE — PROGRESS NOTES
HISTORY OF PRESENT ILLNESS  James Chase is a 68 y.o. female. HPI ESTABLISHED Patient here for follow up RIGHT breast cancer. Denies pain or changes to the breast area. Breast cancer-  4/8/21 - 66 yo female with right breast T2 N0 IDC, GR2, [id number], PR99, Ki-67-3%, Her2-     5/25/21 - RIGHT breast lumpectomy and RIGHT SLNB. Spoke with patient 2/5 nodes +, Tumor 4.3 cm. Margins clear. Mammaprint - low risk luminal type A.     No family history of breast or ovarian cancer. Breast imaging-   Mammogram [facility] 3/1/22,     Review of Systems   All other systems reviewed and are negative. Physical Exam  Vitals and nursing note reviewed. Chest:   Breasts: Breasts are symmetrical.      Right: No inverted nipple, mass, nipple discharge, skin change or tenderness. Left: No inverted nipple, mass, nipple discharge, skin change or tenderness. Lymphadenopathy:      Cervical: No cervical adenopathy. ASSESSMENT and PLAN    ICD-10-CM ICD-9-CM    1. S/P lumpectomy, right breast  Z98.890 V45.89      - no evidence local recurrence  - f/u with np in 6 months   20 minutes was spent with patient on counseling and coordination of care. Otitis Media, Pediatric  Otitis media is redness, soreness, and inflammation of the middle ear. Otitis media may be caused by allergies or, most commonly, by infection. Often it occurs as a complication of the common cold.  Children younger than 7 years of age are more prone to otitis media. The size and position of the eustachian tubes are different in children of this age group. The eustachian tube drains fluid from the middle ear. The eustachian tubes of children younger than 7 years of age are shorter and are at a more horizontal angle than older children and adults. This angle makes it more difficult for fluid to drain. Therefore, sometimes fluid collects in the middle ear, making it easier for bacteria or viruses to build up and grow. Also, children at this age have not yet developed the same resistance to viruses and bacteria as older children and adults.  SIGNS AND SYMPTOMS  Symptoms of otitis media may include:  · Earache.  · Fever.  · Ringing in the ear.  · Headache.  · Leakage of fluid from the ear.  · Agitation and restlessness. Children may pull on the affected ear. Infants and toddlers may be irritable.  DIAGNOSIS  In order to diagnose otitis media, your child's ear will be examined with an otoscope. This is an instrument that allows your child's health care provider to see into the ear in order to examine the eardrum. The health care provider also will ask questions about your child's symptoms.  TREATMENT   Otitis media usually goes away on its own. Talk with your child's health care provider about which treatment options are right for your child. This decision will depend on your child's age, his or her symptoms, and whether the infection is in one ear (unilateral) or in both ears (bilateral). Treatment options may include:  · Waiting 48 hours to see if your child's symptoms get better.  · Medicines for pain relief.  · Antibiotic medicines, if the otitis media may be caused by a bacterial  infection.  If your child has many ear infections during a period of several months, his or her health care provider may recommend a minor surgery. This surgery involves inserting small tubes into your child's eardrums to help drain fluid and prevent infection.  HOME CARE INSTRUCTIONS   · If your child was prescribed an antibiotic medicine, have him or her finish it all even if he or she starts to feel better.  · Give medicines only as directed by your child's health care provider.  · Keep all follow-up visits as directed by your child's health care provider.  PREVENTION   To reduce your child's risk of otitis media:  · Keep your child's vaccinations up to date. Make sure your child receives all recommended vaccinations, including a pneumonia vaccine (pneumococcal conjugate PCV7) and a flu (influenza) vaccine.  · Exclusively breastfeed your child at least the first 6 months of his or her life, if this is possible for you.  · Avoid exposing your child to tobacco smoke.  SEEK MEDICAL CARE IF:  · Your child's hearing seems to be reduced.  · Your child has a fever.  · Your child's symptoms do not get better after 2-3 days.  SEEK IMMEDIATE MEDICAL CARE IF:   · Your child who is younger than 3 months has a fever of 100°F (38°C) or higher.  · Your child has a headache.  · Your child has neck pain or a stiff neck.  · Your child seems to have very little energy.  · Your child has excessive diarrhea or vomiting.  · Your child has tenderness on the bone behind the ear (mastoid bone).  · The muscles of your child's face seem to not move (paralysis).  MAKE SURE YOU:   · Understand these instructions.  · Will watch your child's condition.  · Will get help right away if your child is not doing well or gets worse.     This information is not intended to replace advice given to you by your health care provider. Make sure you discuss any questions you have with your health care provider.     Document Released: 09/27/2006 Document  Revised: 09/07/2016 Document Reviewed: 07/15/2014  Elsevier Interactive Patient Education ©2016 [employer]

## 2022-03-20 PROBLEM — E78.2 MIXED HYPERLIPIDEMIA: Status: ACTIVE | Noted: 2017-06-23

## 2022-06-16 ENCOUNTER — ANESTHESIA EVENT (OUTPATIENT)
Dept: ENDOSCOPY | Age: 77
End: 2022-06-16
Payer: MEDICARE

## 2022-06-16 ENCOUNTER — HOSPITAL ENCOUNTER (OUTPATIENT)
Age: 77
Setting detail: OUTPATIENT SURGERY
Discharge: HOME | End: 2022-06-16
Attending: SPECIALIST | Admitting: SPECIALIST
Payer: MEDICARE

## 2022-06-16 ENCOUNTER — ANESTHESIA (OUTPATIENT)
Dept: ENDOSCOPY | Age: 77
End: 2022-06-16
Payer: MEDICARE

## 2022-06-16 VITALS
HEIGHT: 60 IN | SYSTOLIC BLOOD PRESSURE: 112 MMHG | HEART RATE: 72 BPM | RESPIRATION RATE: 16 BRPM | WEIGHT: 145.7 LBS | TEMPERATURE: 97.7 F | OXYGEN SATURATION: 97 % | DIASTOLIC BLOOD PRESSURE: 45 MMHG | BODY MASS INDEX: 28.61 KG/M2

## 2022-06-16 LAB
GLUCOSE BLD STRIP.AUTO-MCNC: 119 MG/DL (ref 65–117)
H PYLORI FROM TISSUE: NEGATIVE
KIT LOT NO., HCLOLOT: NORMAL
NEGATIVE CONTROL: NEGATIVE
POSITIVE CONTROL: POSITIVE
SERVICE CMNT-IMP: ABNORMAL

## 2022-06-16 PROCEDURE — 74011000250 HC RX REV CODE- 250: Performed by: NURSE ANESTHETIST, CERTIFIED REGISTERED

## 2022-06-16 PROCEDURE — 76060000032 HC ANESTHESIA 0.5 TO 1 HR: Performed by: SPECIALIST

## 2022-06-16 PROCEDURE — 82962 GLUCOSE BLOOD TEST: CPT

## 2022-06-16 PROCEDURE — 76040000007: Performed by: SPECIALIST

## 2022-06-16 PROCEDURE — 77030013992 HC SNR POLYP ENDOSC BSC -B: Performed by: SPECIALIST

## 2022-06-16 PROCEDURE — 77030003657 HC NDL SCLER BSC -B: Performed by: SPECIALIST

## 2022-06-16 PROCEDURE — 77030019988 HC FCPS ENDOSC DISP BSC -B: Performed by: SPECIALIST

## 2022-06-16 PROCEDURE — 2709999900 HC NON-CHARGEABLE SUPPLY: Performed by: SPECIALIST

## 2022-06-16 PROCEDURE — 87077 CULTURE AEROBIC IDENTIFY: CPT | Performed by: SPECIALIST

## 2022-06-16 PROCEDURE — 74011250636 HC RX REV CODE- 250/636: Performed by: NURSE ANESTHETIST, CERTIFIED REGISTERED

## 2022-06-16 PROCEDURE — 88305 TISSUE EXAM BY PATHOLOGIST: CPT

## 2022-06-16 PROCEDURE — 77030038665 HC SOL ELEVIEW INJ AGNT ARPH -B: Performed by: SPECIALIST

## 2022-06-16 PROCEDURE — 74011250636 HC RX REV CODE- 250/636: Performed by: SPECIALIST

## 2022-06-16 RX ORDER — SODIUM CHLORIDE 9 MG/ML
50 INJECTION, SOLUTION INTRAVENOUS CONTINUOUS
Status: DISCONTINUED | OUTPATIENT
Start: 2022-06-16 | End: 2022-06-16 | Stop reason: HOSPADM

## 2022-06-16 RX ORDER — SODIUM CHLORIDE 0.9 % (FLUSH) 0.9 %
5-40 SYRINGE (ML) INJECTION AS NEEDED
Status: DISCONTINUED | OUTPATIENT
Start: 2022-06-16 | End: 2022-06-16 | Stop reason: HOSPADM

## 2022-06-16 RX ORDER — PROPOFOL 10 MG/ML
INJECTION, EMULSION INTRAVENOUS AS NEEDED
Status: DISCONTINUED | OUTPATIENT
Start: 2022-06-16 | End: 2022-06-16 | Stop reason: HOSPADM

## 2022-06-16 RX ORDER — LIDOCAINE HYDROCHLORIDE 20 MG/ML
INJECTION, SOLUTION EPIDURAL; INFILTRATION; INTRACAUDAL; PERINEURAL AS NEEDED
Status: DISCONTINUED | OUTPATIENT
Start: 2022-06-16 | End: 2022-06-16 | Stop reason: HOSPADM

## 2022-06-16 RX ORDER — SODIUM CHLORIDE 0.9 % (FLUSH) 0.9 %
5-40 SYRINGE (ML) INJECTION EVERY 8 HOURS
Status: DISCONTINUED | OUTPATIENT
Start: 2022-06-16 | End: 2022-06-16 | Stop reason: HOSPADM

## 2022-06-16 RX ORDER — DEXTROMETHORPHAN/PSEUDOEPHED 2.5-7.5/.8
1.2 DROPS ORAL
Status: DISCONTINUED | OUTPATIENT
Start: 2022-06-16 | End: 2022-06-16 | Stop reason: HOSPADM

## 2022-06-16 RX ORDER — GLYCOPYRROLATE 0.2 MG/ML
INJECTION INTRAMUSCULAR; INTRAVENOUS AS NEEDED
Status: DISCONTINUED | OUTPATIENT
Start: 2022-06-16 | End: 2022-06-16 | Stop reason: HOSPADM

## 2022-06-16 RX ADMIN — PROPOFOL 100 MG: 10 INJECTION, EMULSION INTRAVENOUS at 11:43

## 2022-06-16 RX ADMIN — SODIUM CHLORIDE 50 ML/HR: 9 INJECTION, SOLUTION INTRAVENOUS at 11:27

## 2022-06-16 RX ADMIN — PROPOFOL 100 MG: 10 INJECTION, EMULSION INTRAVENOUS at 11:42

## 2022-06-16 RX ADMIN — GLYCOPYRROLATE 0.2 MG: 0.2 INJECTION, SOLUTION INTRAMUSCULAR; INTRAVENOUS at 11:35

## 2022-06-16 RX ADMIN — LIDOCAINE HYDROCHLORIDE 100 MG: 20 INJECTION, SOLUTION EPIDURAL; INFILTRATION; INTRACAUDAL; PERINEURAL at 11:35

## 2022-06-16 RX ADMIN — PROPOFOL 100 MG: 10 INJECTION, EMULSION INTRAVENOUS at 11:35

## 2022-06-16 NOTE — PROCEDURES
Esophagogastroduodenoscopy Procedure Note      Karn Boas  1945  [id number]    Indication:  Abdominal pain, epigastric     Endoscopist: Lucía Calvillo MD    Referring Provider:  Nicho Araiza MD    Sedation:  MAC anesthesia Propofol    Procedure Details:  After infomed consent was obtained for the procedure, with all risks and benefits of procedure explained the patient was taken to the endoscopy suite and placed in the left lateral decubitus position. Following sequential administration of sedation as per above, the endoscope was inserted into the mouth and advanced under direct vision to second portion of the duodenum. A careful inspection was made as the gastroscope was withdrawn, including a retroflexed view of the proximal stomach; findings and interventions are described below. Findings:     Esophagus:   - Normal mucosa throughout the esophagus. Z line normal at 39 cm. Stomach:   + Diffuse moderate erythema in the stomach c/w gastritis s/p Bxs for LATASHA test and Path. Pylorus patent, no erosions or ulcers seen. Duodenum:   - Erythema in the duodenum c/w duodenitis. Cold forceps biopsies to r/o celiac. Therapies:  See above    Specimen: Specimens were collected as described and send to the laboratory. Complications:   None were encountered during the procedure. EBL:  < 10 ml.           Recommendations:   -avoid NSAIDs  -acid suppression    Lucía Calvillo MD  6/16/2022  12:14 PM

## 2022-06-16 NOTE — PERIOP NOTES
Endoscopy Case End Note:    1210:  Procedure scope was pre-cleaned, per protocol, at bedside by MARY ANNE KRAMER      3704:  Report received from anesthesia - Yolanda CRNA. See anesthesia flowsheet for intra-procedure vital signs and events.

## 2022-06-16 NOTE — ROUTINE PROCESS
Bjorn Gloss  1945  [id number]    Situation:  Verbal report received from: Charlotte James RN  Procedure: Procedure(s):  COLONOSCOPY  ESOPHAGOGASTRODUODENOSCOPY (EGD)  ESOPHAGOGASTRODUODENAL (EGD) BIOPSY  ENDOSCOPIC POLYPECTOMY  INJECTION x1    Background:    Preoperative diagnosis: ANEMIA UNSPECIFIED, BREAST CANCER, EPIGASTRIC PAIN  Postoperative diagnosis: EGD- Gastritis. Colon- Diverticulosis, Polyp, and Internal Hemorrhoids    :  Dr. Evelyn Martínez  Assistant(s): Endoscopy Technician-1: Adalberto Pham  Endoscopy RN-1: Sailaja LYN RN    Specimens:   ID Type Source Tests Collected by Time Destination   1 : Duodenum Biopsies Preservative Duodenum  Fara Solis MD 6/16/2022 1139 Pathology   2 : Gastric Biopsies Preservative Gastric  Fara Solis MD 6/16/2022 1144 Pathology   3 : Ascending Colon Polyp Preservative Colon, Ascending  Fara Solis MD 6/16/2022 1205 Pathology   4 : Transverse Colon Polyp Preservative Colon, Transverse  Fara Solis MD 6/16/2022 1208 Pathology     H. Pylori  yes    Assessment:  Intra-procedure medications     Anesthesia gave intra-procedure sedation and medications, see anesthesia flow sheet yes    Intravenous fluids: NS@ KVO     Vital signs stable   yes    Abdominal assessment: round and soft   yes    Recommendation:  Discharge patient per MD order  yes.     Family or Rigoenskye Marten, sister  Permission to share finding with family or friend yes

## 2022-06-16 NOTE — DISCHARGE INSTRUCTIONS
Zenon Ortiz  [id number]  1945    COLON / EGD DISCHARGE INSTRUCTIONS  Discomfort:  Sore throat- throat lozenges or warm salt water gargle  Redness at IV site- apply warm compress to area; if redness or soreness persist- contact your physician  There may be a slight amount of blood passed from the rectum  Gaseous discomfort- walking, belching will help relieve any discomfort  You may not operate a vehicle for 12 hours  You may not engage in an occupation involving machinery or appliances for rest of today  You may not drink alcoholic beverages for at least 12 hours  Avoid making any critical decisions for at least 24 hour  DIET:   Regular diet. - however -  remember your colon is empty and a heavy meal will produce gas. Avoid these foods:  vegetables, fried / greasy foods, carbonated drinks for today     ACTIVITY:  You may not  resume your normal daily activities it is recommended that you spend the remainder of the day resting -  avoid any strenuous activity. CALL M.D. ANY SIGN OF:   Increasing pain, nausea, vomiting  Abdominal distension (swelling)  New increased bleeding (oral or rectal)  Fever (chills)  Pain in chest area  Bloody discharge from nose or mouth  Shortness of breath    Tylenol as needed for pain. Follow-up Instructions:   Call Dr. Santos Kumar for results of procedure / biopsy in 7 days at telephone #  [phone]  Additional instructions:  Repeat Colonoscopy in 1-2 years depending on results of polyp tissue removed today.                   Zenon Angel  [id number]  1945        DISCHARGE SUMMARY from Nurse    The following personal items collected during your admission are returned to you:   Dental Appliance: Dental Appliances: None  Vision: Visual Aid: None  Hearing Aid:    Jewelry:    Clothing:    Other Valuables:    Valuables sent to safe:      PATIENT INSTRUCTIONS:    Take Home Medications:  {Medication reconciliation information is now added to the patient's AVS automatically when it is printed. There is no need to use this SmartLink in discharge instructions.   Highlight this text and delete it to clear this message}

## 2022-06-16 NOTE — H&P
Gastroenterology Outpatient History and Physical    Patient: Adan Nunez    Physician: Nicholas Stafford MD    Vital Signs: Blood pressure (!) 100/47, pulse 70, temperature 98 °F (36.7 °C), resp. rate 17, height 5' (1.524 m), weight 66.1 kg (145 lb 11.2 oz), SpO2 98 %, not currently breastfeeding. Allergies:    Allergies   Allergen Reactions    Eryc [Erythromycin] Hives    Influenza Virus Vaccine [facility] 2012-13 (18+) Cell Aguilar Other (comments)     \"felt like I was on fire\"    Pcn [Penicillins] Hives     Has had both as an adult and child- -     Bactrim [Sulfamethoprim] Other (comments)    Crestor [Rosuvastatin] Myalgia    Hydrochlorothiazide Other (comments)     hyponatremia    Keflex [Cephalexin] Other (comments)     Aching muscles    Lipitor [Atorvastatin] Myalgia    Proton Pump Inhibitors Other (comments)     dizziness       Chief Complaint: Epigastric pain, Anemia    History of Present Illness: 69 yo F for EGD for Epigastric pain and anemia    Justification for Procedure: above    History:  Past Medical History:   Diagnosis Date    Anxiety     Arthritis     Breast cancer ([facility] [address]     RIGHT     CAD (coronary artery disease)     stenting cardiac 6/5/2015    Chronic pain     GERD (gastroesophageal reflux disease)     Hypertension     Hyponatremia     Squamous cell carcinoma in situ of skin of face     Squamous cell carcinoma of right female breast     Thromboembolus ([address]     left leg injury- DVT    UTI (urinary tract infection) 2020    Vertigo 5/11/2012      Past Surgical History:   Procedure Laterality Date    BX OF BREAST, NEEDLE CORE, IMAGE GUIDE Right 03/2021    HX ADENOIDECTOMY  1950    HX BREAST BIOPSY Left     Long time ago --neg    HX BREAST LUMPECTOMY Right 5/25/2021    RIGHT BREAST LUMPECTOMY WITH ULTRASOUND, RIGHT BREAST SENTINEL NODE BIOPSY performed by Malu Figueroa MD at [address] HX CATARACT REMOVAL Bilateral     HX CORONARY STENT PLACEMENT      HX GI COLONOSCOPY    HX HEART CATHETERIZATION      HX HEENT      SQUAMIUS CELL REMOVED FROM FACE     HX TONSILLECTOMY  1950    HX TUBAL LIGATION  1982    CO BREAST SURGERY PROCEDURE UNLISTED      left breast biopsy    CO CARDIAC SURG PROCEDURE UNLIST  2013    STENT  PLACEMENT       Social History     Socioeconomic History    Marital status: SINGLE   Tobacco Use    Smoking status: Current Some Day Smoker     Packs/day: 0.00     Years: 45.00     Pack years: 0.00     Types: Cigarettes    Smokeless tobacco: Never Used    Tobacco comment: \"no lately\"   Vaping Use    Vaping Use: Never used   Substance and Sexual Activity    Alcohol use: Not Currently    Drug use: Never    Sexual activity: Not Currently      Family History   Problem Relation Age of Onset    Heart Disease Mother     Diabetes Mother     Cancer Father         bladder    Coronary Art Dis Other         mother had stents in 63's or 66's    No Known Problems Sister     Anesth Problems Neg Hx        Medications:   Prior to Admission medications    Medication Sig Start Date End Date Taking? Authorizing Provider   BABY ASPIRIN PO Take 81 mg by mouth. Yes Provider, Historical   losartan (COZAAR) 50 mg tablet Take 1 tablet by mouth once daily 2/27/22  Yes Jennifer Jennings NP   metoprolol tartrate (LOPRESSOR) 25 mg tablet Take 1 tablet by mouth twice daily 2/27/22  Yes Jennifer Jennings NP   fluticasone propionate (Flonase Allergy Relief) 50 mcg/actuation nasal spray 1 spray in each nostril   Yes Provider, Historical   amLODIPine (NORVASC) 5 mg tablet Take 1 tablet by mouth once daily for blood pressure 7/9/21  Yes Krysten Andrews MD   acetaminophen (TYLENOL EXTRA STRENGTH) 500 mg tablet Take  by mouth every six (6) hours as needed for Pain. Yes Provider, Historical   lovastatin (MEVACOR) 40 mg tablet Take 1 Tab by mouth nightly.  Refills per PCP who is performing labs 4/2/19  Yes Markie Shaikh NP   gabapentin (NEURONTIN) 100 mg capsule Take 1 Capsule by mouth three (3) times daily. Max Daily Amount: 300 mg. Patient not taking: Reported on 11/15/2021 6/11/21   Sandoval Paula MD   vit A/vit C/vit E/zinc/copper (ICAPS AREDS PO) Take  by mouth two (2) times a day. Provider, Historical       Physical Exam:   General: alert, no distress   HEENT: Head: Normocephalic, no lesions, without obvious abnormality.    Heart: regular rate and rhythm, S1, S2 normal, no murmur, click, rub or gallop   Lungs: chest clear, no wheezing, rales, normal symmetric air entry   Abdominal: soft, NT/ND + BS   Neurological: Grossly normal   Extremities: extremities normal, atraumatic, no cyanosis or edema     Findings/Diagnosis: Epigastric pain, Anemia    Plan of Care/Planned Procedure: EGD and Colonoscopy

## 2022-06-16 NOTE — PROCEDURES
Colonoscopy Procedure Note    Indications:   Iron deficiency anemia    Referring Physician: Kirstin De Paz MD  Anesthesia/Sedation: MAC anesthesia Propofol  Endoscopist:  Dr. Navid Fleming    Procedure in Detail:  Informed consent was obtained for the procedure, including sedation. Risks of perforation, hemorrhage, adverse drug reaction, and aspiration were discussed. The patient was placed in the left lateral decubitus position. Based on the pre-procedure assessment, including review of the patient's medical history, medications, allergies, and review of systems, she had been deemed to be an appropriate candidate for moderate sedation; she was therefore sedated with the medications listed above. The patient was monitored continuously with ECG tracing, pulse oximetry, blood pressure monitoring, and direct observations. A rectal examination was performed. The [id number] was inserted into the rectum and advanced under direct vision to the cecum, which was identified by the ileocecal valve and appendiceal orifice. The quality of the colonic preparation was fair. A careful inspection was made as the colonoscope was withdrawn, including a retroflexed view of the rectum; findings and interventions are described below. Appropriate photodocumentation was obtained. Findings:   1. The scope traversed the colon to the cecum. Some resistance and tortuosity throughout with diffuse diverticulosis seen throughout. 2.  There was a large sessile polyp seen in the distal ascending colon about 2 cm in size. Removed in piecemeal fashion in 3-4 pieces with hot snare polypectomy. Appeared completely removed. We marked the site with radha ink 8 ml total.  3.  Sessile 6 mm polyp in the transverse colon s/p hot snare removal  4. Severe diverticulosis throughout the colon. 5.  Small internal hemorrhoids.     Therapies:  See above    Specimen:  Specimens were collected as described above and sent to pathology. Complications: None were encountered during the procedure. EBL: < 10 ml. Recommendations:   -Repeat Colonoscopy in 1-2 years depending on path results.     Signed By: Emily Sharif MD                        June 16, 2022

## 2022-06-16 NOTE — ANESTHESIA PREPROCEDURE EVALUATION
Relevant Problems   CARDIOVASCULAR   (+) CAD (coronary artery disease), native coronary artery   (+) Essential hypertension, benign   (+) SVT (supraventricular tachycardia) (HCC)       Anesthetic History   No history of anesthetic complications            Review of Systems / Medical History  Patient summary reviewed, nursing notes reviewed and pertinent labs reviewed    Pulmonary          Smoker         Neuro/Psych   Within defined limits           Cardiovascular    Hypertension: well controlled        Dysrhythmias : SVT  CAD    Exercise tolerance: >4 METS     GI/Hepatic/Renal     GERD: well controlled           Endo/Other        Arthritis     Other Findings   Comments: Hypertension  CAD (coronary artery disease)  UTI (urinary tract infection)  Arthritis           Physical Exam    Airway  Mallampati: II  TM Distance: 4 - 6 cm  Neck ROM: normal range of motion   Mouth opening: Normal     Cardiovascular  Regular rate and rhythm,  S1 and S2 normal,  no murmur, click, rub, or gallop  Rhythm: regular  Rate: normal         Dental  No notable dental hx       Pulmonary  Breath sounds clear to auscultation               Abdominal  GI exam deferred       Other Findings            Anesthetic Plan    ASA: 3  Anesthesia type: MAC          Induction: Intravenous  Anesthetic plan and risks discussed with: Patient

## 2022-06-16 NOTE — ANESTHESIA POSTPROCEDURE EVALUATION
Procedure(s):  COLONOSCOPY  ESOPHAGOGASTRODUODENOSCOPY (EGD)  ESOPHAGOGASTRODUODENAL (EGD) BIOPSY  ENDOSCOPIC POLYPECTOMY  INJECTION x1. MAC    Anesthesia Post Evaluation        Patient location during evaluation: PACU  Note status: Adequate. Level of consciousness: responsive to verbal stimuli and sleepy but conscious  Pain management: satisfactory to patient  Airway patency: patent  Anesthetic complications: no  Cardiovascular status: acceptable  Respiratory status: acceptable  Hydration status: acceptable  Comments: +Post-Anesthesia Evaluation and Assessment    Patient: Moe Rutledge MRN: [id number]  SSN: [id number]   YOB: 1945  Age: 68 y.o. Sex: female      Cardiovascular Function/Vital Signs    BP (!) 105/41   Pulse 72   Temp 36.5 °C (97.7 °F)   Resp 19   Ht 5' (1.524 m)   Wt 66.1 kg (145 lb 11.2 oz)   SpO2 97%   Breastfeeding No   BMI 28.46 kg/m²     Patient is status post Procedure(s):  COLONOSCOPY  ESOPHAGOGASTRODUODENOSCOPY (EGD)  ESOPHAGOGASTRODUODENAL (EGD) BIOPSY  ENDOSCOPIC POLYPECTOMY  INJECTION x1. Nausea/Vomiting: Controlled. Postoperative hydration reviewed and adequate. Pain:  Pain Scale 1: Numeric (0 - 10) (06/16/22 1222)  Pain Intensity 1: 4 (pain was present prior to procedure) (06/16/22 1222)   Managed. Neurological Status: At baseline. Mental Status and Level of Consciousness: Arousable. Pulmonary Status:   O2 Device: None (Room air) (06/16/22 1224)   Adequate oxygenation and airway patent. Complications related to anesthesia: None    Post-anesthesia assessment completed. No concerns. Signed By: Maddie Lemos MD    6/16/2022  Post anesthesia nausea and vomiting:  controlled      INITIAL Post-op Vital signs:   Vitals Value Taken Time   /41 06/16/22 1239   Temp 36.5 °C (97.7 °F) 06/16/22 1222   Pulse 73 06/16/22 1241   Resp 15 06/16/22 1241   SpO2 99 % 06/16/22 1241   Vitals shown include unvalidated device data.

## 2022-08-01 NOTE — TELEPHONE ENCOUNTER
Patient is calling because she needs a refill on her amlodipine medication. Pharmacy is requesting the medicine for the patient also. Patient only has 2 pills left.     [phone]

## 2022-08-02 RX ORDER — AMLODIPINE BESYLATE 5 MG/1
TABLET ORAL
Qty: 90 TABLET | Refills: 2 | Status: SHIPPED | OUTPATIENT
Start: 2022-08-02

## 2022-08-02 NOTE — TELEPHONE ENCOUNTER
Refill Request Received for the Following Medication     Requested Prescriptions     Pending Prescriptions Disp Refills    amLODIPine (NORVASC) 5 mg tablet [Pharmacy Med Name: amLODIPine Besylate 5 MG Oral Tablet] 90 Tablet 0     Sig: Take 1 tablet by mouth once daily for blood pressure       Last Prescribed:07-    Last Appointment With Me:  11-    Future Appointments:  Future Appointments   Date Time Provider Johanne Louis   9/21/2022 10:45 AM Riccardo Serrano MD [facility] BS AMB   11/7/2022 10:40 AM MD FE Kelly BS AMB

## 2022-09-21 ENCOUNTER — OFFICE VISIT (OUTPATIENT)
Dept: SURGERY | Age: 77
End: 2022-09-21
Payer: MEDICARE

## 2022-09-21 VITALS — HEIGHT: 60 IN | BODY MASS INDEX: 28.47 KG/M2 | WEIGHT: 145 LBS

## 2022-09-21 DIAGNOSIS — Z85.3 HISTORY OF RIGHT BREAST CANCER: Primary | ICD-10-CM

## 2022-09-21 PROCEDURE — G8536 NO DOC ELDER MAL SCRN: HCPCS | Performed by: SURGERY

## 2022-09-21 PROCEDURE — 99213 OFFICE O/P EST LOW 20 MIN: CPT | Performed by: SURGERY

## 2022-09-21 PROCEDURE — G8432 DEP SCR NOT DOC, RNG: HCPCS | Performed by: SURGERY

## 2022-09-21 PROCEDURE — G8427 DOCREV CUR MEDS BY ELIG CLIN: HCPCS | Performed by: SURGERY

## 2022-09-21 PROCEDURE — 1090F PRES/ABSN URINE INCON ASSESS: CPT | Performed by: SURGERY

## 2022-09-21 PROCEDURE — 1123F ACP DISCUSS/DSCN MKR DOCD: CPT | Performed by: SURGERY

## 2022-09-21 PROCEDURE — 1101F PT FALLS ASSESS-DOCD LE1/YR: CPT | Performed by: SURGERY

## 2022-09-21 PROCEDURE — G8756 NO BP MEASURE DOC: HCPCS | Performed by: SURGERY

## 2022-09-21 PROCEDURE — G8400 PT W/DXA NO RESULTS DOC: HCPCS | Performed by: SURGERY

## 2022-09-21 PROCEDURE — G8419 CALC BMI OUT NRM PARAM NOF/U: HCPCS | Performed by: SURGERY

## 2022-09-21 NOTE — PROGRESS NOTES
HISTORY OF PRESENT ILLNESS  Mary Jo Sosa is a 68 y.o. female. HPI ESTABLISHED Patient here for follow up RIGHT breast cancer. The RIGHT breast feels hard but denies pain or other changes to the area. Breast cancer-  4/8/21 - 75 yo female with right breast T2 N0 IDC, GR2, ER99, PR99, Ki-67-3%, Her2-     5/25/21 - RIGHT breast lumpectomy and RIGHT SLNB. Spoke with patient 2/5 nodes +, Tumor 4.3 cm. Margins clear. Mammaprint - low risk luminal type A. No family history of breast or ovarian cancer. Breast imaging-   Mammogram [facility] 3/1/22,     Review of Systems   All other systems reviewed and are negative. Physical Exam  Vitals and nursing note reviewed. Chest:   Breasts:     Right: No swelling, bleeding, inverted nipple, mass, nipple discharge, skin change or tenderness. Left: No swelling, bleeding, inverted nipple, mass, nipple discharge, skin change or tenderness. Lymphadenopathy:      Upper Body:      Right upper body: No axillary adenopathy. Left upper body: No axillary adenopathy. ASSESSMENT and PLAN    ICD-10-CM ICD-9-CM    1. History of right breast cancer  Z85.3 V10.3         - overdue mammograms will schedule  No evidence local recurrence  Rtc with np 6 months  20 minutes was spent with patient on counseling and coordination of care.

## 2022-11-10 RX ORDER — LOSARTAN POTASSIUM 50 MG/1
TABLET ORAL
Qty: 30 TABLET | Refills: 0 | Status: SHIPPED | OUTPATIENT
Start: 2022-11-10

## 2022-11-10 NOTE — TELEPHONE ENCOUNTER
PCP: Darron Crews MD    Last appt: 11/2021  Future Appointments   Date Time Provider Johanne Louis   12/6/2022  1:00 PM MD FE [place] BS AMB   3/21/2023 11:00 AM Hiwot Bermudez NP [facility] BS AMB       Requested Prescriptions     Signed Prescriptions Disp Refills    losartan (COZAAR) 50 mg tablet 30 Tablet 0     Sig: Take 1 tablet by mouth once daily     Authorizing Provider: Kenney Mesa     Ordering User: ARELY Jackson         Other Comments:  Verbal order per provider. Order (medication, dose, route, frequency, amount, refills) repeated and verified twice.

## 2022-11-21 RX ORDER — METOPROLOL TARTRATE 25 MG/1
TABLET, FILM COATED ORAL
Qty: 60 TABLET | Refills: 0 | Status: SHIPPED | OUTPATIENT
Start: 2022-11-21

## 2022-11-21 NOTE — TELEPHONE ENCOUNTER
PCP: Sj Mondragon MD    Last appt: 11/2021  Future Appointments   Date Time Provider Johanne Louis   12/6/2022  1:00 PM MD FE [place] AMB   3/21/2023 11:00 AM Joseph Borrego NP [facility] BS AMB       Requested Prescriptions     Pending Prescriptions Disp Refills    metoprolol tartrate (LOPRESSOR) 25 mg tablet [Pharmacy Med Name: Metoprolol Tartrate 25 MG Oral Tablet] 60 Tablet 0     Sig: Take 1 tablet by mouth twice daily         Other Comments:  Verbal order per provider. Order (medication, dose, route, frequency, amount, refills) repeated and verified twice.

## 2022-12-05 ENCOUNTER — DOCUMENTATION ONLY (OUTPATIENT)
Dept: SURGERY | Age: 77
End: 2022-12-05

## 2022-12-05 NOTE — PROGRESS NOTES
Received a request from [facility]-Dr. Fernanda Gerardo  office,  for medical records, for last ov notes.  Fax ov notesto [phone]

## 2022-12-06 ENCOUNTER — OFFICE VISIT (OUTPATIENT)
Dept: CARDIOLOGY CLINIC | Age: 77
End: 2022-12-06
Payer: MEDICARE

## 2022-12-06 VITALS
HEART RATE: 77 BPM | BODY MASS INDEX: 29.09 KG/M2 | RESPIRATION RATE: 18 BRPM | SYSTOLIC BLOOD PRESSURE: 140 MMHG | HEIGHT: 60 IN | DIASTOLIC BLOOD PRESSURE: 80 MMHG | OXYGEN SATURATION: 97 % | WEIGHT: 148.2 LBS

## 2022-12-06 DIAGNOSIS — I25.10 CORONARY ARTERY DISEASE INVOLVING NATIVE CORONARY ARTERY OF NATIVE HEART WITHOUT ANGINA PECTORIS: Primary | ICD-10-CM

## 2022-12-06 DIAGNOSIS — I10 ESSENTIAL HYPERTENSION, BENIGN: ICD-10-CM

## 2022-12-06 DIAGNOSIS — E78.2 MIXED HYPERLIPIDEMIA: ICD-10-CM

## 2022-12-06 DIAGNOSIS — R07.89 ATYPICAL CHEST PAIN: ICD-10-CM

## 2022-12-06 DIAGNOSIS — Z98.61 S/P PTCA (PERCUTANEOUS TRANSLUMINAL CORONARY ANGIOPLASTY): ICD-10-CM

## 2022-12-06 DIAGNOSIS — F17.200 TOBACCO USE DISORDER: ICD-10-CM

## 2022-12-06 PROCEDURE — 93005 ELECTROCARDIOGRAM TRACING: CPT | Performed by: INTERNAL MEDICINE

## 2022-12-06 PROCEDURE — G0463 HOSPITAL OUTPT CLINIC VISIT: HCPCS | Performed by: INTERNAL MEDICINE

## 2022-12-06 RX ORDER — METFORMIN HYDROCHLORIDE 500 MG/1
TABLET ORAL 2 TIMES DAILY WITH MEALS
COMMUNITY

## 2022-12-06 NOTE — LETTER
12/6/2022    Patient: Dalia Vazquez   YOB: 1945   Date of Visit: 12/6/2022     Emanuel Lopez MD  [address]  [address] [facility] [address]  Via Fax: [phone]    Dear Emanuel Lopez MD,      Thank you for referring Ms. Dalia Vazquez to [facility] for evaluation. My notes for this consultation are attached. If you have questions, please do not hesitate to call me. I look forward to following your patient along with you.       Sincerely,    Hemanth Cooper MD

## 2022-12-06 NOTE — PROGRESS NOTES
[address], [address] [place] [address]  [phone]    [address] [facility], [facility]     Subjective:      Yaa Holden is a 68 y.o. female is here for routine f/u. Pmhx CAD s/p stent, HTN, HLD, borderline DM, venous insufficiency and tobacco abuse. Last seen by us in 11/8/2021. She is sp right hip arthroplasty in  11/22/2021. Still smoking but down to 0.5 PPD. Reports intermittent episodes of tingling sensation on right arm,  most recent episode was last Saturday while she was sleeping and it awoken  Her from her sleep. She is concerned that this is because of her heart. The patient denies shortness of breath, orthopnea, PND, LE edema, palpitations, syncope, or presyncope.        Patient Active Problem List    Diagnosis Date Noted    S/P total right hip arthroplasty 11/22/2021    Mixed hyperlipidemia 06/23/2017    CAD (coronary artery disease), native coronary artery 06/04/2015    S/P PTCA (percutaneous transluminal coronary angioplasty) 06/04/2015    Family history of ischemic heart disease 02/15/2013    Essential hypertension, benign 02/15/2013    SVT (supraventricular tachycardia) ([facility] [address] 02/15/2013    Hyposmolality and/or hyponatremia 02/15/2013    Tobacco use disorder 02/15/2013    Vertigo 05/11/2012      Isaiah Joe MD  Past Medical History:   Diagnosis Date    Anxiety     Arthritis     Breast cancer ([facility] [address]     RIGHT     CAD (coronary artery disease)     stenting cardiac 6/5/2015    Chronic pain     GERD (gastroesophageal reflux disease)     Hypertension     Hyponatremia     Squamous cell carcinoma in situ of skin of face     Squamous cell carcinoma of right female breast     Thromboembolus ([address]     left leg injury- DVT    UTI (urinary tract infection) 2020    Vertigo 5/11/2012      Past Surgical History:   Procedure Laterality Date    BX OF BREAST, NEEDLE CORE, IMAGE GUIDE Right 03/2021    COLONOSCOPY N/A 6/16/2022    COLONOSCOPY performed by Dennie Helms MD REAL at [facility] ENDOSCOPY    HX ADENOIDECTOMY  1950    HX BREAST BIOPSY Left     Long time ago --neg    HX BREAST LUMPECTOMY Right 5/25/2021    RIGHT BREAST LUMPECTOMY WITH ULTRASOUND, RIGHT BREAST SENTINEL NODE BIOPSY performed by Forde Osgood, MD at [facility] AMBULATORY OR    HX CATARACT REMOVAL Bilateral     HX CORONARY STENT PLACEMENT      HX GI      COLONOSCOPY    HX HEART CATHETERIZATION      HX HEENT      [facility] CELL REMOVED FROM [address] [facility]     HX TONSILLECTOMY  1950    HX TUBAL LIGATION  1982    OK BREAST SURGERY PROCEDURE UNLISTED      left breast biopsy    OK CARDIAC SURG PROCEDURE UNLIST  2013    STENT  PLACEMENT      Allergies   Allergen Reactions    Eryc [Erythromycin] Hives    Influenza Virus Vaccine [facility] 2012-13 (18+) Cell Aguilar Other (comments)     \"felt like I was on fire\"    Pcn [Penicillins] Hives     Has had both as an adult and child- -     Bactrim [Sulfamethoprim] Other (comments)    Crestor [Rosuvastatin] Myalgia    Hydrochlorothiazide Other (comments)     hyponatremia    Keflex [Cephalexin] Other (comments)     Aching muscles    Lipitor [Atorvastatin] Myalgia    Proton Pump Inhibitors Other (comments)     dizziness      Family History   Problem Relation Age of Onset    Heart Disease Mother     Diabetes Mother     Cancer Father         bladder    Coronary Art Dis Other         mother had stents in 63's or 66's    No Known Problems Sister     Anesth Problems Neg Hx       Social History     Socioeconomic History    Marital status: SINGLE     Spouse name: Not on file    Number of children: Not on file    Years of education: Not on file    Highest education level: Not on file   Occupational History    Not on file   Tobacco Use    Smoking status: Some Days     Packs/day: 0.00     Years: 45.00     Pack years: 0.00     Types: Cigarettes    Smokeless tobacco: Never    Tobacco comments:     \"no lately\"   Vaping Use    Vaping Use: Never used   Substance and Sexual Activity    Alcohol use: Not Currently    Drug use: Never    Sexual activity: Not Currently   Other Topics Concern     Service Not Asked    Blood Transfusions Not Asked    Caffeine Concern Not Asked    Occupational Exposure Not Asked    Hobby Hazards Not Asked    Sleep Concern Not Asked    Stress Concern Not Asked    Weight Concern Not Asked    Special Diet Not Asked    Back Care Not Asked    Exercise Not Asked    Bike Helmet Not Asked    Seat Belt Not Asked    Self-Exams Not Asked   Social History Narrative    Not on file     Social Determinants of Health     Financial Resource Strain: Not on file   Food Insecurity: Not on file   Transportation Needs: Not on file   Physical Activity: Not on file   Stress: Not on file   Social Connections: Not on file   Intimate Partner Violence: Not on file   Housing Stability: Not on file      Current Outpatient Medications   Medication Sig    metFORMIN (GLUCOPHAGE) 500 mg tablet Take  by mouth two (2) times daily (with meals). metoprolol tartrate (LOPRESSOR) 25 mg tablet Take 1 tablet by mouth twice daily    losartan (COZAAR) 50 mg tablet Take 1 tablet by mouth once daily    amLODIPine (NORVASC) 5 mg tablet Take 1 tablet by mouth once daily for blood pressure    BABY ASPIRIN PO Take 81 mg by mouth. fluticasone propionate (FLONASE) 50 mcg/actuation nasal spray 1 spray in each nostril    gabapentin (NEURONTIN) 100 mg capsule Take 1 Capsule by mouth three (3) times daily. Max Daily Amount: 300 mg.    vit A/vit C/vit E/zinc/copper (ICAPS AREDS PO) Take  by mouth two (2) times a day. acetaminophen (TYLENOL) 500 mg tablet Take  by mouth every six (6) hours as needed for Pain.    lovastatin (MEVACOR) 40 mg tablet Take 1 Tab by mouth nightly. Refills per PCP who is performing labs     No current facility-administered medications for this visit.          Review of Symptoms:  11 systems reviewed, negative other than as stated in the HPI    Physical ExamPhysical Exam:    Vitals:    12/06/22 1257   BP: (!) 148/76   Pulse: 77 Resp: 18   SpO2: 97%   Weight: 148 lb 3.2 oz (67.2 kg)   Height: 5' (1.524 m)     Body mass index is 28.94 kg/m². General PE  Gen:  NAD  Mental Status - Alert. General Appearance - Not in acute distress. HEENT:  PERRL, no carotid bruits or JVD  Chest and Lung Exam   Inspection: Accessory muscles - No use of accessory muscles in breathing. Auscultation:   Breath sounds: - Normal.   Cardiovascular   Inspection: Jugular vein - Bilateral - Inspection Normal.   Palpation/Percussion:   Apical Impulse: - Normal.   Auscultation: Rhythm - Regular. Heart Sounds - S1 WNL and S2 WNL. No S3 or S4. Murmurs & Other Heart Sounds: Auscultation of the heart reveals - No Murmurs. Peripheral Vascular   Upper Extremity: Inspection - Bilateral - No Cyanotic nailbeds or Digital clubbing. Lower Extremity:   Palpation: Edema - Bilateral - No edema. Abdomen:   Soft, non-tender, bowel sounds are active.   Neuro: A&O times 3, CN and motor grossly WNL    Labs:   Lab Results   Component Value Date/Time    Cholesterol, total 128 10/23/2015 08:15 AM    Cholesterol, total 128 07/03/2014 07:58 AM    Cholesterol, Total 155 08/28/2013 09:46 AM    HDL Cholesterol 51 10/23/2015 08:15 AM    HDL Cholesterol 56 07/03/2014 07:58 AM    HDL Cholesterol 49 08/28/2013 09:46 AM    LDL Cholesterol 90 08/28/2013 09:46 AM    LDL, calculated 66 10/23/2015 08:15 AM    LDL, calculated 62 07/03/2014 07:58 AM    Triglyceride 54 10/23/2015 08:15 AM    Triglyceride 50 07/03/2014 07:58 AM    Triglycerides 64 08/28/2013 09:46 AM     Lab Results   Component Value Date/Time    CK 71 03/05/2019 07:51 AM     Lab Results   Component Value Date/Time    Sodium 131 (L) 11/23/2021 01:40 AM    Potassium 4.5 11/23/2021 01:40 AM    Chloride 104 11/23/2021 01:40 AM    CO2 20 (L) 11/23/2021 01:40 AM    Anion gap 7 11/23/2021 01:40 AM    Glucose 348 (H) 11/23/2021 01:40 AM    BUN 15 11/23/2021 01:40 AM    Creatinine 1.01 11/23/2021 01:40 AM    BUN/Creatinine ratio 15 11/23/2021 01:40 AM    GFR est AA >60 11/23/2021 01:40 AM    GFR est non-AA 53 (L) 11/23/2021 01:40 AM    Calcium 8.6 11/23/2021 01:40 AM    Bilirubin, total 0.9 11/15/2021 10:25 AM    Alk. phosphatase 53 11/15/2021 10:25 AM    Protein, total 7.3 11/15/2021 10:25 AM    Albumin 3.7 11/15/2021 10:25 AM    Globulin 3.6 11/15/2021 10:25 AM    A-G Ratio 1.0 (L) 11/15/2021 10:25 AM    ALT (SGPT) 27 11/15/2021 10:25 AM       EKG:  NSR     Assessment:        ICD-10-CM ICD-9-CM    1. Coronary artery disease involving native coronary artery of native heart without angina pectoris  I25.10 414.01 AMB POC EKG ROUTINE W/ 12 LEADS, INTER & REP      2. S/P PTCA (percutaneous transluminal coronary angioplasty)  Z98.61 V45.82       3. Essential hypertension, benign  I10 401.1       4. Tobacco use disorder  F17.200 305.1       5. Mixed hyperlipidemia  E78.2 272.2           Orders Placed This Encounter    AMB POC EKG ROUTINE W/ 12 LEADS, INTER & REP     Order Specific Question:   Reason for Exam:     Answer:   routine    metFORMIN (GLUCOPHAGE) 500 mg tablet     Sig: Take  by mouth two (2) times daily (with meals). Plan:     ASHD  S/p RAUL to mid LAD in 6/2015  Lexiscan 4/2019 without evidence of ischemia with apical apex infarct, which is known. Echo in 4/2019 with preserved LVEF 56-60% with grade 1 DD and mild-mod TR. Continue ASA, BB, CCB, statin  Reports intermittent episodes of tingling sensation on right arm,, but only when her arm is in a certain position like laying on her left side and draped in her right arm across her body-most recent episode was last Saturday while she was sleeping and it awoken  Her from her sleep. She is concerned that this is because of her heart. Advised if exertional or nonpositional chest or arm discomfort we should repeat a stress test.    HTN  Controlled with current therapy-Amlodipine, BB and Losartan  Stable kidney fxn Serum Cr 0.61 in 10/2021    HLD  10/2021 LDL 49 . On statin.  Labs and lipids per PCP  Received LDL from her primary nurse practitioner Oliver De La Rosa sigifredo dated October 26, 2022 LDL 69, HDL 45, triglycerides 61, total cholesterol 127       Venous Insufficiency  Right GSV reflux 2/2021  Followed by Dr Shanique Valenzuela     DM  On metformin    Right breast ca  S/p RIGHT BREAST LUMPECTOMY WITH ULTRASOUND on 5/2021  Followed by Dr Rona Bettencourt      Tobacco abuse  Trying to quit,  down to half a PPD    S/p right hip arthroplasty in 11/2021    Counseled on diet and exercise- eventual goal of 30-60 minutes 5-7 times a week as per AHA guidelines. Continue current care and f/u in 6 months, sooner as needed. Miya Valdivia NP    Patient seen and examined by me with the above nurse practitioner. I personally performed all components of the history, physical, and medical decision making and agree with the assessment and plan with minor modifications as noted. Today the patient presents with stable CAD, blood pressure and lipids at goal.    General PE  Gen:  NAD  Mental Status - Alert. General Appearance - Not in acute distress. HEENT:  PERRL, no carotid bruits or JVD  Chest and Lung Exam   Inspection: Accessory muscles - No use of accessory muscles in breathing. Auscultation:   Breath sounds: - Normal.   Cardiovascular   Inspection: Jugular vein - Bilateral - Inspection Normal.   Palpation/Percussion:   Apical Impulse: - Normal.   Auscultation: Rhythm - Regular. Heart Sounds - S1 WNL and S2 WNL. No S3 or S4. Murmurs & Other Heart Sounds: Auscultation of the heart reveals - No Murmurs. Peripheral Vascular   Upper Extremity: Inspection - Bilateral - No Cyanotic nailbeds or Digital clubbing. Lower Extremity:   Palpation: Edema - Bilateral - No edema. Abdomen:   Soft, non-tender, bowel sounds are active. Neuro: A&O times 3, CN and motor grossly WNL    Continue current cardiac care. Encouraged healthy diet and exercise as tolerated. Follow up in 6 months in the [facility].

## 2022-12-20 RX ORDER — METOPROLOL TARTRATE 25 MG/1
TABLET, FILM COATED ORAL
Qty: 60 TABLET | Refills: 5 | Status: SHIPPED | OUTPATIENT
Start: 2022-12-20

## 2022-12-20 NOTE — TELEPHONE ENCOUNTER
PCP: Krista Andrade MD    Last appt: 12/2022  Future Appointments   Date Time Provider Johanne Louis   3/21/2023 11:00 AM Kris Kulkarni NP [facility] BS AMB   7/17/2023  9:20 AM Shahida Resendiz MD [facility] BS AMB       Requested Prescriptions     Signed Prescriptions Disp Refills    metoprolol tartrate (LOPRESSOR) 25 mg tablet 60 Tablet 5     Sig: Take 1 tablet by mouth twice daily     Authorizing Provider: Tangela Sharma     Ordering User: ARELY Siu         Other Comments:  Verbal order per provider. Order (medication, dose, route, frequency, amount, refills) repeated and verified twice.

## 2023-01-04 ENCOUNTER — TELEPHONE (OUTPATIENT)
Dept: CARDIOLOGY CLINIC | Age: 78
End: 2023-01-04

## 2023-01-04 NOTE — TELEPHONE ENCOUNTER
Patient is calling because she is schedule to have a surgery with [address] at [facility]. Pt is schedule for surgery on 1/16/23. Dermatology is requesting the patient to hold her blood thinner for a week. They are checking her forearm to make sure she is clear or cancer.     [facility] [address]    [phone]

## 2023-01-09 NOTE — TELEPHONE ENCOUNTER
Clearance note, recent progress note and EKG faxed to: [facility] at [phone]. Patient informed.

## 2023-01-09 NOTE — TELEPHONE ENCOUNTER
Patient is calling for cardiac clearance for surgery. Please see previous message.     [phone] home

## 2023-01-09 NOTE — TELEPHONE ENCOUNTER
Pre-Procedure Cardiac Clearance Request:    [facility]. Procedure Date: 1/16/23. Procedure: removal of cancer cells in left arm. [address]    Anesthesia: local    Request for Interruption of Anticoagulants:   Aspirin    May stop anticoagulant how many days prior and when to resume    Is patient cleared from a cardiac standpoint to proceed with upcoming procedure. Please advise. [facility] [address] [place] [address] Avenue     [phone]    Patient still having issues with right hand. Hand not tingling as much but is swelling, no red, not hot to touch, little colder that left hand, able to open and close hand. Patient is going to her primary physician tomorrow for this issue.  Bertin Aguilar

## 2023-01-09 NOTE — TELEPHONE ENCOUNTER
May proceed with dermatologic procedure, at low cardiac risk for this low risk procedure. If necessary to hold aspirin 81 mg, she could hold for up to 5 to 7 days, but for many dermatologic procedures this is not necessary.

## 2023-01-26 DIAGNOSIS — Z82.49 FAMILY HISTORY OF ISCHEMIC HEART DISEASE: ICD-10-CM

## 2023-01-26 DIAGNOSIS — I25.10 CORONARY ARTERY DISEASE INVOLVING NATIVE CORONARY ARTERY OF NATIVE HEART WITHOUT ANGINA PECTORIS: Primary | ICD-10-CM

## 2023-01-26 DIAGNOSIS — I10 ESSENTIAL HYPERTENSION, BENIGN: ICD-10-CM

## 2023-01-30 RX ORDER — LOSARTAN POTASSIUM 50 MG/1
TABLET ORAL
Qty: 30 TABLET | Refills: 0 | OUTPATIENT
Start: 2023-01-30

## 2023-01-30 NOTE — TELEPHONE ENCOUNTER
Patient needs a BMP in order for medication to be refill. Last one done in Nov 2021. PCP office was called and no BMP was done in 2022. BMP order placed.  Patient verified understanding

## 2023-02-03 RX ORDER — LOSARTAN POTASSIUM 50 MG/1
50 TABLET ORAL DAILY
Qty: 90 TABLET | Refills: 3 | Status: SHIPPED | OUTPATIENT
Start: 2023-02-03

## 2023-02-03 NOTE — TELEPHONE ENCOUNTER
Patient is calling because she needs a refill on her losartan. Patient did have her lab work done on 1/30/23.      [phone]

## 2023-02-06 ENCOUNTER — TELEPHONE (OUTPATIENT)
Dept: CARDIOLOGY CLINIC | Age: 78
End: 2023-02-06

## 2023-02-06 NOTE — TELEPHONE ENCOUNTER
----- Message from Pattie Palomares NP sent at 2/6/2023  8:42 AM EST -----  Kidney fxn ok---Na level is low,  chronic for years. Follow up with pcp if needed further work up    Verified Patient with two identifiers  Spoke with patient regarding results and recommendations. Patient voiced understanding.  Lab results mailed to address on file per patient request.

## 2023-02-28 NOTE — PERIOP NOTES
[facility]  Ambulatory Surgery Unit  Pre-operative Instructions    Surgery/Procedure Date  3/6            Tentative Arrival Time TBD      1. On the day of your surgery/procedure, please report to the Ambulatory Surgery Unit Registration Desk and sign in at your designated time. The Ambulatory Surgery Unit is located in [facility] on the [facility] side of the [facility] across from the [address] [place] [address]. Please have all of your health insurance cards, co-payment, and a photo ID.    **TWO adults may accompany you the day of the procedure. We have limited seating available. If our waiting room is at capacity, your ride may be asked to remain in their vehicle. No one under 15 is allowed in the waiting room. 2. You must have someone with you to drive you home, as you should not drive a car for 24 hours following anesthesia. Please make arrangements for a responsible adult friend or family member to stay with you for at least the first 24 hours after your surgery. 3. Do not have anything to eat or drink (including water, gum, mints, coffee, juice) after 11:59 PM  3/5. This may not apply to medications prescribed by your physician. (Please note below the special instructions with medications to take the morning of surgery, if applicable.)    4. We recommend you do not drink any alcoholic beverages for 24 hours before and after your surgery. 5. Contact your surgeons office for instructions on the following medications: non-steroidal anti-inflammatory drugs (i.e. Advil, Aleve), vitamins, and supplements. (Some surgeons will want you to stop these medications prior to surgery and others may allow you to take them)   **If you are currently taking Plavix, Coumadin, Aspirin and/or other blood-thinning agents, contact your surgeon for instructions. ** Your surgeon will partner with the physician prescribing these medications to determine if it is safe to stop or if you need to continue taking. Please do not stop taking these medications without instructions from your surgeon. 6. In an effort to help prevent surgical site infection, we ask that you shower with an anti-bacterial soap (i.e. Dial/Safeguard, or the soap provided to you at your preadmission testing appointment) for 3 days prior to and on the morning of surgery, using a fresh towel after each shower. (Please begin this process with fresh bed linens.) Do not apply any lotions, powders, or deodorants after the shower on the day of your procedure. If applicable, please do not shave the operative site for 48 hours prior to surgery. 7. Wear comfortable clothes. Wear glasses instead of contacts. Do not bring any jewelry or money (other than copays or fees as instructed). Do not wear make-up, particularly mascara, the morning of your surgery. Do not wear nail polish, particularly if you are having foot /hand surgery. Wear your hair loose or down, no ponytails, buns, kassie pins or clips. All body piercings must be removed. 8. You should understand that if you do not follow these instructions your surgery may be cancelled. If your physical condition changes (i.e. fever, cold or flu) please contact your surgeon as soon as possible. 9. It is important that you be on time. If a situation occurs where you may be late, or if you have any questions or problems, please call [phone].    10. Your surgery time may be subject to change. You will receive a phone call the day prior to surgery to confirm your arrival time. Special Instructions: Take all medications and inhalers, as prescribed, on the morning of surgery with a sip of water EXCEPT: metformin      Insulin Dependent Diabetic patients: Take your diabetic medications as prescribed the day before surgery. Hold all diabetic medications the day of surgery.     If you are scheduled to arrive for surgery after 8:00 AM, and your AM blood sugar is >200, please call Ambulatory Surgery. I understand a pre-operative phone call will be made to verify my surgery time. In the event that I am not available, I give permission for a message to be left on my answering service and/or with another person?       yes    Reviewed by phone with pt, verbalized understanding.     ___________________      ___________________      ________________  (Signature of Patient)          (Witness)                   (Date and Time)

## 2023-03-01 NOTE — PERIOP NOTES
Patient in to  pre op instruction paperwork, reviewed paperwork with patient and given patient copy and signed copy in hard chart.

## 2023-03-03 ENCOUNTER — ANESTHESIA EVENT (OUTPATIENT)
Dept: SURGERY | Age: 78
End: 2023-03-03
Payer: MEDICARE

## 2023-03-06 ENCOUNTER — HOSPITAL ENCOUNTER (OUTPATIENT)
Age: 78
Setting detail: OUTPATIENT SURGERY
Discharge: HOME | End: 2023-03-06
Attending: ORTHOPAEDIC SURGERY | Admitting: ORTHOPAEDIC SURGERY
Payer: MEDICARE

## 2023-03-06 ENCOUNTER — ANESTHESIA (OUTPATIENT)
Dept: SURGERY | Age: 78
End: 2023-03-06
Payer: MEDICARE

## 2023-03-06 VITALS
DIASTOLIC BLOOD PRESSURE: 48 MMHG | HEART RATE: 62 BPM | OXYGEN SATURATION: 99 % | HEIGHT: 60 IN | TEMPERATURE: 97.8 F | BODY MASS INDEX: 27.88 KG/M2 | WEIGHT: 142 LBS | RESPIRATION RATE: 16 BRPM | SYSTOLIC BLOOD PRESSURE: 123 MMHG

## 2023-03-06 DIAGNOSIS — G89.18 POST-OP PAIN: Primary | ICD-10-CM

## 2023-03-06 LAB
GLUCOSE BLD STRIP.AUTO-MCNC: 148 MG/DL (ref 65–117)
SERVICE CMNT-IMP: ABNORMAL

## 2023-03-06 PROCEDURE — 2709999900 HC NON-CHARGEABLE SUPPLY: Performed by: ORTHOPAEDIC SURGERY

## 2023-03-06 PROCEDURE — 76210000046 HC AMBSU PH II REC FIRST 0.5 HR: Performed by: ORTHOPAEDIC SURGERY

## 2023-03-06 PROCEDURE — 77030000032 HC CUF TRNQT ZIMM -B: Performed by: ORTHOPAEDIC SURGERY

## 2023-03-06 PROCEDURE — 74011250636 HC RX REV CODE- 250/636: Performed by: NURSE ANESTHETIST, CERTIFIED REGISTERED

## 2023-03-06 PROCEDURE — 77030040356 HC CORD BPLR FRCP COVD -A: Performed by: ORTHOPAEDIC SURGERY

## 2023-03-06 PROCEDURE — 82962 GLUCOSE BLOOD TEST: CPT

## 2023-03-06 PROCEDURE — 76210000040 HC AMBSU PH I REC FIRST 0.5 HR: Performed by: ORTHOPAEDIC SURGERY

## 2023-03-06 PROCEDURE — 74011250636 HC RX REV CODE- 250/636: Performed by: ORTHOPAEDIC SURGERY

## 2023-03-06 PROCEDURE — 74011000250 HC RX REV CODE- 250: Performed by: ORTHOPAEDIC SURGERY

## 2023-03-06 PROCEDURE — 77030040922 HC BLNKT HYPOTHRM STRY -A: Performed by: ORTHOPAEDIC SURGERY

## 2023-03-06 PROCEDURE — 76060000061 HC AMB SURG ANES 0.5 TO 1 HR: Performed by: ORTHOPAEDIC SURGERY

## 2023-03-06 PROCEDURE — 74011250636 HC RX REV CODE- 250/636: Performed by: ANESTHESIOLOGY

## 2023-03-06 PROCEDURE — 76030000000 HC AMB SURG OR TIME 0.5 TO 1: Performed by: ORTHOPAEDIC SURGERY

## 2023-03-06 PROCEDURE — 77030002916 HC SUT ETHLN J&J -A: Performed by: ORTHOPAEDIC SURGERY

## 2023-03-06 RX ORDER — SODIUM CHLORIDE 0.9 % (FLUSH) 0.9 %
5-40 SYRINGE (ML) INJECTION AS NEEDED
Status: DISCONTINUED | OUTPATIENT
Start: 2023-03-06 | End: 2023-03-06 | Stop reason: HOSPADM

## 2023-03-06 RX ORDER — DIPHENHYDRAMINE HYDROCHLORIDE 50 MG/ML
12.5 INJECTION, SOLUTION INTRAMUSCULAR; INTRAVENOUS AS NEEDED
Status: DISCONTINUED | OUTPATIENT
Start: 2023-03-06 | End: 2023-03-06 | Stop reason: HOSPADM

## 2023-03-06 RX ORDER — SODIUM CHLORIDE, SODIUM LACTATE, POTASSIUM CHLORIDE, CALCIUM CHLORIDE 600; 310; 30; 20 MG/100ML; MG/100ML; MG/100ML; MG/100ML
25 INJECTION, SOLUTION INTRAVENOUS CONTINUOUS
Status: DISCONTINUED | OUTPATIENT
Start: 2023-03-06 | End: 2023-03-06 | Stop reason: HOSPADM

## 2023-03-06 RX ORDER — LIDOCAINE HYDROCHLORIDE 10 MG/ML
0.1 INJECTION, SOLUTION EPIDURAL; INFILTRATION; INTRACAUDAL; PERINEURAL AS NEEDED
Status: DISCONTINUED | OUTPATIENT
Start: 2023-03-06 | End: 2023-03-06 | Stop reason: HOSPADM

## 2023-03-06 RX ORDER — PROPOFOL 10 MG/ML
INJECTION, EMULSION INTRAVENOUS
Status: DISCONTINUED | OUTPATIENT
Start: 2023-03-06 | End: 2023-03-06 | Stop reason: HOSPADM

## 2023-03-06 RX ORDER — SODIUM CHLORIDE 0.9 % (FLUSH) 0.9 %
5-40 SYRINGE (ML) INJECTION EVERY 8 HOURS
Status: DISCONTINUED | OUTPATIENT
Start: 2023-03-06 | End: 2023-03-06 | Stop reason: HOSPADM

## 2023-03-06 RX ORDER — TRAMADOL HYDROCHLORIDE 50 MG/1
50 TABLET ORAL
Qty: 20 TABLET | Refills: 0 | Status: SHIPPED | OUTPATIENT
Start: 2023-03-06 | End: 2023-03-11

## 2023-03-06 RX ORDER — FENTANYL CITRATE 50 UG/ML
25 INJECTION, SOLUTION INTRAMUSCULAR; INTRAVENOUS
Status: DISCONTINUED | OUTPATIENT
Start: 2023-03-06 | End: 2023-03-06 | Stop reason: HOSPADM

## 2023-03-06 RX ORDER — ONDANSETRON 2 MG/ML
4 INJECTION INTRAMUSCULAR; INTRAVENOUS AS NEEDED
Status: DISCONTINUED | OUTPATIENT
Start: 2023-03-06 | End: 2023-03-06 | Stop reason: HOSPADM

## 2023-03-06 RX ORDER — OXYCODONE AND ACETAMINOPHEN 5; 325 MG/1; MG/1
1 TABLET ORAL
Status: DISCONTINUED | OUTPATIENT
Start: 2023-03-06 | End: 2023-03-06 | Stop reason: HOSPADM

## 2023-03-06 RX ADMIN — SODIUM CHLORIDE, POTASSIUM CHLORIDE, SODIUM LACTATE AND CALCIUM CHLORIDE 25 ML/HR: 600; 310; 30; 20 INJECTION, SOLUTION INTRAVENOUS at 08:23

## 2023-03-06 RX ADMIN — PROPOFOL 50 MCG/KG/MIN: 10 INJECTION, EMULSION INTRAVENOUS at 09:22

## 2023-03-06 RX ADMIN — WATER 2 G: 1 INJECTION INTRAMUSCULAR; INTRAVENOUS; SUBCUTANEOUS at 09:21

## 2023-03-06 NOTE — PERIOP NOTES
Permission received to review discharge instructions and discuss private health information with Brittni Hough, sister.

## 2023-03-06 NOTE — OP NOTES
PATIENT NAME:  Eleno Douglas    SURGEON:  Karen Pereira MD    DATE OF SURGERY:  3/6/2023    LOCATION: [facility]    PREOPERATIVE DIAGNOSIS:   right carpal tunnel syndrome    POSTOPERATIVE DIAGNOSIS:  Same    PROCEDURE:  right Open carpal tunnel decompression             ANESTHESIA:  Local (1% lidocaine with 0.25% bupivicaine in a 1:1 mixture) with sedation     BLOOD LOSS:  Minimal    Assistant: Baltazar Romberg, PA-C     OPERATIVE INDICATIONS: The patient is a 68 y.o. old female who has developed progressive right carpal tunnel syndrome, unresponsive to all conservative treatment. Symptoms have failed to respond consistently to conservative treatment such that patient has elected to undergo carpal tunnel decompression. She understands the alternatives to surgery, the nature of this elective procedure, the usual recovery, possible variations in healing, and the potential for shortcomings and complications (including but not exclusive to bleeding, infection, scar tenderness,  weakness, residual numbness, or thenar muscle weakness). DESCRIPTION  OF PROCEDURE: Patient identified correctly in the pre-operative holding area and correct extremity marked. Was then taken stable to the operating room and placed supine with the operative extremity on a hand table. After sedation was administered by the anesthesia team, the right hand and forearm were prepped and draped in a sterile field. A timeout was taken and the operative site was confirmed. Local anesthesia was instilled into the wound. The extremity was then elevated and exsanguinated and a forearm tourniquet was inflated to 200 mm of mercury. An incision was made in the proximal palm in line with the radial side of the ring finger from the distal wrist crease to Kaplans line. Dissection was carried through the subcutaneous tissue and the transverse carpal ligament was visualized.   This was released under direct visualization first distally followed by proximally and carried up past the wrist wrist crease. A complete decompression was confirmed by direct visualization of the decompressed median nerve as well as palpation. No other synovial pathology was noted. The tourniquet was then released. The nerve was noted to become hyperemic. Copious irrigation was performed. Hemostasis was obtained with bipolar cautery and the wound was closed with 3-0 nylon sutures. A sterile dressing was then applied leaving the fingers free for range of motion. The patient tolerated the procedure well and was discharged to the recovery area uneventfully. All instrument needle and lap counts were correct at the end of the case.

## 2023-03-06 NOTE — H&P
Subjective:   Patient ID: Liliane Johnson is a 68 y.o. female. Chief Complaint: Pain of the Right Hand    Comes today for follow-up of her right hand. Continue to report constant numbness. 4/10 pain. Underwent an EMG earlier today. This is reviewed by myself. This is notable for severe right carpal tunnel syndrome. There is active denervation of the right APB. There is moderate left carpal tunnel syndrome. ROS and PMHx reviewed with no changes    Objective:   Constitutional: No acute distress. Well nourished. Well developed. Eyes: Sclera are nonicteric. Respiratory: No labored breathing. See anesthesia note for full exam.  Cardiovascular: No marked edema. See anesthesia note for full exam.  Skin: No marked skin ulcers. Neurological: see below  Psychiatric: Alert and oriented x3. Musculoskeletal   Examination of the right hand is stable from prior. There is significant thenar weakness and decreased sensation in the median nerve distribution. Radiographs:       No imaging obtained     Assessment:     1. Bilateral carpal tunnel syndrome     Plan:   Discussed nature condition as well as treatment options. Have recommended surgical management due to severity. Risks benefits and alternatives including the significant risk of limited or prolonged relief due to severity are discussed. Postoperative course discussed. She will call for surgical scheduling    Orders Placed This Encounter   Surgical Posting Sheet     No follow-ups on file. Date of Surgery Update:  Crist Carrel was seen and examined. History and physical has been reviewed. The patient has been examined.  There have been no significant clinical changes since the completion of the originally dated History and Physical.    Signed By: Lourdes Gilbert MD     March 6, 2023 8:27 AM

## 2023-03-06 NOTE — ANESTHESIA POSTPROCEDURE EVALUATION
Procedure(s):  RIGHT OPEN CARPAL TUNNEL RELEASE (MAC WITH LOCAL). MAC    Anesthesia Post Evaluation      Multimodal analgesia: multimodal analgesia used between 6 hours prior to anesthesia start to PACU discharge  Patient location during evaluation: PACU  Patient participation: complete - patient participated  Level of consciousness: awake and alert  Pain score: 0  Airway patency: patent  Anesthetic complications: no  Cardiovascular status: acceptable  Respiratory status: acceptable  Hydration status: acceptable  Post anesthesia nausea and vomiting:  none  Final Post Anesthesia Temperature Assessment:  Normothermia (36.0-37.5 degrees C)      INITIAL Post-op Vital signs:   Vitals Value Taken Time   /60 03/06/23 1006   Temp 37 °C (98.6 °F) 03/06/23 0951   Pulse 62 03/06/23 1012   Resp 22 03/06/23 1012   SpO2 97 % 03/06/23 1012   Vitals shown include unvalidated device data.

## 2023-03-06 NOTE — DISCHARGE INSTRUCTIONS
[facility]  Post-operative instructions  For: Amadeo Pan    Your first postop appointment should be scheduled with Dr. Aleyda Rodriguez for 2-3 weeks post-op. [address], [address]  [place] Negrete  Phone: [phone]  Hand Therapy Phone: [phone]  Fax: [phone]    Please follow these instructions for a safe and speedy recovery:    1. Surgical Bandage: Leave the bandage in place until 2 weeks after surgery. Please keep it clean and dry. To shower or bathe, apply a plastic bag or GLAD Press'n Seal® plastic wrap around the bandage or simply sponge bathe. After 2 weeks, you can remove the dressing and get incision wet but NO SOAKING. 2. Elevation: Hand swelling is best prevented by keeping your hand elevated above the level of your heart at all times, night and day. The opposite, dangling your hand below your waist, will cause additional pain, swelling, and later stiffness. You can elevate the hand in a sling or by propping it on a pillow at night. Occasionally, we will provide you with a custom-made foam block for elevating the arm. Ice compresses may help but do not rep[lace elevation. Frequently, extreme pain is caused by a tight bandage, which should be loosened. If pain is severe and progressive, call us at [phone] during the day (ask for immediate connection to Dr. Jose Gaming Team) or during the night (ask for the on-call physician). 3. Medication: You will be provided with an appropriate pain medication (over-the-counter or prescription). Please fill this at a pharmacy promptly so you will have it available when all local anesthetic wears off. Take this to relieve pain as directed on the bottle. Please refrain from driving, drinking alcohol, and making important medical decisions while taking the medication. Please call us if you need something stronger.  Medication changes or refills must be made before 5pm or through your pharmacy. 4. Weight bearing: Do NOT bear any weight on the operative extremity for the first 2 weeks after surgery. After 2 weeks, you have a 5 pound weight lifting restriction. I want to thank you for choosing us for your hand care needs. My staff and I are committed to providing all our customers with the highest quality hand surgery and subsequent hand therapy care as possible. We want your recovery to be comfortable. If you have clinical non-emergent questions about your surgery or other hand conditions please call [phone] and ask for my team. Your call will be returned within 24 hours. TAKE NARCOTIC PAIN MEDICATIONS WITH FOOD     Narcotics tend to be constipating, we suggest taking a stool softener such as Colace or Miralax (follow package instructions). DO NOT DRIVE WHILE TAKING NARCOTIC PAIN MEDICATIONS. DO NOT TAKE SLEEPING MEDICATIONS OR ANTIANXIETY MEDICATIONS WHILE TAKING NARCOTIC PAIN MEDICATIONS,  ESPECIALLY THE NIGHT OF ANESTHESIA! CPAP PATIENTS BE SURE TO WEAR MACHINE WHENEVER NAPPING OR SLEEPING! DISCHARGE SUMMARY from Nurse    The following personal items collected during your admission are returned to you:   Dental Appliance: Dental Appliances: None  Vision: Visual Aid: Glasses, With patient  Hearing Aid:    Jewelry: Jewelry: Ring, With patient  Clothing: Clothing: With patient  Other Valuables: Other Valuables: None  Valuables sent to safe:        PATIENT INSTRUCTIONS:    After General Anesthesia or Intravenous Sedation, for 24 hours or while taking prescription Narcotics:        Someone should be with you for the next 24 hours. For your own safety, a responsible adult must drive you home. Limit your activities  Recommended activity: Rest today, up with assistance today. Do not climb stairs or shower unattended for the next 24 hours.   Please start with a soft bland diet and advance as tolerated (no nausea) to regular diet.  If you have a sore throat you should try the following: fluids, warm salt water gargles, or throat lozenges. If it does not improve after several days please follow up with your primary physician. Do not drive and operate hazardous machinery  Do not make important personal or business decisions  Do  not drink alcoholic beverages  If you have not urinated within 8 hours after discharge, please contact your surgeon on call. Report the following to your surgeon:  Excessive pain, swelling, redness or odor of or around the surgical area  Temperature over 100.5  Nausea and vomiting lasting longer than 4 hours or if unable to take medications  Any signs of decreased circulation or nerve impairment to extremity: change in color, persistent  numbness, tingling, coldness or increase pain      You will receive a Post Operative Call from one of the Recovery Room Nurses on the day after your surgery to check on you. It is very important for us to know how you are recovering after your surgery. If you have an issue or need to speak with someone, please call your surgeon, do not wait for the post operative call. You may receive an e-mail or letter in the mail from [employer] regarding your experience with us in the Ambulatory Surgery Unit. Your feedback is valuable to us and we appreciate your participation in the survey. If the above instructions are not adequate or you are having problems after your surgery, call the physician at their office number. We wish you a speedy recovery ? What to do at Home:      *  Please give a list of your current medications to your Primary Care Provider. *  Please update this list whenever your medications are discontinued, doses are      changed, or new medications (including over-the-counter products) are added. *  Please carry medication information at all times in case of emergency situations.     If you have not received your influenza and/or pneumococcal vaccine, please follow up with your primary care physician. The discharge information has been reviewed with the patient and caregiver. The patient and caregiver verbalized understanding. TO PREVENT AN INFECTION      [address] YOUR HANDS    To prevent infection, good handwashing is the most important thing you or your caregiver can do. Wash your hands with soap and water or use the hand  we gave you before you touch any wounds. SHOWER    Use the antibacterial soap we gave you when you take a shower. Shower with this soap until your wounds are healed. To reach all areas of your body, you may need someone to help you. Dont forget to clean your belly button with every shower. USE CLEAN SHEETS    Use freshly cleaned sheets on your bed after surgery. To keep the surgery site clean, do not allow pets to sleep with you while your wound is still healing. STOP SMOKING    Stop smoking, or at least cut back on smoking    Smoking slows your healing.

## 2023-03-06 NOTE — ANESTHESIA PREPROCEDURE EVALUATION
Relevant Problems   CARDIOVASCULAR   (+) CAD (coronary artery disease), native coronary artery   (+) Essential hypertension, benign   (+) SVT (supraventricular tachycardia) (HCC)       Anesthetic History   No history of anesthetic complications       Comments: Sensitive to sedation     Review of Systems / Medical History  Patient summary reviewed, nursing notes reviewed and pertinent labs reviewed    Pulmonary          Smoker ( 1/2 ppd)         Neuro/Psych         Psychiatric history (anxiety)     Cardiovascular    Hypertension: well controlled        Dysrhythmias : SVT  CAD and cardiac stents (2015)    Exercise tolerance: >4 METS  Comments: 2019 Stress test negative for ischemia, has apical scar    EF 56=60%, mod TR   GI/Hepatic/Renal     GERD: well controlled           Endo/Other        Arthritis and cancer (right breast 2021)     Other Findings   Comments: Right Carpal tunnel syndrome    Vertigo  Claustraphobia  prediabetes         Physical Exam    Airway  Mallampati: IV  TM Distance: 4 - 6 cm  Neck ROM: normal range of motion   Mouth opening: Normal     Cardiovascular    Rhythm: regular  Rate: normal         Dental  No notable dental hx       Pulmonary  Breath sounds clear to auscultation               Abdominal  GI exam deferred       Other Findings            Anesthetic Plan    ASA: 3  Anesthesia type: MAC          Induction: Intravenous  Anesthetic plan and risks discussed with: Patient      preop glucose 148. Pt wants very little sedation.

## 2023-03-06 NOTE — PERIOP NOTES
[place]  1945  [id number]    Situation:  Verbal report given from: Gabbi Izaguirre and beckie crna  Procedure: Procedure(s):  RIGHT OPEN CARPAL TUNNEL RELEASE (MAC WITH LOCAL)    Background:    Preoperative diagnosis: Bilateral carpal tunnel syndrome [G56.03]    Postoperative diagnosis: Bilateral carpal tunnel syndrome [G56.03]    :  Dr. Brittany Morelos    Assistant(s): Circ-1: Price Rubio RN  Scrub Tech-1: Lucy Rodarte    Specimens: * No specimens in log *    Assessment:  Intra-procedure medications         Anesthesia gave intra-procedure sedation and medications, see anesthesia flow sheet     Intravenous fluids: LR@ KVO     Vital signs stable       Recommendation:    Permission to share finding with sister : yes

## 2023-03-06 NOTE — PERIOP NOTES
3358 arrival to pacu pt with course moist cough, HOB elevated. Thick clear secretions    0955 cough decreased, still productive of thick clear secretions. Pt states she has been having allergy symptoms (stuffy nose) the last few days. Denies cough at home    1006 pt awake and alert, denies complaints. Remains A+Ox4. Tolerating po fluids without diff. 1010 d/c instructions reviewed with sister by phone, verbalized understanding. Pt states she is ready for discharge. 1 Dr. Umer Vann at bedside. [address] F[address] Negrete for discharge. Occ non productive cough, denies SOB. 1029 IV d/c'd without incident, assisted pt dressing. Pt noted to have tick on right upper back, released with gentle wipe, pinpoint redness noted. Pt notified. 1040 discharged to car via wheelchair all belongings with pt and sister. Pt remains A+Ox4. Sister notified of tick. Sister has d/c instructions.

## 2023-03-17 NOTE — PROGRESS NOTES
HISTORY OF PRESENT ILLNESS  Juni Harris is a 68 y.o. female. HPI Established patient presents for follow-up for RIGHT breast cancer. Denies breast mass, skin changes, nipple discharge and pain. Breast history -   Referring - Yenni Coates MD - [facility]  4/8/21 - RIGHT breast biopsy - RIGHT IDC, GR2, ER99, PR99, Ki-67-3%, Her2 neg  Mammaprint - low risk  5/25/21 - RIGHT breast lumpectomy and RIGHT SLNB - Dr. Hahn Splinter  4.3cm IDC, node positive - 2/5; [place] [facility]  6/2021 - completed XRT - Dr. Arnaldo Orozco, but could not tolerate - Dr. Roberta Patel        Family history -    No family history of breast or ovarian cancer. OB History    No obstetric history on file. Obstetric Comments   Menarche 6, LMP 52, # of children 0, age of 1st delivery N/A, Hysterectomy/oophorectomy No/No, Breast bx Yes, history of breast feeding N/A, BCP Yes, Hormone therapy No                 Past Surgical History:   Procedure Laterality Date    BX OF BREAST, NEEDLE CORE, IMAGE GUIDE Right 03/2021    COLONOSCOPY N/A 06/16/2022    COLONOSCOPY performed by Nannette Steiner MD at [facility] ENDOSCOPY    HX ADENOIDECTOMY  1950    HX BREAST BIOPSY Left     Long time ago --neg    HX BREAST LUMPECTOMY Right 05/25/2021    RIGHT BREAST LUMPECTOMY WITH ULTRASOUND, RIGHT BREAST SENTINEL NODE BIOPSY performed by Maddy Cheek MD at [facility] AMBULATORY OR    HX CATARACT REMOVAL Bilateral     HX CORONARY STENT PLACEMENT      HX HEART CATHETERIZATION      HX HEENT      [address] CELL REMOVED FROM [facility]     HX TONSILLECTOMY  1950    HX TUBAL LIGATION  1982    [place] UNLISTED PROCEDURE BREAST      left breast biopsy    [place] UNLISTED PROCEDURE CARDIAC SURGERY  2013    STENT  PLACEMENT            ROS    Physical Exam  Constitutional:       Appearance: Normal appearance. Chest:   Breasts:     Right: No mass, skin change or tenderness. Left: No mass, nipple discharge, skin change or tenderness.           Comments: RIGHT breast - stable post treatment changes; smaller than LEFT post XRT  Musculoskeletal:      Comments: RIGHT - FROM  LEFT - limited due to shoulder issues   Lymphadenopathy:      Upper Body:      Right upper body: No supraclavicular or axillary adenopathy. Left upper body: No supraclavicular or axillary adenopathy. Neurological:      Mental Status: She is alert. Psychiatric:         Attention and Perception: Attention normal.         Mood and Affect: Mood normal.         Speech: Speech normal.         Behavior: Behavior normal.         Visit Vitals  BP (!) 154/68 (BP 1 Location: Right arm, BP Patient Position: Sitting, BP Cuff Size: Small adult)   Pulse (!) 58   Ht 5' (1.524 m)   Wt 142 lb (64.4 kg)   BMI 27.73 kg/m²       ASSESSMENT and PLAN  Encounter Diagnoses   Name Primary? S/P lumpectomy, right breast Yes    History of right breast cancer     S/P radiation therapy     Malignant neoplasm of upper-outer quadrant of right female breast, unspecified estrogen receptor status ([facility] [address]        Normal exam with no evidence of local recurrence. Mammo at [facility] - RIGHT done in 11/2022. BDmammogram 3D in 5/2023. Continues follow-up with Dr. Brittany Ocasio. Could not tolerate AI, but continues follow-up with Dr. Theresa Flores. RTC here in 1 year or sooner PRN. She is comfortable with this plan. All questions answered and she stated understanding. Total time spent for this patient - 20 minutes.

## 2023-03-20 ENCOUNTER — OFFICE VISIT (OUTPATIENT)
Dept: SURGERY | Age: 78
End: 2023-03-20
Payer: MEDICARE

## 2023-03-20 VITALS
HEART RATE: 58 BPM | DIASTOLIC BLOOD PRESSURE: 68 MMHG | SYSTOLIC BLOOD PRESSURE: 154 MMHG | WEIGHT: 142 LBS | BODY MASS INDEX: 27.88 KG/M2 | HEIGHT: 60 IN

## 2023-03-20 DIAGNOSIS — C50.411 MALIGNANT NEOPLASM OF UPPER-OUTER QUADRANT OF RIGHT FEMALE BREAST, UNSPECIFIED ESTROGEN RECEPTOR STATUS (HCC): ICD-10-CM

## 2023-03-20 DIAGNOSIS — Z98.890 S/P LUMPECTOMY, RIGHT BREAST: Primary | ICD-10-CM

## 2023-03-20 DIAGNOSIS — Z85.3 HISTORY OF RIGHT BREAST CANCER: ICD-10-CM

## 2023-03-20 DIAGNOSIS — Z92.3 S/P RADIATION THERAPY: ICD-10-CM

## 2023-03-20 PROCEDURE — 3077F SYST BP >= 140 MM HG: CPT | Performed by: NURSE PRACTITIONER

## 2023-03-20 PROCEDURE — 1101F PT FALLS ASSESS-DOCD LE1/YR: CPT | Performed by: NURSE PRACTITIONER

## 2023-03-20 PROCEDURE — 3078F DIAST BP <80 MM HG: CPT | Performed by: NURSE PRACTITIONER

## 2023-03-20 PROCEDURE — 1090F PRES/ABSN URINE INCON ASSESS: CPT | Performed by: NURSE PRACTITIONER

## 2023-03-20 PROCEDURE — 1123F ACP DISCUSS/DSCN MKR DOCD: CPT | Performed by: NURSE PRACTITIONER

## 2023-03-20 PROCEDURE — G8427 DOCREV CUR MEDS BY ELIG CLIN: HCPCS | Performed by: NURSE PRACTITIONER

## 2023-03-20 PROCEDURE — G8417 CALC BMI ABV UP PARAM F/U: HCPCS | Performed by: NURSE PRACTITIONER

## 2023-03-20 PROCEDURE — 99213 OFFICE O/P EST LOW 20 MIN: CPT | Performed by: NURSE PRACTITIONER

## 2023-03-20 PROCEDURE — G8536 NO DOC ELDER MAL SCRN: HCPCS | Performed by: NURSE PRACTITIONER

## 2023-03-20 PROCEDURE — G8400 PT W/DXA NO RESULTS DOC: HCPCS | Performed by: NURSE PRACTITIONER

## 2023-03-20 PROCEDURE — G8432 DEP SCR NOT DOC, RNG: HCPCS | Performed by: NURSE PRACTITIONER

## 2023-04-24 RX ORDER — AMLODIPINE BESYLATE 5 MG/1
TABLET ORAL
Qty: 90 TABLET | Refills: 0 | Status: SHIPPED | OUTPATIENT
Start: 2023-04-24

## 2023-04-24 NOTE — TELEPHONE ENCOUNTER
Refill Request Received for the Following Medication     Requested Prescriptions     Pending Prescriptions Disp Refills    amLODIPine (NORVASC) 5 mg tablet [Pharmacy Med Name: amLODIPine Besylate 5 MG Oral Tablet] 90 Tablet 0     Sig: Take 1 tablet by mouth once daily for blood pressure       Last Prescribed: 08-    Last Appointment With Me:  12/6/2022     Future Appointments:  Future Appointments   Date Time Provider Johanne Louis   5/2/2023 10:00 AM SELAM [facility] CRISTINO MUNOZ [facility]      01-  Creatinine 0.68

## 2023-05-02 ENCOUNTER — HOSPITAL ENCOUNTER (OUTPATIENT)
Dept: MRI IMAGING | Age: 78
Discharge: HOME | End: 2023-05-02
Attending: NURSE PRACTITIONER
Payer: MEDICARE

## 2023-05-02 DIAGNOSIS — M54.50 LUMBAR PAIN: ICD-10-CM

## 2023-05-02 DIAGNOSIS — M54.32 SCIATICA OF LEFT SIDE: ICD-10-CM

## 2023-05-02 DIAGNOSIS — M47.817 LUMBOSACRAL SPONDYLOSIS WITHOUT MYELOPATHY: ICD-10-CM

## 2023-05-02 DIAGNOSIS — M51.36 DDD (DEGENERATIVE DISC DISEASE), LUMBAR: ICD-10-CM

## 2023-05-02 DIAGNOSIS — M54.16 RADICULOPATHY OF LUMBAR REGION: ICD-10-CM

## 2023-05-02 DIAGNOSIS — M43.16 SPONDYLOLISTHESIS OF LUMBAR REGION: ICD-10-CM

## 2023-05-02 PROCEDURE — 72148 MRI LUMBAR SPINE W/O DYE: CPT

## 2023-06-14 LAB — HBA1C MFR BLD HPLC: 5.9 %

## 2023-07-28 RX ORDER — AMLODIPINE BESYLATE 5 MG/1
TABLET ORAL
Qty: 90 TABLET | Refills: 1 | Status: SHIPPED | OUTPATIENT
Start: 2023-07-28

## 2023-07-28 NOTE — TELEPHONE ENCOUNTER
Refill Request Received for the Following Medication     Requested Prescriptions     Pending Prescriptions Disp Refills    amLODIPine (NORVASC) 5 MG tablet [Pharmacy Med Name: amLODIPine Besylate 5 MG Oral Tablet] 90 tablet 0     Sig: Take 1 tablet by mouth once daily for blood pressure       Last Prescribed:08-02-22    Last Appointment With Me:  12/6/2022     Future Appointments:  Future Appointments   Date Time Provider [address]   9/22/2023 10:20 AM Hillary Kenyon MD BS BS AMB   3/25/2024 10:45 AM ILSA Huerta - THOMAS [facility] BS AMB

## 2023-09-15 NOTE — TELEPHONE ENCOUNTER
Refill Request Received for the Following Medication     Requested Prescriptions     Pending Prescriptions Disp Refills    metoprolol tartrate (LOPRESSOR) 25 MG tablet [Pharmacy Med Name: Metoprolol Tartrate 25 MG Oral Tablet] 180 tablet 0     Sig: Take 1 tablet by mouth twice daily       Last Prescribed:    Last Appointment With Me:  12/6/2022     Future Appointments:  Future Appointments   Date Time Provider [address] [place] [address]   9/22/2023 10:20 AM Cristy Okeefe MD BS BS AMB   3/25/2024 10:45 AM ILSA Bullard - THOMAS [facility] BS AMB

## 2023-12-11 NOTE — TELEPHONE ENCOUNTER
Refill Request Received for the Following Medication     Requested Prescriptions     Pending Prescriptions Disp Refills    metoprolol tartrate (LOPRESSOR) 25 MG tablet [Pharmacy Med Name: Metoprolol Tartrate 25 MG Oral Tablet] 180 tablet 0     Sig: Take 1 tablet by mouth twice daily       Last Prescribed:    Last Appointment With Me:  12/6/2022     Future Appointments:  Future Appointments   Date Time Provider [address]   3/25/2024 10:45 AM ILSA Reynolds NP VB BS AMB   4/12/2024 10:40 AM Alicia Ross MD BS BS AMB

## 2024-03-25 ENCOUNTER — OFFICE VISIT (OUTPATIENT)
Age: 79
End: 2024-03-25
Payer: MEDICARE

## 2024-03-25 VITALS — HEIGHT: 60 IN | BODY MASS INDEX: 28.86 KG/M2 | WEIGHT: 147 LBS

## 2024-03-25 DIAGNOSIS — Z98.890 S/P LUMPECTOMY OF BREAST: ICD-10-CM

## 2024-03-25 DIAGNOSIS — C50.411 MALIGNANT NEOPLASM OF UPPER-OUTER QUADRANT OF RIGHT BREAST IN FEMALE, ESTROGEN RECEPTOR POSITIVE (HCC): Primary | ICD-10-CM

## 2024-03-25 DIAGNOSIS — Z17.0 MALIGNANT NEOPLASM OF UPPER-OUTER QUADRANT OF RIGHT BREAST IN FEMALE, ESTROGEN RECEPTOR POSITIVE (HCC): Primary | ICD-10-CM

## 2024-03-25 DIAGNOSIS — Z85.3 HISTORY OF BREAST CANCER IN FEMALE: ICD-10-CM

## 2024-03-25 DIAGNOSIS — Z92.3 S/P RADIATION THERAPY: ICD-10-CM

## 2024-03-25 PROCEDURE — 1123F ACP DISCUSS/DSCN MKR DOCD: CPT | Performed by: NURSE PRACTITIONER

## 2024-03-25 PROCEDURE — 4004F PT TOBACCO SCREEN RCVD TLK: CPT | Performed by: NURSE PRACTITIONER

## 2024-03-25 PROCEDURE — G8484 FLU IMMUNIZE NO ADMIN: HCPCS | Performed by: NURSE PRACTITIONER

## 2024-03-25 PROCEDURE — 1090F PRES/ABSN URINE INCON ASSESS: CPT | Performed by: NURSE PRACTITIONER

## 2024-03-25 PROCEDURE — G8400 PT W/DXA NO RESULTS DOC: HCPCS | Performed by: NURSE PRACTITIONER

## 2024-03-25 PROCEDURE — G8427 DOCREV CUR MEDS BY ELIG CLIN: HCPCS | Performed by: NURSE PRACTITIONER

## 2024-03-25 PROCEDURE — G8419 CALC BMI OUT NRM PARAM NOF/U: HCPCS | Performed by: NURSE PRACTITIONER

## 2024-03-25 PROCEDURE — 99213 OFFICE O/P EST LOW 20 MIN: CPT | Performed by: NURSE PRACTITIONER

## 2024-03-25 NOTE — PROGRESS NOTES
change or tenderness.          Comments: RIGHT breast - stable post treatment changes  Musculoskeletal:      Comments: FROM - UE x RIGHT; LEFT shoulder issues   Lymphadenopathy:      Upper Body:      Right upper body: No supraclavicular or axillary adenopathy.      Left upper body: No supraclavicular or axillary adenopathy.   Neurological:      Mental Status: She is alert.   Psychiatric:         Attention and Perception: Attention normal.         Mood and Affect: Mood normal.         Speech: Speech normal.         Behavior: Behavior normal.          Ht 1.524 m (5')   Wt 66.7 kg (147 lb)   BMI 28.71 kg/m²         ASSESSMENT and PLAN   Diagnosis Orders   1. Malignant neoplasm of upper-outer quadrant of right breast in female, estrogen receptor positive (HCC)        2. S/P lumpectomy of breast        3. S/P radiation therapy        4. History of breast cancer in female            Normal exam with no evidence of local recurrence.    Mammo at [facility] - BDmammogram 3D in 5/2024 - will call to schedule.    Continues follow-up with Dr. Orozco.  Could not tolerate AI, but continues follow-up with Dr. Kenyon.    RTC here in 1 year or sooner PRN. She is comfortable with this plan.  All questions answered and she stated understanding.            Total time spent for this patient - 20 minutes.

## 2024-05-15 ENCOUNTER — TELEPHONE (OUTPATIENT)
Age: 79
End: 2024-05-15

## 2024-05-15 NOTE — TELEPHONE ENCOUNTER
Patient is calling to request a refill on medication on TORSEMIDE. Patient states she has been taking 2 pills and that's why she needs a refill. Patient would like a callback as well to discuss her blood pressure.     [facility] Phone # [phone]  Patient phone # [phone]

## 2024-05-15 NOTE — TELEPHONE ENCOUNTER
Spoke with patient, verified with 2 identifiers.  Patient stated \"I was taking 2 tablets a day sometimes when my bp was high\"   Last BP readings  05-15   134/76  05-14   153/71  05-13   102/70  05-12    179/88.  This nurse explained that BP readings were within normal limits and advised to take just one Torsemide a day and continue taking losartan 50 mg daily and keep a BP log with one reading in the morning and one reading in the afternoon.  Patient voiced understanding and will call us back with a BP log on Monday.

## 2024-05-30 ENCOUNTER — ANCILLARY PROCEDURE (OUTPATIENT)
Age: 79
End: 2024-05-30
Payer: MEDICARE

## 2024-05-30 ENCOUNTER — TELEPHONE (OUTPATIENT)
Age: 79
End: 2024-05-30

## 2024-05-30 VITALS — BODY MASS INDEX: 28.27 KG/M2 | WEIGHT: 144 LBS | HEIGHT: 60 IN

## 2024-05-30 DIAGNOSIS — I65.23 CAROTID ATHEROSCLEROSIS, BILATERAL: Primary | ICD-10-CM

## 2024-05-30 DIAGNOSIS — I36.1 NONRHEUMATIC TRICUSPID VALVE REGURGITATION: ICD-10-CM

## 2024-05-30 DIAGNOSIS — I65.29 CAROTID STENOSIS: ICD-10-CM

## 2024-05-30 DIAGNOSIS — I65.23 CAROTID ATHEROSCLEROSIS, BILATERAL: ICD-10-CM

## 2024-05-30 LAB
ECHO BSA: 1.66 M2
VAS LEFT CCA DIST EDV: 18.7 CM/S
VAS LEFT CCA DIST PSV: 85.2 CM/S
VAS LEFT CCA PROX EDV: 16.5 CM/S
VAS LEFT CCA PROX PSV: 63.8 CM/S
VAS LEFT ECA EDV: 0 CM/S
VAS LEFT ECA PSV: 75 CM/S
VAS LEFT ICA DIST EDV: 26.6 CM/S
VAS LEFT ICA DIST PSV: 107.9 CM/S
VAS LEFT ICA MID EDV: 38.6 CM/S
VAS LEFT ICA MID PSV: 159.2 CM/S
VAS LEFT ICA PROX EDV: 47.4 CM/S
VAS LEFT ICA PROX PSV: 187.7 CM/S
VAS LEFT ICA/CCA PSV: 2.2 NO UNITS
VAS LEFT VERTEBRAL EDV: 21.1 CM/S
VAS LEFT VERTEBRAL PSV: 78.1 CM/S
VAS RIGHT CCA DIST EDV: 0 CM/S
VAS RIGHT CCA DIST PSV: 27.7 CM/S
VAS RIGHT CCA PROX EDV: 0 CM/S
VAS RIGHT CCA PROX PSV: 24.6 CM/S
VAS RIGHT ECA EDV: 21.4 CM/S
VAS RIGHT ECA PSV: 332.5 CM/S
VAS RIGHT ICA DIST EDV: 7.6 CM/S
VAS RIGHT ICA DIST PSV: 27.9 CM/S
VAS RIGHT ICA MID EDV: 4.4 CM/S
VAS RIGHT ICA MID PSV: 19 CM/S
VAS RIGHT ICA PROX EDV: 130 CM/S
VAS RIGHT ICA PROX PSV: 341.7 CM/S
VAS RIGHT ICA/CCA PSV: 12.4 NO UNITS

## 2024-05-30 PROCEDURE — 93880 EXTRACRANIAL BILAT STUDY: CPT | Performed by: INTERNAL MEDICINE

## 2024-05-30 PROCEDURE — 93306 TTE W/DOPPLER COMPLETE: CPT | Performed by: INTERNAL MEDICINE

## 2024-05-30 NOTE — TELEPHONE ENCOUNTER
Patient was in clinic for a carotid ultrasound. Critical/high grade (80-99%) stenosis of the right ICA.  Please see results and advise.

## 2024-05-31 ENCOUNTER — TELEPHONE (OUTPATIENT)
Age: 79
End: 2024-05-31

## 2024-05-31 NOTE — TELEPHONE ENCOUNTER
Spoke with patient, verified with 2 identifiers, regarding results and recommendations. Also this nurse explained that Urgent referral was already faxed to dr. Pineda but she needs to call now and set up an appt as soon as possible.   number provided. Patient voiced understanding.

## 2024-05-31 NOTE — TELEPHONE ENCOUNTER
Salena-please advise the patient that she does not reveal tight blockages in the right sided carotid artery which supplies blood to the brain.  I recommend she consult with Dr. Denis Pineda or one of his colleagues of vascular surgery ASAP.  Please enter referral and confirm that she schedules the appointment.

## 2024-05-31 NOTE — RESULT ENCOUNTER NOTE
See telephone call-referring to vascular for severe right carotid stenosis.  As always, if numbness tingling slurred speech or weakness on one side of the body, go to the emergency room.  I would continue aspirin, and that Plavix 75 mg daily, watch for any signs of more than minor bleeding.

## 2024-06-02 LAB
ECHO AO ROOT DIAM: 3.3 CM
ECHO AO ROOT INDEX: 2.04 CM/M2
ECHO AV AREA PEAK VELOCITY: 2.3 CM2
ECHO AV AREA VTI: 2.7 CM2
ECHO AV AREA/BSA PEAK VELOCITY: 1.4 CM2/M2
ECHO AV AREA/BSA VTI: 1.7 CM2/M2
ECHO AV MEAN GRADIENT: 3 MMHG
ECHO AV MEAN VELOCITY: 0.9 M/S
ECHO AV PEAK GRADIENT: 5 MMHG
ECHO AV PEAK VELOCITY: 1.1 M/S
ECHO AV VELOCITY RATIO: 0.73
ECHO AV VTI: 26.4 CM
ECHO BSA: 1.66 M2
ECHO EST RA PRESSURE: 5 MMHG
ECHO LA DIAMETER INDEX: 1.73 CM/M2
ECHO LA DIAMETER: 2.8 CM
ECHO LA TO AORTIC ROOT RATIO: 0.85
ECHO LA VOL A-L A2C: 60 ML (ref 22–52)
ECHO LA VOL A-L A4C: 62 ML (ref 22–52)
ECHO LA VOL BP: 60 ML (ref 22–52)
ECHO LA VOL MOD A2C: 55 ML (ref 22–52)
ECHO LA VOL MOD A4C: 58 ML (ref 22–52)
ECHO LA VOL/BSA BIPLANE: 37 ML/M2 (ref 16–34)
ECHO LA VOLUME AREA LENGTH: 65 ML
ECHO LA VOLUME INDEX A-L A2C: 37 ML/M2 (ref 16–34)
ECHO LA VOLUME INDEX A-L A4C: 38 ML/M2 (ref 16–34)
ECHO LA VOLUME INDEX AREA LENGTH: 40 ML/M2 (ref 16–34)
ECHO LA VOLUME INDEX MOD A2C: 34 ML/M2 (ref 16–34)
ECHO LA VOLUME INDEX MOD A4C: 36 ML/M2 (ref 16–34)
ECHO LV E' LATERAL VELOCITY: 11 CM/S
ECHO LV E' SEPTAL VELOCITY: 7 CM/S
ECHO LV EDV A2C: 32 ML
ECHO LV EDV A4C: 45 ML
ECHO LV EDV BP: 40 ML (ref 56–104)
ECHO LV EDV INDEX A4C: 28 ML/M2
ECHO LV EDV INDEX BP: 25 ML/M2
ECHO LV EDV NDEX A2C: 20 ML/M2
ECHO LV EJECTION FRACTION A2C: 50 %
ECHO LV EJECTION FRACTION A4C: 67 %
ECHO LV EJECTION FRACTION BIPLANE: 61 % (ref 55–100)
ECHO LV ESV A2C: 16 ML
ECHO LV ESV A4C: 15 ML
ECHO LV ESV BP: 16 ML (ref 19–49)
ECHO LV ESV INDEX A2C: 10 ML/M2
ECHO LV ESV INDEX A4C: 9 ML/M2
ECHO LV ESV INDEX BP: 10 ML/M2
ECHO LV FRACTIONAL SHORTENING: 29 % (ref 28–44)
ECHO LV INTERNAL DIMENSION DIASTOLE INDEX: 2.59 CM/M2
ECHO LV INTERNAL DIMENSION DIASTOLIC: 4.2 CM (ref 3.9–5.3)
ECHO LV INTERNAL DIMENSION SYSTOLIC INDEX: 1.85 CM/M2
ECHO LV INTERNAL DIMENSION SYSTOLIC: 3 CM
ECHO LV IVSD: 1.1 CM (ref 0.6–0.9)
ECHO LV MASS 2D: 147 G (ref 67–162)
ECHO LV MASS INDEX 2D: 90.7 G/M2 (ref 43–95)
ECHO LV POSTERIOR WALL DIASTOLIC: 1 CM (ref 0.6–0.9)
ECHO LV RELATIVE WALL THICKNESS RATIO: 0.48
ECHO LVOT AREA: 3.1 CM2
ECHO LVOT AV VTI INDEX: 0.83
ECHO LVOT DIAM: 2 CM
ECHO LVOT MEAN GRADIENT: 2 MMHG
ECHO LVOT PEAK GRADIENT: 3 MMHG
ECHO LVOT PEAK VELOCITY: 0.8 M/S
ECHO LVOT STROKE VOLUME INDEX: 42.3 ML/M2
ECHO LVOT SV: 68.5 ML
ECHO LVOT VTI: 21.8 CM
ECHO MV A VELOCITY: 1.09 M/S
ECHO MV E DECELERATION TIME (DT): 281 MS
ECHO MV E VELOCITY: 0.66 M/S
ECHO MV E/A RATIO: 0.61
ECHO MV E/E' LATERAL: 6
ECHO MV E/E' RATIO (AVERAGED): 7.71
ECHO MV E/E' SEPTAL: 9.43
ECHO PV MAX VELOCITY: 0.9 M/S
ECHO PV PEAK GRADIENT: 4 MMHG
ECHO RA MINOR AXIS INDEX: 2.28 CM/M2
ECHO RA MINOR AXIS: 3.7 CM
ECHO RIGHT VENTRICULAR SYSTOLIC PRESSURE (RVSP): 34 MMHG
ECHO RV FREE WALL PEAK S': 11 CM/S
ECHO RV INTERNAL DIMENSION: 3.3 CM
ECHO TV REGURGITANT MAX VELOCITY: 2.67 M/S
ECHO TV REGURGITANT PEAK GRADIENT: 29 MMHG

## 2024-06-02 PROCEDURE — 93306 TTE W/DOPPLER COMPLETE: CPT | Performed by: INTERNAL MEDICINE

## 2024-06-03 ENCOUNTER — TELEPHONE (OUTPATIENT)
Age: 79
End: 2024-06-03

## 2024-06-03 NOTE — TELEPHONE ENCOUNTER
----- Message from Jhon Corrales MD sent at 5/31/2024  4:51 PM EDT -----  See telephone call-referring to vascular for severe right carotid stenosis.  As always, if numbness tingling slurred speech or weakness on one side of the body, go to the emergency room.  I would continue aspirin, and that Plavix 75 mg daily, watch for any signs of more than minor bleeding.    Secure message left  on voice mail to call office back at [phone]

## 2024-06-04 ENCOUNTER — TRANSCRIBE ORDERS (OUTPATIENT)
Facility: HOSPITAL | Age: 79
End: 2024-06-04

## 2024-06-04 DIAGNOSIS — I65.23 BILATERAL CAROTID ARTERY STENOSIS: Primary | ICD-10-CM

## 2024-06-05 ENCOUNTER — TELEPHONE (OUTPATIENT)
Age: 79
End: 2024-06-05

## 2024-06-05 RX ORDER — CLOPIDOGREL BISULFATE 75 MG/1
75 TABLET ORAL DAILY
Qty: 90 TABLET | Refills: 4 | Status: SHIPPED | OUTPATIENT
Start: 2024-06-05

## 2024-06-05 NOTE — TELEPHONE ENCOUNTER
- Message from Jhon Corrales MD   Please advise heart function remains normal on echo and moderate leakage of 1 valve is stable.  Continue same.    Jhon Corrales MD   See telephone call-referring to vascular for severe right carotid stenosis.  As always, if numbness tingling slurred speech or weakness on one side of the body, go to the emergency room.  I would continue aspirin, and that Plavix 75 mg daily, watch for any signs of more than minor bleeding.    Spoke with patient , verified patient with two identifiers, regarding results and recommendations. Patient voiced understanding. Patient already has an appointment for a CT scan on June 20 and FU with Vascular surgeon on June 21, stated.     Plavix sent to pharmacy on file. Patient will start tomorrow,stated.

## 2024-06-05 NOTE — RESULT ENCOUNTER NOTE
Please advise heart function remains normal on echo and moderate leakage of 1 valve is stable.  Continue same.

## 2024-06-06 NOTE — TELEPHONE ENCOUNTER
Patient called in stating that she is having medication questions and she would like to please have an call back from an nurse . She does not have access to [facility] !       Patient # ~ [phone]

## 2024-06-06 NOTE — TELEPHONE ENCOUNTER
Spoke with patient, verified with 2 identifiers. Ms paredes stated that she never had a history of bleeding but she is concern because she was reading about side effects. This nurse explained that she can monitor for possible bleeding monitoring for pink-red urine, black-tarry stools or any kind of bleeding.  Patient voiced understanding.

## 2024-06-20 ENCOUNTER — HOSPITAL ENCOUNTER (OUTPATIENT)
Facility: HOSPITAL | Age: 79
Discharge: HOME | End: 2024-06-20
Attending: SURGERY
Payer: MEDICARE

## 2024-06-20 DIAGNOSIS — I65.23 BILATERAL CAROTID ARTERY STENOSIS: ICD-10-CM

## 2024-06-20 PROCEDURE — 70496 CT ANGIOGRAPHY HEAD: CPT

## 2024-06-20 PROCEDURE — 6360000004 HC RX CONTRAST MEDICATION: Performed by: SURGERY

## 2024-06-20 RX ADMIN — IOPAMIDOL 100 ML: 755 INJECTION, SOLUTION INTRAVENOUS at 10:56

## 2024-07-10 ENCOUNTER — HOSPITAL ENCOUNTER (OUTPATIENT)
Facility: HOSPITAL | Age: 79
Discharge: HOME | End: 2024-07-13
Payer: MEDICARE

## 2024-07-10 VITALS
HEIGHT: 60 IN | TEMPERATURE: 97.6 F | OXYGEN SATURATION: 100 % | SYSTOLIC BLOOD PRESSURE: 157 MMHG | BODY MASS INDEX: 28.61 KG/M2 | HEART RATE: 61 BPM | RESPIRATION RATE: 14 BRPM | WEIGHT: 145.72 LBS | DIASTOLIC BLOOD PRESSURE: 50 MMHG

## 2024-07-10 LAB
ABO + RH BLD: NORMAL
ALBUMIN SERPL-MCNC: 3.8 G/DL (ref 3.5–5)
ALBUMIN/GLOB SERPL: 1.1 (ref 1.1–2.2)
ALP SERPL-CCNC: 54 U/L (ref 45–117)
ALT SERPL-CCNC: 26 U/L (ref 12–78)
ANION GAP SERPL CALC-SCNC: 7 MMOL/L (ref 5–15)
APPEARANCE UR: CLEAR
APTT PPP: 25.3 SEC (ref 22.1–31)
AST SERPL-CCNC: 18 U/L (ref 15–37)
BACTERIA URNS QL MICRO: NEGATIVE /HPF
BILIRUB SERPL-MCNC: 1 MG/DL (ref 0.2–1)
BILIRUB UR QL: NEGATIVE
BLOOD GROUP ANTIBODIES SERPL: NORMAL
BUN SERPL-MCNC: 13 MG/DL (ref 6–20)
BUN/CREAT SERPL: 19 (ref 12–20)
CALCIUM SERPL-MCNC: 9.4 MG/DL (ref 8.5–10.1)
CHLORIDE SERPL-SCNC: 100 MMOL/L (ref 97–108)
CO2 SERPL-SCNC: 24 MMOL/L (ref 21–32)
COLOR UR: ABNORMAL
CREAT SERPL-MCNC: 0.7 MG/DL (ref 0.55–1.02)
EPITH CASTS URNS QL MICRO: ABNORMAL /LPF
ERYTHROCYTE [DISTWIDTH] IN BLOOD BY AUTOMATED COUNT: 12.1 % (ref 11.5–14.5)
GLOBULIN SER CALC-MCNC: 3.5 G/DL (ref 2–4)
GLUCOSE SERPL-MCNC: 181 MG/DL (ref 65–100)
GLUCOSE UR STRIP.AUTO-MCNC: NEGATIVE MG/DL
HCT VFR BLD AUTO: 37.6 % (ref 35–47)
HGB BLD-MCNC: 12.6 G/DL (ref 11.5–16)
HGB UR QL STRIP: NEGATIVE
HYALINE CASTS URNS QL MICRO: ABNORMAL /LPF (ref 0–2)
INR PPP: 1 (ref 0.9–1.1)
KETONES UR QL STRIP.AUTO: NEGATIVE MG/DL
LEUKOCYTE ESTERASE UR QL STRIP.AUTO: ABNORMAL
MCH RBC QN AUTO: 33.2 PG (ref 26–34)
MCHC RBC AUTO-ENTMCNC: 33.5 G/DL (ref 30–36.5)
MCV RBC AUTO: 98.9 FL (ref 80–99)
NITRITE UR QL STRIP.AUTO: NEGATIVE
NRBC # BLD: 0 K/UL (ref 0–0.01)
NRBC BLD-RTO: 0 PER 100 WBC
PH UR STRIP: 7.5 (ref 5–8)
PLATELET # BLD AUTO: 183 K/UL (ref 150–400)
PMV BLD AUTO: 10 FL (ref 8.9–12.9)
POTASSIUM SERPL-SCNC: 4 MMOL/L (ref 3.5–5.1)
PROT SERPL-MCNC: 7.3 G/DL (ref 6.4–8.2)
PROT UR STRIP-MCNC: NEGATIVE MG/DL
PROTHROMBIN TIME: 10.3 SEC (ref 9–11.1)
RBC # BLD AUTO: 3.8 M/UL (ref 3.8–5.2)
RBC #/AREA URNS HPF: ABNORMAL /HPF (ref 0–5)
SODIUM SERPL-SCNC: 131 MMOL/L (ref 136–145)
SP GR UR REFRACTOMETRY: 1.01
SPECIMEN EXP DATE BLD: NORMAL
THERAPEUTIC RANGE: NORMAL SECS (ref 58–77)
URINE CULTURE IF INDICATED: ABNORMAL
UROBILINOGEN UR QL STRIP.AUTO: 1 EU/DL (ref 0.2–1)
WBC # BLD AUTO: 7.9 K/UL (ref 3.6–11)
WBC URNS QL MICRO: ABNORMAL /HPF (ref 0–4)

## 2024-07-10 PROCEDURE — 86901 BLOOD TYPING SEROLOGIC RH(D): CPT

## 2024-07-10 PROCEDURE — 85730 THROMBOPLASTIN TIME PARTIAL: CPT

## 2024-07-10 PROCEDURE — 81001 URINALYSIS AUTO W/SCOPE: CPT

## 2024-07-10 PROCEDURE — 86900 BLOOD TYPING SEROLOGIC ABO: CPT

## 2024-07-10 PROCEDURE — 85027 COMPLETE CBC AUTOMATED: CPT

## 2024-07-10 PROCEDURE — 85610 PROTHROMBIN TIME: CPT

## 2024-07-10 PROCEDURE — 86850 RBC ANTIBODY SCREEN: CPT

## 2024-07-10 PROCEDURE — 80053 COMPREHEN METABOLIC PANEL: CPT

## 2024-07-10 PROCEDURE — 36415 COLL VENOUS BLD VENIPUNCTURE: CPT

## 2024-07-10 RX ORDER — SODIUM CHLORIDE, SODIUM LACTATE, POTASSIUM CHLORIDE, CALCIUM CHLORIDE 600; 310; 30; 20 MG/100ML; MG/100ML; MG/100ML; MG/100ML
INJECTION, SOLUTION INTRAVENOUS CONTINUOUS
OUTPATIENT
Start: 2024-07-17

## 2024-07-10 NOTE — PROGRESS NOTES
[facility]  Preoperative Instructions        Surgery Date: 7/17/24          Time of Arrival: To Be Called  Contact: [phone]  (please also call sister Xenia Trammell [phone])    On the day of your surgery, please report to Surgical Services Registration Desk and sign in at your designated time.  The Surgery Center is located to the right of the Emergency Room.     2. You must have someone with you to drive you home. You should not drive a car for 24 hours following surgery. Please make arrangements for a friend or family member to stay with you for the first 24 hours after your surgery.    3. Do not have anything to eat or drink (including water, gum, mints, coffee, juice) after midnight Tuesday July 16th, 2024.?This may not apply to medications prescribed by your physician. ?(Please note below the special instructions with medications to take the morning of your procedure.)    4. We recommend you do not drink any alcoholic beverages for 24 hours before and after your surgery.    5. Contact your surgeon's office for instructions on the following medications: non-steroidal anti-inflammatory drugs (i.e. Advil, Aleve), vitamins, and supplements. (Some surgeon's will want you to stop these medications prior to surgery and others may allow you to take them)  **If you are currently taking Plavix, Coumadin, Aspirin and/or other blood-thinning agents, contact your surgeon for instructions.** Your surgeon will partner with the physician prescribing these medications to determine if it is safe to stop or if you need to continue taking.  Please do not stop taking these medications without instructions from your surgeon    6. Wear comfortable clothes.  Wear glasses instead of contacts.  Do not bring any money or jewelry. Please bring picture ID, insurance card, and any prearranged co-payment or hospital payment.  Do not wear make-up, particularly mascara the morning of your surgery.  Do not wear

## 2024-07-10 NOTE — PROGRESS NOTES

## 2024-07-17 ENCOUNTER — ANESTHESIA (OUTPATIENT)
Facility: HOSPITAL | Age: 79
End: 2024-07-17
Payer: MEDICARE

## 2024-07-17 ENCOUNTER — ANESTHESIA EVENT (OUTPATIENT)
Facility: HOSPITAL | Age: 79
End: 2024-07-17
Payer: MEDICARE

## 2024-07-17 ENCOUNTER — HOSPITAL ENCOUNTER (INPATIENT)
Facility: HOSPITAL | Age: 79
LOS: 1 days | Discharge: HOME | End: 2024-07-18
Attending: SURGERY | Admitting: SURGERY
Payer: MEDICARE

## 2024-07-17 PROBLEM — I65.21 CAROTID ARTERY STENOSIS, ASYMPTOMATIC, RIGHT: Status: ACTIVE | Noted: 2024-07-17

## 2024-07-17 LAB
GLUCOSE BLD STRIP.AUTO-MCNC: 165 MG/DL (ref 65–117)
GLUCOSE BLD STRIP.AUTO-MCNC: 168 MG/DL (ref 65–117)
SERVICE CMNT-IMP: ABNORMAL
SERVICE CMNT-IMP: ABNORMAL

## 2024-07-17 PROCEDURE — 03CH0ZZ EXTIRPATION OF MATTER FROM RIGHT COMMON CAROTID ARTERY, OPEN APPROACH: ICD-10-PCS | Performed by: SURGERY

## 2024-07-17 PROCEDURE — 82962 GLUCOSE BLOOD TEST: CPT

## 2024-07-17 PROCEDURE — 6370000000 HC RX 637 (ALT 250 FOR IP): Performed by: ANESTHESIOLOGY

## 2024-07-17 PROCEDURE — 6360000002 HC RX W HCPCS: Performed by: SURGERY

## 2024-07-17 PROCEDURE — 2720000010 HC SURG SUPPLY STERILE: Performed by: SURGERY

## 2024-07-17 PROCEDURE — A4217 STERILE WATER/SALINE, 500 ML: HCPCS | Performed by: SURGERY

## 2024-07-17 PROCEDURE — 7100000000 HC PACU RECOVERY - FIRST 15 MIN: Performed by: SURGERY

## 2024-07-17 PROCEDURE — 2709999900 HC NON-CHARGEABLE SUPPLY: Performed by: SURGERY

## 2024-07-17 PROCEDURE — C1768 GRAFT, VASCULAR: HCPCS | Performed by: SURGERY

## 2024-07-17 PROCEDURE — 2060000000 HC ICU INTERMEDIATE R&B

## 2024-07-17 PROCEDURE — 2580000003 HC RX 258: Performed by: SURGERY

## 2024-07-17 PROCEDURE — 3600000013 HC SURGERY LEVEL 3 ADDTL 15MIN: Performed by: SURGERY

## 2024-07-17 PROCEDURE — 6360000002 HC RX W HCPCS

## 2024-07-17 PROCEDURE — 3600000003 HC SURGERY LEVEL 3 BASE: Performed by: SURGERY

## 2024-07-17 PROCEDURE — 7100000001 HC PACU RECOVERY - ADDTL 15 MIN: Performed by: SURGERY

## 2024-07-17 PROCEDURE — 03UH0KZ SUPPLEMENT RIGHT COMMON CAROTID ARTERY WITH NONAUTOLOGOUS TISSUE SUBSTITUTE, OPEN APPROACH: ICD-10-PCS | Performed by: SURGERY

## 2024-07-17 PROCEDURE — C1889 IMPLANT/INSERT DEVICE, NOC: HCPCS | Performed by: SURGERY

## 2024-07-17 PROCEDURE — 6360000002 HC RX W HCPCS: Performed by: ANESTHESIOLOGY

## 2024-07-17 PROCEDURE — 2580000003 HC RX 258

## 2024-07-17 PROCEDURE — 2500000003 HC RX 250 WO HCPCS

## 2024-07-17 PROCEDURE — 6370000000 HC RX 637 (ALT 250 FOR IP): Performed by: SURGERY

## 2024-07-17 PROCEDURE — 3700000000 HC ANESTHESIA ATTENDED CARE: Performed by: SURGERY

## 2024-07-17 PROCEDURE — 2500000003 HC RX 250 WO HCPCS: Performed by: ANESTHESIOLOGY

## 2024-07-17 PROCEDURE — 3700000001 HC ADD 15 MINUTES (ANESTHESIA): Performed by: SURGERY

## 2024-07-17 DEVICE — VASCU-GUARD IS PREPARED FROM BOVINE PERICARDIUM CROSS-LINKED WITH GLUTARALDEHYDE, AND TREATED WITH 1 MOLAR SODIUM HYDROXIDE FOR A MINIMUM OF 60 MINUTES AT 20-25 DEGREES C. VASCU-GUARD IS TERMINALLY STERILIZED USING GAMMA IRRADIATION. AND PACKAGED WITHIN A DOUBLE-POUCH SYSTEM. THE CONTENTS OF THE UNOPENED, UNDAMAGED OUTER POUCH ARE STERILE.
Type: IMPLANTABLE DEVICE | Site: CAROTID | Status: FUNCTIONAL
Brand: VASCU-GUARD

## 2024-07-17 RX ORDER — IPRATROPIUM BROMIDE AND ALBUTEROL SULFATE 2.5; .5 MG/3ML; MG/3ML
1 SOLUTION RESPIRATORY (INHALATION) ONCE
Status: COMPLETED | OUTPATIENT
Start: 2024-07-17 | End: 2024-07-17

## 2024-07-17 RX ORDER — ACETAMINOPHEN 500 MG
1000 TABLET ORAL ONCE
Status: COMPLETED | OUTPATIENT
Start: 2024-07-17 | End: 2024-07-17

## 2024-07-17 RX ORDER — ONDANSETRON 2 MG/ML
4 INJECTION INTRAMUSCULAR; INTRAVENOUS EVERY 6 HOURS PRN
Status: DISCONTINUED | OUTPATIENT
Start: 2024-07-17 | End: 2024-07-18 | Stop reason: HOSPADM

## 2024-07-17 RX ORDER — MIDAZOLAM HYDROCHLORIDE 2 MG/2ML
2 INJECTION, SOLUTION INTRAMUSCULAR; INTRAVENOUS
Status: DISCONTINUED | OUTPATIENT
Start: 2024-07-17 | End: 2024-07-17

## 2024-07-17 RX ORDER — NOREPINEPHRINE BITARTRATE/D5W 8 MG/250ML
PLASTIC BAG, INJECTION (ML) INTRAVENOUS PRN
Status: DISCONTINUED | OUTPATIENT
Start: 2024-07-17 | End: 2024-07-17 | Stop reason: SDUPTHER

## 2024-07-17 RX ORDER — SODIUM CHLORIDE 0.9 % (FLUSH) 0.9 %
5-40 SYRINGE (ML) INJECTION PRN
Status: DISCONTINUED | OUTPATIENT
Start: 2024-07-17 | End: 2024-07-18 | Stop reason: HOSPADM

## 2024-07-17 RX ORDER — SODIUM CHLORIDE 9 MG/ML
INJECTION, SOLUTION INTRAVENOUS CONTINUOUS PRN
Status: DISCONTINUED | OUTPATIENT
Start: 2024-07-17 | End: 2024-07-17 | Stop reason: SDUPTHER

## 2024-07-17 RX ORDER — SODIUM CHLORIDE 9 MG/ML
INJECTION, SOLUTION INTRAVENOUS PRN
Status: DISCONTINUED | OUTPATIENT
Start: 2024-07-17 | End: 2024-07-18

## 2024-07-17 RX ORDER — HYDROMORPHONE HYDROCHLORIDE 1 MG/ML
0.25 INJECTION, SOLUTION INTRAMUSCULAR; INTRAVENOUS; SUBCUTANEOUS EVERY 5 MIN PRN
Status: DISCONTINUED | OUTPATIENT
Start: 2024-07-17 | End: 2024-07-17

## 2024-07-17 RX ORDER — OXYCODONE HYDROCHLORIDE 5 MG/1
5 TABLET ORAL EVERY 4 HOURS PRN
Status: DISCONTINUED | OUTPATIENT
Start: 2024-07-17 | End: 2024-07-18

## 2024-07-17 RX ORDER — ALBUTEROL SULFATE 2.5 MG/3ML
SOLUTION RESPIRATORY (INHALATION)
Status: DISPENSED
Start: 2024-07-17 | End: 2024-07-17

## 2024-07-17 RX ORDER — MIDAZOLAM HYDROCHLORIDE 2 MG/2ML
0.5 INJECTION, SOLUTION INTRAMUSCULAR; INTRAVENOUS ONCE
Status: COMPLETED | OUTPATIENT
Start: 2024-07-17 | End: 2024-07-17

## 2024-07-17 RX ORDER — FENTANYL CITRATE 50 UG/ML
25 INJECTION, SOLUTION INTRAMUSCULAR; INTRAVENOUS EVERY 5 MIN PRN
Status: COMPLETED | OUTPATIENT
Start: 2024-07-17 | End: 2024-07-17

## 2024-07-17 RX ORDER — FAMOTIDINE 20 MG/1
40 TABLET, FILM COATED ORAL DAILY
Status: DISCONTINUED | OUTPATIENT
Start: 2024-07-17 | End: 2024-07-18 | Stop reason: HOSPADM

## 2024-07-17 RX ORDER — PROCHLORPERAZINE EDISYLATE 5 MG/ML
5 INJECTION INTRAMUSCULAR; INTRAVENOUS
Status: COMPLETED | OUTPATIENT
Start: 2024-07-17 | End: 2024-07-17

## 2024-07-17 RX ORDER — SODIUM CHLORIDE 9 MG/ML
INJECTION, SOLUTION INTRAVENOUS CONTINUOUS
Status: DISCONTINUED | OUTPATIENT
Start: 2024-07-17 | End: 2024-07-18

## 2024-07-17 RX ORDER — LIDOCAINE HYDROCHLORIDE 20 MG/ML
INJECTION, SOLUTION EPIDURAL; INFILTRATION; INTRACAUDAL; PERINEURAL PRN
Status: DISCONTINUED | OUTPATIENT
Start: 2024-07-17 | End: 2024-07-17 | Stop reason: SDUPTHER

## 2024-07-17 RX ORDER — LOSARTAN POTASSIUM 50 MG/1
50 TABLET ORAL DAILY
Status: DISCONTINUED | OUTPATIENT
Start: 2024-07-17 | End: 2024-07-18 | Stop reason: HOSPADM

## 2024-07-17 RX ORDER — HYDRALAZINE HYDROCHLORIDE 20 MG/ML
10 INJECTION INTRAMUSCULAR; INTRAVENOUS
Status: DISCONTINUED | OUTPATIENT
Start: 2024-07-17 | End: 2024-07-17

## 2024-07-17 RX ORDER — AMLODIPINE BESYLATE 5 MG/1
5 TABLET ORAL DAILY
Status: DISCONTINUED | OUTPATIENT
Start: 2024-07-17 | End: 2024-07-18 | Stop reason: HOSPADM

## 2024-07-17 RX ORDER — ASPIRIN 81 MG/1
81 TABLET ORAL DAILY
Status: DISCONTINUED | OUTPATIENT
Start: 2024-07-17 | End: 2024-07-18 | Stop reason: HOSPADM

## 2024-07-17 RX ORDER — SODIUM CHLORIDE 9 MG/ML
INJECTION, SOLUTION INTRAVENOUS PRN
Status: DISCONTINUED | OUTPATIENT
Start: 2024-07-17 | End: 2024-07-17

## 2024-07-17 RX ORDER — MORPHINE SULFATE 4 MG/ML
2 INJECTION, SOLUTION INTRAMUSCULAR; INTRAVENOUS
Status: DISCONTINUED | OUTPATIENT
Start: 2024-07-17 | End: 2024-07-18

## 2024-07-17 RX ORDER — FENTANYL CITRATE 50 UG/ML
INJECTION, SOLUTION INTRAMUSCULAR; INTRAVENOUS PRN
Status: DISCONTINUED | OUTPATIENT
Start: 2024-07-17 | End: 2024-07-17 | Stop reason: SDUPTHER

## 2024-07-17 RX ORDER — SODIUM CHLORIDE 0.9 % (FLUSH) 0.9 %
5-40 SYRINGE (ML) INJECTION EVERY 12 HOURS SCHEDULED
Status: DISCONTINUED | OUTPATIENT
Start: 2024-07-17 | End: 2024-07-17

## 2024-07-17 RX ORDER — ATORVASTATIN CALCIUM 20 MG/1
20 TABLET, FILM COATED ORAL DAILY
Status: DISCONTINUED | OUTPATIENT
Start: 2024-07-17 | End: 2024-07-18 | Stop reason: HOSPADM

## 2024-07-17 RX ORDER — PHENYLEPHRINE HYDROCHLORIDE 10 MG/ML
INJECTION INTRAVENOUS
Status: DISPENSED
Start: 2024-07-17 | End: 2024-07-17

## 2024-07-17 RX ORDER — HYDROMORPHONE HYDROCHLORIDE 2 MG/ML
INJECTION, SOLUTION INTRAMUSCULAR; INTRAVENOUS; SUBCUTANEOUS PRN
Status: DISCONTINUED | OUTPATIENT
Start: 2024-07-17 | End: 2024-07-17 | Stop reason: SDUPTHER

## 2024-07-17 RX ORDER — SODIUM CHLORIDE, SODIUM LACTATE, POTASSIUM CHLORIDE, CALCIUM CHLORIDE 600; 310; 30; 20 MG/100ML; MG/100ML; MG/100ML; MG/100ML
INJECTION, SOLUTION INTRAVENOUS CONTINUOUS
Status: DISCONTINUED | OUTPATIENT
Start: 2024-07-17 | End: 2024-07-17

## 2024-07-17 RX ORDER — NALOXONE HYDROCHLORIDE 0.4 MG/ML
INJECTION, SOLUTION INTRAMUSCULAR; INTRAVENOUS; SUBCUTANEOUS PRN
Status: DISCONTINUED | OUTPATIENT
Start: 2024-07-17 | End: 2024-07-17

## 2024-07-17 RX ORDER — ACETAMINOPHEN 500 MG
1000 TABLET ORAL EVERY 6 HOURS PRN
Status: DISCONTINUED | OUTPATIENT
Start: 2024-07-17 | End: 2024-07-18 | Stop reason: HOSPADM

## 2024-07-17 RX ORDER — ONDANSETRON 2 MG/ML
INJECTION INTRAMUSCULAR; INTRAVENOUS PRN
Status: DISCONTINUED | OUTPATIENT
Start: 2024-07-17 | End: 2024-07-17 | Stop reason: SDUPTHER

## 2024-07-17 RX ORDER — SODIUM CHLORIDE 0.9 % (FLUSH) 0.9 %
5-40 SYRINGE (ML) INJECTION PRN
Status: DISCONTINUED | OUTPATIENT
Start: 2024-07-17 | End: 2024-07-17

## 2024-07-17 RX ORDER — GLYCOPYRROLATE 0.2 MG/ML
INJECTION INTRAMUSCULAR; INTRAVENOUS PRN
Status: DISCONTINUED | OUTPATIENT
Start: 2024-07-17 | End: 2024-07-17 | Stop reason: SDUPTHER

## 2024-07-17 RX ORDER — ENOXAPARIN SODIUM 100 MG/ML
40 INJECTION SUBCUTANEOUS DAILY
Status: DISCONTINUED | OUTPATIENT
Start: 2024-07-17 | End: 2024-07-18 | Stop reason: HOSPADM

## 2024-07-17 RX ORDER — HEPARIN SODIUM 1000 [USP'U]/ML
INJECTION, SOLUTION INTRAVENOUS; SUBCUTANEOUS PRN
Status: DISCONTINUED | OUTPATIENT
Start: 2024-07-17 | End: 2024-07-17 | Stop reason: SDUPTHER

## 2024-07-17 RX ORDER — ONDANSETRON 2 MG/ML
4 INJECTION INTRAMUSCULAR; INTRAVENOUS ONCE
Status: DISCONTINUED | OUTPATIENT
Start: 2024-07-17 | End: 2024-07-17

## 2024-07-17 RX ORDER — SODIUM CHLORIDE 0.9 % (FLUSH) 0.9 %
5-40 SYRINGE (ML) INJECTION EVERY 12 HOURS SCHEDULED
Status: DISCONTINUED | OUTPATIENT
Start: 2024-07-17 | End: 2024-07-18

## 2024-07-17 RX ORDER — PROPOFOL 10 MG/ML
INJECTION, EMULSION INTRAVENOUS PRN
Status: DISCONTINUED | OUTPATIENT
Start: 2024-07-17 | End: 2024-07-17 | Stop reason: SDUPTHER

## 2024-07-17 RX ORDER — SODIUM CHLORIDE, SODIUM LACTATE, POTASSIUM CHLORIDE, CALCIUM CHLORIDE 600; 310; 30; 20 MG/100ML; MG/100ML; MG/100ML; MG/100ML
INJECTION, SOLUTION INTRAVENOUS CONTINUOUS PRN
Status: DISCONTINUED | OUTPATIENT
Start: 2024-07-17 | End: 2024-07-17 | Stop reason: SDUPTHER

## 2024-07-17 RX ORDER — FLUTICASONE PROPIONATE 50 MCG
2 SPRAY, SUSPENSION (ML) NASAL DAILY
Status: DISCONTINUED | OUTPATIENT
Start: 2024-07-17 | End: 2024-07-18 | Stop reason: HOSPADM

## 2024-07-17 RX ORDER — ONDANSETRON 4 MG/1
4 TABLET, ORALLY DISINTEGRATING ORAL EVERY 8 HOURS PRN
Status: DISCONTINUED | OUTPATIENT
Start: 2024-07-17 | End: 2024-07-18 | Stop reason: HOSPADM

## 2024-07-17 RX ORDER — ACETAMINOPHEN 325 MG/1
650 TABLET ORAL
Status: DISCONTINUED | OUTPATIENT
Start: 2024-07-17 | End: 2024-07-17

## 2024-07-17 RX ORDER — DEXAMETHASONE SODIUM PHOSPHATE 4 MG/ML
INJECTION, SOLUTION INTRA-ARTICULAR; INTRALESIONAL; INTRAMUSCULAR; INTRAVENOUS; SOFT TISSUE PRN
Status: DISCONTINUED | OUTPATIENT
Start: 2024-07-17 | End: 2024-07-17 | Stop reason: SDUPTHER

## 2024-07-17 RX ORDER — DEXAMETHASONE SODIUM PHOSPHATE 4 MG/ML
4 INJECTION, SOLUTION INTRA-ARTICULAR; INTRALESIONAL; INTRAMUSCULAR; INTRAVENOUS; SOFT TISSUE
Status: DISCONTINUED | OUTPATIENT
Start: 2024-07-17 | End: 2024-07-17

## 2024-07-17 RX ORDER — PROTAMINE SULFATE 10 MG/ML
INJECTION, SOLUTION INTRAVENOUS PRN
Status: DISCONTINUED | OUTPATIENT
Start: 2024-07-17 | End: 2024-07-17 | Stop reason: SDUPTHER

## 2024-07-17 RX ORDER — OXYCODONE HYDROCHLORIDE 5 MG/1
5 TABLET ORAL
Status: DISCONTINUED | OUTPATIENT
Start: 2024-07-17 | End: 2024-07-17

## 2024-07-17 RX ORDER — ROCURONIUM BROMIDE 10 MG/ML
INJECTION, SOLUTION INTRAVENOUS PRN
Status: DISCONTINUED | OUTPATIENT
Start: 2024-07-17 | End: 2024-07-17 | Stop reason: SDUPTHER

## 2024-07-17 RX ORDER — FENTANYL CITRATE 50 UG/ML
100 INJECTION, SOLUTION INTRAMUSCULAR; INTRAVENOUS
Status: DISCONTINUED | OUTPATIENT
Start: 2024-07-17 | End: 2024-07-17

## 2024-07-17 RX ORDER — SUCCINYLCHOLINE/SOD CL,ISO/PF 200MG/10ML
SYRINGE (ML) INTRAVENOUS PRN
Status: DISCONTINUED | OUTPATIENT
Start: 2024-07-17 | End: 2024-07-17 | Stop reason: SDUPTHER

## 2024-07-17 RX ADMIN — PROTAMINE SULFATE 30 MG: 10 INJECTION, SOLUTION INTRAVENOUS at 10:02

## 2024-07-17 RX ADMIN — FENTANYL CITRATE 25 MCG: 50 INJECTION, SOLUTION INTRAMUSCULAR; INTRAVENOUS at 08:29

## 2024-07-17 RX ADMIN — HYDROMORPHONE HYDROCHLORIDE 0.2 MG: 2 INJECTION INTRAMUSCULAR; INTRAVENOUS; SUBCUTANEOUS at 09:30

## 2024-07-17 RX ADMIN — PROPOFOL 100 MG: 10 INJECTION, EMULSION INTRAVENOUS at 08:02

## 2024-07-17 RX ADMIN — FENTANYL CITRATE 25 MCG: 50 INJECTION INTRAMUSCULAR; INTRAVENOUS at 11:25

## 2024-07-17 RX ADMIN — SUGAMMADEX 200 MG: 100 INJECTION, SOLUTION INTRAVENOUS at 10:14

## 2024-07-17 RX ADMIN — FENTANYL CITRATE 25 MCG: 50 INJECTION INTRAMUSCULAR; INTRAVENOUS at 11:20

## 2024-07-17 RX ADMIN — FENTANYL CITRATE 25 MCG: 50 INJECTION, SOLUTION INTRAMUSCULAR; INTRAVENOUS at 08:01

## 2024-07-17 RX ADMIN — ROCURONIUM BROMIDE 10 MG: 10 INJECTION INTRAVENOUS at 09:28

## 2024-07-17 RX ADMIN — FENTANYL CITRATE 50 MCG: 50 INJECTION, SOLUTION INTRAMUSCULAR; INTRAVENOUS at 08:50

## 2024-07-17 RX ADMIN — Medication 4 MCG: at 10:41

## 2024-07-17 RX ADMIN — ACETAMINOPHEN 500 MG: 500 TABLET ORAL at 21:05

## 2024-07-17 RX ADMIN — SODIUM CHLORIDE, POTASSIUM CHLORIDE, SODIUM LACTATE AND CALCIUM CHLORIDE: 600; 310; 30; 20 INJECTION, SOLUTION INTRAVENOUS at 07:57

## 2024-07-17 RX ADMIN — PROPOFOL 50 MG: 10 INJECTION, EMULSION INTRAVENOUS at 08:03

## 2024-07-17 RX ADMIN — ATORVASTATIN CALCIUM 20 MG: 20 TABLET, FILM COATED ORAL at 22:46

## 2024-07-17 RX ADMIN — ENOXAPARIN SODIUM 40 MG: 100 INJECTION SUBCUTANEOUS at 14:47

## 2024-07-17 RX ADMIN — Medication 100 MG: at 08:03

## 2024-07-17 RX ADMIN — DEXAMETHASONE SODIUM PHOSPHATE 4 MG: 4 INJECTION INTRA-ARTICULAR; INTRALESIONAL; INTRAMUSCULAR; INTRAVENOUS; SOFT TISSUE at 08:38

## 2024-07-17 RX ADMIN — Medication 4 MCG: at 09:32

## 2024-07-17 RX ADMIN — PHENYLEPHRINE HYDROCHLORIDE 50 MCG/MIN: 10 INJECTION INTRAVENOUS at 14:23

## 2024-07-17 RX ADMIN — PROPOFOL 20 MG: 10 INJECTION, EMULSION INTRAVENOUS at 10:14

## 2024-07-17 RX ADMIN — Medication 4 MCG: at 08:13

## 2024-07-17 RX ADMIN — Medication 4 MCG: at 09:51

## 2024-07-17 RX ADMIN — Medication 4 MCG: at 08:15

## 2024-07-17 RX ADMIN — FENTANYL CITRATE 25 MCG: 50 INJECTION INTRAMUSCULAR; INTRAVENOUS at 11:35

## 2024-07-17 RX ADMIN — ASPIRIN 81 MG: 81 TABLET, COATED ORAL at 14:46

## 2024-07-17 RX ADMIN — MIDAZOLAM HYDROCHLORIDE 0.5 MG: 1 INJECTION, SOLUTION INTRAMUSCULAR; INTRAVENOUS at 11:59

## 2024-07-17 RX ADMIN — LIDOCAINE HYDROCHLORIDE 30 MG: 20 INJECTION, SOLUTION EPIDURAL; INFILTRATION; INTRACAUDAL; PERINEURAL at 08:04

## 2024-07-17 RX ADMIN — LIDOCAINE HYDROCHLORIDE 50 MG: 20 INJECTION, SOLUTION EPIDURAL; INFILTRATION; INTRACAUDAL; PERINEURAL at 08:02

## 2024-07-17 RX ADMIN — PHENYLEPHRINE HYDROCHLORIDE 40 MCG/MIN: 10 INJECTION INTRAVENOUS at 08:18

## 2024-07-17 RX ADMIN — HEPARIN SODIUM 2000 UNITS: 1000 INJECTION, SOLUTION INTRAVENOUS; SUBCUTANEOUS at 09:27

## 2024-07-17 RX ADMIN — VANCOMYCIN HYDROCHLORIDE 1000 MG: 1 INJECTION, POWDER, LYOPHILIZED, FOR SOLUTION INTRAVENOUS at 07:12

## 2024-07-17 RX ADMIN — PROCHLORPERAZINE EDISYLATE 2.5 MG: 5 INJECTION INTRAMUSCULAR; INTRAVENOUS at 11:28

## 2024-07-17 RX ADMIN — GLYCOPYRROLATE 0.2 MG: 0.2 INJECTION INTRAMUSCULAR; INTRAVENOUS at 10:22

## 2024-07-17 RX ADMIN — IPRATROPIUM BROMIDE AND ALBUTEROL SULFATE 1 DOSE: 2.5; .5 SOLUTION RESPIRATORY (INHALATION) at 11:40

## 2024-07-17 RX ADMIN — Medication 4 MCG: at 10:39

## 2024-07-17 RX ADMIN — HYDROMORPHONE HYDROCHLORIDE 0.25 MG: 1 INJECTION, SOLUTION INTRAMUSCULAR; INTRAVENOUS; SUBCUTANEOUS at 11:25

## 2024-07-17 RX ADMIN — METFORMIN HYDROCHLORIDE 500 MG: 500 TABLET ORAL at 21:06

## 2024-07-17 RX ADMIN — Medication 4 MCG: at 08:58

## 2024-07-17 RX ADMIN — Medication 4 MCG: at 09:37

## 2024-07-17 RX ADMIN — HEPARIN SODIUM 10000 UNITS: 1000 INJECTION, SOLUTION INTRAVENOUS; SUBCUTANEOUS at 08:36

## 2024-07-17 RX ADMIN — SODIUM CHLORIDE: 900 INJECTION, SOLUTION INTRAVENOUS at 08:09

## 2024-07-17 RX ADMIN — HYDROMORPHONE HYDROCHLORIDE 0.2 MG: 2 INJECTION INTRAMUSCULAR; INTRAVENOUS; SUBCUTANEOUS at 09:54

## 2024-07-17 RX ADMIN — Medication 4 MCG: at 08:53

## 2024-07-17 RX ADMIN — FAMOTIDINE 40 MG: 20 TABLET, FILM COATED ORAL at 14:46

## 2024-07-17 RX ADMIN — Medication 4 MCG: at 08:42

## 2024-07-17 RX ADMIN — HYDROMORPHONE HYDROCHLORIDE 0.6 MG: 2 INJECTION INTRAMUSCULAR; INTRAVENOUS; SUBCUTANEOUS at 10:43

## 2024-07-17 RX ADMIN — SODIUM CHLORIDE, POTASSIUM CHLORIDE, SODIUM LACTATE AND CALCIUM CHLORIDE: 600; 310; 30; 20 INJECTION, SOLUTION INTRAVENOUS at 10:10

## 2024-07-17 RX ADMIN — ROCURONIUM BROMIDE 10 MG: 10 INJECTION INTRAVENOUS at 08:53

## 2024-07-17 RX ADMIN — LIDOCAINE HYDROCHLORIDE 50 MG: 20 INJECTION, SOLUTION EPIDURAL; INFILTRATION; INTRACAUDAL; PERINEURAL at 10:20

## 2024-07-17 RX ADMIN — ROCURONIUM BROMIDE 5 MG: 10 INJECTION INTRAVENOUS at 08:02

## 2024-07-17 RX ADMIN — Medication 4 MCG: at 09:04

## 2024-07-17 RX ADMIN — FENTANYL CITRATE 25 MCG: 50 INJECTION INTRAMUSCULAR; INTRAVENOUS at 11:45

## 2024-07-17 RX ADMIN — ONDANSETRON 4 MG: 2 INJECTION INTRAMUSCULAR; INTRAVENOUS at 08:38

## 2024-07-17 RX ADMIN — PHENYLEPHRINE HYDROCHLORIDE 40 MCG/MIN: 10 INJECTION INTRAVENOUS at 10:45

## 2024-07-17 RX ADMIN — SODIUM CHLORIDE: 9 INJECTION, SOLUTION INTRAVENOUS at 11:44

## 2024-07-17 RX ADMIN — HYDROMORPHONE HYDROCHLORIDE 0.4 MG: 2 INJECTION INTRAMUSCULAR; INTRAVENOUS; SUBCUTANEOUS at 10:37

## 2024-07-17 RX ADMIN — ROCURONIUM BROMIDE 35 MG: 10 INJECTION INTRAVENOUS at 08:09

## 2024-07-17 RX ADMIN — ACETAMINOPHEN 1000 MG: 500 TABLET ORAL at 07:02

## 2024-07-17 RX ADMIN — Medication 4 MCG: at 09:01

## 2024-07-17 RX ADMIN — Medication 8 MCG: at 08:55

## 2024-07-17 RX ADMIN — PHENYLEPHRINE HYDROCHLORIDE 20 MCG/MIN: 10 INJECTION INTRAVENOUS at 08:36

## 2024-07-17 RX ADMIN — Medication 4 MCG: at 10:03

## 2024-07-17 ASSESSMENT — PAIN DESCRIPTION - LOCATION
LOCATION: NECK
LOCATION: NECK;EAR
LOCATION: NECK

## 2024-07-17 ASSESSMENT — PAIN SCALES - GENERAL
PAINLEVEL_OUTOF10: 7
PAINLEVEL_OUTOF10: 4
PAINLEVEL_OUTOF10: 5
PAINLEVEL_OUTOF10: 5
PAINLEVEL_OUTOF10: 7

## 2024-07-17 ASSESSMENT — PAIN DESCRIPTION - DESCRIPTORS
DESCRIPTORS: ACHING
DESCRIPTORS: ACHING;SORE;THROBBING

## 2024-07-17 ASSESSMENT — PAIN DESCRIPTION - ORIENTATION
ORIENTATION: RIGHT

## 2024-07-17 ASSESSMENT — PAIN - FUNCTIONAL ASSESSMENT: PAIN_FUNCTIONAL_ASSESSMENT: 0-10

## 2024-07-17 ASSESSMENT — LIFESTYLE VARIABLES: SMOKING_STATUS: 1

## 2024-07-17 NOTE — OP NOTE
[facility]  OPERATIVE REPORT     Name: Elise Schneider  MR#: [id number]  : 1945  DATE OF SERVICE:    24      PREOPERATIVE DIAGNOSIS: Asymptomatic right carotid artery stenosis     POSTOPERATIVE DIAGNOSIS: Asymptomatic right carotid artery stenosis     PROCEDURE PERFORMED: Right carotid endarterectomy with bovine patch angioplasty    SURGEON:  Asad Watts MD.    ASSISTANT: [place]     ANESTHESIA:  General.     COMPLICATIONS:  None.     SPECIMENS REMOVED:  carotid plaque     IMPLANTS:  bovine patch, RISHABH drain     ESTIMATED BLOOD LOSS:  100 ccs     INDICATIONS:  The patient is a 78 year old female with asymptomatic right carotid artery stenosis > 90% presenting for carotid endarterectomy. Risks and benefits of procedure discussed with patient he wished to proceed.         PROCEDURE: The patient was taken to the operating room, placed in supine position, prepped and draped in the usual sterile manner. Longitudinal incisions were made along the anterior border of the sternocleidomastoid, carried down through subcutaneous fat and fascia. Hemostasis was obtained with electrocautery. The platysmal muscle was divided. The carotid sheath was identified and opened. The vagus nerve, ansa cervicalis, and hypoglossal nerves were identified and avoided. The glossopharyngeal nerve and digastric muscle were also identified. The common carotid artery was dissected and heparin was administered. Next the internal and external carotids were then freed from the surrounding tissue. The internal, external and common carotids were then clamped. The carotid bulb was opened with a #11 blade and extended with Huitron scissors through the very tight lesion into normal internal carotid. IA external Sundt shunt was then placed and Edgar clamps used to hold the shunt in the position.The plaque was then sharply excised proximally and an eversion endarterectomy was performed successfully at the external. The plaque edge  on the internal was tacked with 6-0 prolene sutures. I had to work above and below the hypoglossal nerve. Heparinized saline was injected and no evidence of flapping or other debris was noted. The remaining carotid was examined under magnification, which showed no debris of flaps present. The proximal plaque at the common carotid was tacked with 6-0 prolene sutures. At this point, a bovine patch was brought on to the field, cut to appropriate length and size, and anastomosed to the artery using #6-0 Prolene in a running fashion. Prior to closing the patch, The shunt was removed and the internal carotid was back-bled through this. The last stitch was tied. Hemostasis was excellent. The internal was again gently occluded while flow was restored to the common and external carotids for several moments and then flow was restored to the entire system. At this point, protamine was administered and allowed to take effect. Hemostasis was excellent. A RISHABH drain was placed. The wound was irrigated with antibiotic solution and closed in layers using #3-0 Vicryl and #4-0 monocryl. The patient was extubated and noted to be neurologically intact. The patient was then taken to the recovery room in satisfactory condition after tolerating the procedure well. Sponge, needles, and instrument count were correct.      Asad Watts MD

## 2024-07-17 NOTE — ANESTHESIA POSTPROCEDURE EVALUATION
Department of Anesthesiology  Postprocedure Note    Patient: Elise Schneider  MRN: [id number]  YOB: 1945  Date of evaluation: 7/17/2024    Procedure Summary       Date: 07/17/24 Room / Location: [facility] MAIN OR M3 / [facility] MAIN OR    Anesthesia Start: 0757 Anesthesia Stop: 1045    Procedure: RIGHT CAROTID ENDARTERECTOMY (Right: Neck) Diagnosis:       Carotid stenosis, right      (Carotid stenosis, right [I65.21])    Providers: Asad Watts MD Responsible Provider: Bijan Villanueva MD    Anesthesia Type: General ASA Status: 3            Anesthesia Type: General    Rc Phase I: Rc Score: 9    Rc Phase II:      Anesthesia Post Evaluation    Patient location during evaluation: PACU  Patient participation: complete - patient participated  Level of consciousness: sleepy but conscious and responsive to verbal stimuli  Pain score: 1  Airway patency: patent  Nausea & Vomiting: no vomiting and no nausea  Cardiovascular status: blood pressure returned to baseline and hemodynamically stable  Respiratory status: acceptable  Hydration status: stable  Multimodal analgesia pain management approach  Pain management: adequate    No notable events documented.

## 2024-07-17 NOTE — ANESTHESIA PRE PROCEDURE
[address] [facility] [facility]  [address] [address]  [address] [address]  Dept: [phone]  Dept Fax: [phone]  Loc: [phone]    Amy Ford is a 59 y.o. male who presents today for his medical conditions/complaints as noted below. Amy Ford is c/o of Diabetes        HPI:     HPI   Chief Complaint   Patient presents with    Diabetes     Patient presents today for follow-up diabetes, GERD, COPD. He states COPD has been worse lately; he no longer has an inhaler. He states he wakes up coughing in the mornings and difficult to catch his breath. He has not ever needed a long acting inhaler and prefers to try albuterol again and will let us know if not sufficient. He reports blood sugar has been elevated lately. Last A1C was 8.2. He states he has never gotten his trulicity picked up and would like us to resend this. He has only been taking humalog he states; he stopped taking his lantus but was confused on instructions at his last visit. He saw Dr Shaylee Chen in October and Trulicity was prescribed but he just never tried to get it filled; he was also told that if he was unable to get the Trulicity to resume Lantus 20 units at bedtime. However, he did not follow through with either of these instructions but is willing to today.  He prefers to not have to do an injection every night due to compliance issues; he would like to see if he can do a once weekly and get his glucose levels down first.     Past Medical History:   Diagnosis Date    Arthritis     BiPAP (biphasic positive airway pressure) dependence     8cm to 20cm    Chronic back pain     Emphysema/COPD (HCC)     GERD (gastroesophageal reflux disease)     History of anemia     History of blood transfusion     Hyperlipidemia     Hypertension     Neuropathy     Obstructive sleep apnea     AHI:  95.8 per PSG, 3/2017; repeat PSG, 11/2017 revealed an AHI of 65.5    Peripheral vascular disease ([facility] [address]  S/P angioplasty     Type II or unspecified type diabetes mellitus without mention of complication, not stated as uncontrolled       Past Surgical History:   Procedure Laterality Date    CHOLECYSTECTOMY      LARYNGOSCOPY N/A 11/10/2017    Direct laryngoscopy with assessment of hypopharynx, larynx, and post-cricoid areas performed by France Vera MD at [facility] N/A 5/25/2018    MICRO DIRECT LARYNGOSCOPY WITH BIOPSY performed by France Vera MD at [address]      x 2 in past, around [address] [facility] [address] 1/6/2021 10/20/2020 10/8/2020 8/3/2020 3/16/2020 11/06/6034   SYSTOLIC 768 380 567 - 924 905   DIASTOLIC 62 62 82 - 68 62   Pulse 78 94 88 94 93 97   Temp 97.3 97.4 97.3 96.5 97.2 97.8   Resp - 20 18 - - -   SpO2 96 98 97 96 94 98   Weight 251 lb 6.4 oz 251 lb 251 lb - 243 lb 245 lb   Height 5' 7.5\" 5' 7.5\" 5' 7.5\" - 5' 7\" 5' 9\"   Body mass index 38.79 kg/m2 38.73 kg/m2 38.73 kg/m2 - 38.06 kg/m2 36.18 kg/m2   Some recent data might be hidden       Family History   Problem Relation Age of Onset    Diabetes Father     Heart Disease Maternal Grandmother        Social History     Tobacco Use    Smoking status: Former Smoker     Packs/day: 0.25     Years: 20.00     Pack years: 5.00     Types: Cigarettes     Quit date: 1/9/2017     Years since quitting: 3.9    Smokeless tobacco: Never Used   Substance Use Topics    Alcohol use: No      Current Outpatient Medications   Medication Sig Dispense Refill    Dulaglutide (TRULICITY) 1.02 UF/6.0OQ SOPN Inject 0.75 mg into the skin once a week 4 pen 0    hydroCHLOROthiazide (HYDRODIURIL) 25 MG tablet Take 0.5 tablets by mouth daily 90 tablet 0    cyclobenzaprine (FLEXERIL) 5 MG tablet Take 1 tablet by mouth 3 times daily as needed for Muscle spasms 90 tablet 1    albuterol sulfate HFA (VENTOLIN HFA) 108 (90 Base) MCG/ACT inhaler Inhale 2 puffs into the lungs 4 times daily as needed for Wheezing 1 Inhaler 5    pantoprazole (PROTONIX) 40 MG tablet Take 1 tablet by mouth every morning (before breakfast) 30 tablet 5    insulin glargine (LANTUS SOLOSTAR) 100 UNIT/ML injection pen Inject 20 Units into the skin nightly 5 pen 0    amLODIPine (NORVASC) 10 MG tablet Take 1 tablet by mouth once daily 90 tablet 0    atorvastatin (LIPITOR) 40 MG tablet Take 1 tablet by mouth once daily 90 tablet 3    labetalol (NORMODYNE) 100 MG tablet Take 1 tablet by mouth twice daily 180 tablet 1    Diabetic Shoe MISC by Does not apply route DISPENSE ONE PAIR DIABETIC SHOES AND THREE PAIRS OF HEAT MOLDED INSERTS 1 each 0    lisinopril (PRINIVIL;ZESTRIL) 40 MG tablet Take 1 tablet by mouth once daily 90 tablet 1    blood glucose monitor kit and supplies Test 4 times a day & as needed for symptoms of irregular blood glucose.  1 kit 0    cilostazol (PLETAL) 50 MG tablet Take 1 tablet by mouth 2 times daily 60 tablet 3    blood glucose test strips (GNP EASY TOUCH GLUCOSE TEST) strip TEST 3 TIMES DAILY Dx E11.49 300 strip 0    Insulin Pen Needle 31G X 6 MM MISC 1 each by Does not apply route 4 times daily (before meals and nightly) May substitute to generic for insurance Dx E11.49 120 each 3    insulin aspart (NOVOLOG FLEXPEN) 100 UNIT/ML injection pen Inject 25-37 Units into the skin 3 times daily (before meals) 20 units breakfast, 30 units lunch, 30 units dinner + sliding scale each meal (Total dose is 20-32 breakfast, 30-42 lunch and dinner) 5 pen 3    Diabetic Shoe MISC by Does not apply route 1 each 0    Insulin Syringe-Needle U-100 30G X 1/2\" 0.5 ML MISC 1 each by Does not apply route 3 times daily 100 each 3    Lancets MISC Daily Accu-Chek 100 each 3    aspirin 325 MG tablet Take 81 mg by mouth daily      senna-docusate (PERICOLACE) 8.6-50 MG per tablet Take 2 tablets by mouth daily as needed for Constipation (Patient not taking: Reported on 1/6/2021) 30 tablet 0    nicotine polacrilex (NICORETTE) 4 MG gum Take 1 09:15 AM    CALCIUM 9.4 07/10/2024 09:15 AM    BILITOT 1.0 07/10/2024 09:15 AM    ALKPHOS 54 07/10/2024 09:15 AM    ALKPHOS 53 11/15/2021 10:25 AM    AST 18 07/10/2024 09:15 AM    ALT 26 07/10/2024 09:15 AM       POC Tests: No results for input(s): \"POCGLU\", \"POCNA\", \"POCK\", \"POCCL\", \"POCBUN\", \"POCHEMO\", \"POCHCT\" in the last 72 hours.    Coags:   Lab Results   Component Value Date/Time    PROTIME 10.3 07/10/2024 09:15 AM    INR 1.0 07/10/2024 09:15 AM    APTT 25.3 07/10/2024 09:15 AM       HCG (If Applicable): No results found for: \"PREGTESTUR\", \"PREGSERUM\", \"HCG\", \"HCGQUANT\"     ABGs: No results found for: \"PHART\", \"PO2ART\", \"VWD6FPU\", \"BQB8PHV\", \"BEART\", \"I4KUNTWM\"     Type & Screen (If Applicable):  No results found for: \"LABABO\"    Drug/Infectious Status (If Applicable):  No results found for: \"HIV\", \"HEPCAB\"    COVID-19 Screening (If Applicable):   Lab Results   Component Value Date/Time    COVID19 Not detected 11/18/2021 06:17 AM           Anesthesia Evaluation  Patient summary reviewed and Nursing notes reviewed  Airway: Mallampati: II       Mouth opening: > = 3 FB   Dental: normal exam         Pulmonary:Negative Pulmonary ROS and normal exam  breath sounds clear to auscultation  (+)           current smoker          Patient smoked on day of surgery.                 Cardiovascular:Negative CV ROS  Exercise tolerance: poor (<4 METS)  (+) hypertension: moderate, CAD: obstructive, dysrhythmias: SVT        Rhythm: regular  Rate: normal                    Neuro/Psych:   Negative Neuro/Psych ROS              GI/Hepatic/Renal: Neg GI/Hepatic/Renal ROS  (+) GERD: poorly controlled          Endo/Other: Negative Endo/Other ROS   (+) DiabetesType II DM.                 Abdominal:             Vascular: negative vascular ROS.         Other Findings:       Anesthesia Plan      general     ASA 3       Induction: intravenous.  arterial line    Anesthetic plan and risks discussed with patient.      Plan discussed with  sleep disturbance and suicidal ideas. Objective:     Physical Exam  Vitals signs and nursing note reviewed. Constitutional:       Appearance: He is well-developed. Comments: Very hoarse voice   HENT:      Head: Atraumatic. Right Ear: External ear normal.      Left Ear: External ear normal.      Nose: Nose normal.   Eyes:      Conjunctiva/sclera: Conjunctivae normal.      Pupils: Pupils are equal, round, and reactive to light. Neck:      Musculoskeletal: Normal range of motion and neck supple. Cardiovascular:      Rate and Rhythm: Normal rate and regular rhythm. Heart sounds: Normal heart sounds, S1 normal and S2 normal.   Pulmonary:      Effort: Pulmonary effort is normal.      Breath sounds: Normal breath sounds. Abdominal:      General: Bowel sounds are normal.      Palpations: Abdomen is soft. Musculoskeletal: Normal range of motion. Skin:     General: Skin is warm and dry. Neurological:      Mental Status: He is alert and oriented to person, place, and time. Psychiatric:         Behavior: Behavior normal.       /62   Pulse 78   Temp 97.3 °F (36.3 °C)   Ht 5' 7.5\" (1.715 m)   Wt 251 lb 6.4 oz (114 kg)   SpO2 96%   BMI 38.79 kg/m²     Assessment:       Diagnosis Orders   1. Type 2 diabetes mellitus with diabetic peripheral angiopathy without gangrene, without long-term current use of insulin ([facility])  Dulaglutide (TRULICITY) 5.65 XF/2.1UY SOPN    CBC Auto Differential    Comprehensive Metabolic Panel    Lipid Panel    Hemoglobin A1C    insulin glargine (LANTUS SOLOSTAR) 100 UNIT/ML injection pen   2. Abdominal muscle pain  cyclobenzaprine (FLEXERIL) 5 MG tablet         Plan:   More than 50% of the time was spent counseling and coordinating care for a total time of 30 min face to face. Instructions were discussed with patient multiple times and repeated back.  His first option will be Trulicity and if not able to afford this he will resume Lantus and let us know.    1. Try to get Trulicity-- this is a once weekly injection to help your body make more of its' own insulin. It is not insulin. 2. If unable to get Trulicity, resume Lantus (insulin) nightly starting at 20 units. 3. Monitor blood sugar 3x daily and keep a diary of this. 4. Follow-up in 2 months with blood sugar diary and labs 1 week prior to appointment. 5. Discontinue omeprazole and begin protonix. 6. Albuterol inhaler every 4 hours as needed for shortness of breath, cough. 7. Call for any worsened problems or concerns. Patient given educational materials -see patient instructions. Discussed use, benefit, and side effects of prescribed medications. All patient questions answered. Pt voiced understanding. Reviewed health maintenance. Instructed to continue currentmedications, diet and exercise. Patient agreed with treatment plan. Follow up as directed.      MEDICATIONS:  Orders Placed This Encounter   Medications    Dulaglutide (TRULICITY) 6.03 DA/2.1LM SOPN     Sig: Inject 0.75 mg into the skin once a week     Dispense:  4 pen     Refill:  0    hydroCHLOROthiazide (HYDRODIURIL) 25 MG tablet     Sig: Take 0.5 tablets by mouth daily     Dispense:  90 tablet     Refill:  0    cyclobenzaprine (FLEXERIL) 5 MG tablet     Sig: Take 1 tablet by mouth 3 times daily as needed for Muscle spasms     Dispense:  90 tablet     Refill:  1    albuterol sulfate HFA (VENTOLIN HFA) 108 (90 Base) MCG/ACT inhaler     Sig: Inhale 2 puffs into the lungs 4 times daily as needed for Wheezing     Dispense:  1 Inhaler     Refill:  5    pantoprazole (PROTONIX) 40 MG tablet     Sig: Take 1 tablet by mouth every morning (before breakfast)     Dispense:  30 tablet     Refill:  5    insulin glargine (LANTUS SOLOSTAR) 100 UNIT/ML injection pen     Sig: Inject 20 Units into the skin nightly     Dispense:  5 pen     Refill:  0         ORDERS:  Orders Placed This Encounter   Procedures    CBC Auto Differential    Comprehensive Metabolic Panel    Lipid Panel    Hemoglobin A1C       Follow-up:  Return in about 3 months (around 4/6/2021) for in office, follow-up with labs. PATIENT INSTRUCTIONS:  Patient Instructions   1. Try to get Trulicity-- this is a once weekly injection to help your body make more of its' own insulin. It is not insulin. 2. If unable to get Trulicity, resume Lantus (insulin) nightly starting at 20 units. 3. Monitor blood sugar 3x daily and keep a diary of this. 4. Follow-up in 2 months with blood sugar diary and labs 1 week prior to appointment. 5. Discontinue omeprazole and begin protonix. 6. Albuterol inhaler every 4 hours as needed for shortness of breath, cough. 7. Call for any worsened problems or concerns. We are committed to providing you with the best care possible. In order to help us achieve these goals please remember to bring all medications, herbal products, and over the counter supplements with you to each visit. If your provider has ordered testing for you, please be sure to follow up with our office if you have not received results within 7 days after the testing took place. *If you receive a survey after visiting one of our offices, please take time to share your experience concerning your physician office visit. These surveys are confidential and no health information about you is shared. We are eager to improve for you and we are counting on your feedback to help make that happen. Electronically signed by ALFONSO Zarate on 1/6/2021 at 10:03 PM    EMR Dragon/transcription disclaimer:  Much of thisencounter note is electronic transcription/translation of spoken language to printed texts. The electronic translation of spoken language may be erroneous, or at times, nonsensical words or phrases may be inadvertentlytranscribed.   Although I have reviewed the note for such errors, some may still exist.

## 2024-07-17 NOTE — H&P
Vascular Surgery History & Physical    Subjective:     Elise Schneider is a 78 y.o.  female with severe asymptomatic right carotid artery stenosis > 80%.      Past Medical History:   Diagnosis Date    Anxiety     Arthritis     left shoulder and elbow    Breast cancer (HCC)     RIGHT     CAD (coronary artery disease)     stenting cardiac 6/5/2015    Chronic pain     Claustrophobia     Diabetes mellitus (HCC)     GERD (gastroesophageal reflux disease)     History of supraventricular tachycardia 2013    Hypertension     Hyponatremia     Pre-diabetes     Squamous cell carcinoma in situ of skin of face     Squamous cell carcinoma of right female breast     Thromboembolus (HCC)     left leg injury- DVT    UTI (urinary tract infection) 2020    Vertigo 05/11/2012      Past Surgical History:   Procedure Laterality Date    ADENOIDECTOMY  1950    BREAST BIOPSY Left     Long time ago --neg    BREAST LUMPECTOMY Right 05/25/2021    RIGHT BREAST LUMPECTOMY WITH ULTRASOUND, RIGHT BREAST SENTINEL NODE BIOPSY performed by Alida Mendoza MD at [facility] AMBULATORY OR    BX OF BREAST, NEEDLE CORE, IMAGE GUIDE Right 03/2021    CARPAL TUNNEL RELEASE Right     COLONOSCOPY N/A 06/16/2022    COLONOSCOPY performed by Jhon Hogue MD at [facility] ENDOSCOPY    CORONARY ANGIOPLASTY WITH STENT PLACEMENT  2013    stent x1    ENDOSCOPY, COLON, DIAGNOSTIC      HEENT      SQUAMIUS CELL REMOVED FROM FACE     TONSILLECTOMY  1950    TOTAL HIP ARTHROPLASTY Right     TUBAL LIGATION  1982    US LYMPH NODE BIOPSY  04/08/2021    US LYMPH NODE BIOPSY [facility] AMB HISTORICAL     Family History   Problem Relation Age of Onset    Diabetes Mother     Heart Disease Mother     No Known Problems Sister     Coronary Art Dis Other         mother had stents in 60's or 70's    Anesth Problems Neg Hx       Social History     Tobacco Use    Smoking status: Every Day     Current packs/day: 0.50     Average packs/day: 0.5 packs/day for 64.5 years (32.3 ttl pk-yrs)      Types: Cigarettes     Start date: 1960    Smokeless tobacco: Never   Substance Use Topics    Alcohol use: Not Currently       Prior to Admission medications    Medication Sig Start Date End Date Taking? Authorizing Provider   sodium chloride (OCEAN, BABY AYR) 0.65 % nasal spray 1 spray by Nasal route as needed for Congestion    Андрей Marques MD   clopidogrel (PLAVIX) 75 MG tablet Take 1 tablet by mouth daily 6/5/24   Jhon Corrales MD   Multiple Vitamins-Minerals (PRESERVISION AREDS) CAPS 1 capsule 2 times daily    Андрей Marques MD   metoprolol tartrate (LOPRESSOR) 25 MG tablet Take 1 tablet by mouth twice daily 12/11/23   Jhon Corrales MD   famotidine (PEPCID) 40 MG tablet Take 1 tablet by mouth 2/17/23   Андрей Marques MD   amLODIPine (NORVASC) 5 MG tablet Take 1 tablet by mouth once daily for blood pressure 7/28/23   Jhon Corrales MD   BABY ASPIRIN PO Take 81 mg by mouth daily    Automatic Reconciliation, Ar   acetaminophen (TYLENOL) 500 MG tablet Take 2 tablets by mouth every 6 hours as needed    Automatic Reconciliation, Ar   fluticasone (FLONASE) 50 MCG/ACT nasal spray 2 sprays by Nasal route daily    Automatic Reconciliation, Ar   losartan (COZAAR) 50 MG tablet Take 1 tablet by mouth daily 2/3/23   Automatic Reconciliation, Ar   lovastatin (MEVACOR) 40 MG tablet Take 1 tablet by mouth nightly 4/2/19   Automatic Reconciliation, Ar   metFORMIN (GLUCOPHAGE) 500 MG tablet Take 1 tablet by mouth nightly    Automatic Reconciliation, Ar     Allergies   Allergen Reactions    Erythromycin Hives    Penicillins Hives     Has had both as an adult and child- -     Atorvastatin Myalgia    Cephalexin Other (See Comments)     Aching muscles-   Pt states she took abx in 2/2024 and had no reaction, but she was not sure if it was Keflex or not    Cyclobenzaprine     Hydrochlorothiazide Other (See Comments)     hyponatremia    Proton Pump Inhibitors Other (See Comments)     dizziness

## 2024-07-17 NOTE — PROGRESS NOTES
End of Shift Note    Bedside shift change report given to Althea ROJO (oncoming nurse) by Leatha Pederson RN (offgoing nurse).  Report included the following information SBAR, Procedure Summary, Intake/Output, Cardiac Rhythm  , and Dual Neuro Assessment    Shift worked:  7a-7p     Shift summary and any significant changes:     1415: Patient arrived to IVCU from PACU; right upper neck insicion is CDI. Patient denies having CP, headache, or SOB.   1845: Patient ambulated to the bathroom; right neck insicion is CDI. Patient denies having CP, SOB, or dizziness while ambulating.      Concerns for physician to address:  None     Zone phone for oncoming shift:   [phone]       Activity:     Number times ambulated in hallways past shift: 0  Number of times OOB to chair past shift: 1    Cardiac:   Cardiac Monitoring: Yes           Access:  Current line(s): PIV     Genitourinary:   Urinary status: voiding    Respiratory:      Chronic home O2 use?: NO  Incentive spirometer at bedside: NO       GI:     Current diet:  ADULT DIET; Regular; 3 carb choices (45 gm/meal)  Passing flatus: YES  Tolerating current diet: YES       Pain Management:   Patient states pain is manageable on current regimen: YES    Skin:     Interventions: float heels and increase time out of bed    Patient Safety:  Fall Score:    Interventions: bed/chair alarm, gripper socks, and pt to call before getting OOB       Length of Stay:  Expected LOS: 2  Actual LOS: 0      Leatha Pederson RN

## 2024-07-17 NOTE — ANESTHESIA PROCEDURE NOTES
Arterial Line:    An arterial line was placed using ultrasound guidance, in the pre-op for the following indication(s): continuous blood pressure monitoring.    A 20 gauge (size), 1 and 1/4 inch (length), Angiocath (type) catheter was placed, Seldinger technique not used, into the right radial artery, secured by Tegaderm.  Anesthesia type: Local  Local infiltration: Injection    Events:  patient tolerated procedure well with no complications.7/17/2024 7:20 AM7/17/2024 7:30 AM  Resident/CRNA: Braden Valles APRN - CRNA  Performed: Resident/CRNA   Preanesthetic Checklist  Completed: patient identified, IV checked, site marked, risks and benefits discussed, surgical/procedural consents, equipment checked, pre-op evaluation, timeout performed, anesthesia consent given, oxygen available, monitors applied/VS acknowledged, fire risk safety assessment completed and verbalized and blood product R/B/A discussed and consented

## 2024-07-18 VITALS
OXYGEN SATURATION: 96 % | HEIGHT: 59 IN | HEART RATE: 62 BPM | WEIGHT: 141.31 LBS | TEMPERATURE: 97.5 F | DIASTOLIC BLOOD PRESSURE: 67 MMHG | SYSTOLIC BLOOD PRESSURE: 128 MMHG | RESPIRATION RATE: 16 BRPM | BODY MASS INDEX: 28.49 KG/M2

## 2024-07-18 PROCEDURE — 6370000000 HC RX 637 (ALT 250 FOR IP): Performed by: SURGERY

## 2024-07-18 PROCEDURE — 6360000002 HC RX W HCPCS: Performed by: SURGERY

## 2024-07-18 RX ORDER — ASPIRIN 81 MG/1
81 TABLET ORAL DAILY
Qty: 90 TABLET | Refills: 3 | Status: SHIPPED | COMMUNITY
Start: 2024-07-19

## 2024-07-18 RX ORDER — ACETAMINOPHEN 500 MG
1000 TABLET ORAL EVERY 6 HOURS PRN
Qty: 40 TABLET | Refills: 0 | Status: SHIPPED | COMMUNITY
Start: 2024-07-18 | End: 2024-07-23

## 2024-07-18 RX ADMIN — METOPROLOL TARTRATE 25 MG: 25 TABLET, FILM COATED ORAL at 09:10

## 2024-07-18 RX ADMIN — ENOXAPARIN SODIUM 40 MG: 100 INJECTION SUBCUTANEOUS at 09:13

## 2024-07-18 RX ADMIN — ASPIRIN 81 MG: 81 TABLET, COATED ORAL at 09:10

## 2024-07-18 RX ADMIN — LOSARTAN POTASSIUM 50 MG: 50 TABLET, FILM COATED ORAL at 09:10

## 2024-07-18 RX ADMIN — AMLODIPINE BESYLATE 5 MG: 5 TABLET ORAL at 09:10

## 2024-07-18 NOTE — DISCHARGE SUMMARY
Vascular Surgery Discharge Summary     Patient ID:  Elise Schneider  [id number]  78 y.o.  1945    Admitting Provider: Asad JON MD  Discharging Provider: Terese Reyes [facility]    Admit Date: 7/17/2024    Discharge Date: 7/18/2024    Discharge Diagnoses:    Asymptomatic right carotid stenosis POA  Diabetes mellitus POA  Coronary artery disease POA  Hypertension POA   Hyperlipidemia POA  Tobacco use disorder POA  Gastroesophageal reflux disease POA  Anxiety POA  History of provoked left lower extremity deep venous thrombosis POA    Procedures during admission:  Right carotid endarterectomy 7/17/2024    Hospital Course:   Ms. Elise Schneider is a 78-year-old female with a past medical history significant for diabetes mellitus, coronary artery disease, hypertension, hyperlipidemia, gastroesophageal reflux disease, and anxiety.  She is a current smoker.  Prior to presenting she was taking aspirin and Plavix.  She has a history of a provoked left lower extremity deep venous thrombosis.    She was admitted to the hospital following a right carotid endarterectomy on 7/17 for asymptomatic severe disease.  She has known moderate left carotid stenosis.  Her postprocedural course was uneventful.  Her comorbid conditions were stable.    On day of discharge she denied hemispheric or vertebral symptoms.  Her right neck incision was dry and intact with Dermabond.  She was tolerating oral intake without any difficulty swallowing.    Pertinent Results:   Recent Results (from the past 24 hour(s))   POCT Glucose    Collection Time: 07/17/24  4:52 PM   Result Value Ref Range    POC Glucose 168 (H) 65 - 117 mg/dL    Performed by: Jamal Zhang RN        Vital signs: Patient Vitals for the past 24 hrs:   BP Temp Temp src Pulse Resp SpO2   07/18/24 0910 (!) 116/56 -- -- 73 -- --   07/18/24 0723 (!) 133/57 97.7 °F (36.5 °C) Oral 59 12 96 %   07/18/24 0702 125/67 -- -- 80 -- 94 %   07/18/24 0503 (!) 130/92 -- -- 60 -- 98 %  115/47 -- -- 67 22 98 %   07/17/24 1100 (!) 119/94 -- -- 67 15 97 %   07/17/24 1055 (!) 100/55 -- -- 69 16 96 %       Consults: None    Patient Condition at Discharge: stable    Disposition: home       Medication List        START taking these medications      aspirin 81 MG EC tablet  Take 1 tablet by mouth daily  Start taking on: July 19, 2024  Replaces: BABY ASPIRIN PO            CHANGE how you take these medications      acetaminophen 500 MG tablet  Commonly known as: TYLENOL  Take 2 tablets by mouth every 6 hours as needed for Pain  What changed: reasons to take this            CONTINUE taking these medications      amLODIPine 5 MG tablet  Commonly known as: NORVASC  Take 1 tablet by mouth once daily for blood pressure     clopidogrel 75 MG tablet  Commonly known as: Plavix  Take 1 tablet by mouth daily     famotidine 40 MG tablet  Commonly known as: PEPCID     fluticasone 50 MCG/ACT nasal spray  Commonly known as: FLONASE     losartan 50 MG tablet  Commonly known as: COZAAR     lovastatin 40 MG tablet  Commonly known as: MEVACOR     metFORMIN 500 MG tablet  Commonly known as: GLUCOPHAGE     metoprolol tartrate 25 MG tablet  Commonly known as: LOPRESSOR  Take 1 tablet by mouth twice daily     PreserVision AREDS Caps     sodium chloride 0.65 % nasal spray  Commonly known as: OCEAN, BABY AYR            STOP taking these medications      BABY ASPIRIN PO  Replaced by: aspirin 81 MG EC tablet               Where to Get Your Medications        Information about where to get these medications is not yet available    Ask your nurse or doctor about these medications  acetaminophen 500 MG tablet  aspirin 81 MG EC tablet          Diet: diabetic diet    Discharge instructions:    Provider Follow-Up:   Asad Watts MD  [address] [facility] [address] [phone]    Follow up on 8/9/2024  @ 11:00 am      Signed:  Terese Reyes [facility]   7/18/2024  10:54 AM

## 2024-07-18 NOTE — PROGRESS NOTES
End of Shift Note    Bedside shift change report given to Leatha (oncoming nurse) by Rafaela Lorenzana RN (offgoing nurse).  Report included the following information Nurse Handoff Report    Shift worked:  Night shift      Shift summary and any significant changes:     Patient has pain rated between 3-5 on right ear/neck. Recent pain rating was a 2. Vital signs stable. See charting for assessment. Neuro checks done every hour. No changes.     Concerns for physician to address:  N/a     [facility] phone for oncoming shift:   [phone]       Activity:  Activity: In bed  Number times ambulated in hallways past shift: 0  Number of times OOB to chair past shift: 0    Cardiac:   Cardiac Monitoring: Yes      Cardiac Rhythm: Sinus rhythm    Access:  Current line(s): PIV     Genitourinary:   Urinary status: voiding    Respiratory:   O2 Device: None (Room air)  Chronic home O2 use?: NO  Incentive spirometer at bedside: YES       GI:     Current diet:    ADULT DIET; Regular; 3 carb choices (45 gm/meal)  Passing flatus: YES  Tolerating current diet: YES       Pain Management:   Patient states pain is manageable on current regimen: YES    Skin:  Daniel Scale Score: 23  Interventions: increase time out of bed    Patient Safety:  Fall Score: Grady Total Score: 0  Interventions: bed/chair alarm, assistive device (walker, cane. etc), gripper socks, pt to call before getting OOB, and stay with me (per policy)       Length of Stay:  Expected LOS: 2  Actual LOS: 1      Rafaela Lorenzana RN

## 2024-07-18 NOTE — PLAN OF CARE
Problem: Chronic Conditions and Co-morbidities  Goal: Patient's chronic conditions and co-morbidity symptoms are monitored and maintained or improved  7/18/2024 1239 by Leatha Pederson RN  Outcome: Adequate for Discharge  7/18/2024 0027 by Rafaela Lorenzana RN  Outcome: Progressing     Problem: Pain  Goal: Verbalizes/displays adequate comfort level or baseline comfort level  7/18/2024 1239 by Leatha Pederson RN  Outcome: Adequate for Discharge  7/18/2024 0027 by Rafaela Lorenzana RN  Outcome: Progressing     Problem: Safety - Adult  Goal: Free from fall injury  7/18/2024 1239 by Leatha Pederson RN  Outcome: Adequate for Discharge  Flowsheets (Taken 7/18/2024 0203 by Rafaela Lorenzana, RN)  Free From Fall Injury: Instruct family/caregiver on patient safety  7/18/2024 0027 by Rafaela Lorenzana RN  Outcome: Progressing     Problem: Discharge Planning  Goal: Discharge to home or other facility with appropriate resources  7/18/2024 1239 by Leatha Pederson RN  Outcome: Adequate for Discharge  7/18/2024 0027 by Rafaela Lorenzana RN  Outcome: Progressing

## 2024-07-18 NOTE — PROGRESS NOTES
0845: Patient's RISHABH drained removed; site is CDI with quick clot and tegaderm dressing.   1250: I have reviewed discharge instructions with the patient such as follow up appointments and post procedure site care.      Predictive Model Details          18 (Normal)  Factor Value    Calculated 7/18/2024 14:17 90% Age 78 years old    Deterioration Index Model 7% Systolic 128     3% Pulse 62     1% Temperature 97.5 °F (36.4 °C)     0% Pulse oximetry 96 %     0% Respiratory rate 16

## 2024-07-18 NOTE — PLAN OF CARE
Predictive Model Details          19 (Normal)  Factor Value    Calculated 7/18/2024 00:27 82% Age 78 years old    Deterioration Index Model 9% Respiratory rate 15     5% Systolic 120     2% Pulse oximetry 94 %     2% Pulse 64     0% Temperature 98 °F (36.7 °C)        Problem: Chronic Conditions and Co-morbidities  Goal: Patient's chronic conditions and co-morbidity symptoms are monitored and maintained or improved  Outcome: Progressing     Problem: Pain  Goal: Verbalizes/displays adequate comfort level or baseline comfort level  Outcome: Progressing     Problem: Safety - Adult  Goal: Free from fall injury  Outcome: Progressing     Problem: Discharge Planning  Goal: Discharge to home or other facility with appropriate resources  Outcome: Progressing

## 2024-07-18 NOTE — DISCHARGE INSTRUCTIONS
Discharge Instructions for Carotid Endarterectomy   A carotid endarterectomy restores normal blood flow through the vessels that carry blood to your brain. These vessels are called the carotid arteries. During the surgery, a surgeon made a small incision in the side of your neck, just below your jaw. The artery was opened and the blockage was cleared. This procedure was done to reduce your risk of a stroke, which can occur when the carotid arteries are severely blocked or narrowed.    Home care  Spend your first few days after surgery relaxing at home. It's OK to do quiet activities such as reading or watching TV.  Take your medicines exactly as instructed. Don’t skip doses.  You may drive when you are no longer taking pain medication.   It's OK to shower. Don't scrub your incision.  Don't do strenuous activity for 7 to 10 days after your surgery.  Don’t lift anything heavier than 5-8 pounds for 2 to 3 weeks after your surgery.  You may return to work after 2 weeks.    Gradually increase your activity. It may take some time for you to return to your normal activities.  Check your incision every day for signs of infection (redness, swelling, drainage, or warmth).  Don’t be alarmed if you have some loss of feeling along your jaw line, the incision line, and earlobe. This is a result of the incision and usually goes away after 6 to 12 months.    When to call 911  A stroke is a medical emergency. Call 911 right away if you have any of these symptoms of stroke:  Weakness, tingling, or loss of feeling on one side of your face or body  Sudden double vision or trouble seeing in one or both eyes  Sudden trouble talking or slurred speech  Sudden, severe headache  F.A.S.T. is an easy way to remember the signs of stroke. When you see these signs, call 911 fast.  F.A.S.T. stands for:  F is for face drooping. One side of the face is drooping or numb. When the person smiles, the smile is uneven.  A is for arm weakness. One arm is  weak or numb. When the person lifts both arms at the same time, one arm may drift downward.  S is for speech difficulty. You may notice slurred speech or trouble speaking. The person can't repeat a simple sentence correctly when asked.  T is for time to call 911. If someone shows any of these symptoms, even if they go away, call 911 right away. Make note of the time the symptoms first appeared.    When to call your healthcare provider  Call your healthcare provider right away if you have any of the following:  Neck swelling  Redness, pain, swelling, or drainage from your incision  Fever above 100.4°F (38°C), or higher, or as advised    Long-term changes at home  Eat a healthy, low-fat, low cholesterol, and low calorie diet.   Maintain your ideal body weight.  After you have recovered from surgery, try to exercise more, especially walking.   If you smoke, ask your primary doctor for help quitting.     Call the office at [phone] if there are any problems.

## 2024-08-12 ENCOUNTER — TELEPHONE (OUTPATIENT)
Age: 79
End: 2024-08-12

## 2024-08-12 NOTE — TELEPHONE ENCOUNTER
Spoke with patient,verified with 2 identifes. Patient stated calling the paramedics this past Saturday because had an elevated BP reading and was taken to [facility]. Patient stated that elevated BP started 5 days ago.  Denied other cardiac symptoms    Patient taking amlodipine 5 mg, Metoprolol and Losartan as prescribed.   This nurse advised patient to take BP reading in this encounter MARCOS 146/79. Patient not writing down BP, stated.   This nurse advised to take BP every day 2 hours taking AM blood pressure tabs and write them down in a note pad and bring it with her in the appointment se for 08- with NP.  Patient was reassured that BP was slightly high but not dangerous high.  Advised to call is BP goes higher.  Patient voiced understanding.   Mail address and date with NP.

## 2024-08-12 NOTE — TELEPHONE ENCOUNTER
Patient is calling in regards to her blood pressure has been continuing to be elevated and she has spoken with her PCP and was told to contact Dr. Corrales. Patient would like a call back today ASAP. Also, patient would like to discuss medication.     This morning before medication: 154/80    Patient home phone # [phone]  Patient cell phone # [phone]

## 2024-08-22 ENCOUNTER — OFFICE VISIT (OUTPATIENT)
Age: 79
End: 2024-08-22

## 2024-08-22 ENCOUNTER — TELEPHONE (OUTPATIENT)
Age: 79
End: 2024-08-22

## 2024-08-22 VITALS
OXYGEN SATURATION: 98 % | WEIGHT: 140 LBS | HEART RATE: 83 BPM | SYSTOLIC BLOOD PRESSURE: 120 MMHG | DIASTOLIC BLOOD PRESSURE: 70 MMHG | BODY MASS INDEX: 27.48 KG/M2 | RESPIRATION RATE: 16 BRPM | HEIGHT: 60 IN

## 2024-08-22 DIAGNOSIS — Z01.818 PRE-OPERATIVE CLEARANCE: Primary | ICD-10-CM

## 2024-08-22 DIAGNOSIS — I36.1 NONRHEUMATIC TRICUSPID VALVE REGURGITATION: ICD-10-CM

## 2024-08-22 DIAGNOSIS — I25.10 ATHEROSCLEROSIS OF NATIVE CORONARY ARTERY OF NATIVE HEART WITHOUT ANGINA PECTORIS: ICD-10-CM

## 2024-08-22 DIAGNOSIS — I10 ESSENTIAL (PRIMARY) HYPERTENSION: ICD-10-CM

## 2024-08-22 DIAGNOSIS — I65.23 CAROTID ATHEROSCLEROSIS, BILATERAL: ICD-10-CM

## 2024-08-22 DIAGNOSIS — E78.2 MIXED HYPERLIPIDEMIA: ICD-10-CM

## 2024-08-22 ASSESSMENT — PATIENT HEALTH QUESTIONNAIRE - PHQ9
1. LITTLE INTEREST OR PLEASURE IN DOING THINGS: NOT AT ALL
SUM OF ALL RESPONSES TO PHQ QUESTIONS 1-9: 0
2. FEELING DOWN, DEPRESSED OR HOPELESS: NOT AT ALL
SUM OF ALL RESPONSES TO PHQ QUESTIONS 1-9: 0
SUM OF ALL RESPONSES TO PHQ9 QUESTIONS 1 & 2: 0

## 2024-08-22 NOTE — PROGRESS NOTES
[address] [place] [address] [phone]       Subjective:        Elise Schneider is a 78 y.o. female is here for cardiac clearance prior to her left shoulder surgery with Dr. Crouch.  She recently underwent a right CEA with Dr. Watts last month for carotid stenosis and has moderate left ICA stenosis remaining.  BP is well controlled on current regimen.  She denies any chest pain or shortness of breath.  She denies any palpitations, dizziness or lightheadedness.  Last coronary stent in 2015, no ischemia on stress test in 2019.  Currently she would not be able to walk sufficiently for a repeat treadmill stress test and she cannot lift her arms above her head for nuclear imaging for a nuclear stress test.  She has extensive ecchymosis from taking concurrent ASA and plavix.  Her sister Geovanni is here today with her, since patient is hard of hearing patient prefers we call her sister with communications.  Geovanni's number is [phone].    Patient Active Problem List    Diagnosis Date Noted    Carotid artery stenosis, asymptomatic, right 07/17/2024    S/P total right hip arthroplasty 11/22/2021    Mixed hyperlipidemia 06/23/2017    S/P PTCA (percutaneous transluminal coronary angioplasty) 06/04/2015    CAD (coronary artery disease), native coronary artery 06/04/2015    SVT [facility]supraventricular tachycardia) ([facility]) 02/15/2013    Tobacco use disorder 02/15/2013    Family history of ischemic heart disease 02/15/2013    Essential hypertension, benign 02/15/2013    Vertigo 05/11/2012      Soheila Granados MD  Past Medical History:   Diagnosis Date    Anxiety     Arthritis     left shoulder and elbow    Breast cancer (HCC)     RIGHT     CAD (coronary artery disease)     stenting cardiac 6/5/2015    Carotid stenosis, bilateral     Chronic pain     Claustrophobia     Diabetes mellitus (HCC)     GERD (gastroesophageal reflux disease)     History of supraventricular tachycardia 2013    Hyperlipidemia

## 2024-08-22 NOTE — PATIENT INSTRUCTIONS
Please have FASTING labs drawn at the [facility] sometime in the next few weeks to check your cholesterol    Please call our office at [phone] and ask for Salena (Dr. Corrales's nurse) if you need anything or have any questions    Please sign up for Brit if possible to make communication easier with us

## 2024-08-26 DIAGNOSIS — E78.2 MIXED HYPERLIPIDEMIA: ICD-10-CM

## 2024-08-26 LAB
CHOLEST SERPL-MCNC: 138 MG/DL
HDLC SERPL-MCNC: 57 MG/DL
HDLC SERPL: 2.4 (ref 0–5)
LDLC SERPL CALC-MCNC: 70 MG/DL (ref 0–100)
TRIGL SERPL-MCNC: 55 MG/DL
VLDLC SERPL CALC-MCNC: 11 MG/DL

## 2024-08-30 RX ORDER — METOPROLOL TARTRATE 25 MG/1
TABLET, FILM COATED ORAL
Qty: 180 TABLET | Refills: 1 | Status: SHIPPED | OUTPATIENT
Start: 2024-08-30

## 2024-08-30 NOTE — TELEPHONE ENCOUNTER
Refill Request Received for the Following Medication     Requested Prescriptions     Pending Prescriptions Disp Refills    metoprolol tartrate (LOPRESSOR) 25 MG tablet [Pharmacy Med Name: Metoprolol Tartrate 25 MG Oral Tablet] 180 tablet 0     Sig: Take 1 tablet by mouth twice daily       Last Prescribed:    Last Appointment With Me:  4/12/2024     Future Appointments:  Future Appointments   Date Time Provider Department Center   9/6/2024  1:30 PM [facility]   3/31/2025 10:30 AM Anila Street APRN - [facility] AM[facility]   4/21/2025  9:40 AM Jhon Corrales MD [facility] AMB

## 2024-09-06 ENCOUNTER — HOSPITAL ENCOUNTER (OUTPATIENT)
Facility: HOSPITAL | Age: 79
Discharge: HOME | End: 2024-09-09
Attending: ORTHOPAEDIC SURGERY
Payer: MEDICARE

## 2024-09-06 DIAGNOSIS — M12.812 LEFT ROTATOR CUFF TEAR ARTHROPATHY: ICD-10-CM

## 2024-09-06 DIAGNOSIS — M75.102 LEFT ROTATOR CUFF TEAR ARTHROPATHY: ICD-10-CM

## 2024-09-06 PROCEDURE — 73200 CT UPPER EXTREMITY W/O DYE: CPT

## 2024-09-12 ENCOUNTER — TELEPHONE (OUTPATIENT)
Age: 79
End: 2024-09-12

## 2024-09-18 ENCOUNTER — HOSPITAL ENCOUNTER (OUTPATIENT)
Facility: HOSPITAL | Age: 79
Discharge: HOME | End: 2024-09-21
Payer: MEDICARE

## 2024-09-18 VITALS
HEART RATE: 71 BPM | OXYGEN SATURATION: 100 % | WEIGHT: 140.21 LBS | SYSTOLIC BLOOD PRESSURE: 182 MMHG | BODY MASS INDEX: 27.53 KG/M2 | RESPIRATION RATE: 14 BRPM | HEIGHT: 60 IN | DIASTOLIC BLOOD PRESSURE: 64 MMHG

## 2024-09-18 LAB
ABO + RH BLD: NORMAL
ALBUMIN SERPL-MCNC: 4 G/DL (ref 3.5–5)
ALBUMIN/GLOB SERPL: 1.1 (ref 1.1–2.2)
ALP SERPL-CCNC: 50 U/L (ref 45–117)
ALT SERPL-CCNC: 21 U/L (ref 12–78)
ANION GAP SERPL CALC-SCNC: 6 MMOL/L (ref 2–12)
APPEARANCE UR: CLEAR
AST SERPL-CCNC: 16 U/L (ref 15–37)
BACTERIA URNS QL MICRO: NEGATIVE /HPF
BILIRUB SERPL-MCNC: 1 MG/DL (ref 0.2–1)
BILIRUB UR QL: NEGATIVE
BLOOD GROUP ANTIBODIES SERPL: NORMAL
BUN SERPL-MCNC: 15 MG/DL (ref 6–20)
BUN/CREAT SERPL: 24 (ref 12–20)
CALCIUM SERPL-MCNC: 9.6 MG/DL (ref 8.5–10.1)
CHLORIDE SERPL-SCNC: 101 MMOL/L (ref 97–108)
CO2 SERPL-SCNC: 25 MMOL/L (ref 21–32)
COLOR UR: ABNORMAL
CREAT SERPL-MCNC: 0.62 MG/DL (ref 0.55–1.02)
EPITH CASTS URNS QL MICRO: ABNORMAL /LPF
ERYTHROCYTE [DISTWIDTH] IN BLOOD BY AUTOMATED COUNT: 12.4 % (ref 11.5–14.5)
EST. AVERAGE GLUCOSE BLD GHB EST-MCNC: 126 MG/DL
GLOBULIN SER CALC-MCNC: 3.7 G/DL (ref 2–4)
GLUCOSE SERPL-MCNC: 114 MG/DL (ref 65–100)
GLUCOSE UR STRIP.AUTO-MCNC: NEGATIVE MG/DL
HBA1C MFR BLD: 6 % (ref 4–5.6)
HCT VFR BLD AUTO: 37.1 % (ref 35–47)
HGB BLD-MCNC: 12.7 G/DL (ref 11.5–16)
HGB UR QL STRIP: NEGATIVE
HYALINE CASTS URNS QL MICRO: ABNORMAL /LPF (ref 0–2)
INR PPP: 1 (ref 0.9–1.1)
KETONES UR QL STRIP.AUTO: NEGATIVE MG/DL
LEUKOCYTE ESTERASE UR QL STRIP.AUTO: ABNORMAL
MCH RBC QN AUTO: 32.9 PG (ref 26–34)
MCHC RBC AUTO-ENTMCNC: 34.2 G/DL (ref 30–36.5)
MCV RBC AUTO: 96.1 FL (ref 80–99)
NITRITE UR QL STRIP.AUTO: NEGATIVE
NRBC # BLD: 0 K/UL (ref 0–0.01)
NRBC BLD-RTO: 0 PER 100 WBC
PH UR STRIP: 7 (ref 5–8)
PLATELET # BLD AUTO: 178 K/UL (ref 150–400)
PMV BLD AUTO: 10.3 FL (ref 8.9–12.9)
POTASSIUM SERPL-SCNC: 3.9 MMOL/L (ref 3.5–5.1)
PROT SERPL-MCNC: 7.7 G/DL (ref 6.4–8.2)
PROT UR STRIP-MCNC: NEGATIVE MG/DL
PROTHROMBIN TIME: 10.9 SEC (ref 9–11.1)
RBC # BLD AUTO: 3.86 M/UL (ref 3.8–5.2)
RBC #/AREA URNS HPF: ABNORMAL /HPF (ref 0–5)
SODIUM SERPL-SCNC: 132 MMOL/L (ref 136–145)
SP GR UR REFRACTOMETRY: 1.01
SPECIMEN EXP DATE BLD: NORMAL
URINE CULTURE IF INDICATED: ABNORMAL
UROBILINOGEN UR QL STRIP.AUTO: 1 EU/DL (ref 0.2–1)
WBC # BLD AUTO: 8.9 K/UL (ref 3.6–11)
WBC URNS QL MICRO: ABNORMAL /HPF (ref 0–4)

## 2024-09-18 PROCEDURE — 97535 SELF CARE MNGMENT TRAINING: CPT | Performed by: OCCUPATIONAL THERAPIST

## 2024-09-18 PROCEDURE — 86901 BLOOD TYPING SEROLOGIC RH(D): CPT

## 2024-09-18 PROCEDURE — 36415 COLL VENOUS BLD VENIPUNCTURE: CPT

## 2024-09-18 PROCEDURE — APPNB15 APP NON BILLABLE TIME 0-15 MINS: Performed by: NURSE PRACTITIONER

## 2024-09-18 PROCEDURE — 81001 URINALYSIS AUTO W/SCOPE: CPT

## 2024-09-18 PROCEDURE — 87086 URINE CULTURE/COLONY COUNT: CPT

## 2024-09-18 PROCEDURE — 85027 COMPLETE CBC AUTOMATED: CPT

## 2024-09-18 PROCEDURE — 97166 OT EVAL MOD COMPLEX 45 MIN: CPT | Performed by: OCCUPATIONAL THERAPIST

## 2024-09-18 PROCEDURE — 86850 RBC ANTIBODY SCREEN: CPT

## 2024-09-18 PROCEDURE — 80053 COMPREHEN METABOLIC PANEL: CPT

## 2024-09-18 PROCEDURE — 86900 BLOOD TYPING SEROLOGIC ABO: CPT

## 2024-09-18 PROCEDURE — 83036 HEMOGLOBIN GLYCOSYLATED A1C: CPT

## 2024-09-18 PROCEDURE — 85610 PROTHROMBIN TIME: CPT

## 2024-09-18 ASSESSMENT — PAIN SCALES - GENERAL: PAINLEVEL_OUTOF10: 7

## 2024-09-18 ASSESSMENT — PAIN DESCRIPTION - ORIENTATION: ORIENTATION: LEFT

## 2024-09-18 ASSESSMENT — PAIN DESCRIPTION - LOCATION: LOCATION: SHOULDER

## 2024-09-19 LAB
BACTERIA SPEC CULT: NORMAL
SERVICE CMNT-IMP: NORMAL
SERVICE CMNT-IMP: NORMAL

## 2024-09-30 ENCOUNTER — CLINICAL DOCUMENTATION (OUTPATIENT)
Age: 79
End: 2024-09-30

## 2024-09-30 DIAGNOSIS — Z98.890 S/P CAROTID ENDARTERECTOMY: Primary | ICD-10-CM

## 2024-09-30 NOTE — PROGRESS NOTES
Abstracted essentially normal bilateral carotid ultrasound to chart under carotid endarterectomy in problem list

## 2024-10-15 ENCOUNTER — APPOINTMENT (OUTPATIENT)
Facility: HOSPITAL | Age: 79
End: 2024-10-15
Payer: MEDICARE

## 2024-10-15 ENCOUNTER — HOSPITAL ENCOUNTER (INPATIENT)
Facility: HOSPITAL | Age: 79
LOS: 3 days | Discharge: HOME | End: 2024-10-18
Attending: STUDENT IN AN ORGANIZED HEALTH CARE EDUCATION/TRAINING PROGRAM | Admitting: STUDENT IN AN ORGANIZED HEALTH CARE EDUCATION/TRAINING PROGRAM
Payer: MEDICARE

## 2024-10-15 ENCOUNTER — TELEPHONE (OUTPATIENT)
Age: 79
End: 2024-10-15

## 2024-10-15 DIAGNOSIS — Z98.61 S/P PTCA (PERCUTANEOUS TRANSLUMINAL CORONARY ANGIOPLASTY): ICD-10-CM

## 2024-10-15 DIAGNOSIS — R07.9 CHEST PAIN, UNSPECIFIED TYPE: ICD-10-CM

## 2024-10-15 DIAGNOSIS — R07.9 ACUTE CHEST PAIN: ICD-10-CM

## 2024-10-15 DIAGNOSIS — I20.0 UNSTABLE ANGINA (HCC): ICD-10-CM

## 2024-10-15 DIAGNOSIS — E78.2 MIXED HYPERLIPIDEMIA: ICD-10-CM

## 2024-10-15 DIAGNOSIS — R42 VERTIGO: ICD-10-CM

## 2024-10-15 DIAGNOSIS — Z96.641 S/P TOTAL RIGHT HIP ARTHROPLASTY: ICD-10-CM

## 2024-10-15 DIAGNOSIS — I65.21 CAROTID ARTERY STENOSIS, ASYMPTOMATIC, RIGHT: ICD-10-CM

## 2024-10-15 DIAGNOSIS — I25.10 CORONARY ARTERY DISEASE INVOLVING NATIVE CORONARY ARTERY WITHOUT ANGINA PECTORIS, UNSPECIFIED WHETHER NATIVE OR TRANSPLANTED HEART: ICD-10-CM

## 2024-10-15 DIAGNOSIS — I10 ESSENTIAL HYPERTENSION, BENIGN: ICD-10-CM

## 2024-10-15 DIAGNOSIS — R07.9 CHEST PAIN: Primary | ICD-10-CM

## 2024-10-15 DIAGNOSIS — Z98.890 S/P CAROTID ENDARTERECTOMY: ICD-10-CM

## 2024-10-15 DIAGNOSIS — I47.10 SVT (SUPRAVENTRICULAR TACHYCARDIA) (HCC): ICD-10-CM

## 2024-10-15 DIAGNOSIS — Z82.49 FAMILY HISTORY OF ISCHEMIC HEART DISEASE: ICD-10-CM

## 2024-10-15 DIAGNOSIS — F17.200 TOBACCO USE DISORDER: ICD-10-CM

## 2024-10-15 LAB
ALBUMIN SERPL-MCNC: 3.9 G/DL (ref 3.5–5)
ALBUMIN/GLOB SERPL: 1.2 (ref 1.1–2.2)
ALP SERPL-CCNC: 51 U/L (ref 45–117)
ALT SERPL-CCNC: 22 U/L (ref 12–78)
ANION GAP SERPL CALC-SCNC: 5 MMOL/L (ref 2–12)
APPEARANCE UR: CLEAR
AST SERPL-CCNC: 16 U/L (ref 15–37)
BACTERIA URNS QL MICRO: ABNORMAL /HPF
BASOPHILS # BLD: 0 K/UL (ref 0–0.1)
BASOPHILS NFR BLD: 1 % (ref 0–1)
BILIRUB SERPL-MCNC: 0.8 MG/DL (ref 0.2–1)
BILIRUB UR QL: NEGATIVE
BUN SERPL-MCNC: 12 MG/DL (ref 6–20)
BUN/CREAT SERPL: 16 (ref 12–20)
CALCIUM SERPL-MCNC: 9.7 MG/DL (ref 8.5–10.1)
CHLORIDE SERPL-SCNC: 101 MMOL/L (ref 97–108)
CO2 SERPL-SCNC: 27 MMOL/L (ref 21–32)
COLOR UR: ABNORMAL
CREAT SERPL-MCNC: 0.74 MG/DL (ref 0.55–1.02)
DIFFERENTIAL METHOD BLD: ABNORMAL
EKG ATRIAL RATE: 60 BPM
EKG ATRIAL RATE: 60 BPM
EKG DIAGNOSIS: NORMAL
EKG DIAGNOSIS: NORMAL
EKG P AXIS: 38 DEGREES
EKG P AXIS: 46 DEGREES
EKG P-R INTERVAL: 124 MS
EKG P-R INTERVAL: 126 MS
EKG Q-T INTERVAL: 422 MS
EKG Q-T INTERVAL: 426 MS
EKG QRS DURATION: 78 MS
EKG QRS DURATION: 80 MS
EKG QTC CALCULATION (BAZETT): 422 MS
EKG QTC CALCULATION (BAZETT): 426 MS
EKG R AXIS: 52 DEGREES
EKG R AXIS: 55 DEGREES
EKG T AXIS: 18 DEGREES
EKG T AXIS: 4 DEGREES
EKG VENTRICULAR RATE: 60 BPM
EKG VENTRICULAR RATE: 60 BPM
EOSINOPHIL # BLD: 0.1 K/UL (ref 0–0.4)
EOSINOPHIL NFR BLD: 1 % (ref 0–7)
EPITH CASTS URNS QL MICRO: ABNORMAL /LPF
ERYTHROCYTE [DISTWIDTH] IN BLOOD BY AUTOMATED COUNT: 12.3 % (ref 11.5–14.5)
GLOBULIN SER CALC-MCNC: 3.2 G/DL (ref 2–4)
GLUCOSE BLD STRIP.AUTO-MCNC: 111 MG/DL (ref 65–117)
GLUCOSE BLD STRIP.AUTO-MCNC: 125 MG/DL (ref 65–117)
GLUCOSE BLD STRIP.AUTO-MCNC: 158 MG/DL (ref 65–117)
GLUCOSE SERPL-MCNC: 169 MG/DL (ref 65–100)
GLUCOSE UR STRIP.AUTO-MCNC: NEGATIVE MG/DL
HCT VFR BLD AUTO: 37.1 % (ref 35–47)
HGB BLD-MCNC: 12.4 G/DL (ref 11.5–16)
HGB UR QL STRIP: NEGATIVE
IMM GRANULOCYTES # BLD AUTO: 0.1 K/UL (ref 0–0.04)
IMM GRANULOCYTES NFR BLD AUTO: 1 % (ref 0–0.5)
KETONES UR QL STRIP.AUTO: ABNORMAL MG/DL
LEUKOCYTE ESTERASE UR QL STRIP.AUTO: ABNORMAL
LIPASE SERPL-CCNC: 40 U/L (ref 13–75)
LYMPHOCYTES # BLD: 1 K/UL (ref 0.8–3.5)
LYMPHOCYTES NFR BLD: 12 % (ref 12–49)
MCH RBC QN AUTO: 32.8 PG (ref 26–34)
MCHC RBC AUTO-ENTMCNC: 33.4 G/DL (ref 30–36.5)
MCV RBC AUTO: 98.1 FL (ref 80–99)
MONOCYTES # BLD: 0.5 K/UL (ref 0–1)
MONOCYTES NFR BLD: 6 % (ref 5–13)
NEUTS SEG # BLD: 6.7 K/UL (ref 1.8–8)
NEUTS SEG NFR BLD: 79 % (ref 32–75)
NITRITE UR QL STRIP.AUTO: NEGATIVE
NRBC # BLD: 0 K/UL (ref 0–0.01)
NRBC BLD-RTO: 0 PER 100 WBC
NT PRO BNP: 917 PG/ML
PH UR STRIP: 7.5 (ref 5–8)
PLATELET # BLD AUTO: 190 K/UL (ref 150–400)
PMV BLD AUTO: 10.2 FL (ref 8.9–12.9)
POTASSIUM SERPL-SCNC: 4.7 MMOL/L (ref 3.5–5.1)
PROT SERPL-MCNC: 7.1 G/DL (ref 6.4–8.2)
PROT UR STRIP-MCNC: ABNORMAL MG/DL
RBC # BLD AUTO: 3.78 M/UL (ref 3.8–5.2)
RBC #/AREA URNS HPF: ABNORMAL /HPF (ref 0–5)
SERVICE CMNT-IMP: ABNORMAL
SERVICE CMNT-IMP: ABNORMAL
SERVICE CMNT-IMP: NORMAL
SODIUM SERPL-SCNC: 133 MMOL/L (ref 136–145)
SP GR UR REFRACTOMETRY: 1.01 (ref 1–1.03)
TROPONIN I SERPL HS-MCNC: 66 NG/L (ref 0–51)
TROPONIN I SERPL HS-MCNC: 70 NG/L (ref 0–51)
URINE CULTURE IF INDICATED: ABNORMAL
UROBILINOGEN UR QL STRIP.AUTO: 1 EU/DL (ref 0.2–1)
WBC # BLD AUTO: 8.4 K/UL (ref 3.6–11)
WBC URNS QL MICRO: ABNORMAL /HPF (ref 0–4)

## 2024-10-15 PROCEDURE — 81001 URINALYSIS AUTO W/SCOPE: CPT

## 2024-10-15 PROCEDURE — 36415 COLL VENOUS BLD VENIPUNCTURE: CPT

## 2024-10-15 PROCEDURE — 84484 ASSAY OF TROPONIN QUANT: CPT

## 2024-10-15 PROCEDURE — 83880 ASSAY OF NATRIURETIC PEPTIDE: CPT

## 2024-10-15 PROCEDURE — 82962 GLUCOSE BLOOD TEST: CPT

## 2024-10-15 PROCEDURE — 80053 COMPREHEN METABOLIC PANEL: CPT

## 2024-10-15 PROCEDURE — 85025 COMPLETE CBC W/AUTO DIFF WBC: CPT

## 2024-10-15 PROCEDURE — 93005 ELECTROCARDIOGRAM TRACING: CPT | Performed by: STUDENT IN AN ORGANIZED HEALTH CARE EDUCATION/TRAINING PROGRAM

## 2024-10-15 PROCEDURE — 6370000000 HC RX 637 (ALT 250 FOR IP): Performed by: INTERNAL MEDICINE

## 2024-10-15 PROCEDURE — 87086 URINE CULTURE/COLONY COUNT: CPT

## 2024-10-15 PROCEDURE — 99285 EMERGENCY DEPT VISIT HI MDM: CPT

## 2024-10-15 PROCEDURE — 83690 ASSAY OF LIPASE: CPT

## 2024-10-15 PROCEDURE — 6370000000 HC RX 637 (ALT 250 FOR IP): Performed by: STUDENT IN AN ORGANIZED HEALTH CARE EDUCATION/TRAINING PROGRAM

## 2024-10-15 PROCEDURE — 71045 X-RAY EXAM CHEST 1 VIEW: CPT

## 2024-10-15 PROCEDURE — 1100000003 HC PRIVATE W/ TELEMETRY

## 2024-10-15 RX ORDER — ATORVASTATIN CALCIUM 20 MG/1
20 TABLET, FILM COATED ORAL NIGHTLY
Status: DISCONTINUED | OUTPATIENT
Start: 2024-10-15 | End: 2024-10-18 | Stop reason: HOSPADM

## 2024-10-15 RX ORDER — IPRATROPIUM BROMIDE AND ALBUTEROL SULFATE 2.5; .5 MG/3ML; MG/3ML
1 SOLUTION RESPIRATORY (INHALATION) EVERY 4 HOURS PRN
Status: DISCONTINUED | OUTPATIENT
Start: 2024-10-15 | End: 2024-10-18 | Stop reason: HOSPADM

## 2024-10-15 RX ORDER — ONDANSETRON 4 MG/1
4 TABLET, ORALLY DISINTEGRATING ORAL EVERY 8 HOURS PRN
Status: DISCONTINUED | OUTPATIENT
Start: 2024-10-15 | End: 2024-10-18 | Stop reason: HOSPADM

## 2024-10-15 RX ORDER — LOSARTAN POTASSIUM 50 MG/1
50 TABLET ORAL EVERY MORNING
Status: DISCONTINUED | OUTPATIENT
Start: 2024-10-16 | End: 2024-10-18 | Stop reason: HOSPADM

## 2024-10-15 RX ORDER — BISACODYL 5 MG/1
5 TABLET, DELAYED RELEASE ORAL DAILY PRN
Status: DISCONTINUED | OUTPATIENT
Start: 2024-10-15 | End: 2024-10-16

## 2024-10-15 RX ORDER — INSULIN LISPRO 100 [IU]/ML
0-8 INJECTION, SOLUTION INTRAVENOUS; SUBCUTANEOUS
Status: DISCONTINUED | OUTPATIENT
Start: 2024-10-15 | End: 2024-10-16

## 2024-10-15 RX ORDER — ASPIRIN 81 MG/1
81 TABLET ORAL DAILY
Status: DISCONTINUED | OUTPATIENT
Start: 2024-10-15 | End: 2024-10-18 | Stop reason: HOSPADM

## 2024-10-15 RX ORDER — ASPIRIN 81 MG/1
324 TABLET, CHEWABLE ORAL ONCE
Status: COMPLETED | OUTPATIENT
Start: 2024-10-15 | End: 2024-10-15

## 2024-10-15 RX ORDER — AMLODIPINE BESYLATE 5 MG/1
5 TABLET ORAL EVERY MORNING
Status: DISCONTINUED | OUTPATIENT
Start: 2024-10-16 | End: 2024-10-18 | Stop reason: HOSPADM

## 2024-10-15 RX ORDER — M-VIT,TX,IRON,MINS/CALC/FOLIC 27MG-0.4MG
1 TABLET ORAL DAILY
Status: DISCONTINUED | OUTPATIENT
Start: 2024-10-15 | End: 2024-10-18 | Stop reason: HOSPADM

## 2024-10-15 RX ORDER — VITS A,C,E/LUTEIN/MINERALS 300MCG-200
1 TABLET ORAL 2 TIMES DAILY
Status: DISCONTINUED | OUTPATIENT
Start: 2024-10-15 | End: 2024-10-15

## 2024-10-15 RX ORDER — DEXTROSE MONOHYDRATE 100 MG/ML
INJECTION, SOLUTION INTRAVENOUS CONTINUOUS PRN
Status: DISCONTINUED | OUTPATIENT
Start: 2024-10-15 | End: 2024-10-16

## 2024-10-15 RX ORDER — NITROGLYCERIN 0.4 MG/1
0.4 TABLET SUBLINGUAL ONCE
Status: DISCONTINUED | OUTPATIENT
Start: 2024-10-15 | End: 2024-10-16

## 2024-10-15 RX ORDER — GLUCAGON 1 MG/ML
1 KIT INJECTION PRN
Status: DISCONTINUED | OUTPATIENT
Start: 2024-10-15 | End: 2024-10-16

## 2024-10-15 RX ORDER — ACETAMINOPHEN 500 MG
1000 TABLET ORAL
Status: COMPLETED | OUTPATIENT
Start: 2024-10-15 | End: 2024-10-15

## 2024-10-15 RX ORDER — ACETAMINOPHEN 325 MG/1
650 TABLET ORAL EVERY 4 HOURS PRN
Status: DISCONTINUED | OUTPATIENT
Start: 2024-10-15 | End: 2024-10-18 | Stop reason: HOSPADM

## 2024-10-15 RX ORDER — METOPROLOL TARTRATE 25 MG/1
25 TABLET, FILM COATED ORAL 2 TIMES DAILY
Status: DISCONTINUED | OUTPATIENT
Start: 2024-10-15 | End: 2024-10-18 | Stop reason: HOSPADM

## 2024-10-15 RX ORDER — ENOXAPARIN SODIUM 100 MG/ML
40 INJECTION SUBCUTANEOUS DAILY
Status: DISCONTINUED | OUTPATIENT
Start: 2024-10-16 | End: 2024-10-18 | Stop reason: HOSPADM

## 2024-10-15 RX ORDER — FAMOTIDINE 20 MG/1
20 TABLET, FILM COATED ORAL 2 TIMES DAILY
Status: DISCONTINUED | OUTPATIENT
Start: 2024-10-15 | End: 2024-10-18 | Stop reason: HOSPADM

## 2024-10-15 RX ADMIN — ACETAMINOPHEN 1000 MG: 500 TABLET ORAL at 11:30

## 2024-10-15 RX ADMIN — ASPIRIN 81 MG: 81 TABLET, COATED ORAL at 17:18

## 2024-10-15 RX ADMIN — Medication 1 TABLET: at 17:18

## 2024-10-15 RX ADMIN — ASPIRIN 324 MG: 81 TABLET, CHEWABLE ORAL at 11:32

## 2024-10-15 RX ADMIN — METOPROLOL TARTRATE 25 MG: 25 TABLET, FILM COATED ORAL at 21:19

## 2024-10-15 RX ADMIN — FAMOTIDINE 20 MG: 20 TABLET, FILM COATED ORAL at 21:20

## 2024-10-15 RX ADMIN — ATORVASTATIN CALCIUM 20 MG: 20 TABLET, FILM COATED ORAL at 21:20

## 2024-10-15 ASSESSMENT — PAIN DESCRIPTION - LOCATION: LOCATION: SHOULDER

## 2024-10-15 ASSESSMENT — PAIN SCALES - GENERAL: PAINLEVEL_OUTOF10: 5

## 2024-10-15 NOTE — PROGRESS NOTES
TRANSFER - IN REPORT:    Verbal report received from ALIA Carrillo on Elise Schneider  being received from ED for routine progression of patient care      Report consisted of patient's Situation, Background, Assessment and   Recommendations(SBAR).     Information from the following report(s) Nurse Handoff Report, Adult Overview, Intake/Output, MAR, and Recent Results was reviewed with the receiving nurse.    Opportunity for questions and clarification was provided.      Assessment completed upon patient's arrival to unit and care assumed.     1900: Bedside and Verbal shift change report given to ALIA Ochoa (oncoming nurse) by Domenica Coates RN (offgoing nurse). Report included the following information Nurse Handoff Report, Adult Overview, Intake/Output, MAR, and Recent Results.

## 2024-10-15 NOTE — ED NOTES
Patient was sent here in ED by her PCP for further work up   Not complaining of chest pain, hooked to cardiac monitor.   IV cannula started, blood works done. Relative at bedside   Needs attended

## 2024-10-15 NOTE — TELEPHONE ENCOUNTER
Patients sister Xenia is calling in regards to patient went to the urgent care in [place] last night and they ran test and said she needed to follow up with Dr. Antoni SU. Patient was given appointment for 10/29/2024 however, her sister states that she needs to be seen sooner so she will be taking patient back to ER. She would like a call back ASAP to discuss further.     Patients sister Xenia # [phone]

## 2024-10-15 NOTE — CONSULTS
[facility]  [address] [place] [address]  [phone]  [email or web address]       CARDIOLOGY CONSULTATION       Date of  Admission: 10/15/2024 10:42 AM     Admission type:Emergency   Primary Care Physician:Gabriela Maravilla APRN - NP     Attending Provider: Will Knott MD  Cardiology Provider: Antoni  CC/REASON FOR CONSULT: Chest pain     Subjective:     Elise Schneider is a 78 y.o. female admitted for Acute chest pain [R07.9]  Unstable angina (HCC) [I20.0].    Chest pain: The patient was in her usual state of health about 1 year ago when she started experiencing episodes of tightness in her chest that are precipitated when she overexerts herself.  Does not use any cane or walker at her baseline.  Has a truck to get around.  Denies any shortness of breath or passing out episodes.  Smokes cigarettes.  No symptoms at the time of my visit.  The patient was seen and examined at the bedside.    Patient Active Problem List    Diagnosis Date Noted    Acute chest pain 10/15/2024    Unstable angina (HCC) 10/15/2024    S/P carotid endarterectomy 09/30/2024    Carotid artery stenosis, asymptomatic, right 07/17/2024    S/P total right hip arthroplasty 11/22/2021    Mixed hyperlipidemia 06/23/2017    S/P PTCA (percutaneous transluminal coronary angioplasty) 06/04/2015    CAD (coronary artery disease), native coronary artery 06/04/2015    SVT (supraventricular tachycardia) (HCC) 02/15/2013    Tobacco use disorder 02/15/2013    Family history of ischemic heart disease 02/15/2013    Essential hypertension, benign 02/15/2013    Vertigo 05/11/2012      Gabriela Maravilla APRN - NP  Past Medical History:   Diagnosis Date    Adverse effect of anesthesia     difficulty breathing during induction    Anxiety     Arthritis     left shoulder and elbow    Breast cancer (HCC)     RIGHT     CAD (coronary artery disease)     stenting cardiac 6/5/2015    Carotid stenosis, bilateral     Chronic  ipratropium 0.5 mg-albuterol 2.5 mg (DUONEB) nebulizer solution 1 Dose  1 Dose Inhalation Q4H PRN    [START ON 10/16/2024] enoxaparin (LOVENOX) injection 40 mg  40 mg SubCUTAneous Daily    insulin lispro (HUMALOG,ADMELOG) injection vial 0-8 Units  0-8 Units SubCUTAneous 4x Daily AC & HS    glucose chewable tablet 16 g  4 tablet Oral PRN    dextrose bolus 10% 125 mL  125 mL IntraVENous PRN    Or    dextrose bolus 10% 250 mL  250 mL IntraVENous PRN    glucagon injection 1 mg  1 mg SubCUTAneous PRN    dextrose 10 % infusion   IntraVENous Continuous PRN     Current Outpatient Medications   Medication Sig    acetaminophen (TYLENOL) 500 MG tablet Take 1 tablet by mouth every 6 hours as needed for Pain    metoprolol tartrate (LOPRESSOR) 25 MG tablet Take 1 tablet by mouth twice daily    aspirin 81 MG EC tablet Take 1 tablet by mouth daily (Patient taking differently: Take 1 tablet by mouth daily Indications: unsure of who prescribed maybe cardiologist per pt  Dr Corrales, heart)    sodium chloride (OCEAN, BABY AYR) 0.65 % nasal spray 1 spray by Nasal route as needed for Congestion    Multiple Vitamins-Minerals (PRESERVISION AREDS) CAPS 1 capsule 2 times daily    famotidine (PEPCID) 40 MG tablet Take 1 tablet by mouth (Patient not taking: Reported on 9/18/2024)    amLODIPine (NORVASC) 5 MG tablet Take 1 tablet by mouth once daily for blood pressure (Patient taking differently: Take 1 tablet by mouth every morning)    fluticasone (FLONASE) 50 MCG/ACT nasal spray 2 sprays by Nasal route daily as needed    losartan (COZAAR) 50 MG tablet Take 1 tablet by mouth every morning    lovastatin (MEVACOR) 40 MG tablet Take 1 tablet by mouth nightly    metFORMIN (GLUCOPHAGE) 500 MG tablet Take 1 tablet by mouth nightly        Cardiovascular ROS: positive for - chest pain         Objective:      Vitals:    10/15/24 1300   BP:    Pulse:    Resp:    Temp: 98.5 °F (36.9 °C)   SpO2:        Physical Exam  Constitutional:       General: She is

## 2024-10-15 NOTE — H&P
History and Physical    Patient: Elise Schneider               Sex: female          DOA: 10/15/2024       YOB: 1945      Age:  78 y.o.        LOS:  LOS: 0 days        Gabriela Maravilla APRN - NP    Chief complaint: Chest pain last night    HPI:     Elise Schneider is a 78 y.o. female who presented to emergency room for further evaluation of chest pain that happened last night.  Patient stated she started having chest pain and she went to [facility] emergency room and had some blood workup and was told that she does not have a heart attack but needs to see a cardiologist.  She lives in [place] and it is hard to get any cardiologist in her area so decided to come to emergency room so she can see a cardiologist.  She has history of coronary artery disease and stenting for ED.  Because of elevated heart score of 4, our service was consulted for admission so patient can be seen by cardiologist.    When I went to see this patient, patient is chest pain free.  No nausea or vomiting.  Patient stated she has been getting intermittent exertional chest pain for last few days but yesterday it lasted for a long time so decided to go to the emergency room.  No shortness of breath cough.  No headaches or dizziness.  She states she lives in [place].  She does not have any children.  She has a sister.  She walks independently.  She smokes off-and-on.  She has prior history of coronary artery disease and being followed by Dr. Ospina.  No heavy will stop alcohol.  Patient says she takes metformin, aspirin, metoprolol, Pepcid, multivitamin and Norvasc.    Past Medical History:   Diagnosis Date    Adverse effect of anesthesia     difficulty breathing during induction    Anxiety     Arthritis     left shoulder and elbow    Breast cancer (HCC)     RIGHT     CAD (coronary artery disease)     stenting cardiac 6/5/2015    Carotid stenosis, bilateral     Chronic pain     Claustrophobia     Diabetes mellitus (HCC)     GERD  Collected: 10/15/24 1114    Order Status: No result Updated: 10/15/24 1209          XR CHEST PORTABLE    Result Date: 10/15/2024  No acute intrathoracic process is identified. Electronically signed by TYSHAWN JACKSON    CT ABDOMEN WO CONTRAST    Result Date: 10/14/2024  1.   No acute findings. 2.   Right nephrolithiasis, nonobstructing. 3.   Pancolonic diverticulosis. No evidence of acute diverticulitis. 4.   Left inguinal hernia containing nondilated small bowel. COMMENTS: Consistent with the American College of Radiology's Incidental Findings Committee white paper (J Am Ramila Radiol 2018): Any incidental renal lesion less than 1 cm or classified as too small to characterize, or any incidental cystic renal lesion characterized as simple-appearing, is likely benign. No follow-up imaging is recommended for these lesions per consensus recommendations based on imaging criteria. THIS DOCUMENT HAS BEEN ELECTRONICALLY VERIFIED BY VICKEY ATKINSON DO ON 10/14/2024 22:34:16          Assessment/Plan     Principal Problem:    Acute chest pain  Active Problems:    Unstable angina (HCC)  Resolved Problems:    * No resolved hospital problems. *    1.  Intermittent chest pain with elevated troponin.  2.  Hypertension  3.  Diabetes mellitus  4.  GERD  5.  Hyperlipidemia and coronary artery disease  6.  History of skin cancer  7.  Chronic hyponatremia  8.  Carotid artery disease    PLAN:    Patient will be admitted to telemetry floor.  Cardiology will be consulted.  Patient does not have any chest pain.  Will monitor troponin.  Continue aspirin and statin.  For hypertension, will continue home dose of losartan, metoprolol and Norvasc with holding parameters.  Patient is intolerant to atorvastatin.  However she could not tell me what is a side effect.  Patient was taking Mevacor.  Will try Lipitor here if does not work, pharmacist may have to substitute it with Mevacor.  Will also put patient on Pepcid for GERD  Monitor sodium  Goals of

## 2024-10-15 NOTE — ED PROVIDER NOTES
EMERGENCY DEPARTMENT HISTORY AND PHYSICAL EXAM      Date: 10/15/2024  Patient Name: Elise Schneider    History of Presenting Illness     Chief Complaint   Patient presents with    Referral - General     Pt ambulated onto stretcher. Sent from PCP for further workup. Pt claims she was recently dx with liver cyst, pancreas cysts, kidney stone and arterial atherosclerosis. Pt can not see cardiologist until 10-29 Pt cc of weakness and chest pressure. Pt has BLE swelling. +1.          HPI: History From: patient, History limited by: none  Elise Schneider, 78 y.o. female presents to the ED with cc of epigastric and chest pain.  This started yesterday evening, she describes it as a dull central pain in her lower chest.  This is accompanied by epigastric discomfort.  No nausea or diaphoresis.  She reports mild shortness of breath, no coughing or fever.  She has noted some increasing swelling in both of her legs recently.  She was seen at [facility] yesterday, had CT abdomen pelvis, had instructions to follow-up with her cardiologist, however she was not able to get an appointment to the end of the month.  Her discomfort has been persistent since last night, prompting her to present here.  She reports history of coronary artery disease with prior stent.          There are no other complaints, changes, or physical findings at this time.    PCP: Gabrilea Maravilla APRN - NP    No current facility-administered medications on file prior to encounter.     Current Outpatient Medications on File Prior to Encounter   Medication Sig Dispense Refill    acetaminophen (TYLENOL) 500 MG tablet Take 1 tablet by mouth every 6 hours as needed for Pain      metoprolol tartrate (LOPRESSOR) 25 MG tablet Take 1 tablet by mouth twice daily 180 tablet 1    aspirin 81 MG EC tablet Take 1 tablet by mouth daily (Patient taking differently: Take 1 tablet by mouth daily Indications: unsure of who prescribed maybe cardiologist per pt  Dr Corrales, heart) 90  10/15/24 12:53 PM   Result Value Ref Range    POC Glucose 158 (H) 65 - 117 mg/dL    Performed by: Ramin Carrillo RN        Radiologic Studies -   XR CHEST PORTABLE   Final Result   No acute intrathoracic process is identified.             Electronically signed by TYSHAWN JACKSON              Medical Decision Making   I am the first provider for this patient.    I reviewed the vital signs, available nursing notes, past medical history, past surgical history, family history and social history.    Vital Signs-Reviewed the patient's vital signs.  Patient Vitals for the past 12 hrs:   Temp Pulse Resp BP SpO2   10/15/24 1045 97.7 °F (36.5 °C) 65 17 (!) 152/79 98 %         Provider Notes (Medical Decision Making):   78-year-old presenting with chest pain, epigastric pain.  Differential includes atypical ACS, gastritis or GERD, dyspepsia, esophageal spasm, musculoskeletal pain, pancreatitis.  She had CT scan done last night, this showed no acute findings.  She has reassuring vital signs, no peritoneal signs on abdominal exam, without change of abdominal pain, do not believe that repeat CT scan of abdomen pelvis is warranted, however she does need cardiac workup with continued chest pain and significant cardiac risk factors.    ED Course:     .    Medications   acetaminophen (TYLENOL) tablet 650 mg (has no administration in time range)   nitroGLYCERIN (NITROSTAT) SL tablet 0.4 mg (0.4 mg SubLINGual Not Given 10/15/24 1245)   ondansetron (ZOFRAN-ODT) disintegrating tablet 4 mg (has no administration in time range)   bisacodyl (DULCOLAX) EC tablet 5 mg (has no administration in time range)   ipratropium 0.5 mg-albuterol 2.5 mg (DUONEB) nebulizer solution 1 Dose (has no administration in time range)   enoxaparin (LOVENOX) injection 40 mg (has no administration in time range)   insulin lispro (HUMALOG,ADMELOG) injection vial 0-8 Units (has no administration in time range)   glucose chewable tablet 16 g (has no administration in time

## 2024-10-15 NOTE — TELEPHONE ENCOUNTER
Patient in [facility] at this moment, gave verbal permission to talk to sister Xenia. Patient seeing in the ED for a kidney problem at this time. Appointment kept for october 19. Sister voiced understanding.

## 2024-10-16 ENCOUNTER — APPOINTMENT (OUTPATIENT)
Facility: HOSPITAL | Age: 79
End: 2024-10-16
Payer: MEDICARE

## 2024-10-16 LAB
ACT BLD: 269 SECS (ref 79–138)
ALBUMIN SERPL-MCNC: 3.3 G/DL (ref 3.5–5)
ALBUMIN/GLOB SERPL: 1 (ref 1.1–2.2)
ALP SERPL-CCNC: 44 U/L (ref 45–117)
ALT SERPL-CCNC: 21 U/L (ref 12–78)
ANION GAP SERPL CALC-SCNC: 6 MMOL/L (ref 2–12)
AST SERPL-CCNC: 20 U/L (ref 15–37)
BACTERIA SPEC CULT: NORMAL
BASOPHILS # BLD: 0 K/UL (ref 0–0.1)
BASOPHILS NFR BLD: 1 % (ref 0–1)
BILIRUB SERPL-MCNC: 0.5 MG/DL (ref 0.2–1)
BUN SERPL-MCNC: 14 MG/DL (ref 6–20)
BUN/CREAT SERPL: 22 (ref 12–20)
CALCIUM SERPL-MCNC: 8.8 MG/DL (ref 8.5–10.1)
CHLORIDE SERPL-SCNC: 104 MMOL/L (ref 97–108)
CHOLEST SERPL-MCNC: 114 MG/DL
CO2 SERPL-SCNC: 23 MMOL/L (ref 21–32)
CREAT SERPL-MCNC: 0.63 MG/DL (ref 0.55–1.02)
DIFFERENTIAL METHOD BLD: ABNORMAL
EKG ATRIAL RATE: 85 BPM
EKG DIAGNOSIS: NORMAL
EKG P AXIS: 35 DEGREES
EKG P-R INTERVAL: 118 MS
EKG Q-T INTERVAL: 400 MS
EKG QRS DURATION: 82 MS
EKG QTC CALCULATION (BAZETT): 476 MS
EKG R AXIS: 57 DEGREES
EKG T AXIS: 42 DEGREES
EKG VENTRICULAR RATE: 85 BPM
EOSINOPHIL # BLD: 0.2 K/UL (ref 0–0.4)
EOSINOPHIL NFR BLD: 3 % (ref 0–7)
ERYTHROCYTE [DISTWIDTH] IN BLOOD BY AUTOMATED COUNT: 12.4 % (ref 11.5–14.5)
GLOBULIN SER CALC-MCNC: 3.3 G/DL (ref 2–4)
GLUCOSE BLD STRIP.AUTO-MCNC: 188 MG/DL (ref 65–117)
GLUCOSE BLD STRIP.AUTO-MCNC: 194 MG/DL (ref 65–117)
GLUCOSE BLD STRIP.AUTO-MCNC: 213 MG/DL (ref 65–117)
GLUCOSE BLD STRIP.AUTO-MCNC: 221 MG/DL (ref 65–117)
GLUCOSE SERPL-MCNC: 193 MG/DL (ref 65–100)
HCT VFR BLD AUTO: 33.5 % (ref 35–47)
HDLC SERPL-MCNC: 50 MG/DL
HDLC SERPL: 2.3 (ref 0–5)
HGB BLD-MCNC: 11.1 G/DL (ref 11.5–16)
IMM GRANULOCYTES # BLD AUTO: 0 K/UL (ref 0–0.04)
IMM GRANULOCYTES NFR BLD AUTO: 1 % (ref 0–0.5)
LDLC SERPL CALC-MCNC: 55.2 MG/DL (ref 0–100)
LYMPHOCYTES # BLD: 1.6 K/UL (ref 0.8–3.5)
LYMPHOCYTES NFR BLD: 23 % (ref 12–49)
MCH RBC QN AUTO: 32.8 PG (ref 26–34)
MCHC RBC AUTO-ENTMCNC: 33.1 G/DL (ref 30–36.5)
MCV RBC AUTO: 99.1 FL (ref 80–99)
MONOCYTES # BLD: 0.6 K/UL (ref 0–1)
MONOCYTES NFR BLD: 9 % (ref 5–13)
NEUTS SEG # BLD: 4.4 K/UL (ref 1.8–8)
NEUTS SEG NFR BLD: 63 % (ref 32–75)
NRBC # BLD: 0 K/UL (ref 0–0.01)
NRBC BLD-RTO: 0 PER 100 WBC
PLATELET # BLD AUTO: 165 K/UL (ref 150–400)
PMV BLD AUTO: 10.9 FL (ref 8.9–12.9)
POTASSIUM SERPL-SCNC: 3.8 MMOL/L (ref 3.5–5.1)
PROT SERPL-MCNC: 6.6 G/DL (ref 6.4–8.2)
RBC # BLD AUTO: 3.38 M/UL (ref 3.8–5.2)
SERVICE CMNT-IMP: ABNORMAL
SERVICE CMNT-IMP: NORMAL
SODIUM SERPL-SCNC: 133 MMOL/L (ref 136–145)
TRIGL SERPL-MCNC: 44 MG/DL
VLDLC SERPL CALC-MCNC: 8.8 MG/DL
WBC # BLD AUTO: 6.9 K/UL (ref 3.6–11)

## 2024-10-16 PROCEDURE — 6370000000 HC RX 637 (ALT 250 FOR IP): Performed by: STUDENT IN AN ORGANIZED HEALTH CARE EDUCATION/TRAINING PROGRAM

## 2024-10-16 PROCEDURE — 6360000004 HC RX CONTRAST MEDICATION: Performed by: STUDENT IN AN ORGANIZED HEALTH CARE EDUCATION/TRAINING PROGRAM

## 2024-10-16 PROCEDURE — 82962 GLUCOSE BLOOD TEST: CPT

## 2024-10-16 PROCEDURE — 2500000003 HC RX 250 WO HCPCS: Performed by: HOSPITALIST

## 2024-10-16 PROCEDURE — 2580000003 HC RX 258: Performed by: HOSPITALIST

## 2024-10-16 PROCEDURE — C1874 STENT, COATED/COV W/DEL SYS: HCPCS | Performed by: STUDENT IN AN ORGANIZED HEALTH CARE EDUCATION/TRAINING PROGRAM

## 2024-10-16 PROCEDURE — 4A023N7 MEASUREMENT OF CARDIAC SAMPLING AND PRESSURE, LEFT HEART, PERCUTANEOUS APPROACH: ICD-10-PCS | Performed by: STUDENT IN AN ORGANIZED HEALTH CARE EDUCATION/TRAINING PROGRAM

## 2024-10-16 PROCEDURE — 85025 COMPLETE CBC W/AUTO DIFF WBC: CPT

## 2024-10-16 PROCEDURE — 36415 COLL VENOUS BLD VENIPUNCTURE: CPT

## 2024-10-16 PROCEDURE — 76882 US LMTD JT/FCL EVL NVASC XTR: CPT

## 2024-10-16 PROCEDURE — 2580000003 HC RX 258: Performed by: STUDENT IN AN ORGANIZED HEALTH CARE EDUCATION/TRAINING PROGRAM

## 2024-10-16 PROCEDURE — C9600 PERC DRUG-EL COR STENT SING: HCPCS | Performed by: STUDENT IN AN ORGANIZED HEALTH CARE EDUCATION/TRAINING PROGRAM

## 2024-10-16 PROCEDURE — B2151ZZ FLUOROSCOPY OF LEFT HEART USING LOW OSMOLAR CONTRAST: ICD-10-PCS | Performed by: STUDENT IN AN ORGANIZED HEALTH CARE EDUCATION/TRAINING PROGRAM

## 2024-10-16 PROCEDURE — 6370000000 HC RX 637 (ALT 250 FOR IP): Performed by: INTERNAL MEDICINE

## 2024-10-16 PROCEDURE — C1725 CATH, TRANSLUMIN NON-LASER: HCPCS | Performed by: STUDENT IN AN ORGANIZED HEALTH CARE EDUCATION/TRAINING PROGRAM

## 2024-10-16 PROCEDURE — 2060000000 HC ICU INTERMEDIATE R&B

## 2024-10-16 PROCEDURE — 6360000002 HC RX W HCPCS: Performed by: STUDENT IN AN ORGANIZED HEALTH CARE EDUCATION/TRAINING PROGRAM

## 2024-10-16 PROCEDURE — C1887 CATHETER, GUIDING: HCPCS | Performed by: STUDENT IN AN ORGANIZED HEALTH CARE EDUCATION/TRAINING PROGRAM

## 2024-10-16 PROCEDURE — 2709999900 HC NON-CHARGEABLE SUPPLY: Performed by: STUDENT IN AN ORGANIZED HEALTH CARE EDUCATION/TRAINING PROGRAM

## 2024-10-16 PROCEDURE — 80061 LIPID PANEL: CPT

## 2024-10-16 PROCEDURE — 93458 L HRT ARTERY/VENTRICLE ANGIO: CPT | Performed by: STUDENT IN AN ORGANIZED HEALTH CARE EDUCATION/TRAINING PROGRAM

## 2024-10-16 PROCEDURE — 92978 ENDOLUMINL IVUS OCT C 1ST: CPT | Performed by: STUDENT IN AN ORGANIZED HEALTH CARE EDUCATION/TRAINING PROGRAM

## 2024-10-16 PROCEDURE — 80053 COMPREHEN METABOLIC PANEL: CPT

## 2024-10-16 PROCEDURE — 99222 1ST HOSP IP/OBS MODERATE 55: CPT | Performed by: INTERNAL MEDICINE

## 2024-10-16 PROCEDURE — B2111ZZ FLUOROSCOPY OF MULTIPLE CORONARY ARTERIES USING LOW OSMOLAR CONTRAST: ICD-10-PCS | Performed by: STUDENT IN AN ORGANIZED HEALTH CARE EDUCATION/TRAINING PROGRAM

## 2024-10-16 PROCEDURE — 93005 ELECTROCARDIOGRAM TRACING: CPT | Performed by: STUDENT IN AN ORGANIZED HEALTH CARE EDUCATION/TRAINING PROGRAM

## 2024-10-16 PROCEDURE — C1753 CATH, INTRAVAS ULTRASOUND: HCPCS | Performed by: STUDENT IN AN ORGANIZED HEALTH CARE EDUCATION/TRAINING PROGRAM

## 2024-10-16 PROCEDURE — 027034Z DILATION OF CORONARY ARTERY, ONE ARTERY WITH DRUG-ELUTING INTRALUMINAL DEVICE, PERCUTANEOUS APPROACH: ICD-10-PCS | Performed by: STUDENT IN AN ORGANIZED HEALTH CARE EDUCATION/TRAINING PROGRAM

## 2024-10-16 PROCEDURE — C1894 INTRO/SHEATH, NON-LASER: HCPCS | Performed by: STUDENT IN AN ORGANIZED HEALTH CARE EDUCATION/TRAINING PROGRAM

## 2024-10-16 PROCEDURE — 85347 COAGULATION TIME ACTIVATED: CPT

## 2024-10-16 PROCEDURE — 2500000003 HC RX 250 WO HCPCS: Performed by: STUDENT IN AN ORGANIZED HEALTH CARE EDUCATION/TRAINING PROGRAM

## 2024-10-16 PROCEDURE — C1769 GUIDE WIRE: HCPCS | Performed by: STUDENT IN AN ORGANIZED HEALTH CARE EDUCATION/TRAINING PROGRAM

## 2024-10-16 PROCEDURE — 6370000000 HC RX 637 (ALT 250 FOR IP): Performed by: HOSPITALIST

## 2024-10-16 PROCEDURE — 99152 MOD SED SAME PHYS/QHP 5/>YRS: CPT | Performed by: STUDENT IN AN ORGANIZED HEALTH CARE EDUCATION/TRAINING PROGRAM

## 2024-10-16 PROCEDURE — 99153 MOD SED SAME PHYS/QHP EA: CPT | Performed by: STUDENT IN AN ORGANIZED HEALTH CARE EDUCATION/TRAINING PROGRAM

## 2024-10-16 DEVICE — STENT ONYXNG40018UX ONYX 4.00X18RX
Type: IMPLANTABLE DEVICE | Status: FUNCTIONAL
Brand: ONYX FRONTIER™

## 2024-10-16 RX ORDER — GLUCAGON 1 MG/ML
1 KIT INJECTION PRN
Status: DISCONTINUED | OUTPATIENT
Start: 2024-10-16 | End: 2024-10-18 | Stop reason: HOSPADM

## 2024-10-16 RX ORDER — HEPARIN SODIUM 1000 [USP'U]/ML
INJECTION, SOLUTION INTRAVENOUS; SUBCUTANEOUS PRN
Status: DISCONTINUED | OUTPATIENT
Start: 2024-10-16 | End: 2024-10-16 | Stop reason: HOSPADM

## 2024-10-16 RX ORDER — CLOPIDOGREL BISULFATE 75 MG/1
75 TABLET ORAL DAILY
Status: DISCONTINUED | OUTPATIENT
Start: 2024-10-17 | End: 2024-10-18 | Stop reason: HOSPADM

## 2024-10-16 RX ORDER — LANOLIN ALCOHOL/MO/W.PET/CERES
3 CREAM (GRAM) TOPICAL NIGHTLY
Status: DISCONTINUED | OUTPATIENT
Start: 2024-10-16 | End: 2024-10-18 | Stop reason: HOSPADM

## 2024-10-16 RX ORDER — CLOPIDOGREL 300 MG/1
TABLET, FILM COATED ORAL PRN
Status: DISCONTINUED | OUTPATIENT
Start: 2024-10-16 | End: 2024-10-16 | Stop reason: HOSPADM

## 2024-10-16 RX ORDER — SODIUM CHLORIDE 0.9 % (FLUSH) 0.9 %
5-40 SYRINGE (ML) INJECTION PRN
Status: DISCONTINUED | OUTPATIENT
Start: 2024-10-16 | End: 2024-10-18 | Stop reason: HOSPADM

## 2024-10-16 RX ORDER — IOPAMIDOL 755 MG/ML
INJECTION, SOLUTION INTRAVASCULAR PRN
Status: DISCONTINUED | OUTPATIENT
Start: 2024-10-16 | End: 2024-10-16 | Stop reason: HOSPADM

## 2024-10-16 RX ORDER — INSULIN LISPRO 100 [IU]/ML
0-4 INJECTION, SOLUTION INTRAVENOUS; SUBCUTANEOUS
Status: DISCONTINUED | OUTPATIENT
Start: 2024-10-16 | End: 2024-10-18 | Stop reason: HOSPADM

## 2024-10-16 RX ORDER — ACETAMINOPHEN 325 MG/1
650 TABLET ORAL EVERY 4 HOURS PRN
Status: DISCONTINUED | OUTPATIENT
Start: 2024-10-16 | End: 2024-10-16

## 2024-10-16 RX ORDER — SODIUM CHLORIDE 9 MG/ML
INJECTION, SOLUTION INTRAVENOUS PRN
Status: DISCONTINUED | OUTPATIENT
Start: 2024-10-16 | End: 2024-10-18 | Stop reason: HOSPADM

## 2024-10-16 RX ORDER — POLYETHYLENE GLYCOL 3350 17 G/17G
17 POWDER, FOR SOLUTION ORAL DAILY
Status: DISCONTINUED | OUTPATIENT
Start: 2024-10-16 | End: 2024-10-18 | Stop reason: HOSPADM

## 2024-10-16 RX ORDER — MORPHINE SULFATE 2 MG/ML
INJECTION, SOLUTION INTRAMUSCULAR; INTRAVENOUS PRN
Status: DISCONTINUED | OUTPATIENT
Start: 2024-10-16 | End: 2024-10-16 | Stop reason: HOSPADM

## 2024-10-16 RX ORDER — LIDOCAINE HYDROCHLORIDE 10 MG/ML
INJECTION, SOLUTION INFILTRATION; PERINEURAL PRN
Status: DISCONTINUED | OUTPATIENT
Start: 2024-10-16 | End: 2024-10-16 | Stop reason: HOSPADM

## 2024-10-16 RX ORDER — INSULIN LISPRO 100 [IU]/ML
0.05 INJECTION, SOLUTION INTRAVENOUS; SUBCUTANEOUS
Status: DISCONTINUED | OUTPATIENT
Start: 2024-10-17 | End: 2024-10-18 | Stop reason: HOSPADM

## 2024-10-16 RX ORDER — VERAPAMIL HYDROCHLORIDE 2.5 MG/ML
INJECTION, SOLUTION INTRAVENOUS PRN
Status: DISCONTINUED | OUTPATIENT
Start: 2024-10-16 | End: 2024-10-16 | Stop reason: HOSPADM

## 2024-10-16 RX ORDER — SODIUM CHLORIDE 0.9 % (FLUSH) 0.9 %
5-40 SYRINGE (ML) INJECTION EVERY 12 HOURS SCHEDULED
Status: DISCONTINUED | OUTPATIENT
Start: 2024-10-16 | End: 2024-10-18 | Stop reason: HOSPADM

## 2024-10-16 RX ORDER — FENTANYL CITRATE 50 UG/ML
INJECTION, SOLUTION INTRAMUSCULAR; INTRAVENOUS PRN
Status: DISCONTINUED | OUTPATIENT
Start: 2024-10-16 | End: 2024-10-16 | Stop reason: HOSPADM

## 2024-10-16 RX ORDER — FUROSEMIDE 10 MG/ML
INJECTION INTRAMUSCULAR; INTRAVENOUS PRN
Status: DISCONTINUED | OUTPATIENT
Start: 2024-10-16 | End: 2024-10-16 | Stop reason: HOSPADM

## 2024-10-16 RX ORDER — INSULIN GLARGINE 100 [IU]/ML
0.15 INJECTION, SOLUTION SUBCUTANEOUS DAILY
Status: DISCONTINUED | OUTPATIENT
Start: 2024-10-17 | End: 2024-10-18 | Stop reason: HOSPADM

## 2024-10-16 RX ORDER — SENNOSIDES A AND B 8.6 MG/1
2 TABLET, FILM COATED ORAL NIGHTLY
Status: DISCONTINUED | OUTPATIENT
Start: 2024-10-16 | End: 2024-10-18 | Stop reason: HOSPADM

## 2024-10-16 RX ORDER — DEXTROSE MONOHYDRATE 100 MG/ML
INJECTION, SOLUTION INTRAVENOUS CONTINUOUS PRN
Status: DISCONTINUED | OUTPATIENT
Start: 2024-10-16 | End: 2024-10-18 | Stop reason: HOSPADM

## 2024-10-16 RX ADMIN — VALPROATE SODIUM 125 MG: 100 INJECTION, SOLUTION INTRAVENOUS at 23:32

## 2024-10-16 RX ADMIN — Medication 3 MG: at 21:54

## 2024-10-16 RX ADMIN — SODIUM CHLORIDE, PRESERVATIVE FREE 10 ML: 5 INJECTION INTRAVENOUS at 21:56

## 2024-10-16 RX ADMIN — INSULIN LISPRO 2 UNITS: 100 INJECTION, SOLUTION INTRAVENOUS; SUBCUTANEOUS at 17:49

## 2024-10-16 RX ADMIN — FAMOTIDINE 20 MG: 20 TABLET, FILM COATED ORAL at 12:04

## 2024-10-16 RX ADMIN — METOPROLOL TARTRATE 25 MG: 25 TABLET, FILM COATED ORAL at 21:55

## 2024-10-16 RX ADMIN — POLYETHYLENE GLYCOL 3350 17 G: 17 POWDER, FOR SOLUTION ORAL at 12:26

## 2024-10-16 RX ADMIN — ATORVASTATIN CALCIUM 20 MG: 20 TABLET, FILM COATED ORAL at 21:55

## 2024-10-16 RX ADMIN — Medication 1 TABLET: at 12:04

## 2024-10-16 RX ADMIN — AMLODIPINE BESYLATE 5 MG: 5 TABLET ORAL at 12:04

## 2024-10-16 RX ADMIN — SODIUM CHLORIDE, PRESERVATIVE FREE 10 ML: 5 INJECTION INTRAVENOUS at 12:13

## 2024-10-16 RX ADMIN — ASPIRIN 81 MG: 81 TABLET, COATED ORAL at 12:04

## 2024-10-16 ASSESSMENT — PAIN SCALES - GENERAL
PAINLEVEL_OUTOF10: 0
PAINLEVEL_OUTOF10: 0

## 2024-10-16 NOTE — CARE COORDINATION
Care Management Initial Assessment        RUR: 11% \"low risk\"  Readmission? No  1st IM letter given? Yes, given by Patient Access Team on 10/15/24  1st  letter given: N/A    Initial note - 1612 PM: Chart reviewed. CM met with pt at the bedside to introduce self and role. Verified contact information and demographics. Pt resides alone in a one level home with 3 ALEJO. Pt has no PCP and is not interested in a new PCP. Preferred pharmacy is [facility] on [address]. Pt has a no hx of HH services or a SNF stay. Pt is independent with ADL's and has no DME needs. Pt has a walker at home. Pt is an active . Pt has no ACP on file; pt is a FULL code. Pt family to transport pt home upon time of d/c. Full assessment below:     10/16/24 1612   Service Assessment   Patient Orientation Alert and Oriented;Person;Place;Situation;Self   Cognition Alert   History Provided By Patient   Primary Caregiver Self   Support Systems Family Members   Patient's Healthcare Decision Maker is: Legal Next of Kin   PCP Verified by CM Yes  (No PCP, pt does not want a PCP.)   Last Visit to PCP Within last year   Prior Functional Level Independent in ADLs/IADLs   Current Functional Level Independent in ADLs/IADLs   Can patient return to prior living arrangement Yes   Ability to make needs known: Good   Family able to assist with home care needs: Yes   Would you like for me to discuss the discharge plan with any other family members/significant others, and if so, who? No   Financial Resources Medicare  ([employer] Medicare)   Community Resources None   Social/Functional History   Lives With Alone   Type of Home House   Home Layout One level   Home Access Stairs to enter with rails   Entrance Stairs - Number of Steps 3 ALEJO   Bathroom Shower/Tub None   Bathroom Toilet Standard   Bathroom Equipment None   Bathroom Accessibility Accessible   Home Equipment Walker - Rolling   Receives Help From Family   ADL Assistance Independent   Homemaking  Assistance Independent   Ambulation Assistance Independent   Transfer Assistance Independent   Active  Yes   Mode of Transportation Car   Occupation Retired   Discharge Planning   Type of Residence House   Living Arrangements Alone   Current Services Prior To Admission None   Potential Assistance Needed N/A   DME Ordered? No   Potential Assistance Purchasing Medications No   Type of Home Care Services None   Patient expects to be discharged to: House     Advance Care Planning     General Advance Care Planning (ACP) Conversation    Date of Conversation: 10/16/2024  Conducted with: Patient with Decision Making Capacity  Other persons present: None    Healthcare Decision Maker: No healthcare decision makers have been documented.     Today we documented Decision Maker(s) consistent with Legal Next of Kin hierarchy.  Content/Action Overview:  Has NO ACP documents-Information provided  Reviewed DNR/DNI and patient elects Full Code (Attempt Resuscitation)    Length of Voluntary ACP Conversation in minutes:  <16 minutes (Non-Billable)    NADER ORTIZ   [phone]

## 2024-10-16 NOTE — PLAN OF CARE
Problem: Chronic Conditions and Co-morbidities  Goal: Patient's chronic conditions and co-morbidity symptoms are monitored and maintained or improved  Outcome: Progressing  Flowsheets (Taken 10/15/2024 2010)  Care Plan - Patient's Chronic Conditions and Co-Morbidity Symptoms are Monitored and Maintained or Improved: Monitor and assess patient's chronic conditions and comorbid symptoms for stability, deterioration, or improvement     Problem: Discharge Planning  Goal: Discharge to home or other facility with appropriate resources  Outcome: Progressing  Flowsheets (Taken 10/15/2024 2010)  Discharge to home or other facility with appropriate resources: Identify barriers to discharge with patient and caregiver     Problem: Safety - Adult  Goal: Free from fall injury  Outcome: Progressing  Flowsheets (Taken 10/15/2024 2010)  Free From Fall Injury: Instruct family/caregiver on patient safety     Problem: Pain  Goal: Verbalizes/displays adequate comfort level or baseline comfort level  Outcome: Progressing  Flowsheets (Taken 10/15/2024 2007)  Verbalizes/displays adequate comfort level or baseline comfort level: Encourage patient to monitor pain and request assistance

## 2024-10-16 NOTE — CONSULTS
Palliative Medicine  Patient Name: Elise Schneider  YOB: 1945  MRN: [id number]  Age: 78 y.o.  Gender: female    Date of Initial Consult: 10/15/2024  Date of Service: 10/16/2024  Time: 1:55 PM  Provider: Sharmila Olmstead MD  Hospital Day: 2  Admit Date: 10/15/2024  Referring Provider: Will Knott MD       Reasons for Consultation:  Goals of Care    HISTORY OF PRESENT ILLNESS (HPI):   Elise Schneider is a 78 y.o. female with a past medical history of HTN, CAD s/p stent, Carotid artery disease, DM, chronic hyponatremia,  who was admitted on 10/15/2024 from with a diagnosis of unstable angina/Acute Coronory syndrome.    10/16/2024:   - Cardiac cath, right coronary artery 80-90% stenosis, s/p drug -eluting stent  - moderate  in stent restenosis in LAD  .  - Cardiology recommends Cardiac rehab, medical management and out patient follow up in six weeks .    Psychosocial: Never , lives in [place] independently, drives she has good support from her neighbors.  She has no children her legal next of kin is her sister Xenia Trammell who lives 45 minutes away from her and is available for her.  Her nephew's Pollo is also involved in her care.   She has a history of smoking on and off.          PALLIATIVE DIAGNOSES:    Palliative care encounter  Intermittent confusion s/p sedation for cardiac cath.  Hx of CAD Acute coronary syndrome s/p cardiac cath  Advanced directive  Goals of care      ASSESSMENT AND PLAN:   Chart reviewed for details including cardiac cath results.  Patient is currently sleepy just came back from cardiac cath/s/p sedation,  intermittently confused  cannot currently engage fully engage in conversation to make any medical decisions.  Her sister Xenia and Pollo/nephew at bedside.  Patient is not any in any distress..  She wants to go home, would not consider rehab  We discussed home health her sister shared patient would not like \"anybody\" in her home.  Advanced directive:   Patient does

## 2024-10-16 NOTE — PROGRESS NOTES
0700: Bedside and Verbal shift change report given to Domenica Coates RN (oncoming nurse) by ALIA Ochoa (offgoing nurse). Report included the following information Nurse Handoff Report, Adult Overview, Intake/Output, MAR, and Recent Results.     Pt off unit for cath    1115: TRANSFER - OUT REPORT:    Verbal report given to ALIA Olea on Elise Schneider  being transferred to [facility] for ordered procedure       Report consisted of patient's Situation, Background, Assessment and   Recommendations(SBAR).     Information from the following report(s) Nurse Handoff Report, Adult Overview, Intake/Output, MAR, and Recent Results was reviewed with the receiving nurse.  [place] Assessment: Presents to emergency department  because of falls (Syncope, seizure, or loss of consciousness): No, Age > 70: Yes, Altered Mental Status, Intoxication with alcohol or substance confusion (Disorientation, impaired judgment, poor safety awaremess, or inability to follow instructions): No, Impaired Mobility: Ambulates or transfers with assistive devices or assistance; Unable to ambulate or transer.: Yes, Nursing Judgement: No  Lines:   Peripheral IV 10/15/24 Proximal;Right;Anterior Forearm (Active)   Site Assessment Clean, dry & intact 10/16/24 0814   Line Status Flushed;Capped 10/16/24 0814   Line Care Connections checked and tightened;Cap changed 10/16/24 0814   Phlebitis Assessment No symptoms 10/16/24 0814   Infiltration Assessment 0 10/16/24 0814   Alcohol Cap Used Yes 10/16/24 0814   Dressing Status Clean, dry & intact 10/16/24 0814   Dressing Type Transparent 10/16/24 0814        Opportunity for questions and clarification was provided.      Patient transported with:  Monitor

## 2024-10-16 NOTE — PROGRESS NOTES
Hospitalist Progress Note    NAME:   Elise Schneider   : 1945   MRN: [id number]     Date/Time: 10/16/2024 10:42 AM  Patient PCP: Gabriela Maravilla APRN - NP    Estimated discharge date:  Barriers:     Observation after cath  Possible discharge on 10/17 after cardiac clearance       Interim history 10/16/2024: Patient recently coming back from the Cath Lab.  Patient is mildly confused after taking the sedations.  Nephew is also present at bedside.  Patient denies any chest pressure, chest pain, shortness of breath.  Her last bowel movement was 2 days ago denies any bleeding from any site.     Assessment / Plan:    1.  Intermittent chest pain with elevated troponin.  2.  Hypertension  3.  Diabetes mellitus  4.  GERD  5.  Hyperlipidemia and coronary artery disease  6.  History of skin cancer  7.  Chronic hyponatremia  8.  Carotid artery disease            #Unstable angina  Intermittent chest pain with elevated troponin  History of LAD stenting   History of tobacco use  Hypertension  History of coronary artery disease  Hyperlipidemia    --Status post PCI on 10/16/2024:.  Proximal right coronary artery 80 to 6090% stenosis this post 1 drug-eluting stent.  Moderate in-stent restenosis in LAD for which medical had management is recommended.  Cardiology recommended aspirin 81 mg, Plavix 75 along with continuation of statin and cardiac rehab  -- Continue telemetry unit  -- Cardiology on board, appreciate recs  -- Continue aspirin 81 mg daily and atorvastatin 20 mg daily  --for hypertension: Continue home dose of losartan 50 mg daily,and Norvasc 5 mg daily with holding parameter  -- Continue Plavix 75 mg daily  -- Continue metoprolol 25 mg twice daily  -- Will continue to monitor post cath  -- Need cardiology clearance tomorrow before discharge      Right arm redness  Patient unable to tell the details of right arm redness and mild swelling.  No fever or leukocytosis  --Will get right arm ultrasound to rule out  any cellulitis and abscess  -- Less concern of infection at this time, so hold on antibiotics until he is on his back      #GERD  --Continue home Pepcid 20 mg twice daily      #Type 2 diabetes  Sliding scale insulin and hypoglycemic precautions--            Medical Decision Making:   I personally reviewed labs: Yes  I personally reviewed imaging: Yes  I personally reviewed EKG: Yes  Toxic drug monitoring: Yes  Discussed case with: Cardiology, patient, bedside nurse        Code Status: Full code  DVT Prophylaxis: Aspirin and Plavix  GI Prophylaxis: Pepcid    Subjective:     Chief Complaint / Reason for Physician Visit  \"Elise Schneider is a 78 y.o. female who presented to emergency room for further evaluation of chest pain that happened last night.  Patient stated she started having chest pain and she went to [facility] emergency room and had some blood workup and was told that she does not have a heart attack but needs to see a cardiologist.  She lives in [place] and it is hard to get any cardiologist in her area so decided to come to emergency room so she can see a cardiologist.  She has history of coronary artery disease and stenting for ED.  Because of elevated heart score of 4, our service was consulted for admission so patient can be seen by cardiologist. \".  Discussed with RN events overnight.       Objective:     VITALS:   Last 24hrs VS reviewed since prior progress note. Most recent are:  Patient Vitals for the past 24 hrs:   BP Temp Temp src Pulse Resp SpO2 Weight   10/16/24 0854 (!) 177/88 -- -- 84 20 100 % --   10/16/24 0815 127/61 97.5 °F (36.4 °C) Oral 67 16 100 % --   10/16/24 0745 (!) 140/58 97.3 °F (36.3 °C) Oral 63 18 98 % --   10/16/24 0235 (!) 130/53 97.7 °F (36.5 °C) Oral 68 19 -- --   10/15/24 2220 -- 97.6 °F (36.4 °C) Oral -- 18 -- --   10/15/24 2219 (!) 140/61 -- -- 71 -- -- --   10/15/24 2119 (!) 115/51 -- -- 72 -- -- --   10/15/24 2007 (!) 115/51 97.7 °F (36.5 °C) Oral 70 18 99 % --   10/15/24

## 2024-10-16 NOTE — PROGRESS NOTES
[facility]  [address], [place] [id number] [phone]  [email or web address]  CARDIOLOGY PROGRESS NOTE    10/16/2024 10:12 AM    Admit Date: 10/15/2024    Admit Diagnosis:   Unstable angina (HCC) [I20.0]  Acute chest pain [R07.9]  Chest pain, unspecified type [R07.9]    Subjective:     Elise Schneider was seen and examined in the cardiac Cath Lab.  Denies any chest discomfort.  Had some trouble breathing while laying down flat on the table.  Also reports marked arm pain.    BP (!) 177/88   Pulse 84   Temp 97.5 °F (36.4 °C) (Oral)   Resp 20   Wt 62.5 kg (137 lb 12.6 oz)   SpO2 100%   BMI 26.91 kg/m²     Current Facility-Administered Medications   Medication Dose Route Frequency    fentaNYL (SUBLIMAZE) injection    PRN    lidocaine 1 % injection    PRN    verapamil (ISOPTIN) injection    PRN    nitroGLYCERIN 200 mcg    PRN    heparin (porcine) injection    PRN    furosemide (LASIX) injection    PRN    morphine injection   IntraVENous PRN    iopamidol (ISOVUE-370) 76 % injection    PRN    iopamidol (ISOVUE-370) 76 % injection    PRN    acetaminophen (TYLENOL) tablet 650 mg  650 mg Oral Q4H PRN    ondansetron (ZOFRAN-ODT) disintegrating tablet 4 mg  4 mg Oral Q8H PRN    bisacodyl (DULCOLAX) EC tablet 5 mg  5 mg Oral Daily PRN    ipratropium 0.5 mg-albuterol 2.5 mg (DUONEB) nebulizer solution 1 Dose  1 Dose Inhalation Q4H PRN    enoxaparin (LOVENOX) injection 40 mg  40 mg SubCUTAneous Daily    amLODIPine (NORVASC) tablet 5 mg  5 mg Oral QAM    aspirin EC tablet 81 mg  81 mg Oral Daily    losartan (COZAAR) tablet 50 mg  50 mg Oral QAM    atorvastatin (LIPITOR) tablet 20 mg  20 mg Oral Nightly    metoprolol tartrate (LOPRESSOR) tablet 25 mg  25 mg Oral BID    insulin lispro (HUMALOG,ADMELOG) injection vial 0-8 Units  0-8 Units SubCUTAneous 4x Daily AC & HS    glucose chewable tablet 16 g  4 tablet Oral PRN    dextrose bolus 10% 125 mL  125 mL IntraVENous PRN    Or

## 2024-10-16 NOTE — PROGRESS NOTES
Bedside shift change report given to Gabriela RN (oncoming nurse) by Domenica RN (offgoing nurse). Report included the following information Nurse Handoff Report, Recent Results, Cardiac Rhythm Sinus, and Event Log.     End of Shift Note    Bedside shift change report given to Domenica  RN (oncoming nurse) by Gabriela Urbina RN (offgoing nurse).  Report included the following information SBAR, Recent Results, Cardiac Rhythm Sinus, and Quality Measures    Shift worked:  Night     Shift summary and any significant changes:     Pt signed alarm refusal. Pt complained of acid in stomach. Requested Tums.      Concerns for physician to address:  PRN order for Tums.      Zone phone for oncoming shift:          Activity:     Number times ambulated in hallways past shift: 0  Number of times OOB to chair past shift: 4    Cardiac:   Cardiac Monitoring: Yes           Access:  Current line(s): PIV     Genitourinary:   Urinary status: voiding    Respiratory:      Chronic home O2 use?: NO  Incentive spirometer at bedside: NO       GI:     Current diet:  ADULT DIET; Regular; 3 carb choices (45 gm/meal); Low Fat/Low Chol/High Fiber/MARCOS  Passing flatus: YES  Tolerating current diet: YES       Pain Management:   Patient states pain is manageable on current regimen: YES    Skin:     Interventions: increase time out of bed    Patient Safety:  Fall Score:    Interventions: gripper socks       Length of Stay:  Expected LOS: 3  Actual LOS: 1      Gabriela Urbina RN

## 2024-10-16 NOTE — PROGRESS NOTES
End of Shift Note     Bedside shift change report given to  RN (oncoming nurse) by Emmie Gayle RN (offgoing nurse).  Report included the following information SBAR     Shift worked: Day      Shift summary and any significant changes:     1023 Patient arrived to [facility]CU from Cath Lab. Right radial, TR band at 8 ccs, CDI, no bleeding/hematoma. Immobilizer in place. VSS, NSR, RA, Afebrile. Patient on bedrest.     1402 TR band discontinued. Applied quickclot and tegaderm. Right radial site is CDI, no bleeding/hematoma. Immobilizer in place. Patient ambulated to the bathroom, voided, and returned to the chair.    1520 Patient off the unit for ultrasound of left forearm to rule out cellulitis/abscess.    1625 Patient back on the unti from ultrasound.      Concerns for physician to address:        Zone phone for oncoming shift:   [phone]         Activity:  Number times ambulated in hallways past shift: 1   Number of times OOB to chair past shift: 3      Cardiac:   Cardiac Monitoring: Yes         Access:  Current line(s): PIV      Genitourinary:   Urinary status: voiding     Respiratory:   Chronic home O2 use?: NO  Incentive spirometer at bedside: NO     GI:  Current diet:  ADULT DIET; Regular; Low Fat/Low Chol/High Fiber/2 gm Na/4 carb  Passing flatus: YES  Tolerating current diet: YES     Pain Management:   Patient states pain is manageable on current regimen: YES     Skin:  Interventions: float heels, increase time out of bed, and internal/external urinary devices    Patient Safety:  Fall Score:    Interventions: bed/chair alarm, gripper socks, pt to call before getting OOB, and stay with me (per policy)     Length of Stay:  Expected LOS: 1  Actual LOS: 1        Emmie Gayle RN

## 2024-10-16 NOTE — PLAN OF CARE
Problem: Chronic Conditions and Co-morbidities  Goal: Patient's chronic conditions and co-morbidity symptoms are monitored and maintained or improved  10/16/2024 0811 by Domenica Coates RN  Outcome: Progressing  10/16/2024 0007 by Gabriela Urbina RN  Outcome: Progressing  Flowsheets (Taken 10/15/2024 2010)  Care Plan - Patient's Chronic Conditions and Co-Morbidity Symptoms are Monitored and Maintained or Improved: Monitor and assess patient's chronic conditions and comorbid symptoms for stability, deterioration, or improvement     Problem: Discharge Planning  Goal: Discharge to home or other facility with appropriate resources  10/16/2024 0811 by Domenica Coates RN  Outcome: Progressing  10/16/2024 0007 by Gabriela Urbina RN  Outcome: Progressing  Flowsheets (Taken 10/15/2024 2010)  Discharge to home or other facility with appropriate resources: Identify barriers to discharge with patient and caregiver     Problem: Safety - Adult  Goal: Free from fall injury  10/16/2024 0811 by Domenica Coates RN  Outcome: Progressing  10/16/2024 0007 by Gabriela Urbina RN  Outcome: Progressing  Flowsheets (Taken 10/15/2024 2010)  Free From Fall Injury: Instruct family/caregiver on patient safety     Problem: Pain  Goal: Verbalizes/displays adequate comfort level or baseline comfort level  10/16/2024 0811 by Domenica Coates RN  Outcome: Progressing  10/16/2024 0007 by Gabriela Urbina RN  Outcome: Progressing  Flowsheets (Taken 10/15/2024 2007)  Verbalizes/displays adequate comfort level or baseline comfort level: Encourage patient to monitor pain and request assistance

## 2024-10-17 DIAGNOSIS — Z95.5 STENTED CORONARY ARTERY: Primary | ICD-10-CM

## 2024-10-17 LAB
ANION GAP SERPL CALC-SCNC: 6 MMOL/L (ref 2–12)
BUN SERPL-MCNC: 11 MG/DL (ref 6–20)
BUN/CREAT SERPL: 17 (ref 12–20)
CALCIUM SERPL-MCNC: 9.2 MG/DL (ref 8.5–10.1)
CHLORIDE SERPL-SCNC: 101 MMOL/L (ref 97–108)
CO2 SERPL-SCNC: 25 MMOL/L (ref 21–32)
CREAT SERPL-MCNC: 0.66 MG/DL (ref 0.55–1.02)
ERYTHROCYTE [DISTWIDTH] IN BLOOD BY AUTOMATED COUNT: 12.3 % (ref 11.5–14.5)
EST. AVERAGE GLUCOSE BLD GHB EST-MCNC: 134 MG/DL
GLUCOSE BLD STRIP.AUTO-MCNC: 155 MG/DL (ref 65–117)
GLUCOSE BLD STRIP.AUTO-MCNC: 173 MG/DL (ref 65–117)
GLUCOSE BLD STRIP.AUTO-MCNC: 197 MG/DL (ref 65–117)
GLUCOSE BLD STRIP.AUTO-MCNC: 203 MG/DL (ref 65–117)
GLUCOSE SERPL-MCNC: 166 MG/DL (ref 65–100)
HBA1C MFR BLD: 6.3 % (ref 4–5.6)
HCT VFR BLD AUTO: 37 % (ref 35–47)
HGB BLD-MCNC: 12.5 G/DL (ref 11.5–16)
MCH RBC QN AUTO: 32.4 PG (ref 26–34)
MCHC RBC AUTO-ENTMCNC: 33.8 G/DL (ref 30–36.5)
MCV RBC AUTO: 95.9 FL (ref 80–99)
NRBC # BLD: 0 K/UL (ref 0–0.01)
NRBC BLD-RTO: 0 PER 100 WBC
PLATELET # BLD AUTO: 152 K/UL (ref 150–400)
PMV BLD AUTO: 10.1 FL (ref 8.9–12.9)
POTASSIUM SERPL-SCNC: 3.7 MMOL/L (ref 3.5–5.1)
RBC # BLD AUTO: 3.86 M/UL (ref 3.8–5.2)
SERVICE CMNT-IMP: ABNORMAL
SODIUM SERPL-SCNC: 132 MMOL/L (ref 136–145)
WBC # BLD AUTO: 11.9 K/UL (ref 3.6–11)

## 2024-10-17 PROCEDURE — 2060000000 HC ICU INTERMEDIATE R&B

## 2024-10-17 PROCEDURE — 2580000003 HC RX 258: Performed by: STUDENT IN AN ORGANIZED HEALTH CARE EDUCATION/TRAINING PROGRAM

## 2024-10-17 PROCEDURE — 80048 BASIC METABOLIC PNL TOTAL CA: CPT

## 2024-10-17 PROCEDURE — 6370000000 HC RX 637 (ALT 250 FOR IP): Performed by: HOSPITALIST

## 2024-10-17 PROCEDURE — 82962 GLUCOSE BLOOD TEST: CPT

## 2024-10-17 PROCEDURE — 85027 COMPLETE CBC AUTOMATED: CPT

## 2024-10-17 PROCEDURE — 83036 HEMOGLOBIN GLYCOSYLATED A1C: CPT

## 2024-10-17 PROCEDURE — 6370000000 HC RX 637 (ALT 250 FOR IP): Performed by: INTERNAL MEDICINE

## 2024-10-17 PROCEDURE — 6370000000 HC RX 637 (ALT 250 FOR IP): Performed by: STUDENT IN AN ORGANIZED HEALTH CARE EDUCATION/TRAINING PROGRAM

## 2024-10-17 PROCEDURE — 36415 COLL VENOUS BLD VENIPUNCTURE: CPT

## 2024-10-17 RX ADMIN — CLOPIDOGREL BISULFATE 75 MG: 75 TABLET ORAL at 09:24

## 2024-10-17 RX ADMIN — INSULIN LISPRO 3 UNITS: 100 INJECTION, SOLUTION INTRAVENOUS; SUBCUTANEOUS at 11:41

## 2024-10-17 RX ADMIN — FAMOTIDINE 20 MG: 20 TABLET, FILM COATED ORAL at 09:25

## 2024-10-17 RX ADMIN — INSULIN LISPRO 1 UNITS: 100 INJECTION, SOLUTION INTRAVENOUS; SUBCUTANEOUS at 11:42

## 2024-10-17 RX ADMIN — SODIUM CHLORIDE, PRESERVATIVE FREE 10 ML: 5 INJECTION INTRAVENOUS at 09:33

## 2024-10-17 RX ADMIN — METOPROLOL TARTRATE 25 MG: 25 TABLET, FILM COATED ORAL at 21:05

## 2024-10-17 RX ADMIN — SODIUM CHLORIDE, PRESERVATIVE FREE 10 ML: 5 INJECTION INTRAVENOUS at 21:07

## 2024-10-17 RX ADMIN — FAMOTIDINE 20 MG: 20 TABLET, FILM COATED ORAL at 21:05

## 2024-10-17 RX ADMIN — AMLODIPINE BESYLATE 5 MG: 5 TABLET ORAL at 09:25

## 2024-10-17 RX ADMIN — Medication 1 TABLET: at 09:24

## 2024-10-17 RX ADMIN — Medication 3 MG: at 21:05

## 2024-10-17 RX ADMIN — INSULIN GLARGINE 9 UNITS: 100 INJECTION, SOLUTION SUBCUTANEOUS at 09:31

## 2024-10-17 RX ADMIN — INSULIN LISPRO 3 UNITS: 100 INJECTION, SOLUTION INTRAVENOUS; SUBCUTANEOUS at 09:32

## 2024-10-17 RX ADMIN — ATORVASTATIN CALCIUM 20 MG: 20 TABLET, FILM COATED ORAL at 21:05

## 2024-10-17 RX ADMIN — LOSARTAN POTASSIUM 50 MG: 50 TABLET, FILM COATED ORAL at 09:24

## 2024-10-17 RX ADMIN — METOPROLOL TARTRATE 25 MG: 25 TABLET, FILM COATED ORAL at 09:25

## 2024-10-17 RX ADMIN — ASPIRIN 81 MG: 81 TABLET, COATED ORAL at 09:24

## 2024-10-17 RX ADMIN — INSULIN LISPRO 3 UNITS: 100 INJECTION, SOLUTION INTRAVENOUS; SUBCUTANEOUS at 17:11

## 2024-10-17 NOTE — PROGRESS NOTES
Hospitalist Progress Note    NAME:   Elise Schneider   : 1945   MRN: [id number]     Date/Time: 10/17/2024 5:25 PM  Patient PCP: Gabriela Maravilla APRN - NP    Estimated discharge date:  Barriers:     Psychiatry for discharge capacity evaluation  Palliative for advance directive        Interim history 10/16/2024: Patient recently coming back from the Cath Lab.  Patient is mildly confused after taking the sedations.  Nephew is also present at bedside.  Patient denies any chest pressure, chest pain, shortness of breath.  Her last bowel movement was 2 days ago denies any bleeding from any site.     10/17/2024: No acute overnight event this.  This morning the patient was confused.  She was able to tell me few things.  But she was not able to tell why she was here.  And that supported me she was able to tell few minutes ago.  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  Patient denies any chest pain, chest pressure, shortness of breath.  Rest of the review of systems are negative    Assessment / Plan:    1.  Intermittent chest pain with elevated troponin.  2.  Hypertension  3.  Diabetes mellitus  4.  GERD  5.  Hyperlipidemia and coronary artery disease  6.  History of skin cancer  7.  Chronic hyponatremia  8.  Carotid artery disease            #Unstable angina, resolved  Intermittent chest pain with elevated troponin, resolved  History of LAD stenting   History of tobacco use  Hypertension  History of coronary artery disease  Hyperlipidemia    --Status post PCI on 10/16/2024:.  Proximal right coronary artery 80 to 6090% stenosis this post 1 drug-eluting stent.  Moderate in-stent restenosis in LAD for which medical had management is recommended.  Cardiology recommended aspirin 81 mg, Plavix 75 along with continuation of statin and cardiac rehab  -- Continue telemetry unit  -- Cardiology on board, appreciate recs  -- Continue aspirin 81 mg daily and atorvastatin 20 mg daily  --for hypertension: Continue home dose  hrs:   BP Temp Temp src Pulse Resp SpO2   10/17/24 1515 (!) 119/49 -- -- 67 -- 97 %   10/17/24 1512 (!) 117/49 97.3 °F (36.3 °C) Oral 67 18 97 %   10/17/24 1120 (!) 117/51 97.7 °F (36.5 °C) Oral 64 18 96 %   10/17/24 0800 138/63 98.3 °F (36.8 °C) Oral 82 18 99 %   10/17/24 0400 115/68 -- -- 70 -- --   10/17/24 0330 (!) 147/120 97.8 °F (36.6 °C) Oral 81 18 96 %   10/17/24 0300 (!) 143/60 -- -- 85 -- --   10/17/24 0200 (!) 131/59 -- -- 78 -- 95 %   10/17/24 0100 (!) 141/68 -- -- 80 -- 97 %   10/17/24 0000 (!) 105/59 -- -- 77 -- 94 %   10/16/24 2318 131/63 98 °F (36.7 °C) Oral 90 -- 94 %   10/16/24 2155 (!) 122/57 -- -- (!) 119 -- --   10/16/24 1902 (!) 122/57 98.5 °F (36.9 °C) Oral 80 20 97 %   10/16/24 1848 -- 98.5 °F (36.9 °C) Oral -- -- --   10/16/24 1759 122/70 -- -- 93 -- 98 %         Intake/Output Summary (Last 24 hours) at 10/17/2024 1725  Last data filed at 10/17/2024 0400  Gross per 24 hour   Intake 710.09 ml   Output --   Net 710.09 ml        I had a face to face encounter and independently examined this patient on 10/17/2024, as outlined below:  PHYSICAL EXAM:  General: Alert, cooperative, mildly confused   EENT:  EOMI. Anicteric sclerae.  Resp:  CTA bilaterally, no wheezing or rales.  No accessory muscle use  CV:  Regular  rhythm,  No edema  GI:  Soft, Non distended, Non tender.  +Bowel sounds  Neurologic:  Alert and oriented X 3, normal speech,   Psych:   Good insight. Not anxious nor agitated  Skin:       Mild right arm redness ,     Reviewed most current lab test results and cultures  YES  Reviewed most current radiology test results   YES  Review and summation of old records today    NO  Reviewed patient's current orders and MAR    YES  PMH/SH reviewed - no change compared to H&P    Procedures: see electronic medical records for all procedures/Xrays and details which were not copied into this note but were reviewed prior to creation of Plan.      LABS:  I reviewed today's most current labs and imaging

## 2024-10-17 NOTE — PROGRESS NOTES
Predictive Model Details          35 (Caution)  Factor Value    Calculated 10/17/2024 10:02 35% Age 78 years old    Deterioration Index Model 26% Neurological exam X     11% Cardiac rhythm Sinus arrythmia     7% Sodium abnormal (132 mmol/L)     7% WBC count abnormal (11.9 K/uL)     7% Respiratory rate 18     4% Systolic 138     2% Pulse oximetry 99 %     1% Potassium 3.7 mmol/L     0% Pulse 82     0% Hematocrit 37.0 %     0% Temperature 98.3 °F (36.8 °C)      0700 Bedside shift change report given to Aster RN (oncoming nurse) by Shena RN (offgoing nurse). Report included the following information Nurse Handoff Report.    End of Shift Note    Bedside shift change report given to Jaqueline ROJO (oncoming nurse) by Aster Appiah RN (offgoing nurse).  Report included the following information SBAR and Kardex    Shift worked: day     Shift summary and any significant changes:    Initiated sitter pt confused to place and time.     Quiana SANDS came to see pt. Ordered a duplex of pts rt arm. Also ordered a psych consult to determine if pt is ok for discharge.     Psych consulted placed.     Palliative came to see pt to speak with pt about an advance directive.      Concerns for physician to address:       Zone phone for oncoming shift:          Activity:     Number times ambulated in hallways past shift: 1  Number of times OOB to chair past shift: 1    Cardiac:   Cardiac Monitoring: Yes           Access:  Current line(s): PIV     Genitourinary:   Urinary status: voiding    Respiratory:      Chronic home O2 use?: NO  Incentive spirometer at bedside: NO       GI:     Current diet:  ADULT DIET; Regular; 4 carb choices (60 gm/meal); Low Fat/Low Chol/High Fiber/2 gm Na  Passing flatus: YES  Tolerating current diet: YES       Pain Management:   Patient states pain is manageable on current regimen: YES    Skin:     Interventions: float heels and increase time out of bed    Patient Safety:  Fall Score:    Interventions: bed/chair alarm,

## 2024-10-17 NOTE — PROGRESS NOTES
Spiritual Health History and Assessment/Progress Note  [facility]    Initial Encounter, Palliative Care,  , Adjustment to illness,      Name: Elise Schneider MRN: [id number]    Age: 78 y.o.     Sex: female   Language: English   Presybeterian: Gnosticist   Acute chest pain     Date: 10/17/2024            Total Time Calculated: 25 min              Spiritual Assessment began in [facility] 2 [facility] CARDIO        Referral/Consult From: Rounding   Encounter Overview/Reason: Initial Encounter, Palliative Care  Service Provided For: Patient    Ellen, Belief, Meaning:   Patient identifies as spiritual  Family/Friends Other: No family present      Importance and Influence:  Patient has no beliefs influential to healthcare decision-making identified during this visit  Family/Friends No family/friends present    Community:  Patient feels well-supported. Support system includes: Friends and Extended family  Family/Friends No family/friends present    Assessment and Plan of Care:     Patient Interventions include: Facilitated expression of thoughts and feelings, Explored spiritual coping/struggle/distress, Engaged in theological reflection, and Facilitated life review and/ or legacy  Family/Friends Interventions include: No family/friends present    Patient Plan of Care: Spiritual Care available upon further referral  Family/Friends Plan of Care: Spiritual Care available upon further referral    Electronically signed by Chaplain Ulises on 10/17/2024 at 2:59 PM

## 2024-10-17 NOTE — DISCHARGE INSTRUCTIONS
Please follow-up with your primary care doctor in a week, discussed about advanced directive with your primary care doctor and with your cardiology     Please follow-up with the cardiology    If you have severe chest pain, shortness of breath, chest tightness, dizziness, syncope please come back to emergency department immediately

## 2024-10-17 NOTE — PROGRESS NOTES
Palliative Medicine  Per Dr. Olmstead: Elise Schneider is a 78 y.o. female with a past medical history of HTN, CAD s/p stent, Carotid artery disease, DM, chronic hyponatremia,  who was admitted on 10/15/2024 from with a diagnosis of unstable angina/Acute Coronory syndrome.   10/16/2024:   - Cardiac cath, right coronary artery 80-90% stenosis, s/p drug -eluting stent  - moderate  in stent restenosis in LAD  .  - Cardiology recommends Cardiac rehab, medical management and out patient follow up in six weeks .     Code Status: Full Code    Advance Care Planning:      10/17/2024    12:00 PM   Demographics   Marital Status Single   No AMD. Patient has no children so her sister, Xenia, as her legal nok is her primary HCDM.     Patient / Family Encounter Documentation    Participants (names): Elise Schneider, assigned nurse marlon Rodarte, (by phone, Xenia, sister), nNeka Singleton LCSW    Narrative:   --After review of chart and checking in with assigned nurse, LCSW introduced self to patient, who was sitting in chair, alert and oriented with some periods of mild confusion noted.   --Patient was alert and receptive to encounter, engaging in ACP conversation, expressing her wishes for full code.  --Patient expressed understanding that her sister is her legal nok and would be her primary HCDM and if patient had someone else she trusts to make those decisions for her we could call them together to discuss. Informed her that her sister and nephew are the only contacts in her chart and offered to add someone if she wanted to name someone.   --Blank AMD and booklet were provided to her along with Palliative Medicine business card for reference. Encouraged her to talk with her sister about her wishes as she considers completing an AMD. Offered assistance with this if she would like during this hospital stay.  --LCSW called sister Xenia, who reported she had just arrived to the hospital. She affirmed that she understands as legal nok

## 2024-10-17 NOTE — CARDIO/PULMONARY
Chart reviewed: Patient is 78 y.o. female admitted with Unstable angina (HCC) [I20.0]  Acute chest pain [R07.9]  Chest pain, unspecified type [R07.9]    Nursing advised to wait and see patient tomorrow morning due to periodic confusion.

## 2024-10-17 NOTE — CARDIO/PULMONARY
Chart reviewed: Patient is 78 y.o. female admitted with Unstable angina (HCC) [I20.0]  Acute chest pain [R07.9]  Chest pain, unspecified type [R07.9]

## 2024-10-18 ENCOUNTER — APPOINTMENT (OUTPATIENT)
Facility: HOSPITAL | Age: 79
End: 2024-10-18
Attending: STUDENT IN AN ORGANIZED HEALTH CARE EDUCATION/TRAINING PROGRAM
Payer: MEDICARE

## 2024-10-18 VITALS
RESPIRATION RATE: 18 BRPM | TEMPERATURE: 98.1 F | BODY MASS INDEX: 26.91 KG/M2 | DIASTOLIC BLOOD PRESSURE: 100 MMHG | HEART RATE: 69 BPM | SYSTOLIC BLOOD PRESSURE: 138 MMHG | OXYGEN SATURATION: 100 % | WEIGHT: 137.79 LBS

## 2024-10-18 DIAGNOSIS — Z95.5 STENTED CORONARY ARTERY: Primary | ICD-10-CM

## 2024-10-18 LAB
ALBUMIN SERPL-MCNC: 3.1 G/DL (ref 3.5–5)
ALBUMIN/GLOB SERPL: 0.9 (ref 1.1–2.2)
ALP SERPL-CCNC: 40 U/L (ref 45–117)
ALT SERPL-CCNC: 19 U/L (ref 12–78)
ANION GAP SERPL CALC-SCNC: 6 MMOL/L (ref 2–12)
AST SERPL-CCNC: 13 U/L (ref 15–37)
BILIRUB SERPL-MCNC: 1.1 MG/DL (ref 0.2–1)
BUN SERPL-MCNC: 13 MG/DL (ref 6–20)
BUN/CREAT SERPL: 22 (ref 12–20)
CALCIUM SERPL-MCNC: 8.7 MG/DL (ref 8.5–10.1)
CHLORIDE SERPL-SCNC: 101 MMOL/L (ref 97–108)
CO2 SERPL-SCNC: 24 MMOL/L (ref 21–32)
CREAT SERPL-MCNC: 0.6 MG/DL (ref 0.55–1.02)
ERYTHROCYTE [DISTWIDTH] IN BLOOD BY AUTOMATED COUNT: 12.1 % (ref 11.5–14.5)
GLOBULIN SER CALC-MCNC: 3.3 G/DL (ref 2–4)
GLUCOSE BLD STRIP.AUTO-MCNC: 138 MG/DL (ref 65–117)
GLUCOSE BLD STRIP.AUTO-MCNC: 229 MG/DL (ref 65–117)
GLUCOSE SERPL-MCNC: 143 MG/DL (ref 65–100)
HCT VFR BLD AUTO: 32 % (ref 35–47)
HGB BLD-MCNC: 10.8 G/DL (ref 11.5–16)
MCH RBC QN AUTO: 32.8 PG (ref 26–34)
MCHC RBC AUTO-ENTMCNC: 33.8 G/DL (ref 30–36.5)
MCV RBC AUTO: 97.3 FL (ref 80–99)
NRBC # BLD: 0 K/UL (ref 0–0.01)
NRBC BLD-RTO: 0 PER 100 WBC
PLATELET # BLD AUTO: 149 K/UL (ref 150–400)
PMV BLD AUTO: 10.7 FL (ref 8.9–12.9)
POTASSIUM SERPL-SCNC: 3.6 MMOL/L (ref 3.5–5.1)
PROT SERPL-MCNC: 6.4 G/DL (ref 6.4–8.2)
RBC # BLD AUTO: 3.29 M/UL (ref 3.8–5.2)
SERVICE CMNT-IMP: ABNORMAL
SERVICE CMNT-IMP: ABNORMAL
SODIUM SERPL-SCNC: 131 MMOL/L (ref 136–145)
WBC # BLD AUTO: 7.6 K/UL (ref 3.6–11)

## 2024-10-18 PROCEDURE — 6370000000 HC RX 637 (ALT 250 FOR IP): Performed by: HOSPITALIST

## 2024-10-18 PROCEDURE — 36415 COLL VENOUS BLD VENIPUNCTURE: CPT

## 2024-10-18 PROCEDURE — 80053 COMPREHEN METABOLIC PANEL: CPT

## 2024-10-18 PROCEDURE — 6370000000 HC RX 637 (ALT 250 FOR IP): Performed by: STUDENT IN AN ORGANIZED HEALTH CARE EDUCATION/TRAINING PROGRAM

## 2024-10-18 PROCEDURE — 85027 COMPLETE CBC AUTOMATED: CPT

## 2024-10-18 PROCEDURE — 97165 OT EVAL LOW COMPLEX 30 MIN: CPT | Performed by: OCCUPATIONAL THERAPIST

## 2024-10-18 PROCEDURE — 82962 GLUCOSE BLOOD TEST: CPT

## 2024-10-18 PROCEDURE — 93971 EXTREMITY STUDY: CPT

## 2024-10-18 PROCEDURE — 6370000000 HC RX 637 (ALT 250 FOR IP): Performed by: INTERNAL MEDICINE

## 2024-10-18 PROCEDURE — 97535 SELF CARE MNGMENT TRAINING: CPT | Performed by: OCCUPATIONAL THERAPIST

## 2024-10-18 PROCEDURE — 2580000003 HC RX 258: Performed by: STUDENT IN AN ORGANIZED HEALTH CARE EDUCATION/TRAINING PROGRAM

## 2024-10-18 PROCEDURE — 97162 PT EVAL MOD COMPLEX 30 MIN: CPT

## 2024-10-18 PROCEDURE — 97116 GAIT TRAINING THERAPY: CPT

## 2024-10-18 RX ORDER — CLOPIDOGREL BISULFATE 75 MG/1
75 TABLET ORAL DAILY
Qty: 30 TABLET | Refills: 3 | Status: SHIPPED | OUTPATIENT
Start: 2024-10-18

## 2024-10-18 RX ADMIN — CLOPIDOGREL BISULFATE 75 MG: 75 TABLET ORAL at 08:47

## 2024-10-18 RX ADMIN — ACETAMINOPHEN 650 MG: 325 TABLET ORAL at 05:07

## 2024-10-18 RX ADMIN — INSULIN LISPRO 1 UNITS: 100 INJECTION, SOLUTION INTRAVENOUS; SUBCUTANEOUS at 08:45

## 2024-10-18 RX ADMIN — Medication 1 TABLET: at 08:47

## 2024-10-18 RX ADMIN — ASPIRIN 81 MG: 81 TABLET, COATED ORAL at 08:47

## 2024-10-18 RX ADMIN — SODIUM CHLORIDE, PRESERVATIVE FREE 10 ML: 5 INJECTION INTRAVENOUS at 08:53

## 2024-10-18 RX ADMIN — INSULIN LISPRO 3 UNITS: 100 INJECTION, SOLUTION INTRAVENOUS; SUBCUTANEOUS at 12:10

## 2024-10-18 RX ADMIN — FAMOTIDINE 20 MG: 20 TABLET, FILM COATED ORAL at 08:47

## 2024-10-18 RX ADMIN — INSULIN GLARGINE 9 UNITS: 100 INJECTION, SOLUTION SUBCUTANEOUS at 08:45

## 2024-10-18 RX ADMIN — LOSARTAN POTASSIUM 50 MG: 50 TABLET, FILM COATED ORAL at 08:47

## 2024-10-18 RX ADMIN — INSULIN LISPRO 3 UNITS: 100 INJECTION, SOLUTION INTRAVENOUS; SUBCUTANEOUS at 08:46

## 2024-10-18 RX ADMIN — AMLODIPINE BESYLATE 5 MG: 5 TABLET ORAL at 08:47

## 2024-10-18 ASSESSMENT — PAIN - FUNCTIONAL ASSESSMENT: PAIN_FUNCTIONAL_ASSESSMENT: ACTIVITIES ARE NOT PREVENTED

## 2024-10-18 ASSESSMENT — PAIN SCALES - GENERAL: PAINLEVEL_OUTOF10: 5

## 2024-10-18 ASSESSMENT — PAIN DESCRIPTION - DESCRIPTORS: DESCRIPTORS: ACHING;SORE

## 2024-10-18 ASSESSMENT — PAIN DESCRIPTION - ORIENTATION: ORIENTATION: LEFT;RIGHT

## 2024-10-18 NOTE — CARDIO/PULMONARY
Chart reviewed: Patient is 78 y.o. female admitted with Unstable angina (HCC) [I20.0]  Acute chest pain [R07.9]  Chest pain, unspecified type [R07.9]    Education: CAD education folder given to Elise Schneider.      Educated using teach back method. Reviewed CAD diagnosis definition and purpose of intervention. Discussed risk factors for CAD to include the following: family history, elevated BMI, hyperlipidemia, hypertension, diabetes, stress, and smoking. Smoking Cessation Program link added to AVS. Discussed Heart Healthy/Low Sodium (2000 mg) diet. Reviewed the importance of medication compliance, and potential side effects. Discussed follow up appointments with cardiologist, signs and symptoms of angina, and what to report to physician after discharge.  Emphasized the value of cardiac rehab.     No family present, patient needs to think about it as she is preoccupied with shoulder pain (orthopedic? nursing aware) Patient lives in [place], [place].    Elise Schneider verbalized understanding with questions answered.     Bere Nayak RN

## 2024-10-18 NOTE — PLAN OF CARE
Predictive Model Details          25 (Normal)  Factor Value    Calculated 10/18/2024 02:54 53% Age 78 years old    Deterioration Index Model 17% Cardiac rhythm Sinus arrythmia     11% Sodium abnormal (132 mmol/L)     11% WBC count abnormal (11.9 K/uL)     4% Systolic 125     4% Pulse 55     2% Potassium 3.7 mmol/L     0% Pulse oximetry 95 %     0% Respiratory rate 16     0% Temperature 98.2 °F (36.8 °C)     0% Hematocrit 37.0 %    End of Shift Note    Bedside shift change report given to Dayshift RN (oncoming nurse) by SHANE FITCH RN (offgoing nurse).  Report included the following information SBAR, Kardex, Intake/Output, MAR, Recent Results, and Cardiac Rhythm SA    Shift worked:  7p-7a     Shift summary and any significant changes:     Pt has been without a sitter this shift.  Is alert, calm and cooperative.  Has been largely oriented.  No complaints.  Wants to go home.     Concerns for physician to address:  Psych consult ordered, not sure if this is necessary for this pt at this time.  Pt has been cooperative, not confused, not mean or aggressive at this time.  R arm is still red, warm and swollen \"from an IV I had at [facility]\".  R arm duplex still waiting to be done.      Zone phone for oncoming shift:   [phone]       Activity:     Number times ambulated in hallways past shift: 0  Number of times OOB to chair past shift: 1    Cardiac:   Cardiac Monitoring: Yes           Access:  Current line(s): PIV     Genitourinary:   Urinary status: voiding    Respiratory:      Chronic home O2 use?: NO  Incentive spirometer at bedside: NO       GI:     Current diet:  ADULT DIET; Regular; 4 carb choices (60 gm/meal); Low Fat/Low Chol/High Fiber/2 gm Na  Passing flatus: YES  Tolerating current diet: YES       Pain Management:   Patient states pain is manageable on current regimen: yes    Skin:     Interventions: float heels, increase time out of bed, and limit briefs    Patient Safety:  Fall Score:    Interventions:  bed/chair alarm, gripper socks, pt to call before getting OOB, and stay with me (per policy)       Length of Stay:  Expected LOS: 3  Actual LOS: 3      SHANE FITCH RN                              Problem: Chronic Conditions and Co-morbidities  Goal: Patient's chronic conditions and co-morbidity symptoms are monitored and maintained or improved  Outcome: Progressing  Flowsheets (Taken 10/17/2024 1932)  Care Plan - Patient's Chronic Conditions and Co-Morbidity Symptoms are Monitored and Maintained or Improved: Monitor and assess patient's chronic conditions and comorbid symptoms for stability, deterioration, or improvement     Problem: Discharge Planning  Goal: Discharge to home or other facility with appropriate resources  Outcome: Progressing  Flowsheets (Taken 10/17/2024 1932)  Discharge to home or other facility with appropriate resources: Identify barriers to discharge with patient and caregiver     Problem: Safety - Adult  Goal: Free from fall injury  Outcome: Progressing  Flowsheets  Taken 10/18/2024 0000  Free From Fall Injury: Instruct family/caregiver on patient safety  Taken 10/17/2024 1932  Free From Fall Injury: Instruct family/caregiver on patient safety     Problem: Pain  Goal: Verbalizes/displays adequate comfort level or baseline comfort level  Outcome: Progressing  Flowsheets  Taken 10/17/2024 2248  Verbalizes/displays adequate comfort level or baseline comfort level: Encourage patient to monitor pain and request assistance  Taken 10/17/2024 1932  Verbalizes/displays adequate comfort level or baseline comfort level:   Encourage patient to monitor pain and request assistance   Assess pain using appropriate pain scale   Administer analgesics based on type and severity of pain and evaluate response   Implement non-pharmacological measures as appropriate and evaluate response   Consider cultural and social influences on pain and pain management

## 2024-10-18 NOTE — PROGRESS NOTES
PHYSICAL THERAPY EVALUATION/DISCHARGE    Patient: Elise Schneider (78 y.o. female)  Date: 10/18/2024  Primary Diagnosis: Unstable angina (HCC) [I20.0]  Acute chest pain [R07.9]  Chest pain, unspecified type [R07.9]  Procedure(s) (LRB):  Left heart cath / coronary angiography (N/A)  Percutaneous coronary intervention (N/A)  Intravascular ultrasound (N/A) 2 Days Post-Op   Precautions: Up as Tolerated                      ASSESSMENT AND RECOMMENDATIONS:  Based on the objective data below, the patient was sitting in chair when PT arrived with her family in the room. Currently demonstrates modified independent with sit to stand, supervision bed to chair and sba/supervision ambulation for 120 feet with gait belt only. Gait has decreased speed, step lengths, intermittent mild path deviations and slight forward flexion of the trunk. Mild LOB she self corrected, but recommended she use a RW at home while she recovers. She cleared 3 steps with 1 rail with sba. Her LUE is almost non-functional due to severe DJD pain and the patient plans to have a TSR in the future. She is cleared for DC from PT standpoint.    Functional Outcome Measure:  The patient scored 21/24 on the [facility] outcome measure.Cutoff score <=171,2,3 had higher odds of discharging home with home health or need of SNF/IPR.      Further skilled acute physical therapy is not indicated at this time. Will sign off.       PLAN :  Recommendation for discharge: (in order for the patient to meet his/her long term goals):   No skilled physical therapy    Other factors to consider for discharge: lives alone and no additional factors    IF patient discharges home will need the following DME: patient owns DME required for discharge       SUBJECTIVE:   Patient stated “ I don't want anyone to come to my home. ”    OBJECTIVE DATA SUMMARY:     Past Medical History:   Diagnosis Date    Adverse effect of anesthesia     difficulty breathing during induction    Anxiety     Arthritis      Chair: Supervision  Balance:        Balance  Sitting: Intact  Standing: Impaired;Without support  Standing - Static: Good  Standing - Dynamic: Fair;Occasional  Ambulation/Gait Training:     Gait  Gait Training: Yes  Left Side Weight Bearing: Full  Right Side Weight Bearing: Full  Overall Level of Assistance: Supervision;Stand-by assistance  Distance (ft): 120 Feet  Assistive Device: Gait belt  Interventions: Safety awareness training;Verbal cues  Base of Support: Widened  Speed/Alla: Pace decreased (< 100 feet/min)  Step Length: Right shortened;Left shortened  Gait Abnormalities: Path deviations;Altered arm swing;Other (comment) (slight forward trunk lean throughout gait test)  Rail Use: Right  Stairs - Level of Assistance: Stand-by assistance;Supervision  Number of Stairs Trained: 4                                                                                                                                                                                                                                        [facility] AM-PAC®      Basic Mobility Inpatient Short Form (6-Clicks) Version 2  How much HELP from another person do you currently need... (If the patient hasn't done an activity recently, how much help from another person do you think they would need if they tried?) Total A Lot A Little None   1.  Turning from your back to your side while in a flat bed without using bedrails? []  1 []  2 []  3  [x]  4   2.  Moving from lying on your back to sitting on the side of a flat bed without using bedrails? []  1 []  2 []  3  [x]  4   3.  Moving to and from a bed to a chair (including a wheelchair)? []  1 []  2 [x]  3  []  4   4. Standing up from a chair using your arms (e.g. wheelchair or bedside chair)? []  1 []  2 []  3  [x]  4   5.  Walking in hospital room? []  1 []  2 [x]  3  []  4   6.  Climbing 3-5 steps with a railing? []  1 []  2 [x]  3  []  4     Raw Score: 21/24                            Cutoff

## 2024-10-18 NOTE — PROGRESS NOTES
Predictive Model Details          28 (Normal)  Factor Value    Calculated 10/18/2024 08:41 44% Age 78 years old    Deterioration Index Model 14% Cardiac rhythm Sinus arrythmia     12% Systolic 99     10% Sodium abnormal (131 mmol/L)     8% Respiratory rate 18     3% Pulse 54     2% Pulse oximetry 99 %     2% Potassium 3.6 mmol/L     2% Platelet count abnormal (149 K/uL)     2% Hematocrit abnormal (32.0 %)     1% WBC count 7.6 K/uL     0% Temperature 97.9 °F (36.6 °C)      0700 Bedside shift change report given to Aster Wallace (oncoming nurse) by Jaqueline WALLACE (offgoing nurse). Report included the following information Nurse Handoff Report.      Shift worked: Day     Shift summary and any significant changes:    Quiana SANDS came to see pt. Cancelled psych consult. If PT/OT can see pt and confirm she is ok for discharge, pt may discharge this afternoon.     Pt discharging at 1615   Concerns for physician to address:       Zone phone for oncoming shift:          Activity:     Number times ambulated in hallways past shift: 1  Number of times OOB to chair past shift: 1    Cardiac:   Cardiac Monitoring: Yes           Access:  Current line(s): PIV     Genitourinary:   Urinary status: voiding    Respiratory:      Chronic home O2 use?: NO  Incentive spirometer at bedside: YES       GI:     Current diet:  ADULT DIET; Regular; 4 carb choices (60 gm/meal); Low Fat/Low Chol/High Fiber/2 gm Na  Passing flatus: YES  Tolerating current diet: YES       Pain Management:   Patient states pain is manageable on current regimen: YES    Skin:     Interventions: float heels, increase time out of bed, and PT/OT consult    Patient Safety:  Fall Score:    Interventions: bed/chair alarm, assistive device (walker, cane. etc), gripper socks, pt to call before getting OOB, and stay with me (per policy)       Length of Stay:  Expected LOS: 3  Actual LOS: 3      Aster Appiah RN

## 2024-10-18 NOTE — PLAN OF CARE
Problem: Chronic Conditions and Co-morbidities  Goal: Patient's chronic conditions and co-morbidity symptoms are monitored and maintained or improved  10/18/2024 0840 by Aster Appiah RN  Outcome: Progressing  10/18/2024 0254 by Caridad Marcano RN  Outcome: Progressing  Flowsheets (Taken 10/17/2024 1932)  Care Plan - Patient's Chronic Conditions and Co-Morbidity Symptoms are Monitored and Maintained or Improved: Monitor and assess patient's chronic conditions and comorbid symptoms for stability, deterioration, or improvement     Problem: Discharge Planning  Goal: Discharge to home or other facility with appropriate resources  10/18/2024 0840 by Aster Appiah RN  Outcome: Progressing  10/18/2024 0254 by Caridad Marcano RN  Outcome: Progressing  Flowsheets (Taken 10/17/2024 1932)  Discharge to home or other facility with appropriate resources: Identify barriers to discharge with patient and caregiver     Problem: Safety - Adult  Goal: Free from fall injury  10/18/2024 0840 by Aster Appiah RN  Outcome: Progressing  Flowsheets (Taken 10/18/2024 0355 by Caridad Marcano RN)  Free From Fall Injury: Instruct family/caregiver on patient safety  10/18/2024 0254 by Caridad Marcano RN  Outcome: Progressing  Flowsheets  Taken 10/18/2024 0000  Free From Fall Injury: Instruct family/caregiver on patient safety  Taken 10/17/2024 1932  Free From Fall Injury: Instruct family/caregiver on patient safety     Problem: Pain  Goal: Verbalizes/displays adequate comfort level or baseline comfort level  10/18/2024 0840 by Aster Appiah RN  Outcome: Progressing  10/18/2024 0254 by Caridad Marcano RN  Outcome: Progressing  Flowsheets  Taken 10/17/2024 2248  Verbalizes/displays adequate comfort level or baseline comfort level: Encourage patient to monitor pain and request assistance  Taken 10/17/2024 1932  Verbalizes/displays adequate comfort level or baseline comfort level:   Encourage patient to

## 2024-10-18 NOTE — PROGRESS NOTES
OCCUPATIONAL THERAPY EVALUATION/DISCHARGE  Patient: Elise Schneider (78 y.o. female)  Date: 10/18/2024  Primary Diagnosis: Unstable angina (HCC) [I20.0]  Acute chest pain [R07.9]  Chest pain, unspecified type [R07.9]  Procedure(s) (LRB):  Left heart cath / coronary angiography (N/A)  Percutaneous coronary intervention (N/A)  Intravascular ultrasound (N/A) 2 Days Post-Op     Precautions: Up as Tolerated                  ASSESSMENT :  Based on the objective data below, the patient demonstrates impaired L shoulder and elbow function due to a RTC tear, mildly decreased higher level balance and mildly decreased activity tolerance from baseline. Despite her current deficits she is functioning at or very near her baseline, performing ADLs at an independent to mod I level and is mod I to supervision for functional mobility. It was recommended that she sponge bathe initially upom returning home, not attempting to transfer in and out of here claw foot tub to shower until she regains a little more strength. Also recommended a non-slip rug for outside the tub, non-slip mat for inside her tub and a grab bar to fit onto the side of her claw foot tub. She is currently  without further OT needs, but will benefit fromOut patient therapy for her L shoulder, as she was planning on attending prior to admit.    Further skilled acute occupational therapy is not indicated at this time.     PLAN :  Discharge from Acute OT    Recommendation for discharge: (in order for the patient to meet his/her long term goals):   Out patient therapy for her L shoulder, as she was planning on attending prior to admit.     Other factors to consider for discharge: lives alone    IF patient discharges home will need the following DME: Recommended non slip rug for outside tub, non-slip mat for inside tub and a grab bar to fit onto the side of her claw foot tub.            OBJECTIVE DATA SUMMARY:     Past Medical History:   Diagnosis Date    Adverse effect of

## 2024-10-18 NOTE — PROGRESS NOTES
PT eval completed. She is cleared for DC home, recommended  PT but patient declined. Full eval to follow.    Juan Patel, PT

## 2024-10-19 NOTE — DISCHARGE SUMMARY
Discharge Summary    Name: Elise Schneider  [id number]  YOB: 1945 (Age: 78 y.o.)   Date of Admission: 10/15/2024  Date of Discharge: 10/18/2024   Attending Physician: No att. providers found    Discharge Diagnosis:     Unstable angina, resolved  Intermittent chest pain with elevated troponin, resolved  History of left anterior descending artery stenting 2015  History of tobacco use  Hypertension  History of coronary artery disease  Hyperlipidemia      Other diagnosis  Acute onset right arm redness, resolving  Gastroesophageal reflux disease  Type 2 diabetes mellitus    Consultations:  IP CONSULT TO CARDIOLOGY  IP CONSULT TO PALLIATIVE CARE  IP CONSULT TO CARDIAC REHAB  IP CONSULT TO CARDIAC REHAB      Brief Admission History/Reason for Admission Per Maggy Vallejo MD:     78-year-old female presented to the emergency department with 1 day history of chest pain.  Patient went to [facility] emergency room, myocardial infarction was ruled out by the cardiologist over there.  She lives in in [place] and it is hard to get any cardiologist in her area so decided to come to emergency room so she can see a cardiologist.     The patient was chest pain during the initial evaluation.  But does reported having history of intermittent exertional chest pain over the last few days rest of review of system negative    Brief Hospital Course by Main Problems:     Patient was vitally stable.  She was admitted to the telemetry floor with troponin and monitoring.  She underwent PCI on 10/16/2024 that showed Proximal right coronary artery 80 to 90% stenosis this post 1 drug-eluting stent. Moderate in-stent restenosis in LAD for which medical had management is recommended. Cardiology recommended aspirin 81 mg, Plavix 75 along with continuation of statin and cardiac rehab.     Patient was initially confused after the PCI possibly secondary to anesthesia.  Her lower she become more oriented and

## 2024-10-29 ENCOUNTER — OFFICE VISIT (OUTPATIENT)
Age: 79
End: 2024-10-29
Payer: MEDICARE

## 2024-10-29 VITALS
BODY MASS INDEX: 27.48 KG/M2 | HEART RATE: 58 BPM | WEIGHT: 140 LBS | HEIGHT: 60 IN | SYSTOLIC BLOOD PRESSURE: 138 MMHG | DIASTOLIC BLOOD PRESSURE: 72 MMHG | OXYGEN SATURATION: 99 %

## 2024-10-29 DIAGNOSIS — E78.2 MIXED HYPERLIPIDEMIA: ICD-10-CM

## 2024-10-29 DIAGNOSIS — Z98.61 S/P PTCA (PERCUTANEOUS TRANSLUMINAL CORONARY ANGIOPLASTY): ICD-10-CM

## 2024-10-29 DIAGNOSIS — I10 ESSENTIAL HYPERTENSION, BENIGN: ICD-10-CM

## 2024-10-29 DIAGNOSIS — I65.21 CAROTID ARTERY STENOSIS, ASYMPTOMATIC, RIGHT: ICD-10-CM

## 2024-10-29 DIAGNOSIS — I10 ESSENTIAL (PRIMARY) HYPERTENSION: ICD-10-CM

## 2024-10-29 DIAGNOSIS — I47.10 SVT (SUPRAVENTRICULAR TACHYCARDIA) (HCC): ICD-10-CM

## 2024-10-29 DIAGNOSIS — I25.10 ATHEROSCLEROSIS OF NATIVE CORONARY ARTERY OF NATIVE HEART WITHOUT ANGINA PECTORIS: Primary | ICD-10-CM

## 2024-10-29 DIAGNOSIS — Z09 HOSPITAL DISCHARGE FOLLOW-UP: ICD-10-CM

## 2024-10-29 PROCEDURE — 99214 OFFICE O/P EST MOD 30 MIN: CPT | Performed by: INTERNAL MEDICINE

## 2024-10-29 PROCEDURE — 93005 ELECTROCARDIOGRAM TRACING: CPT | Performed by: INTERNAL MEDICINE

## 2024-10-29 RX ORDER — AMLODIPINE BESYLATE 10 MG/1
10 TABLET ORAL DAILY
Qty: 90 TABLET | Refills: 3 | Status: SHIPPED | OUTPATIENT
Start: 2024-10-29

## 2024-10-29 RX ORDER — LOSARTAN POTASSIUM 100 MG/1
100 TABLET ORAL EVERY MORNING
Qty: 90 TABLET | Refills: 3 | Status: SHIPPED | COMMUNITY
Start: 2024-10-29

## 2024-10-29 RX ORDER — CLOPIDOGREL BISULFATE 75 MG/1
75 TABLET ORAL DAILY
Qty: 90 TABLET | Refills: 3 | Status: SHIPPED | OUTPATIENT
Start: 2024-10-29

## 2024-10-29 ASSESSMENT — PATIENT HEALTH QUESTIONNAIRE - PHQ9
1. LITTLE INTEREST OR PLEASURE IN DOING THINGS: NOT AT ALL
SUM OF ALL RESPONSES TO PHQ QUESTIONS 1-9: 0
SUM OF ALL RESPONSES TO PHQ QUESTIONS 1-9: 0
SUM OF ALL RESPONSES TO PHQ9 QUESTIONS 1 & 2: 0
SUM OF ALL RESPONSES TO PHQ QUESTIONS 1-9: 0
2. FEELING DOWN, DEPRESSED OR HOPELESS: NOT AT ALL
SUM OF ALL RESPONSES TO PHQ QUESTIONS 1-9: 0

## 2024-10-29 NOTE — PROGRESS NOTES
to home with Roman in the near future     Nonrheumatic tricuspid regurgitation:  Moderate by echo 5/24, normal EF      Carotid atherosclerosis   Bilateral carotid duplex 4/12/2024 showed ATIF with critical stenosis (80-99%), LICA with 50-69% stenosis   Underwent right CEA with Dr. Watts on 7/17/24  Continue ASA and statin  -Ok to stop clopidogrel/plavix in the past due to bruising per Terese Reyes, vascular surgery NP, instruction on 8/22/2024 prior to SMITHA for unstable angina  -Advised patient it is better to stay on aspirin and Plavix for life if only due to bruising     HTN  Controlled with current therapy-Amlodipine, BB and Losartan-patient states she was given verbal instructions to increase amlodipine and losartan and is now taking 10 and 100 mg respectively, so I will increase these doses and prescribe 90 with 3 refills on 10/29/2024     Dyslipidemia  Lab Results   Component Value Date    LDL 55.2 10/16/2024      Venous Insufficiency  Right GSV reflux 2/2021  Followed by Dr Crisostomo      DM  On metformin per PCP      Right breast cancer  S/p RIGHT BREAST LUMPECTOMY WITH ULTRASOUND on 5/2021  Followed by Dr Mendoza/ Kostas      Tobacco abuse  Trying to quit,  down to half a PPD  Counseled on smoking cessation      S/p right hip arthroplasty in 11/2021     Pre-operative cardiac clearance for shoulder surgery  -I would avoid elective interruption of DAPT for at least 6 months, preferably 1 year after SMITHA to RCA on 10/16/2024  -At that point if no chest pain or shortness of breath, she will be at low risk for cardiovascular complications during a non-cardiac surgery.     Follow up in 4 months, sooner PRN.      Jhon Corraels MD

## 2024-11-11 ENCOUNTER — TELEPHONE (OUTPATIENT)
Age: 79
End: 2024-11-11

## 2024-11-11 NOTE — TELEPHONE ENCOUNTER
Losartan 100mg, metoprolol (should be 50mg), plavix 75 mg    [facility]        Call: [phone] please call to inform patient meds have been sent

## 2024-11-12 RX ORDER — METOPROLOL TARTRATE 25 MG/1
25 TABLET, FILM COATED ORAL 2 TIMES DAILY
Qty: 180 TABLET | Refills: 4 | Status: SHIPPED | OUTPATIENT
Start: 2024-11-12

## 2024-11-12 NOTE — TELEPHONE ENCOUNTER
Noted losartan and Plavix were refilled recently on 10- for a year.  Metoprolol refilled for a year.  This nurse attempted to call and notify but was not successful.

## 2024-11-15 RX ORDER — LOSARTAN POTASSIUM 100 MG/1
100 TABLET ORAL EVERY MORNING
Qty: 90 TABLET | Refills: 3 | Status: SHIPPED | OUTPATIENT
Start: 2024-11-15

## 2024-11-15 RX ORDER — AMLODIPINE BESYLATE 10 MG/1
10 TABLET ORAL DAILY
Qty: 90 TABLET | Refills: 3 | Status: SHIPPED | OUTPATIENT
Start: 2024-11-15

## 2024-11-15 NOTE — TELEPHONE ENCOUNTER
Ms Jp came into the office asking to refill amlodipine and Losartan for patient. Ms Wyatt on PHI.   New RX for both medications sent and confirmed with pharmacy technician.

## 2024-11-21 ENCOUNTER — HOSPITAL ENCOUNTER (OUTPATIENT)
Facility: HOSPITAL | Age: 79
Setting detail: RECURRING SERIES
Discharge: HOME | End: 2024-11-24
Attending: STUDENT IN AN ORGANIZED HEALTH CARE EDUCATION/TRAINING PROGRAM
Payer: MEDICARE

## 2024-11-21 VITALS — BODY MASS INDEX: 26.19 KG/M2 | OXYGEN SATURATION: 100 % | WEIGHT: 133.4 LBS | HEIGHT: 60 IN

## 2024-11-21 PROCEDURE — 93797 PHYS/QHP OP CAR RHAB WO ECG: CPT

## 2024-11-21 PROCEDURE — 93798 PHYS/QHP OP CAR RHAB W/ECG: CPT

## 2024-11-21 RX ORDER — PANTOPRAZOLE SODIUM 40 MG/1
40 FOR SUSPENSION ORAL 2 TIMES DAILY
COMMUNITY

## 2024-11-21 ASSESSMENT — EXERCISE STRESS TEST
PEAK_HR: 90
PEAK_BP: 170/70
PEAK_METS: 2.1
PEAK_RPE: 13

## 2024-11-21 ASSESSMENT — PATIENT HEALTH QUESTIONNAIRE - PHQ9
4. FEELING TIRED OR HAVING LITTLE ENERGY: SEVERAL DAYS
SUM OF ALL RESPONSES TO PHQ QUESTIONS 1-9: 1
9. THOUGHTS THAT YOU WOULD BE BETTER OFF DEAD, OR OF HURTING YOURSELF: NOT AT ALL
SUM OF ALL RESPONSES TO PHQ QUESTIONS 1-9: 1
SUM OF ALL RESPONSES TO PHQ QUESTIONS 1-9: 1
2. FEELING DOWN, DEPRESSED OR HOPELESS: NOT AT ALL
10. IF YOU CHECKED OFF ANY PROBLEMS, HOW DIFFICULT HAVE THESE PROBLEMS MADE IT FOR YOU TO DO YOUR WORK, TAKE CARE OF THINGS AT HOME, OR GET ALONG WITH OTHER PEOPLE: NOT DIFFICULT AT ALL
8. MOVING OR SPEAKING SO SLOWLY THAT OTHER PEOPLE COULD HAVE NOTICED. OR THE OPPOSITE, BEING SO FIGETY OR RESTLESS THAT YOU HAVE BEEN MOVING AROUND A LOT MORE THAN USUAL: NOT AT ALL
SUM OF ALL RESPONSES TO PHQ QUESTIONS 1-9: 1
7. TROUBLE CONCENTRATING ON THINGS, SUCH AS READING THE NEWSPAPER OR WATCHING TELEVISION: NOT AT ALL
5. POOR APPETITE OR OVEREATING: NOT AT ALL
1. LITTLE INTEREST OR PLEASURE IN DOING THINGS: NOT AT ALL
6. FEELING BAD ABOUT YOURSELF - OR THAT YOU ARE A FAILURE OR HAVE LET YOURSELF OR YOUR FAMILY DOWN: NOT AT ALL
3. TROUBLE FALLING OR STAYING ASLEEP: NOT AT ALL
SUM OF ALL RESPONSES TO PHQ9 QUESTIONS 1 & 2: 0

## 2024-11-21 NOTE — CARDIO/PULMONARY
INTAKE APPOINTMENT NOTE  2024    NAME: Elise Schneider : 1945 AGE: 79 y.o.  GENDER: female    CARDIAC REHAB ADMITTING DIAGNOSIS: Stented coronary artery [Z95.5]    REFERRING PHYSICIAN: Watson Saunders MD    MEDICAL HX:  Past Medical History:   Diagnosis Date    Adverse effect of anesthesia     difficulty breathing during induction    Anxiety     Arthritis     left shoulder and elbow    Breast cancer (HCC)     RIGHT     CAD (coronary artery disease)     stenting cardiac 2015 PCI 10/16/24    Carotid stenosis, bilateral     Chronic pain     Claustrophobia     Diabetes mellitus (HCC)     GERD (gastroesophageal reflux disease)     History of supraventricular tachycardia     Hyperlipidemia     Hypertension     Hyponatremia     Pre-diabetes     Squamous cell carcinoma in situ of skin of face     Squamous cell carcinoma of right female breast     Thromboembolus (HCC)     left leg injury- DVT    UTI (urinary tract infection)     Vertigo 2012       LABS:     No results found for: \"HBA1C\", \"[id number]\"  Lab Results   Component Value Date/Time    CHOL 114 10/16/2024 02:26 AM    HDL 50 10/16/2024 02:26 AM    LDL 55.2 10/16/2024 02:26 AM    VLDL 8.8 10/16/2024 02:26 AM         ANTHROPOMETRICS:      Ht Readings from Last 1 Encounters:   24 1.524 m (5')      Wt Readings from Last 1 Encounters:   24 60.5 kg (133 lb 6.4 oz)        WAIST: 37       VISIT SUMMARY:    Elise Schneider 79 y.o. presented to Cardiac Rehab for program orientation and 6 minute walk test today with a primary diagnosis of Stented coronary artery [Z95.5]. EF is 55 % (55-60% per Echo 2024) Cardiac risk factors include smoking/ tobacco exposure, family history, dyslipidemia, diabetes mellitus, hypertension, prior MI.    Patient is a current smoker. [employer] program information flyer given to patient. Pt has no desire to continue to reduce the amount she smokes per day as she has reduced it to 0.5 PPD    Elise

## 2024-11-22 ENCOUNTER — APPOINTMENT (OUTPATIENT)
Facility: HOSPITAL | Age: 79
End: 2024-11-22
Attending: STUDENT IN AN ORGANIZED HEALTH CARE EDUCATION/TRAINING PROGRAM
Payer: MEDICARE

## 2024-11-26 ENCOUNTER — HOSPITAL ENCOUNTER (OUTPATIENT)
Facility: HOSPITAL | Age: 79
Setting detail: RECURRING SERIES
Discharge: HOME | End: 2024-11-29
Attending: STUDENT IN AN ORGANIZED HEALTH CARE EDUCATION/TRAINING PROGRAM
Payer: MEDICARE

## 2024-11-26 ENCOUNTER — TELEPHONE (OUTPATIENT)
Age: 79
End: 2024-11-26

## 2024-11-26 PROCEDURE — 93798 PHYS/QHP OP CAR RHAB W/ECG: CPT

## 2024-11-26 NOTE — CARDIO/PULMONARY
Patient's resting blood pressure 107/46, 101/46 and a final of 117/47.  Also reports feeling sluggish.  No dizziness or other symptoms.  Session canceled.  Patient will go home, drink some fluids and recheck.  Will call physician if dizzy or blood pressure still low.

## 2024-11-26 NOTE — TELEPHONE ENCOUNTER
Patient is calling because she is having a colonoscopy on 12/2/24 and she needs medication and cardiac clearance for this procedure.    Dr.Michael Mykel [facility]  [phone] office    [phone] home

## 2024-11-27 ENCOUNTER — APPOINTMENT (OUTPATIENT)
Facility: HOSPITAL | Age: 79
End: 2024-11-27
Payer: MEDICARE

## 2024-11-27 ENCOUNTER — HOSPITAL ENCOUNTER (EMERGENCY)
Facility: HOSPITAL | Age: 79
Discharge: HOME | End: 2024-11-27
Payer: MEDICARE

## 2024-11-27 VITALS
HEIGHT: 60 IN | DIASTOLIC BLOOD PRESSURE: 56 MMHG | BODY MASS INDEX: 26.97 KG/M2 | SYSTOLIC BLOOD PRESSURE: 144 MMHG | HEART RATE: 55 BPM | WEIGHT: 137.35 LBS | OXYGEN SATURATION: 98 % | TEMPERATURE: 97.6 F | RESPIRATION RATE: 13 BRPM

## 2024-11-27 DIAGNOSIS — K41.90 FEMORAL HERNIA OF LEFT SIDE: ICD-10-CM

## 2024-11-27 DIAGNOSIS — R10.13 ABDOMINAL PAIN, EPIGASTRIC: Primary | ICD-10-CM

## 2024-11-27 LAB
ALBUMIN SERPL-MCNC: 3.8 G/DL (ref 3.5–5)
ALBUMIN/GLOB SERPL: 1 (ref 1.1–2.2)
ALP SERPL-CCNC: 56 U/L (ref 45–117)
ALT SERPL-CCNC: 26 U/L (ref 12–78)
ANION GAP SERPL CALC-SCNC: 2 MMOL/L (ref 2–12)
APPEARANCE UR: CLEAR
AST SERPL-CCNC: 16 U/L (ref 15–37)
BACTERIA URNS QL MICRO: NEGATIVE /HPF
BASOPHILS # BLD: 0 K/UL (ref 0–0.1)
BASOPHILS NFR BLD: 0 % (ref 0–1)
BILIRUB SERPL-MCNC: 0.8 MG/DL (ref 0.2–1)
BILIRUB UR QL: NEGATIVE
BUN SERPL-MCNC: 12 MG/DL (ref 6–20)
BUN/CREAT SERPL: 17 (ref 12–20)
CALCIUM SERPL-MCNC: 9.3 MG/DL (ref 8.5–10.1)
CHLORIDE SERPL-SCNC: 97 MMOL/L (ref 97–108)
CO2 SERPL-SCNC: 30 MMOL/L (ref 21–32)
COLOR UR: NORMAL
CREAT SERPL-MCNC: 0.71 MG/DL (ref 0.55–1.02)
DIFFERENTIAL METHOD BLD: ABNORMAL
EKG ATRIAL RATE: 55 BPM
EKG DIAGNOSIS: NORMAL
EKG P AXIS: 79 DEGREES
EKG P-R INTERVAL: 140 MS
EKG Q-T INTERVAL: 438 MS
EKG QRS DURATION: 88 MS
EKG QTC CALCULATION (BAZETT): 419 MS
EKG R AXIS: 63 DEGREES
EKG T AXIS: 37 DEGREES
EKG VENTRICULAR RATE: 55 BPM
EOSINOPHIL # BLD: 0.1 K/UL (ref 0–0.4)
EOSINOPHIL NFR BLD: 1 % (ref 0–7)
EPITH CASTS URNS QL MICRO: NORMAL /LPF
ERYTHROCYTE [DISTWIDTH] IN BLOOD BY AUTOMATED COUNT: 12.2 % (ref 11.5–14.5)
GLOBULIN SER CALC-MCNC: 3.7 G/DL (ref 2–4)
GLUCOSE SERPL-MCNC: 204 MG/DL (ref 65–100)
GLUCOSE UR STRIP.AUTO-MCNC: NEGATIVE MG/DL
HCT VFR BLD AUTO: 36.8 % (ref 35–47)
HGB BLD-MCNC: 12.7 G/DL (ref 11.5–16)
HGB UR QL STRIP: NEGATIVE
HYALINE CASTS URNS QL MICRO: NORMAL /LPF (ref 0–2)
IMM GRANULOCYTES # BLD AUTO: 0.1 K/UL (ref 0–0.04)
IMM GRANULOCYTES NFR BLD AUTO: 1 % (ref 0–0.5)
KETONES UR QL STRIP.AUTO: NEGATIVE MG/DL
LEUKOCYTE ESTERASE UR QL STRIP.AUTO: NEGATIVE
LIPASE SERPL-CCNC: 51 U/L (ref 13–75)
LYMPHOCYTES # BLD: 0.8 K/UL (ref 0.8–3.5)
LYMPHOCYTES NFR BLD: 11 % (ref 12–49)
MCH RBC QN AUTO: 33.2 PG (ref 26–34)
MCHC RBC AUTO-ENTMCNC: 34.5 G/DL (ref 30–36.5)
MCV RBC AUTO: 96.3 FL (ref 80–99)
MONOCYTES # BLD: 0.6 K/UL (ref 0–1)
MONOCYTES NFR BLD: 8 % (ref 5–13)
NEUTS SEG # BLD: 5.4 K/UL (ref 1.8–8)
NEUTS SEG NFR BLD: 79 % (ref 32–75)
NITRITE UR QL STRIP.AUTO: NEGATIVE
NRBC # BLD: 0 K/UL (ref 0–0.01)
NRBC BLD-RTO: 0 PER 100 WBC
PH UR STRIP: 7.5 (ref 5–8)
PLATELET # BLD AUTO: 196 K/UL (ref 150–400)
PMV BLD AUTO: 9.8 FL (ref 8.9–12.9)
POTASSIUM SERPL-SCNC: 3.9 MMOL/L (ref 3.5–5.1)
PROT SERPL-MCNC: 7.5 G/DL (ref 6.4–8.2)
PROT UR STRIP-MCNC: NEGATIVE MG/DL
RBC # BLD AUTO: 3.82 M/UL (ref 3.8–5.2)
RBC #/AREA URNS HPF: NORMAL /HPF (ref 0–5)
RBC MORPH BLD: ABNORMAL
SODIUM SERPL-SCNC: 129 MMOL/L (ref 136–145)
SP GR UR REFRACTOMETRY: 1.01
TROPONIN I SERPL HS-MCNC: 4 NG/L (ref 0–51)
TROPONIN I SERPL HS-MCNC: 6 NG/L (ref 0–51)
URINE CULTURE IF INDICATED: NORMAL
UROBILINOGEN UR QL STRIP.AUTO: 0.2 EU/DL (ref 0.2–1)
WBC # BLD AUTO: 7 K/UL (ref 3.6–11)
WBC URNS QL MICRO: NORMAL /HPF (ref 0–4)

## 2024-11-27 PROCEDURE — 85025 COMPLETE CBC W/AUTO DIFF WBC: CPT

## 2024-11-27 PROCEDURE — 80053 COMPREHEN METABOLIC PANEL: CPT

## 2024-11-27 PROCEDURE — 93005 ELECTROCARDIOGRAM TRACING: CPT | Performed by: EMERGENCY MEDICINE

## 2024-11-27 PROCEDURE — 74177 CT ABD & PELVIS W/CONTRAST: CPT

## 2024-11-27 PROCEDURE — 6360000004 HC RX CONTRAST MEDICATION: Performed by: RADIOLOGY

## 2024-11-27 PROCEDURE — 99285 EMERGENCY DEPT VISIT HI MDM: CPT

## 2024-11-27 PROCEDURE — 2580000003 HC RX 258: Performed by: PHYSICIAN ASSISTANT

## 2024-11-27 PROCEDURE — 36415 COLL VENOUS BLD VENIPUNCTURE: CPT

## 2024-11-27 PROCEDURE — 84484 ASSAY OF TROPONIN QUANT: CPT

## 2024-11-27 PROCEDURE — 96374 THER/PROPH/DIAG INJ IV PUSH: CPT

## 2024-11-27 PROCEDURE — 6360000002 HC RX W HCPCS: Performed by: PHYSICIAN ASSISTANT

## 2024-11-27 PROCEDURE — 96375 TX/PRO/DX INJ NEW DRUG ADDON: CPT

## 2024-11-27 PROCEDURE — 81001 URINALYSIS AUTO W/SCOPE: CPT

## 2024-11-27 PROCEDURE — 83690 ASSAY OF LIPASE: CPT

## 2024-11-27 RX ORDER — 0.9 % SODIUM CHLORIDE 0.9 %
500 INTRAVENOUS SOLUTION INTRAVENOUS ONCE
Status: COMPLETED | OUTPATIENT
Start: 2024-11-27 | End: 2024-11-27

## 2024-11-27 RX ORDER — ONDANSETRON 2 MG/ML
4 INJECTION INTRAMUSCULAR; INTRAVENOUS
Status: COMPLETED | OUTPATIENT
Start: 2024-11-27 | End: 2024-11-27

## 2024-11-27 RX ORDER — IOPAMIDOL 755 MG/ML
100 INJECTION, SOLUTION INTRAVASCULAR
Status: COMPLETED | OUTPATIENT
Start: 2024-11-27 | End: 2024-11-27

## 2024-11-27 RX ORDER — MORPHINE SULFATE 2 MG/ML
2 INJECTION, SOLUTION INTRAMUSCULAR; INTRAVENOUS
Status: COMPLETED | OUTPATIENT
Start: 2024-11-27 | End: 2024-11-27

## 2024-11-27 RX ADMIN — SODIUM CHLORIDE 500 ML: 9 INJECTION, SOLUTION INTRAVENOUS at 09:05

## 2024-11-27 RX ADMIN — ONDANSETRON 4 MG: 2 INJECTION INTRAMUSCULAR; INTRAVENOUS at 09:05

## 2024-11-27 RX ADMIN — IOPAMIDOL 100 ML: 755 INJECTION, SOLUTION INTRAVENOUS at 09:35

## 2024-11-27 RX ADMIN — MORPHINE SULFATE 2 MG: 2 INJECTION, SOLUTION INTRAMUSCULAR; INTRAVENOUS at 09:05

## 2024-11-27 ASSESSMENT — PAIN SCALES - GENERAL: PAINLEVEL_OUTOF10: 10

## 2024-11-27 ASSESSMENT — PAIN DESCRIPTION - DESCRIPTORS: DESCRIPTORS: DISCOMFORT

## 2024-11-27 ASSESSMENT — LIFESTYLE VARIABLES
HOW MANY STANDARD DRINKS CONTAINING ALCOHOL DO YOU HAVE ON A TYPICAL DAY: PATIENT DOES NOT DRINK
HOW OFTEN DO YOU HAVE A DRINK CONTAINING ALCOHOL: NEVER

## 2024-11-27 ASSESSMENT — PAIN - FUNCTIONAL ASSESSMENT: PAIN_FUNCTIONAL_ASSESSMENT: 0-10

## 2024-11-27 ASSESSMENT — PAIN DESCRIPTION - LOCATION: LOCATION: ABDOMEN

## 2024-11-27 ASSESSMENT — PAIN DESCRIPTION - PAIN TYPE: TYPE: ACUTE PAIN

## 2024-11-27 ASSESSMENT — PAIN DESCRIPTION - ORIENTATION: ORIENTATION: MID

## 2024-11-27 NOTE — ED NOTES
Discharge medications reviewed with the patient and family and appropriate educational materials and side effects teaching were provided.

## 2024-11-27 NOTE — ED PROVIDER NOTES
voice recognition software. Quite often unanticipated grammatical, syntax, homophones, and other interpretive errors are inadvertently transcribed by the computer software. Please disregards these errors. Please excuse any errors that have escaped final proofreading.)       Monica Hernandes PA  11/27/24 8723

## 2024-11-27 NOTE — DISCHARGE INSTRUCTIONS
paperwork. Return to the ER if you are unable to be seen or if you are unable to be seen in a timely manner.    Should you experience abdominal pain lasting greater than 6 hours, chest pain, difficulty breathing, fever/chills, numbness/tingling, skin changes or other symptoms that concern you, return to the ED sooner. If you feel worse over the next 24 hours, please return to the ED. We are available 24 hours a day. Thank you for trusting us with your care!

## 2024-11-28 ENCOUNTER — PATIENT MESSAGE (OUTPATIENT)
Age: 79
End: 2024-11-28

## 2024-11-29 NOTE — TELEPHONE ENCOUNTER
Clearance note, recent progress note and EKG faxed to dr. Hogue office at [phone].    Able to talked to patient. Informed.

## 2024-11-29 NOTE — TELEPHONE ENCOUNTER
Noted patient was advised in last office visit, 10-,  Not to hold Antiplatelets for at least 6 months.  Note sent to dr. Hogue.    -I would avoid elective interruption of DAPT for at least 6 months, preferably 1 year after SMITHA to RCA on 10/16/2024  -At that point if no chest pain or shortness of breath, she will be at low risk for cardiovascular complications during a non-cardiac surgery.    .  Clearance note, recent progress note and EKG faxed to Dr. Hogue at [phone]

## 2024-11-29 NOTE — TELEPHONE ENCOUNTER
The patient may not interrupt aspirin or Plavix for any elective procedure until 4/17/2024, which will be 6 months after her stent placement.  If her procedure is urgent, we may consider early interruption but that should be a doctor to doctor discussion.  Dr. Hogue can call me if he feels the procedure is urgent.

## 2024-12-03 ENCOUNTER — APPOINTMENT (OUTPATIENT)
Facility: HOSPITAL | Age: 79
End: 2024-12-03
Attending: STUDENT IN AN ORGANIZED HEALTH CARE EDUCATION/TRAINING PROGRAM
Payer: MEDICARE

## 2024-12-06 ENCOUNTER — HOSPITAL ENCOUNTER (OUTPATIENT)
Facility: HOSPITAL | Age: 79
Setting detail: RECURRING SERIES
Discharge: HOME | End: 2024-12-09
Attending: STUDENT IN AN ORGANIZED HEALTH CARE EDUCATION/TRAINING PROGRAM
Payer: MEDICARE

## 2024-12-06 VITALS — BODY MASS INDEX: 26.4 KG/M2 | WEIGHT: 135.2 LBS

## 2024-12-06 PROCEDURE — 93798 PHYS/QHP OP CAR RHAB W/ECG: CPT

## 2024-12-06 PROCEDURE — 93797 PHYS/QHP OP CAR RHAB WO ECG: CPT

## 2024-12-06 ASSESSMENT — EXERCISE STRESS TEST
PEAK_RPE: 13
PEAK_METS: 2.1
PEAK_HR: 90

## 2024-12-10 ENCOUNTER — HOSPITAL ENCOUNTER (OUTPATIENT)
Facility: HOSPITAL | Age: 79
Setting detail: RECURRING SERIES
End: 2024-12-10
Attending: STUDENT IN AN ORGANIZED HEALTH CARE EDUCATION/TRAINING PROGRAM
Payer: MEDICARE

## 2024-12-10 ENCOUNTER — APPOINTMENT (OUTPATIENT)
Facility: HOSPITAL | Age: 79
End: 2024-12-10
Attending: STUDENT IN AN ORGANIZED HEALTH CARE EDUCATION/TRAINING PROGRAM
Payer: MEDICARE

## 2024-12-13 ENCOUNTER — APPOINTMENT (OUTPATIENT)
Facility: HOSPITAL | Age: 79
End: 2024-12-13
Attending: STUDENT IN AN ORGANIZED HEALTH CARE EDUCATION/TRAINING PROGRAM
Payer: MEDICARE

## 2024-12-17 ENCOUNTER — CLINICAL DOCUMENTATION (OUTPATIENT)
Age: 79
End: 2024-12-17

## 2024-12-17 ENCOUNTER — HOSPITAL ENCOUNTER (OUTPATIENT)
Facility: HOSPITAL | Age: 79
Setting detail: RECURRING SERIES
Discharge: HOME | End: 2024-12-20
Attending: STUDENT IN AN ORGANIZED HEALTH CARE EDUCATION/TRAINING PROGRAM
Payer: MEDICARE

## 2024-12-17 VITALS — WEIGHT: 134.6 LBS | BODY MASS INDEX: 26.29 KG/M2

## 2024-12-17 PROCEDURE — 93798 PHYS/QHP OP CAR RHAB W/ECG: CPT

## 2024-12-17 ASSESSMENT — EXERCISE STRESS TEST
PEAK_RPE: 13
PEAK_HR: 93
PEAK_METS: 2.1

## 2024-12-17 NOTE — PROGRESS NOTES
Reviewed notes from [facility] 12/10/2024:  Summary: Excruciating abdominal pain with solid black feces starting 1.5 weeks prior, taking famotidine 40 mg daily and pantoprazole 40 mg twice daily, has been to ER for pain but no one can find anything wrong.    CT abdomen pelvis with IV contrast 11/27/2024:  Impression no bowel obstruction ileus or perforation, no intra-abdominal abscess.      Physical exam found generalized abdominal pain, abdominal bruit, celiac artery stenosis evidently known.    Patient referred to vascular surgery ASAP for celiac artery stenosis abdominal bruit and abdominal pain out of proportion to exam findings    Ms. Lopez was aware patient was on Plavix, advised to restart aspirin 81 mg at that time.    Lab Results   Component Value Date    HGB 12.7 11/27/2024        Above is a summary from LION Bishop

## 2024-12-18 ENCOUNTER — HOSPITAL ENCOUNTER (OUTPATIENT)
Facility: HOSPITAL | Age: 79
Setting detail: OUTPATIENT SURGERY
Discharge: HOME | End: 2024-12-18
Attending: SPECIALIST | Admitting: SPECIALIST
Payer: MEDICARE

## 2024-12-18 ENCOUNTER — ANESTHESIA (OUTPATIENT)
Facility: HOSPITAL | Age: 79
End: 2024-12-18
Payer: MEDICARE

## 2024-12-18 ENCOUNTER — ANESTHESIA EVENT (OUTPATIENT)
Facility: HOSPITAL | Age: 79
End: 2024-12-18
Payer: MEDICARE

## 2024-12-18 VITALS
RESPIRATION RATE: 14 BRPM | DIASTOLIC BLOOD PRESSURE: 51 MMHG | BODY MASS INDEX: 24.84 KG/M2 | WEIGHT: 135 LBS | SYSTOLIC BLOOD PRESSURE: 108 MMHG | HEIGHT: 62 IN | TEMPERATURE: 97.6 F | HEART RATE: 57 BPM | OXYGEN SATURATION: 98 %

## 2024-12-18 PROCEDURE — 3600007512: Performed by: SPECIALIST

## 2024-12-18 PROCEDURE — 7100000010 HC PHASE II RECOVERY - FIRST 15 MIN: Performed by: SPECIALIST

## 2024-12-18 PROCEDURE — 2580000003 HC RX 258: Performed by: SPECIALIST

## 2024-12-18 PROCEDURE — 3700000000 HC ANESTHESIA ATTENDED CARE: Performed by: SPECIALIST

## 2024-12-18 PROCEDURE — 3700000001 HC ADD 15 MINUTES (ANESTHESIA): Performed by: SPECIALIST

## 2024-12-18 PROCEDURE — 3600007502: Performed by: SPECIALIST

## 2024-12-18 PROCEDURE — 2709999900 HC NON-CHARGEABLE SUPPLY: Performed by: SPECIALIST

## 2024-12-18 PROCEDURE — 6360000002 HC RX W HCPCS: Performed by: REGISTERED NURSE

## 2024-12-18 PROCEDURE — 7100000011 HC PHASE II RECOVERY - ADDTL 15 MIN: Performed by: SPECIALIST

## 2024-12-18 RX ORDER — SODIUM CHLORIDE 0.9 % (FLUSH) 0.9 %
5-40 SYRINGE (ML) INJECTION PRN
Status: DISCONTINUED | OUTPATIENT
Start: 2024-12-18 | End: 2024-12-18 | Stop reason: HOSPADM

## 2024-12-18 RX ORDER — SODIUM CHLORIDE 9 MG/ML
INJECTION, SOLUTION INTRAVENOUS PRN
Status: DISCONTINUED | OUTPATIENT
Start: 2024-12-18 | End: 2024-12-18 | Stop reason: HOSPADM

## 2024-12-18 RX ORDER — SODIUM CHLORIDE 0.9 % (FLUSH) 0.9 %
5-40 SYRINGE (ML) INJECTION EVERY 12 HOURS SCHEDULED
Status: DISCONTINUED | OUTPATIENT
Start: 2024-12-18 | End: 2024-12-18 | Stop reason: HOSPADM

## 2024-12-18 RX ORDER — SODIUM CHLORIDE 9 MG/ML
INJECTION, SOLUTION INTRAVENOUS CONTINUOUS PRN
Status: COMPLETED | OUTPATIENT
Start: 2024-12-18 | End: 2024-12-18

## 2024-12-18 RX ADMIN — PROPOFOL 20 MG: 10 INJECTION, EMULSION INTRAVENOUS at 13:08

## 2024-12-18 RX ADMIN — LIDOCAINE HYDROCHLORIDE 80 MG: 20 INJECTION, SOLUTION EPIDURAL; INFILTRATION; INTRACAUDAL; PERINEURAL at 13:06

## 2024-12-18 RX ADMIN — PROPOFOL 50 MG: 10 INJECTION, EMULSION INTRAVENOUS at 13:06

## 2024-12-18 RX ADMIN — PROPOFOL 20 MG: 10 INJECTION, EMULSION INTRAVENOUS at 13:07

## 2024-12-18 ASSESSMENT — LIFESTYLE VARIABLES: SMOKING_STATUS: 1

## 2024-12-18 ASSESSMENT — PAIN - FUNCTIONAL ASSESSMENT: PAIN_FUNCTIONAL_ASSESSMENT: 0-10

## 2024-12-18 ASSESSMENT — PAIN DESCRIPTION - DESCRIPTORS: DESCRIPTORS: DISCOMFORT

## 2024-12-18 NOTE — ANESTHESIA POSTPROCEDURE EVALUATION
Department of Anesthesiology  Postprocedure Note    Patient: Elise Schneider  MRN: [id number]  YOB: 1945  Date of evaluation: 12/18/2024    Procedure Summary       Date: 12/18/24 Room / Location: [facility] ENDO 01 / [facility] ENDOSCOPY    Anesthesia Start: 1257 Anesthesia Stop: 1319    Procedure: ESOPHAGOGASTRODUODENOSCOPY (Upper GI Region) Diagnosis:       Anemia, unspecified type      Aspirin long-term use      Epigastric pain      (Anemia, unspecified type [D64.9])      (Aspirin long-term use [Z79.82])      (Epigastric pain [R10.13])    Surgeons: Jhon Hogue MD Responsible Provider: Mingo Oneal MD    Anesthesia Type: MAC ASA Status: 3            Anesthesia Type: MAC    Rc Phase I:      Rc Phase II: Rc Score: 10    Anesthesia Post Evaluation    Patient location during evaluation: PACU  Patient participation: complete - patient participated  Level of consciousness: sleepy but conscious and responsive to verbal stimuli  Airway patency: patent  Nausea & Vomiting: no vomiting and no nausea  Cardiovascular status: blood pressure returned to baseline and hemodynamically stable  Respiratory status: acceptable  Hydration status: stable    No notable events documented.

## 2024-12-18 NOTE — OP NOTE
Esophagogastroduodenoscopy Procedure Note      Elise Schneider  1945  [id number]    Indication: Abdominal pain, epigastric     Endoscopist: Jhon Hogue MD    Referring Provider:  Linda Reynoso LMFT    Sedation:  MAC anesthesia Propofol    Procedure Details:  After infomed consent was obtained for the procedure, with all risks and benefits of procedure explained the patient was taken to the endoscopy suite and placed in the left lateral decubitus position.  Following sequential administration of sedation as per above, the endoscope was inserted into the mouth and advanced under direct vision to second portion of the duodenum.  A careful inspection was made as the gastroscope was withdrawn, including a retroflexed view of the proximal stomach; findings and interventions are described below.      Findings:     Esophagus:   - Tortuous distal esophagus with normal Z line located at 34 cm from incisors  + 3-4 cm hiatal hernia.  No bxs performed due to pt on plavix.    Stomach:   + Retroflexion in proximal stomach showed a nonbleeding AVM below the EGJ (no treatment performed due to pt on plavix).  Remainder of the stomach mucosa showed normal mucosa and no ulcers, pylorus patent.    Duodenum:   The bulb and post bulbar mucosa is normal in appearance to the second portion. The duodenal folds appeared normal.      Therapies:  none    Specimen: none            Complications:   None were encountered during the procedure.    EBL:  None.          Recommendations:   -continue PPI and GERD diet  -if drop in Hgb consider pill cam      Jhon Hogue MD  12/18/2024  1:14 PM

## 2024-12-18 NOTE — PERIOP NOTE
Endoscopy Case End Note:    1316:  Procedure scope was pre-cleaned, per protocol, at bedside by Светлана SYED      1317:  Report received from anesthesia - Debora DEMARCO.  See anesthesia flowsheet for intra-procedure vital signs and events.

## 2024-12-18 NOTE — ANESTHESIA PRE PROCEDURE
Department of Anesthesiology  Preprocedure Note       Name:  Elise Schneider   Age:  79 y.o.  :  1945                                          MRN:  [id number]         Date:  2024      Surgeon: Surgeon(s):  Jhon Hogue MD    Procedure: Procedure(s):  ESOPHAGOGASTRODUODENOSCOPY    Medications prior to admission:   Prior to Admission medications    Medication Sig Start Date End Date Taking? Authorizing Provider   pantoprazole sodium (PROTONIX) 40 MG PACK packet Take 1 packet by mouth in the morning and at bedtime   Yes Андрей Marques MD   amLODIPine (NORVASC) 10 MG tablet Take 1 tablet by mouth daily for blood pressure 11/15/24  Yes Jhon Corrales MD   losartan (COZAAR) 100 MG tablet Take 1 tablet by mouth every morning 11/15/24  Yes Jhon Corrales MD   metoprolol tartrate (LOPRESSOR) 25 MG tablet Take 1 tablet by mouth 2 times daily 24  Yes Jhon Corrales MD   clopidogrel (PLAVIX) 75 MG tablet Take 1 tablet by mouth daily 10/29/24  Yes Jhon Corrales MD   acetaminophen (TYLENOL) 500 MG tablet Take 1 tablet by mouth every 6 hours as needed for Pain   Yes ProviderАндрей MD   aspirin 81 MG EC tablet Take 1 tablet by mouth daily 24  Yes Terese Reyes, APRN - NP   sodium chloride (OCEAN, BABY AYR) 0.65 % nasal spray 1 spray by Nasal route as needed for Congestion   Yes ProviderАндрей MD   Multiple Vitamins-Minerals (PRESERVISION AREDS) CAPS 1 capsule 2 times daily   Yes ProviderАндрей MD   famotidine (PEPCID) 40 MG tablet Take 1 tablet by mouth 23  Yes ProviderАндрей MD   fluticasone (FLONASE) 50 MCG/ACT nasal spray 2 sprays by Nasal route daily as needed   Yes Automatic Reconciliation, Ar   lovastatin (MEVACOR) 40 MG tablet Take 1 tablet by mouth nightly 19  Yes Automatic Reconciliation, Ar   metFORMIN (GLUCOPHAGE) 500 MG tablet Take 1 tablet by mouth nightly   Yes Automatic Reconciliation, Ar       Current medications:

## 2024-12-18 NOTE — H&P
Provider   pantoprazole sodium (PROTONIX) 40 MG PACK packet Take 1 packet by mouth in the morning and at bedtime   Yes ProviderАндрей MD   amLODIPine (NORVASC) 10 MG tablet Take 1 tablet by mouth daily for blood pressure 11/15/24  Yes Jhon Corrales MD   losartan (COZAAR) 100 MG tablet Take 1 tablet by mouth every morning 11/15/24  Yes Jhon Corrales MD   metoprolol tartrate (LOPRESSOR) 25 MG tablet Take 1 tablet by mouth 2 times daily 11/12/24  Yes Jhon Corrales MD   clopidogrel (PLAVIX) 75 MG tablet Take 1 tablet by mouth daily 10/29/24  Yes Jhon Corrales MD   acetaminophen (TYLENOL) 500 MG tablet Take 1 tablet by mouth every 6 hours as needed for Pain   Yes Provider, MD Андрей   aspirin 81 MG EC tablet Take 1 tablet by mouth daily 7/19/24  Yes Terese Reyes, APRN - NP   sodium chloride (OCEAN, BABY AYR) 0.65 % nasal spray 1 spray by Nasal route as needed for Congestion   Yes Provider, MD Андрей   Multiple Vitamins-Minerals (PRESERVISION AREDS) CAPS 1 capsule 2 times daily   Yes ProviderАндрей MD   famotidine (PEPCID) 40 MG tablet Take 1 tablet by mouth 2/17/23  Yes ProviderАндрей MD   fluticasone (FLONASE) 50 MCG/ACT nasal spray 2 sprays by Nasal route daily as needed   Yes Automatic Reconciliation, Ar   lovastatin (MEVACOR) 40 MG tablet Take 1 tablet by mouth nightly 4/2/19  Yes Automatic Reconciliation, Ar   metFORMIN (GLUCOPHAGE) 500 MG tablet Take 1 tablet by mouth nightly   Yes Automatic Reconciliation, Ar       Physical Exam:   General: NAD   HEENT: Head: Normocephalic, no lesions, without obvious abnormality.   Heart: regular rate and rhythm, S1, S2 normal, no murmur, click, rub or gallop   Lungs: chest clear, no wheezing, rales, normal symmetric air entry   Abdominal: soft, NT/ND+ BS   Neurological: Grossly normal   Extremities: extremities normal, atraumatic, no cyanosis or edema     Findings/Diagnosis: Epigastric pain, Dark stool    Plan of

## 2024-12-18 NOTE — PROGRESS NOTES
ARRIVAL INFORMATION:  Verified patient name and date of birth, scheduled procedure, and informed consent.     : Xenia (sister) contact number: [phone]  Physician and staff can share information with the .     Receive texts: yes    Belongings with patient include:  Clothing    GI FOCUSED ASSESSMENT:  Neuro: Awake, alert, oriented x4  Respiratory: even and unlabored   GI: soft and non-distended  EKG Rhythm: normal sinus rhythm and sinus bradycardia    Education:Reviewed general discharge instructions and  information.     The risks and benefits of the bite block have been explained to patient.  Patient verbalizes understanding.

## 2024-12-18 NOTE — DISCHARGE INSTRUCTIONS
Elise Schneider  [id number]  1945    EGD DISCHARGE INSTRUCTIONS  Discomfort:  Sore throat- throat lozenges or warm salt water gargle  redness at IV site- apply warm compress to area; if redness or soreness persist- contact your physician  Gaseous discomfort- walking, belching will help relieve any discomfort  You may not operate a vehicle for 12 hours  You may not engage in an occupation involving machinery or appliances for rest of today.  You may not drink alcoholic beverages for at least 12 hours  Avoid making any critical decisions for at least 24 hour  DIET  You may resume your regular diet - however -  remember your colon is empty and a heavy meal will produce gas.   Avoid these foods:  vegetables, fried / greasy foods, carbonated drinks  MEDICATIONS   [unfilled]   Regarding Aspirin or Nonsteroidal medications specifically, please see below.  ACTIVITY  You may resume your normal daily activities.   Spend the remainder of the day resting -  avoid any strenuous activity.    CALL M.D.  ANY SIGN OF   Increasing pain, nausea, vomiting  Abdominal distension (swelling)  New increased bleeding (oral or rectal)  Fever (chills)  Pain in chest area  Bloody discharge from nose or mouth  Shortness of breath    You may not not take any Advil, Aspirin, Ibuprofen, Motrin, Aleve, or Goody’s for 10 days, ONLY  Tylenol as needed for pain.    Follow-up Instructions:   Call Dr. Hogue for questions about procedure at telephone #  [phone]             Continue pantoprazole 40 mg daily with famotidine 40 mg daily    Findings:  Small Hiatal Hernia  Small nonbleeding AVM in the upper stomach      Patient Education on Sedation / Analgesia Administered for Procedure      For 24 hours after general anesthesia or intravenous analgesia / sedation:  Have someone responsible help you with your care  Limit your activities  Do not drive and operate hazardous machinery  Do not make important personal, legal or business decisions  Do

## 2024-12-20 ENCOUNTER — HOSPITAL ENCOUNTER (OUTPATIENT)
Facility: HOSPITAL | Age: 79
Setting detail: RECURRING SERIES
Discharge: HOME | End: 2024-12-23
Attending: STUDENT IN AN ORGANIZED HEALTH CARE EDUCATION/TRAINING PROGRAM
Payer: MEDICARE

## 2024-12-20 VITALS — BODY MASS INDEX: 25.02 KG/M2 | WEIGHT: 136.8 LBS

## 2024-12-20 PROCEDURE — 93798 PHYS/QHP OP CAR RHAB W/ECG: CPT

## 2024-12-20 PROCEDURE — 93797 PHYS/QHP OP CAR RHAB WO ECG: CPT

## 2024-12-20 ASSESSMENT — EXERCISE STRESS TEST
PEAK_HR: 88
PEAK_METS: 2.1
PEAK_RPE: 13

## 2024-12-27 ENCOUNTER — HOSPITAL ENCOUNTER (OUTPATIENT)
Facility: HOSPITAL | Age: 79
Setting detail: RECURRING SERIES
Discharge: HOME | End: 2024-12-30
Attending: STUDENT IN AN ORGANIZED HEALTH CARE EDUCATION/TRAINING PROGRAM
Payer: MEDICARE

## 2024-12-27 VITALS — WEIGHT: 134.6 LBS | BODY MASS INDEX: 24.62 KG/M2

## 2024-12-27 PROCEDURE — 93797 PHYS/QHP OP CAR RHAB WO ECG: CPT

## 2024-12-27 PROCEDURE — 93798 PHYS/QHP OP CAR RHAB W/ECG: CPT

## 2024-12-27 ASSESSMENT — EXERCISE STRESS TEST
PEAK_HR: 83
PEAK_METS: 2.1
PEAK_RPE: 13

## 2025-03-04 ENCOUNTER — OFFICE VISIT (OUTPATIENT)
Age: 80
End: 2025-03-04
Payer: MEDICARE

## 2025-03-04 VITALS
HEIGHT: 62 IN | HEART RATE: 63 BPM | OXYGEN SATURATION: 99 % | WEIGHT: 130.2 LBS | SYSTOLIC BLOOD PRESSURE: 126 MMHG | BODY MASS INDEX: 23.96 KG/M2 | DIASTOLIC BLOOD PRESSURE: 60 MMHG

## 2025-03-04 DIAGNOSIS — I47.10 SVT (SUPRAVENTRICULAR TACHYCARDIA): ICD-10-CM

## 2025-03-04 DIAGNOSIS — R07.9 ACUTE CHEST PAIN: ICD-10-CM

## 2025-03-04 DIAGNOSIS — I65.21 CAROTID ARTERY STENOSIS, ASYMPTOMATIC, RIGHT: ICD-10-CM

## 2025-03-04 DIAGNOSIS — Z98.61 S/P PTCA (PERCUTANEOUS TRANSLUMINAL CORONARY ANGIOPLASTY): ICD-10-CM

## 2025-03-04 DIAGNOSIS — I10 ESSENTIAL (PRIMARY) HYPERTENSION: ICD-10-CM

## 2025-03-04 DIAGNOSIS — E78.2 MIXED HYPERLIPIDEMIA: ICD-10-CM

## 2025-03-04 DIAGNOSIS — Z98.890 S/P CAROTID ENDARTERECTOMY: ICD-10-CM

## 2025-03-04 DIAGNOSIS — I20.0 UNSTABLE ANGINA (HCC): ICD-10-CM

## 2025-03-04 DIAGNOSIS — I10 ESSENTIAL HYPERTENSION, BENIGN: ICD-10-CM

## 2025-03-04 DIAGNOSIS — I25.10 ATHEROSCLEROSIS OF NATIVE CORONARY ARTERY OF NATIVE HEART WITHOUT ANGINA PECTORIS: Primary | ICD-10-CM

## 2025-03-04 PROCEDURE — 99214 OFFICE O/P EST MOD 30 MIN: CPT | Performed by: INTERNAL MEDICINE

## 2025-03-04 PROCEDURE — 93010 ELECTROCARDIOGRAM REPORT: CPT | Performed by: INTERNAL MEDICINE

## 2025-03-04 PROCEDURE — 1126F AMNT PAIN NOTED NONE PRSNT: CPT | Performed by: INTERNAL MEDICINE

## 2025-03-04 PROCEDURE — G8427 DOCREV CUR MEDS BY ELIG CLIN: HCPCS | Performed by: INTERNAL MEDICINE

## 2025-03-04 PROCEDURE — 1123F ACP DISCUSS/DSCN MKR DOCD: CPT | Performed by: INTERNAL MEDICINE

## 2025-03-04 PROCEDURE — G8400 PT W/DXA NO RESULTS DOC: HCPCS | Performed by: INTERNAL MEDICINE

## 2025-03-04 PROCEDURE — 4004F PT TOBACCO SCREEN RCVD TLK: CPT | Performed by: INTERNAL MEDICINE

## 2025-03-04 PROCEDURE — G8420 CALC BMI NORM PARAMETERS: HCPCS | Performed by: INTERNAL MEDICINE

## 2025-03-04 PROCEDURE — 3078F DIAST BP <80 MM HG: CPT | Performed by: INTERNAL MEDICINE

## 2025-03-04 PROCEDURE — 1090F PRES/ABSN URINE INCON ASSESS: CPT | Performed by: INTERNAL MEDICINE

## 2025-03-04 PROCEDURE — 3074F SYST BP LT 130 MM HG: CPT | Performed by: INTERNAL MEDICINE

## 2025-03-04 PROCEDURE — 1159F MED LIST DOCD IN RCRD: CPT | Performed by: INTERNAL MEDICINE

## 2025-03-04 PROCEDURE — 93005 ELECTROCARDIOGRAM TRACING: CPT | Performed by: INTERNAL MEDICINE

## 2025-03-04 PROCEDURE — G2211 COMPLEX E/M VISIT ADD ON: HCPCS | Performed by: INTERNAL MEDICINE

## 2025-03-04 ASSESSMENT — PATIENT HEALTH QUESTIONNAIRE - PHQ9
2. FEELING DOWN, DEPRESSED OR HOPELESS: NOT AT ALL
SUM OF ALL RESPONSES TO PHQ QUESTIONS 1-9: 0
1. LITTLE INTEREST OR PLEASURE IN DOING THINGS: NOT AT ALL

## 2025-03-04 NOTE — PROGRESS NOTES
amlodipine and losartan and is now taking 10 and 100 mg respectively, so I will increase these doses and prescribe 90 with 3 refills on 10/29/2024     Dyslipidemia  Lab Results   Component Value Date    LDL 55.2 10/16/2024   On lovastatin 40 mg daily, prescribed by PCP-note multiple statin intolerances including atorvastatin and rosuvastatin     Venous Insufficiency  Right GSV reflux 2/2021  Previously followed by Dr Crisostomo, now by vascular surgery     DM  On metformin per PCP      Right breast cancer  S/p RIGHT BREAST LUMPECTOMY WITH ULTRASOUND on 5/2021  Followed by Dr Mendoza/ [facility]      Tobacco abuse  Trying to quit,  down to half a PPD  Counseled on smoking cessation using strict sequential weaning with a quit date using nicotine losenges - 5 minutes spent.      S/p right hip arthroplasty in 11/2021     Pre-operative cardiac clearance for shoulder surgery  -I would avoid elective interruption of DAPT for at least 6 months, preferably 1 year after SMITHA to RCA on 10/16/2024  -At that point if no chest pain or shortness of breath, she will be at low risk for cardiovascular complications during a non-cardiac surgery.      Follow up in 6 months, sooner PRN.     Please note that my services today are part of ongoing care related to this patient's serious and complex cardiac conditions, including all of the above cardiovascular diagnoses.        Jhon Corrales MD

## 2025-03-31 ENCOUNTER — OFFICE VISIT (OUTPATIENT)
Age: 80
End: 2025-03-31
Payer: MEDICARE

## 2025-03-31 VITALS — BODY MASS INDEX: 23.92 KG/M2 | WEIGHT: 130 LBS | HEIGHT: 62 IN

## 2025-03-31 DIAGNOSIS — Z85.3 HISTORY OF BREAST CANCER IN FEMALE: ICD-10-CM

## 2025-03-31 DIAGNOSIS — C50.411 MALIGNANT NEOPLASM OF UPPER-OUTER QUADRANT OF RIGHT BREAST IN FEMALE, ESTROGEN RECEPTOR POSITIVE: Primary | ICD-10-CM

## 2025-03-31 DIAGNOSIS — Z17.0 MALIGNANT NEOPLASM OF UPPER-OUTER QUADRANT OF RIGHT BREAST IN FEMALE, ESTROGEN RECEPTOR POSITIVE: Primary | ICD-10-CM

## 2025-03-31 DIAGNOSIS — Z98.890 S/P LUMPECTOMY OF BREAST: ICD-10-CM

## 2025-03-31 DIAGNOSIS — Z92.3 S/P RADIATION THERAPY: ICD-10-CM

## 2025-03-31 PROCEDURE — 99213 OFFICE O/P EST LOW 20 MIN: CPT | Performed by: NURSE PRACTITIONER

## 2025-03-31 PROCEDURE — G8427 DOCREV CUR MEDS BY ELIG CLIN: HCPCS | Performed by: NURSE PRACTITIONER

## 2025-03-31 PROCEDURE — 1159F MED LIST DOCD IN RCRD: CPT | Performed by: NURSE PRACTITIONER

## 2025-03-31 PROCEDURE — 1160F RVW MEDS BY RX/DR IN RCRD: CPT | Performed by: NURSE PRACTITIONER

## 2025-03-31 PROCEDURE — G8400 PT W/DXA NO RESULTS DOC: HCPCS | Performed by: NURSE PRACTITIONER

## 2025-03-31 PROCEDURE — G8420 CALC BMI NORM PARAMETERS: HCPCS | Performed by: NURSE PRACTITIONER

## 2025-03-31 PROCEDURE — 4004F PT TOBACCO SCREEN RCVD TLK: CPT | Performed by: NURSE PRACTITIONER

## 2025-03-31 PROCEDURE — 1090F PRES/ABSN URINE INCON ASSESS: CPT | Performed by: NURSE PRACTITIONER

## 2025-03-31 PROCEDURE — 1123F ACP DISCUSS/DSCN MKR DOCD: CPT | Performed by: NURSE PRACTITIONER

## 2025-03-31 NOTE — PROGRESS NOTES
HISTORY OF PRESENT ILLNESS  Elise Schneider is a 79 y.o. female     HPI  Established patient presents for follow-up for RIGHT breast cancer. Denies breast mass, skin changes, nipple discharge and pain.           Breast history -    Referring - Jenelle Boyer MD - [facility]   4/8/21 - RIGHT breast biopsy - RIGHT IDC, GR2, ER99, PR99, Ki-67-3%, Her2 neg   Mammaprint - low risk   5/25/21 - RIGHT breast lumpectomy and RIGHT SLNB - Dr. Mendoza   4.3cm IDC, node positive - 2/5; T2N1a   6/2021 - completed XRT - Dr. Orozco    Tried AI, but could not tolerate - Dr. Kenyon            Family history -     No family history of breast or ovarian cancer.      OB History    No obstetric history on file.      Obstetric Comments   Menarche 11, LMP 47, # of children 0, age of 1st delivery N/A, Hysterectomy/oophorectomy No/No, Breast bx Yes, history of breast feeding  N/A, BCP Yes, Hormone therapy No                   Past Surgical History:   Procedure Laterality Date    ADENOIDECTOMY  1950    BREAST BIOPSY Left     Long time ago --neg    BREAST LUMPECTOMY Right 05/25/2021    RIGHT BREAST LUMPECTOMY WITH ULTRASOUND, RIGHT BREAST SENTINEL NODE BIOPSY performed by Alida Mendoza MD at [facility] AMBULATORY OR    BX OF BREAST, NEEDLE CORE, IMAGE GUIDE Right 03/2021    CARDIAC PROCEDURE N/A 10/16/2024    Left heart cath / coronary angiography performed by Watson Saunders MD at [facility] CARD[facility]AC CATH LAB    CARDIAC PROCEDURE N/A 10/16/2024    Percutaneous coronary intervention performed by Watson Saunders MD at [facility] CARDIAC CATH LAB    CARDIAC PROCEDURE N/A 10/16/2024    Intravascular ultrasound performed by Watson Saunders MD at [facility] CARDIAC CATH LAB    CAROTID ENDARTERECTOMY Right 7/17/2024    RIGHT CAROTID ENDARTERECTOMY performed by Asad Watts MD at [facility] MAIN OR    CARPAL TUNNEL RELEASE Right     COLONOSCOPY N/A 06/16/2022    COLONOSCOPY performed by Jhon Hogue MD at [facility] ENDOSCOPY    CORONARY ANGIOPLASTY WITH STENT PLACEMENT  2013

## 2025-06-20 ENCOUNTER — HOSPITAL ENCOUNTER (INPATIENT)
Facility: HOSPITAL | Age: 80
LOS: 7 days | Discharge: NURSING, REHAB OR LONG-TERM CARE | End: 2025-06-27
Attending: STUDENT IN AN ORGANIZED HEALTH CARE EDUCATION/TRAINING PROGRAM | Admitting: INTERNAL MEDICINE
Payer: MEDICARE

## 2025-06-20 ENCOUNTER — APPOINTMENT (OUTPATIENT)
Facility: HOSPITAL | Age: 80
End: 2025-06-20
Payer: MEDICARE

## 2025-06-20 DIAGNOSIS — I50.33 ACUTE ON CHRONIC DIASTOLIC CONGESTIVE HEART FAILURE (HCC): ICD-10-CM

## 2025-06-20 DIAGNOSIS — R60.0 PERIPHERAL EDEMA: ICD-10-CM

## 2025-06-20 DIAGNOSIS — E87.1 HYPONATREMIA: Primary | ICD-10-CM

## 2025-06-20 DIAGNOSIS — I20.0 UNSTABLE ANGINA (HCC): ICD-10-CM

## 2025-06-20 LAB
ALBUMIN SERPL-MCNC: 3.4 G/DL (ref 3.5–5)
ALBUMIN/GLOB SERPL: 1 (ref 1.1–2.2)
ALP SERPL-CCNC: 50 U/L (ref 45–117)
ALT SERPL-CCNC: 26 U/L (ref 12–78)
ANION GAP SERPL CALC-SCNC: 6 MMOL/L (ref 2–12)
AST SERPL-CCNC: 17 U/L (ref 15–37)
BASOPHILS # BLD: 0.02 K/UL (ref 0–0.1)
BASOPHILS NFR BLD: 0.2 % (ref 0–1)
BILIRUB SERPL-MCNC: 0.8 MG/DL (ref 0.2–1)
BUN SERPL-MCNC: 20 MG/DL (ref 6–20)
BUN/CREAT SERPL: 30 (ref 12–20)
CALCIUM SERPL-MCNC: 9.2 MG/DL (ref 8.5–10.1)
CHLORIDE SERPL-SCNC: 92 MMOL/L (ref 97–108)
CO2 SERPL-SCNC: 26 MMOL/L (ref 21–32)
CREAT SERPL-MCNC: 0.66 MG/DL (ref 0.55–1.02)
DIFFERENTIAL METHOD BLD: ABNORMAL
EOSINOPHIL # BLD: 0.01 K/UL (ref 0–0.4)
EOSINOPHIL NFR BLD: 0.1 % (ref 0–7)
ERYTHROCYTE [DISTWIDTH] IN BLOOD BY AUTOMATED COUNT: 12.1 % (ref 11.5–14.5)
GLOBULIN SER CALC-MCNC: 3.3 G/DL (ref 2–4)
GLUCOSE BLD STRIP.AUTO-MCNC: 189 MG/DL (ref 65–117)
GLUCOSE BLD STRIP.AUTO-MCNC: 212 MG/DL (ref 65–117)
GLUCOSE SERPL-MCNC: 131 MG/DL (ref 65–100)
HCT VFR BLD AUTO: 32.1 % (ref 35–47)
HGB BLD-MCNC: 11.3 G/DL (ref 11.5–16)
IMM GRANULOCYTES # BLD AUTO: 0.13 K/UL (ref 0–0.04)
IMM GRANULOCYTES NFR BLD AUTO: 1.3 % (ref 0–0.5)
LYMPHOCYTES # BLD: 1.09 K/UL (ref 0.8–3.5)
LYMPHOCYTES NFR BLD: 11.2 % (ref 12–49)
MAGNESIUM SERPL-MCNC: 2 MG/DL (ref 1.6–2.4)
MCH RBC QN AUTO: 33.2 PG (ref 26–34)
MCHC RBC AUTO-ENTMCNC: 35.2 G/DL (ref 30–36.5)
MCV RBC AUTO: 94.4 FL (ref 80–99)
MONOCYTES # BLD: 0.83 K/UL (ref 0–1)
MONOCYTES NFR BLD: 8.5 % (ref 5–13)
NEUTS SEG # BLD: 7.65 K/UL (ref 1.8–8)
NEUTS SEG NFR BLD: 78.7 % (ref 32–75)
NRBC # BLD: 0 K/UL (ref 0–0.01)
NRBC BLD-RTO: 0 PER 100 WBC
NT PRO BNP: 379 PG/ML
PLATELET # BLD AUTO: 189 K/UL (ref 150–400)
PMV BLD AUTO: 8.9 FL (ref 8.9–12.9)
POTASSIUM SERPL-SCNC: 4.2 MMOL/L (ref 3.5–5.1)
PROT SERPL-MCNC: 6.7 G/DL (ref 6.4–8.2)
RBC # BLD AUTO: 3.4 M/UL (ref 3.8–5.2)
SERVICE CMNT-IMP: ABNORMAL
SERVICE CMNT-IMP: ABNORMAL
SODIUM SERPL-SCNC: 124 MMOL/L (ref 136–145)
TSH SERPL DL<=0.05 MIU/L-ACNC: 0.9 UIU/ML (ref 0.36–3.74)
WBC # BLD AUTO: 9.7 K/UL (ref 3.6–11)

## 2025-06-20 PROCEDURE — 93970 EXTREMITY STUDY: CPT

## 2025-06-20 PROCEDURE — 80053 COMPREHEN METABOLIC PANEL: CPT

## 2025-06-20 PROCEDURE — 84300 ASSAY OF URINE SODIUM: CPT

## 2025-06-20 PROCEDURE — 36415 COLL VENOUS BLD VENIPUNCTURE: CPT

## 2025-06-20 PROCEDURE — 85025 COMPLETE CBC W/AUTO DIFF WBC: CPT

## 2025-06-20 PROCEDURE — 82962 GLUCOSE BLOOD TEST: CPT

## 2025-06-20 PROCEDURE — 6370000000 HC RX 637 (ALT 250 FOR IP): Performed by: INTERNAL MEDICINE

## 2025-06-20 PROCEDURE — 84443 ASSAY THYROID STIM HORMONE: CPT

## 2025-06-20 PROCEDURE — 71045 X-RAY EXAM CHEST 1 VIEW: CPT

## 2025-06-20 PROCEDURE — 83880 ASSAY OF NATRIURETIC PEPTIDE: CPT

## 2025-06-20 PROCEDURE — 6360000002 HC RX W HCPCS: Performed by: INTERNAL MEDICINE

## 2025-06-20 PROCEDURE — 83735 ASSAY OF MAGNESIUM: CPT

## 2025-06-20 PROCEDURE — 1100000003 HC PRIVATE W/ TELEMETRY

## 2025-06-20 PROCEDURE — 83935 ASSAY OF URINE OSMOLALITY: CPT

## 2025-06-20 PROCEDURE — 99285 EMERGENCY DEPT VISIT HI MDM: CPT

## 2025-06-20 PROCEDURE — 73562 X-RAY EXAM OF KNEE 3: CPT

## 2025-06-20 PROCEDURE — 2500000003 HC RX 250 WO HCPCS: Performed by: INTERNAL MEDICINE

## 2025-06-20 PROCEDURE — 80048 BASIC METABOLIC PNL TOTAL CA: CPT

## 2025-06-20 RX ORDER — ONDANSETRON 4 MG/1
4 TABLET, ORALLY DISINTEGRATING ORAL EVERY 8 HOURS PRN
Status: DISCONTINUED | OUTPATIENT
Start: 2025-06-20 | End: 2025-06-27 | Stop reason: HOSPADM

## 2025-06-20 RX ORDER — MAGNESIUM SULFATE IN WATER 40 MG/ML
2000 INJECTION, SOLUTION INTRAVENOUS PRN
Status: DISCONTINUED | OUTPATIENT
Start: 2025-06-20 | End: 2025-06-27 | Stop reason: HOSPADM

## 2025-06-20 RX ORDER — POLYETHYLENE GLYCOL 3350 17 G/17G
17 POWDER, FOR SOLUTION ORAL DAILY PRN
Status: DISCONTINUED | OUTPATIENT
Start: 2025-06-20 | End: 2025-06-27 | Stop reason: HOSPADM

## 2025-06-20 RX ORDER — SODIUM CHLORIDE 0.9 % (FLUSH) 0.9 %
5-40 SYRINGE (ML) INJECTION PRN
Status: DISCONTINUED | OUTPATIENT
Start: 2025-06-20 | End: 2025-06-27 | Stop reason: HOSPADM

## 2025-06-20 RX ORDER — ENOXAPARIN SODIUM 100 MG/ML
40 INJECTION SUBCUTANEOUS DAILY
Status: DISCONTINUED | OUTPATIENT
Start: 2025-06-20 | End: 2025-06-27 | Stop reason: HOSPADM

## 2025-06-20 RX ORDER — ONDANSETRON 2 MG/ML
4 INJECTION INTRAMUSCULAR; INTRAVENOUS EVERY 6 HOURS PRN
Status: DISCONTINUED | OUTPATIENT
Start: 2025-06-20 | End: 2025-06-27 | Stop reason: HOSPADM

## 2025-06-20 RX ORDER — LOSARTAN POTASSIUM 100 MG/1
100 TABLET ORAL EVERY MORNING
Status: DISCONTINUED | OUTPATIENT
Start: 2025-06-21 | End: 2025-06-27 | Stop reason: HOSPADM

## 2025-06-20 RX ORDER — GUAIFENESIN 200 MG/10ML
200 LIQUID ORAL EVERY 4 HOURS PRN
Status: DISCONTINUED | OUTPATIENT
Start: 2025-06-20 | End: 2025-06-27 | Stop reason: HOSPADM

## 2025-06-20 RX ORDER — SODIUM CHLORIDE 9 MG/ML
INJECTION, SOLUTION INTRAVENOUS PRN
Status: DISCONTINUED | OUTPATIENT
Start: 2025-06-20 | End: 2025-06-27 | Stop reason: HOSPADM

## 2025-06-20 RX ORDER — TRAMADOL HYDROCHLORIDE 50 MG/1
25 TABLET ORAL EVERY 6 HOURS PRN
Status: DISCONTINUED | OUTPATIENT
Start: 2025-06-20 | End: 2025-06-27 | Stop reason: HOSPADM

## 2025-06-20 RX ORDER — FAMOTIDINE 20 MG/1
40 TABLET, FILM COATED ORAL DAILY
Status: DISCONTINUED | OUTPATIENT
Start: 2025-06-20 | End: 2025-06-27 | Stop reason: HOSPADM

## 2025-06-20 RX ORDER — ACETAMINOPHEN 325 MG/1
650 TABLET ORAL EVERY 6 HOURS PRN
Status: DISCONTINUED | OUTPATIENT
Start: 2025-06-20 | End: 2025-06-27 | Stop reason: HOSPADM

## 2025-06-20 RX ORDER — CLOPIDOGREL BISULFATE 75 MG/1
75 TABLET ORAL DAILY
Status: DISCONTINUED | OUTPATIENT
Start: 2025-06-20 | End: 2025-06-27 | Stop reason: HOSPADM

## 2025-06-20 RX ORDER — AMLODIPINE BESYLATE 5 MG/1
10 TABLET ORAL DAILY
Status: DISCONTINUED | OUTPATIENT
Start: 2025-06-20 | End: 2025-06-20

## 2025-06-20 RX ORDER — INSULIN LISPRO 100 [IU]/ML
0-8 INJECTION, SOLUTION INTRAVENOUS; SUBCUTANEOUS
Status: DISCONTINUED | OUTPATIENT
Start: 2025-06-20 | End: 2025-06-27 | Stop reason: HOSPADM

## 2025-06-20 RX ORDER — SODIUM CHLORIDE 0.9 % (FLUSH) 0.9 %
5-40 SYRINGE (ML) INJECTION EVERY 12 HOURS SCHEDULED
Status: DISCONTINUED | OUTPATIENT
Start: 2025-06-20 | End: 2025-06-27 | Stop reason: HOSPADM

## 2025-06-20 RX ORDER — ATORVASTATIN CALCIUM 20 MG/1
20 TABLET, FILM COATED ORAL DAILY
Status: DISCONTINUED | OUTPATIENT
Start: 2025-06-20 | End: 2025-06-27 | Stop reason: HOSPADM

## 2025-06-20 RX ORDER — METOPROLOL TARTRATE 25 MG/1
25 TABLET, FILM COATED ORAL 2 TIMES DAILY
Status: DISCONTINUED | OUTPATIENT
Start: 2025-06-20 | End: 2025-06-27 | Stop reason: HOSPADM

## 2025-06-20 RX ORDER — ACETAMINOPHEN 650 MG/1
650 SUPPOSITORY RECTAL EVERY 6 HOURS PRN
Status: DISCONTINUED | OUTPATIENT
Start: 2025-06-20 | End: 2025-06-27 | Stop reason: HOSPADM

## 2025-06-20 RX ORDER — POTASSIUM CHLORIDE 1500 MG/1
40 TABLET, EXTENDED RELEASE ORAL PRN
Status: DISCONTINUED | OUTPATIENT
Start: 2025-06-20 | End: 2025-06-27 | Stop reason: HOSPADM

## 2025-06-20 RX ORDER — FUROSEMIDE 10 MG/ML
20 INJECTION INTRAMUSCULAR; INTRAVENOUS 2 TIMES DAILY
Status: DISCONTINUED | OUTPATIENT
Start: 2025-06-20 | End: 2025-06-24

## 2025-06-20 RX ORDER — POTASSIUM CHLORIDE 7.45 MG/ML
10 INJECTION INTRAVENOUS PRN
Status: DISCONTINUED | OUTPATIENT
Start: 2025-06-20 | End: 2025-06-27 | Stop reason: HOSPADM

## 2025-06-20 RX ORDER — PANTOPRAZOLE SODIUM 40 MG/1
40 TABLET, DELAYED RELEASE ORAL
Status: DISCONTINUED | OUTPATIENT
Start: 2025-06-21 | End: 2025-06-27 | Stop reason: HOSPADM

## 2025-06-20 RX ORDER — ASPIRIN 81 MG/1
81 TABLET ORAL DAILY
Status: DISCONTINUED | OUTPATIENT
Start: 2025-06-20 | End: 2025-06-27 | Stop reason: HOSPADM

## 2025-06-20 RX ADMIN — FUROSEMIDE 20 MG: 10 INJECTION, SOLUTION INTRAMUSCULAR; INTRAVENOUS at 18:36

## 2025-06-20 RX ADMIN — METOPROLOL TARTRATE 25 MG: 25 TABLET, FILM COATED ORAL at 15:43

## 2025-06-20 RX ADMIN — FAMOTIDINE 40 MG: 20 TABLET, FILM COATED ORAL at 15:42

## 2025-06-20 RX ADMIN — SODIUM CHLORIDE, PRESERVATIVE FREE 10 ML: 5 INJECTION INTRAVENOUS at 21:33

## 2025-06-20 RX ADMIN — CLOPIDOGREL BISULFATE 75 MG: 75 TABLET, FILM COATED ORAL at 15:43

## 2025-06-20 RX ADMIN — ENOXAPARIN SODIUM 40 MG: 100 INJECTION SUBCUTANEOUS at 15:43

## 2025-06-20 ASSESSMENT — PAIN DESCRIPTION - DESCRIPTORS: DESCRIPTORS: SORE

## 2025-06-20 ASSESSMENT — LIFESTYLE VARIABLES
HOW OFTEN DO YOU HAVE A DRINK CONTAINING ALCOHOL: NEVER
HOW MANY STANDARD DRINKS CONTAINING ALCOHOL DO YOU HAVE ON A TYPICAL DAY: PATIENT DOES NOT DRINK

## 2025-06-20 ASSESSMENT — PAIN - FUNCTIONAL ASSESSMENT: PAIN_FUNCTIONAL_ASSESSMENT: 0-10

## 2025-06-20 ASSESSMENT — PAIN DESCRIPTION - ORIENTATION: ORIENTATION: RIGHT;LEFT

## 2025-06-20 ASSESSMENT — PAIN SCALES - GENERAL
PAINLEVEL_OUTOF10: 0
PAINLEVEL_OUTOF10: 8

## 2025-06-20 ASSESSMENT — PAIN DESCRIPTION - LOCATION: LOCATION: KNEE

## 2025-06-20 NOTE — PLAN OF CARE
Problem: Chronic Conditions and Co-morbidities  Goal: Patient's chronic conditions and co-morbidity symptoms are monitored and maintained or improved  6/20/2025 1930 by Diana Fraga RN  Outcome: Progressing  6/20/2025 1744 by Diana Fraga RN  Outcome: Progressing     Problem: Discharge Planning  Goal: Discharge to home or other facility with appropriate resources  6/20/2025 1930 by Diana Fraga RN  Outcome: Progressing  6/20/2025 1744 by Diana Fraga RN  Outcome: Progressing     Problem: Pain  Goal: Verbalizes/displays adequate comfort level or baseline comfort level  6/20/2025 1930 by Diana Fraga RN  Outcome: Progressing  6/20/2025 1744 by Diana Fraga RN  Outcome: Progressing     Problem: Safety - Adult  Goal: Free from fall injury  6/20/2025 1930 by Diana Fraga RN  Outcome: Progressing  6/20/2025 1744 by Diana Fraga RN  Outcome: Progressing

## 2025-06-20 NOTE — H&P
pk-yrs)     Types: Cigarettes     Start date: 1960    Smokeless tobacco: Never   Substance Use Topics    Alcohol use: Not Currently        Family History   Problem Relation Age of Onset    Diabetes Mother     Heart Disease Mother     No Known Problems Sister     Coronary Art Dis Other         mother had stents in 60's or 70's    Anesth Problems Neg Hx        Allergies   Allergen Reactions    Erythromycin Hives    Penicillins Hives     Has had both as an adult and child- -     Atorvastatin Myalgia    Cephalexin Other (See Comments)     Aching muscles-   Pt states she took abx in 2/2024 and had no reaction, but she was not sure if it was Keflex or not    Cyclobenzaprine     Hydrochlorothiazide Other (See Comments)     hyponatremia    Proton Pump Inhibitors Other (See Comments)     dizziness    Rosuvastatin Myalgia    Sulfamethoxazole-Trimethoprim Other (See Comments)     Unsure of reaction         Prior to Admission medications    Medication Sig Start Date End Date Taking? Authorizing Provider   pantoprazole sodium (PROTONIX) 40 MG PACK packet Take 1 packet by mouth in the morning and at bedtime    ProviderАндрей MD   amLODIPine (NORVASC) 10 MG tablet Take 1 tablet by mouth daily for blood pressure 11/15/24   Jhon Corrales MD   losartan (COZAAR) 100 MG tablet Take 1 tablet by mouth every morning 11/15/24   Jhon Corrales MD   metoprolol tartrate (LOPRESSOR) 25 MG tablet Take 1 tablet by mouth 2 times daily 11/12/24   Jhon Corrales MD   clopidogrel (PLAVIX) 75 MG tablet Take 1 tablet by mouth daily 10/29/24   Jhon Corrales MD   acetaminophen (TYLENOL) 500 MG tablet Take 1 tablet by mouth every 6 hours as needed for Pain    ProviderАндрей MD   aspirin 81 MG EC tablet Take 1 tablet by mouth daily 7/19/24   Terese Reyes, APRN - NP   sodium chloride (OCEAN, BABY AYR) 0.65 % nasal spray 1 spray by Nasal route as needed for Congestion    ProviderАндрей MD   Multiple  Vitamins-Minerals (PRESERVISION AREDS) CAPS 1 capsule 2 times daily    Provider, MD Андрей   famotidine (PEPCID) 40 MG tablet Take 1 tablet by mouth 23   ProviderАндрей MD   fluticasone (FLONASE) 50 MCG/ACT nasal spray 2 sprays by Nasal route daily as needed  Patient not taking: Reported on 3/4/2025    Automatic Reconciliation, Ar   lovastatin (MEVACOR) 40 MG tablet Take 1 tablet by mouth nightly 19   Automatic Reconciliation, Ar   metFORMIN (GLUCOPHAGE) 500 MG tablet Take 1 tablet by mouth nightly    Automatic Reconciliation, Ar         Objective:   VITALS:    Patient Vitals for the past 24 hrs:   BP Temp Temp src Pulse Resp SpO2 Weight   25 1215 (!) 118/48 -- -- -- -- -- --   25 1200 (!) 122/52 -- -- -- -- -- --   25 1145 (!) 116/49 -- -- -- -- -- --   25 1130 (!) 109/48 -- -- -- -- -- --   25 1115 (!) 114/50 -- -- -- -- -- --   25 0954 (!) 120/58 98.2 °F (36.8 °C) Oral 59 18 98 % 59 kg (130 lb)       Temp (24hrs), Av.2 °F (36.8 °C), Min:98.2 °F (36.8 °C), Max:98.2 °F (36.8 °C)        O2 Device: None (Room air)    Wt Readings from Last 12 Encounters:   25 59 kg (130 lb)   25 59 kg (130 lb)   25 59.1 kg (130 lb 3.2 oz)   24 61.1 kg (134 lb 9.6 oz)   24 62.1 kg (136 lb 12.8 oz)   24 61.1 kg (134 lb 9.6 oz)   24 61.2 kg (135 lb)   24 61.3 kg (135 lb 3.2 oz)   24 62.3 kg (137 lb 5.6 oz)   24 60.5 kg (133 lb 6.4 oz)   24 63.5 kg (140 lb)   10/29/24 63.5 kg (140 lb)         PHYSICAL EXAM:  General:    Alert, cooperative, appears stated age.     Lungs:   CTA b/l.  No wheezing or Rhonchi. No rales.  Chest wall:  No tenderness.  No accessory muscle use.  Heart:   Regular  rhythm,  No  Murmur.   2+ pitting edema  Abdomen:   Soft, NT. ND  BS+  Extremities: No cyanosis.  No clubbing,      Skin turgor normal, Radial dial pulse 2+. Capillary refill normal  Neurologic: No facial asymmetry. No aphasia or

## 2025-06-20 NOTE — ED PROVIDER NOTES
[facility] EMERGENCY DEPARTMENT  EMERGENCY DEPARTMENT ENCOUNTER       Pt Name: Elise Schneider  MRN: [id number]  Birthdate 1945  Date of evaluation: 6/20/2025  Provider: Oscar Roman MD   PCP: Linda Reynoso LMFT  Note Started: 1:25 PM EDT 6/20/25     CHIEF COMPLAINT       Chief Complaint   Patient presents with    Joint Swelling     Patient ambulatory with cane to triage c/o bilateral knee swelling that she woke up with this morning. Patient reports pain in bilateral knees, reports pain is chronic. Patient endorses a history of arthritis.         HISTORY OF PRESENT ILLNESS: 1 or more elements      History From: patient, History limited by: none     Elise Schneider is a 79 y.o. female presenting with leg swelling.  Somewhat unsure how long it has been going on but it seems like this is somewhat of a chronic issue but acutely worsened over the past 1 to 2 days.  Notes it is gotten to the point where she has trouble bending her ankles or knees and has difficulty walking.  Noted decreased urinary output.  She has recently been on steroids for back pain but stopped over the past day or 2 for this.       Please See MDM for Additional Details of the HPI/PMH  Nursing Notes were all reviewed and agreed with or any disagreements were addressed in the HPI.     REVIEW OF SYSTEMS        Positives and Pertinent negatives as per HPI.    PAST HISTORY     Past Medical History:  Past Medical History:   Diagnosis Date    Adverse effect of anesthesia     difficulty breathing during induction    Anxiety     Arthritis     left shoulder and elbow    Breast cancer (HCC)     RIGHT     CAD (coronary artery disease)     stenting cardiac 6/5/2015 PCI 10/16/24    Carotid stenosis, bilateral     Chronic pain     Claustrophobia     Diabetes mellitus (HCC)     GERD (gastroesophageal reflux disease)     History of supraventricular tachycardia 2013    Hyperlipidemia     Hypertension     Hyponatremia     Pre-diabetes     Squamous cell  proofreading.)         Oscar Roman MD  06/20/25 4325

## 2025-06-20 NOTE — PROGRESS NOTES
End of Shift Note    Bedside shift change report given to Hardeep ROJO (oncoming nurse) by Diana Fraga RN (offgoing nurse).  Report included the following information SBAR, Kardex, and MAR    Shift worked:  700-1900     Shift summary and any significant changes:     Pt came from the ED. Pt has edema on bilateral lower extremities. Pt ambulatory to bathroom. Medication given. Pt refused gown and bed alarm. Pt signed a refusal form for bed alarm. Labs needs to be send at 2000. Urine sample sent.     Concerns for physician to address:  None     Zone phone for oncoming shift:   [phone]       Activity:  Level of Assistance: Minimal assist, patient does 75% or more  Number times ambulated in hallways past shift: 1  Number of times OOB to chair past shift: 0    Cardiac:   Cardiac Monitoring: Yes           Access:  Current line(s): PIV     Genitourinary:   Urinary Status: Voiding, Bathroom privileges    Respiratory:   O2 Device: None (Room air)  Chronic home O2 use?: NO  Incentive spirometer at bedside: NO    GI:     Current diet:  ADULT DIET; Regular; 4 carb choices (60 gm/meal)  Passing flatus: YES    Pain Management:   Patient states pain is manageable on current regimen: YES    Skin:  Daniel Scale Score: 20  Interventions:      Patient Safety:  Fall Risk: Nursing Judgement-Fall Risk High(Add Comments): Yes  Fall Risk Interventions  Nursing Judgement-Fall Risk High(Add Comments): Yes  Toilet Every 2 Hours-In Advance of Need: Yes  Hourly Visual Checks: Awake, In bed  Fall Visual Posted: Socks, Armband  Room Door Open: Yes  Alarm On: Other (Comment) (pt refused bed alarm and signed the refusal form)  Patient Moved Closer to Nursing Station: Yes    Active Consults:   None    Length of Stay:  Expected LOS: 2  Actual LOS: 0    Diana Fraga RN

## 2025-06-20 NOTE — CARE COORDINATION
Care Management Initial Assessment       RUR: 14% low risk   Readmission? No  1st IM letter given? Yes - 6/20/25  1st  letter given: No    Initial note: Chart review completed prior to assessment. CM completed assessment with pt and pt's sisterXenia, at bedside. CM introduced self, role of CM, verified demographics to ensure accuracy, and discussed transition of care planning. Pt resides alone in 1 level home with 3-4 ALEJO at physical address: [id number] [place] [address]. Pt reports independence with ADLs/IADLs and identifies only DME as walking stick. Pt declines recent falls. Pt has not been an active  lately, has support of sister or neighbor to assist with transportation needs. Pharmacy preference is [facility] on [address]. Pt declines previous hx of post-acute services.     Care management will continue to be available to assist as transition of care planning needs arise. Full assessment below:     06/20/25 1503   Service Assessment   Patient Orientation Alert and Oriented   Cognition Alert   History Provided By Patient   Primary Caregiver Self   Accompanied By/Relationship SisterXenia, at bedside   Support Systems Family Members;Friends/Neighbors  (Sister, nephew, neighbor)   Patient's Healthcare Decision Maker is: Legal Next of Kin   PCP Verified by CM Yes  (Linda Reynoso)   Last Visit to PCP Within last 3 months   Prior Functional Level Independent in ADLs/IADLs   Current Functional Level Independent in ADLs/IADLs   Can patient return to prior living arrangement Yes   Ability to make needs known: Good   Family able to assist with home care needs: Yes   Would you like for me to discuss the discharge plan with any other family members/significant others, and if so, who? Yes  (SisterXenia [phone])   Financial Resources Medicare  ([employer] Medicare)   Community Resources None   Social/Functional History   Lives With Alone   Type of Home House   Home Layout One

## 2025-06-21 LAB
ANION GAP SERPL CALC-SCNC: 5 MMOL/L (ref 2–12)
ANION GAP SERPL CALC-SCNC: 6 MMOL/L (ref 2–12)
BASOPHILS # BLD: 0.01 K/UL (ref 0–0.1)
BASOPHILS NFR BLD: 0.1 % (ref 0–1)
BUN SERPL-MCNC: 19 MG/DL (ref 6–20)
BUN SERPL-MCNC: 23 MG/DL (ref 6–20)
BUN/CREAT SERPL: 38 (ref 12–20)
BUN/CREAT SERPL: 40 (ref 12–20)
CALCIUM SERPL-MCNC: 8.4 MG/DL (ref 8.5–10.1)
CALCIUM SERPL-MCNC: 8.8 MG/DL (ref 8.5–10.1)
CHLORIDE SERPL-SCNC: 95 MMOL/L (ref 97–108)
CHLORIDE SERPL-SCNC: 98 MMOL/L (ref 97–108)
CO2 SERPL-SCNC: 26 MMOL/L (ref 21–32)
CO2 SERPL-SCNC: 26 MMOL/L (ref 21–32)
CORTIS AM PEAK SERPL-MCNC: 5.8 UG/DL (ref 4.3–22.45)
CREAT SERPL-MCNC: 0.5 MG/DL (ref 0.55–1.02)
CREAT SERPL-MCNC: 0.57 MG/DL (ref 0.55–1.02)
DIFFERENTIAL METHOD BLD: ABNORMAL
EOSINOPHIL # BLD: 0.04 K/UL (ref 0–0.4)
EOSINOPHIL NFR BLD: 0.5 % (ref 0–7)
ERYTHROCYTE [DISTWIDTH] IN BLOOD BY AUTOMATED COUNT: 12.1 % (ref 11.5–14.5)
GLUCOSE BLD STRIP.AUTO-MCNC: 127 MG/DL (ref 65–117)
GLUCOSE BLD STRIP.AUTO-MCNC: 196 MG/DL (ref 65–117)
GLUCOSE BLD STRIP.AUTO-MCNC: 196 MG/DL (ref 65–117)
GLUCOSE BLD STRIP.AUTO-MCNC: 258 MG/DL (ref 65–117)
GLUCOSE SERPL-MCNC: 114 MG/DL (ref 65–100)
GLUCOSE SERPL-MCNC: 158 MG/DL (ref 65–100)
HCT VFR BLD AUTO: 30.9 % (ref 35–47)
HGB BLD-MCNC: 10.7 G/DL (ref 11.5–16)
IMM GRANULOCYTES # BLD AUTO: 0.11 K/UL (ref 0–0.04)
IMM GRANULOCYTES NFR BLD AUTO: 1.5 % (ref 0–0.5)
LYMPHOCYTES # BLD: 1.29 K/UL (ref 0.8–3.5)
LYMPHOCYTES NFR BLD: 17.6 % (ref 12–49)
MCH RBC QN AUTO: 33 PG (ref 26–34)
MCHC RBC AUTO-ENTMCNC: 34.6 G/DL (ref 30–36.5)
MCV RBC AUTO: 95.4 FL (ref 80–99)
MONOCYTES # BLD: 0.65 K/UL (ref 0–1)
MONOCYTES NFR BLD: 8.9 % (ref 5–13)
NEUTS SEG # BLD: 5.21 K/UL (ref 1.8–8)
NEUTS SEG NFR BLD: 71.4 % (ref 32–75)
NRBC # BLD: 0 K/UL (ref 0–0.01)
NRBC BLD-RTO: 0 PER 100 WBC
OSMOLALITY UR: 486 MOSM/KG H2O
PLATELET # BLD AUTO: 161 K/UL (ref 150–400)
PMV BLD AUTO: 9.3 FL (ref 8.9–12.9)
POTASSIUM SERPL-SCNC: 3.5 MMOL/L (ref 3.5–5.1)
POTASSIUM SERPL-SCNC: 3.7 MMOL/L (ref 3.5–5.1)
RBC # BLD AUTO: 3.24 M/UL (ref 3.8–5.2)
SERVICE CMNT-IMP: ABNORMAL
SODIUM SERPL-SCNC: 127 MMOL/L (ref 136–145)
SODIUM SERPL-SCNC: 129 MMOL/L (ref 136–145)
SODIUM UR-SCNC: 36 MMOL/L
WBC # BLD AUTO: 7.3 K/UL (ref 3.6–11)

## 2025-06-21 PROCEDURE — 6370000000 HC RX 637 (ALT 250 FOR IP): Performed by: PHYSICIAN ASSISTANT

## 2025-06-21 PROCEDURE — 80048 BASIC METABOLIC PNL TOTAL CA: CPT

## 2025-06-21 PROCEDURE — 82962 GLUCOSE BLOOD TEST: CPT

## 2025-06-21 PROCEDURE — 82533 TOTAL CORTISOL: CPT

## 2025-06-21 PROCEDURE — 85025 COMPLETE CBC W/AUTO DIFF WBC: CPT

## 2025-06-21 PROCEDURE — 97116 GAIT TRAINING THERAPY: CPT

## 2025-06-21 PROCEDURE — 6360000002 HC RX W HCPCS: Performed by: INTERNAL MEDICINE

## 2025-06-21 PROCEDURE — 6370000000 HC RX 637 (ALT 250 FOR IP): Performed by: INTERNAL MEDICINE

## 2025-06-21 PROCEDURE — 1100000003 HC PRIVATE W/ TELEMETRY

## 2025-06-21 PROCEDURE — 97162 PT EVAL MOD COMPLEX 30 MIN: CPT

## 2025-06-21 PROCEDURE — 36415 COLL VENOUS BLD VENIPUNCTURE: CPT

## 2025-06-21 PROCEDURE — 2500000003 HC RX 250 WO HCPCS: Performed by: INTERNAL MEDICINE

## 2025-06-21 RX ORDER — CALCIUM CARBONATE 500 MG/1
500 TABLET, CHEWABLE ORAL 3 TIMES DAILY PRN
Status: DISCONTINUED | OUTPATIENT
Start: 2025-06-21 | End: 2025-06-27 | Stop reason: HOSPADM

## 2025-06-21 RX ADMIN — SODIUM CHLORIDE, PRESERVATIVE FREE 10 ML: 5 INJECTION INTRAVENOUS at 20:23

## 2025-06-21 RX ADMIN — FAMOTIDINE 40 MG: 20 TABLET, FILM COATED ORAL at 10:21

## 2025-06-21 RX ADMIN — CLOPIDOGREL BISULFATE 75 MG: 75 TABLET, FILM COATED ORAL at 20:22

## 2025-06-21 RX ADMIN — SODIUM CHLORIDE, PRESERVATIVE FREE 10 ML: 5 INJECTION INTRAVENOUS at 10:25

## 2025-06-21 RX ADMIN — FUROSEMIDE 20 MG: 10 INJECTION, SOLUTION INTRAMUSCULAR; INTRAVENOUS at 10:21

## 2025-06-21 RX ADMIN — METOPROLOL TARTRATE 25 MG: 25 TABLET, FILM COATED ORAL at 20:22

## 2025-06-21 RX ADMIN — FUROSEMIDE 20 MG: 10 INJECTION, SOLUTION INTRAMUSCULAR; INTRAVENOUS at 17:36

## 2025-06-21 RX ADMIN — ACETAMINOPHEN 650 MG: 325 TABLET ORAL at 08:40

## 2025-06-21 RX ADMIN — PANTOPRAZOLE SODIUM 40 MG: 40 TABLET, DELAYED RELEASE ORAL at 10:21

## 2025-06-21 RX ADMIN — ENOXAPARIN SODIUM 40 MG: 100 INJECTION SUBCUTANEOUS at 10:21

## 2025-06-21 RX ADMIN — CALCIUM CARBONATE (ANTACID) CHEW TAB 500 MG 500 MG: 500 CHEW TAB at 18:31

## 2025-06-21 RX ADMIN — ASPIRIN 81 MG: 81 TABLET, DELAYED RELEASE ORAL at 10:21

## 2025-06-21 ASSESSMENT — PAIN SCALES - GENERAL
PAINLEVEL_OUTOF10: 8
PAINLEVEL_OUTOF10: 0

## 2025-06-21 ASSESSMENT — PAIN DESCRIPTION - ORIENTATION: ORIENTATION: RIGHT;LEFT

## 2025-06-21 ASSESSMENT — PAIN DESCRIPTION - LOCATION: LOCATION: LEG

## 2025-06-21 NOTE — PROGRESS NOTES
Hospitalist Progress Note        Demographics    Patient Name  Elise Schneider   Date of Birth 1945   Medical Record Number  [id number]      Age  79 y.o.   PCP Linda Reynoso LMFT   Admit date:  6/20/2025 10:41 AM     Room Number  1120/01  @ [facility]           Admission Diagnoses:  Hyponatremia   Admission Summary:  \" Elise Schneiedr is a 79 y.o.  female with PMHx significant for coronary artery disease status post stenting, diabetes mellitus, hypertension, GERD, dyslipidemia is coming the hospital chief complaints of bilateral lower extremity swelling.  She reports being largely available dental about 3 months ago when she started having some gradual swelling in her legs along with some pain.  Symptoms really got worse in the last 2 weeks or so.  She reports that swelling initially started in the ankles and now up to the level of the knees along with some pain in both ankles and also knees.  Does not report any chest pain or shortness of breath.  No orthopnea or PND.  She does report decreased urine output in the last few days or so.  Does not report any chest pain.     On arrival to ED, she was noted to normal vitals, on labs sodium is 124, glucose 131, proBNP is 379, LFTs are within normal limits.  TSH is 0.90.  CBC shows a hemoglobin of 11.3 and platelet count of 189. \"  -Dr. Hodges 6/20     Assessment and plan:     BLE edema, R>L, POA  - xray bilateral knees - NAD  - venous duplex - no DVT  - check TTE (prior in 5/2024 showed nml EF and +diastolic dysfunction)  - continue lasix 20mg BID    Acute on chronic hyponatremia, POA  - urine sodium 36 and osmol is high- suggests SIADH  - currently on lasix IV 20mg BID  - nephrology consulted. Appreciate expertise  - perhaps needs sodium chloride tab?    Chronic comorbitidies:  Hx CAD s/p PCI  GERD  Hypertension  Dyslipidemia  Diabetes mellitus type 2  - Continue aspirin, Plavix, PPI,   - Discontinue home Norvasc as it could be contributing to

## 2025-06-21 NOTE — PROGRESS NOTES
End of Shift Note    Bedside shift change report given to Ayanna ROJO (oncoming nurse) by Diana Fraga RN (offgoing nurse).  Report included the following information SBAR, Kardex, and MAR    Shift worked:  700-1900     Shift summary and any significant changes:     Vitals stable. Pt refused insulin. Provider notified. Pt seen by PT/OT. Pt ambulatory with walker. Hourly round completed.     Concerns for physician to address:  no     Zone phone for oncoming shift:   [phone]       Activity:  Level of Assistance: Minimal assist, patient does 75% or more  Number times ambulated in hallways past shift: 0  Number of times OOB to chair past shift: 1    Cardiac:   Cardiac Monitoring: Yes      Cardiac Rhythm: Sinus rhythm    Access:  Current line(s): PIV     Genitourinary:   Urinary Status: Voiding, Bathroom privileges    Respiratory:   O2 Device: None (Room air)  Chronic home O2 use?: NO  Incentive spirometer at bedside: YES    GI:     Current diet:  ADULT DIET; Regular; 4 carb choices (60 gm/meal)  Passing flatus: YES    Pain Management:   Patient states pain is manageable on current regimen: YES    Skin:  Daniel Scale Score: 20  Interventions: Wound Offloading (Prevention Methods): Pillows, Elevate heels    Patient Safety:  Fall Risk: Nursing Judgement-Fall Risk High(Add Comments): Yes  Fall Risk Interventions  Nursing Judgement-Fall Risk High(Add Comments): Yes  Toilet Every 2 Hours-In Advance of Need: Yes  Hourly Visual Checks: Awake, In bed  Fall Visual Posted: Armband, Fall sign posted, Socks  Room Door Open: Deferred to promote rest  Alarm On: Other (Comment) (pt refused)  Patient Moved Closer to Nursing Station: No    Active Consults:   IP CONSULT TO NEPHROLOGY    Length of Stay:  Expected LOS: 2  Actual LOS: 1    Diana Fraga RN

## 2025-06-21 NOTE — PLAN OF CARE
Problem: Physical Therapy - Adult  Goal: By Discharge: Performs mobility at highest level of function for planned discharge setting.  See evaluation for individualized goals.  Description: FUNCTIONAL STATUS PRIOR TO ADMISSION: Patient reports ambulating with walking stick outside, no AD inside. I with ADLs, drives.    HOME SUPPORT PRIOR TO ADMISSION: The patient lived alone in a 1-story home with 4 steps and B rails.    Physical Therapy Goals  Initiated 6/21/2025  1.  Patient will move from supine to sit and sit to supine in bed with modified independence within 7 day(s).    2.  Patient will perform sit to stand with modified independence within 7 day(s).  3.  Patient will transfer from bed to chair and chair to bed with modified independence using the least restrictive device within 7 day(s).  4.  Patient will ambulate with modified independence for 300 feet with the least restrictive device within 7 day(s).   5.  Patient will ascend/descend 4 stairs with 1 handrail(s) with modified independence within 7 day(s).   Outcome: Progressing     PHYSICAL THERAPY EVALUATION    Patient: Elise Schneider (79 y.o. female)  Date: 6/21/2025  Primary Diagnosis: Unstable angina (HCC) [I20.0]  Hyponatremia [E87.1]  Peripheral edema [R60.0]       Precautions: Restrictions/Precautions  Restrictions/Precautions: Fall Risk            ASSESSMENT :   DEFICITS/IMPAIRMENTS:   The patient is limited by decreased functional mobility, ROM, strength, activity tolerance, balance s/p admission for worsening B knee pain and BLE swelling. Patient cleared by nursing to mobilize. Received patient supine in bed.    Based on the impairments listed above the patient is below her prior level of function. Today she performed bed mobility with supervision assist and transfers with standby assist. She attempted to ambulate to the bathroom with no AD, however was unable to  her feet as well as reaching out for furniture. Provided RW for increased  training  Supine to Sit: Supervision  Sit to Supine: Supervision  Scooting: Stand by assistance  Transfers:     Transfer Training  Transfer Training: Yes  Interventions: Verbal cues;Safety awareness training  Sit to Stand: Stand by assistance  Stand to Sit: Stand by assistance  Toilet Transfer: Stand by assistance  Balance:     Balance  Sitting: Intact  Standing: Impaired  Standing - Static: Fair;Good;Unsupported  Standing - Dynamic: Fair;Good;Constant support  Ambulation/Gait Training:    Gait  Gait Training: Yes  Overall Level of Assistance: Contact guard assistance;Stand by assistance  Distance (ft): 10 Feet (x2)  Assistive Device: Gait belt;Walker, rolling  Interventions: Verbal cues;Safety awareness training  Base of Support: Widened  Speed/Alla: Pace decreased (< 100 feet/min);Shuffled  Step Length: Left shortened;Right shortened  Gait Abnormalities: Path deviations;Shuffling gait;Decreased step clearance    Wheelchair Management  Wheelchair Management: No                                                                                                                                                                                                                                    Tinetti test:    Tinetti  Sitting Balance: Steady, safe  Arises: Able, uses arms to help  Attempts to Arise: Able to arise, one attempt  Immediate Standing Balance (First 5 Seconds): Steady but uses walker or other support  Standing Balance: Steady but wide stance, uses cane or other support  Nudged: Staggers, grabs, catches self  Eyes Closed: Unsteady  Turned 360 Degrees: Steadiness: Steady  Turned 360 Degrees: Continuity of Steps: Discontinuous steps  Sitting Down: Safe, smooth motion  Balance Score: 10  Initiation of Gait: No hesitancy  Step Height: R Swing Foot: Right foot does not clear floor completely with step  Step Length: R Swing Foot: Passes left stance foot  Step Height: L Swing Foot: Left foot does not clear floor

## 2025-06-21 NOTE — PROGRESS NOTES
End of Shift Note    Bedside shift change report given to ALIA Ortiz (oncoming nurse) by SALLY MICHELE RN (offgoing nurse).  Report included the following information SBAR, Kardex, and MAR    Shift worked:  7p-7a     Shift summary and any significant changes:     AAOx4, VSS, no complaint of pain, safety rounding completed.     Concerns for physician to address:  no     Zone phone for oncoming shift:   [phone]       Activity:  Level of Assistance: Minimal assist, patient does 75% or more    Cardiac:   Cardiac Monitoring:  yes     Access:  Current line(s): PIV     Genitourinary:   Urinary Status: Voiding, Bathroom privileges (Simultaneous filing. User may not have seen previous data.)    Respiratory:   O2 Device: None (Room air)    GI:  Current diet: ADULT DIET; Regular; 4 carb choices (60 gm/meal)    Pain Management:   Patient states pain is manageable on current regimen: N/A    Skin:  Daniel Scale Score: 20  Interventions: Wound Offloading (Prevention Methods): Elevate heels, Pillows    Patient Safety:  Fall Score: Grady Total Score: 35  Fall Risk Interventions  Nursing Judgement-Fall Risk High(Add Comments): Yes  Toilet Every 2 Hours-In Advance of Need: Yes  Hourly Visual Checks: Awake, In bed  Fall Visual Posted: Armband, Fall sign posted, Socks  Room Door Open: Deferred to promote rest  Alarm On: Other (Comment) (refused)  Patient Moved Closer to Nursing Station: No    Active Consults:  None    Length of Stay:  Expected LOS: 2  Actual LOS: 1      SALLY MICHELE RN

## 2025-06-22 LAB
ANION GAP SERPL CALC-SCNC: 9 MMOL/L (ref 2–12)
BUN SERPL-MCNC: 19 MG/DL (ref 6–20)
BUN/CREAT SERPL: 31 (ref 12–20)
CALCIUM SERPL-MCNC: 8.7 MG/DL (ref 8.5–10.1)
CHLORIDE SERPL-SCNC: 92 MMOL/L (ref 97–108)
CO2 SERPL-SCNC: 25 MMOL/L (ref 21–32)
CREAT SERPL-MCNC: 0.61 MG/DL (ref 0.55–1.02)
GLUCOSE BLD STRIP.AUTO-MCNC: 215 MG/DL (ref 65–117)
GLUCOSE BLD STRIP.AUTO-MCNC: 255 MG/DL (ref 65–117)
GLUCOSE SERPL-MCNC: 172 MG/DL (ref 65–100)
POTASSIUM SERPL-SCNC: 3.7 MMOL/L (ref 3.5–5.1)
SERVICE CMNT-IMP: ABNORMAL
SERVICE CMNT-IMP: ABNORMAL
SODIUM SERPL-SCNC: 126 MMOL/L (ref 136–145)

## 2025-06-22 PROCEDURE — 97535 SELF CARE MNGMENT TRAINING: CPT

## 2025-06-22 PROCEDURE — 82962 GLUCOSE BLOOD TEST: CPT

## 2025-06-22 PROCEDURE — 6370000000 HC RX 637 (ALT 250 FOR IP): Performed by: INTERNAL MEDICINE

## 2025-06-22 PROCEDURE — 80048 BASIC METABOLIC PNL TOTAL CA: CPT

## 2025-06-22 PROCEDURE — 1100000003 HC PRIVATE W/ TELEMETRY

## 2025-06-22 PROCEDURE — 2500000003 HC RX 250 WO HCPCS: Performed by: STUDENT IN AN ORGANIZED HEALTH CARE EDUCATION/TRAINING PROGRAM

## 2025-06-22 PROCEDURE — 36415 COLL VENOUS BLD VENIPUNCTURE: CPT

## 2025-06-22 PROCEDURE — 97165 OT EVAL LOW COMPLEX 30 MIN: CPT

## 2025-06-22 PROCEDURE — 2500000003 HC RX 250 WO HCPCS: Performed by: INTERNAL MEDICINE

## 2025-06-22 PROCEDURE — 6370000000 HC RX 637 (ALT 250 FOR IP): Performed by: PHYSICIAN ASSISTANT

## 2025-06-22 PROCEDURE — 6360000002 HC RX W HCPCS: Performed by: INTERNAL MEDICINE

## 2025-06-22 PROCEDURE — 6360000002 HC RX W HCPCS: Performed by: STUDENT IN AN ORGANIZED HEALTH CARE EDUCATION/TRAINING PROGRAM

## 2025-06-22 RX ORDER — LORAZEPAM 0.5 MG/1
0.5 TABLET ORAL ONCE
Status: COMPLETED | OUTPATIENT
Start: 2025-06-22 | End: 2025-06-22

## 2025-06-22 RX ORDER — NICOTINE 21 MG/24HR
1 PATCH, TRANSDERMAL 24 HOURS TRANSDERMAL DAILY
Status: DISCONTINUED | OUTPATIENT
Start: 2025-06-22 | End: 2025-06-27 | Stop reason: HOSPADM

## 2025-06-22 RX ORDER — QUETIAPINE FUMARATE 25 MG/1
12.5 TABLET, FILM COATED ORAL NIGHTLY PRN
Status: DISCONTINUED | OUTPATIENT
Start: 2025-06-22 | End: 2025-06-27 | Stop reason: HOSPADM

## 2025-06-22 RX ADMIN — LORAZEPAM 0.5 MG: 0.5 TABLET ORAL at 08:48

## 2025-06-22 RX ADMIN — ASPIRIN 81 MG: 81 TABLET, DELAYED RELEASE ORAL at 08:48

## 2025-06-22 RX ADMIN — SODIUM CHLORIDE, PRESERVATIVE FREE 10 ML: 5 INJECTION INTRAVENOUS at 08:51

## 2025-06-22 RX ADMIN — CLOPIDOGREL BISULFATE 75 MG: 75 TABLET, FILM COATED ORAL at 20:27

## 2025-06-22 RX ADMIN — METOPROLOL TARTRATE 25 MG: 25 TABLET, FILM COATED ORAL at 08:48

## 2025-06-22 RX ADMIN — LOSARTAN POTASSIUM 100 MG: 100 TABLET, FILM COATED ORAL at 08:48

## 2025-06-22 RX ADMIN — FUROSEMIDE 20 MG: 10 INJECTION, SOLUTION INTRAMUSCULAR; INTRAVENOUS at 18:16

## 2025-06-22 RX ADMIN — SODIUM CHLORIDE, PRESERVATIVE FREE 10 ML: 5 INJECTION INTRAVENOUS at 20:27

## 2025-06-22 RX ADMIN — Medication 15 G: at 18:15

## 2025-06-22 RX ADMIN — INSULIN LISPRO 2 UNITS: 100 INJECTION, SOLUTION INTRAVENOUS; SUBCUTANEOUS at 20:27

## 2025-06-22 RX ADMIN — TRAMADOL HYDROCHLORIDE 25 MG: 50 TABLET, FILM COATED ORAL at 13:07

## 2025-06-22 RX ADMIN — METOPROLOL TARTRATE 25 MG: 25 TABLET, FILM COATED ORAL at 20:33

## 2025-06-22 RX ADMIN — INSULIN LISPRO 4 UNITS: 100 INJECTION, SOLUTION INTRAVENOUS; SUBCUTANEOUS at 18:14

## 2025-06-22 RX ADMIN — MELATONIN 3 MG: at 20:27

## 2025-06-22 RX ADMIN — FUROSEMIDE 20 MG: 10 INJECTION, SOLUTION INTRAMUSCULAR; INTRAVENOUS at 08:48

## 2025-06-22 RX ADMIN — ENOXAPARIN SODIUM 40 MG: 100 INJECTION SUBCUTANEOUS at 08:48

## 2025-06-22 RX ADMIN — FAMOTIDINE 40 MG: 20 TABLET, FILM COATED ORAL at 08:48

## 2025-06-22 RX ADMIN — OLANZAPINE 2.5 MG: 10 INJECTION, POWDER, FOR SOLUTION INTRAMUSCULAR at 05:49

## 2025-06-22 ASSESSMENT — PAIN DESCRIPTION - LOCATION
LOCATION: LEG
LOCATION: LEG

## 2025-06-22 ASSESSMENT — PAIN SCALES - GENERAL
PAINLEVEL_OUTOF10: 0
PAINLEVEL_OUTOF10: 4
PAINLEVEL_OUTOF10: 4

## 2025-06-22 ASSESSMENT — PAIN DESCRIPTION - DESCRIPTORS: DESCRIPTORS: SORE

## 2025-06-22 ASSESSMENT — PAIN DESCRIPTION - ORIENTATION
ORIENTATION: RIGHT;LEFT
ORIENTATION: RIGHT;LEFT

## 2025-06-22 NOTE — PROGRESS NOTES
End of Shift Note    Bedside shift change report given to Ayanna ROJO (oncoming nurse) by Diana Fraga RN (offgoing nurse).  Report included the following information SBAR, Kardex, and MAR    Shift worked:  700-1900     Shift summary and any significant changes:     Continous sitter at bedside. Labs drawn. Pt anxious and agitated whole shift. Ativan given once. Pt asking for cigarette. Nicotine patch applied on arm. Pt walked to the hallway with sitter. Compression stockings applied. Family visited today. Pt getting IV lasix. Unable to monitor accurate urine output as pt dumped urine from hat. Hourly round completed. Pt refused hospital gown on admission. After encouragement, we were able to put her on the hospital gown. Nephrology on board. Tramadol given once for leg pain.     Concerns for physician to address:  None     Zone phone for oncoming shift:          Activity:  Level of Assistance: Minimal assist, patient does 75% or more  Number times ambulated in hallways past shift: 1  Number of times OOB to chair past shift: 0    Cardiac:   Cardiac Monitoring: No      Cardiac Rhythm: Sinus rhythm    Access:  Current line(s): PIV     Genitourinary:   Urinary Status: Voiding, Bathroom privileges    Respiratory:   O2 Device: None (Room air)  Chronic home O2 use?: NO  Incentive spirometer at bedside: NO    GI:  Last BM (including prior to admit): 06/20/25  Current diet:  ADULT DIET; Regular; 4 carb choices (60 gm/meal)  Passing flatus: YES    Pain Management:   Patient states pain is manageable on current regimen: YES    Skin:  Daniel Scale Score: 20  Interventions: Wound Offloading (Prevention Methods): Pillows, Repositioning    Patient Safety:  Fall Risk: Nursing Judgement-Fall Risk High(Add Comments): Yes  Fall Risk Interventions  Nursing Judgement-Fall Risk High(Add Comments): Yes  Toilet Every 2 Hours-In Advance of Need: Yes  Hourly Visual Checks: Awake, In bed  Fall Visual Posted: Armband, Fall sign posted,  Socks  Room Door Open: Yes  Alarm On: Other (Comment) (pt refused bed alarm and sighed the paper on admission)  Patient Moved Closer to Nursing Station: Yes    Active Consults:   IP CONSULT TO NEPHROLOGY    Length of Stay:  Expected LOS: 3  Actual LOS: 2    Diana Fraga RN

## 2025-06-22 NOTE — CONSULTS
Nephrology Consult Note     [facility]                Phone - [phone]   Patient: Elise Schneider   YOB: 1945    Date- 6/22/2025  MRN: [id number]             CONSULTING PHYSICIAN:     REASON FOR CONSULTATION: HYPONATREMIA  ADMIT DATE:6/20/2025 PATIENT PCP:Linda Reynoso LMFT     IMPRESSION & PLAN:   HYPOANTREMIA DUE TO low solute intake +/- SIADH +/- chf  Confusion  Hypertension  Edema -? Right sided heart failure  H/o chronic HYPONATREMIA    PLAN-  START Urea powder  continue lasix  Check echo-  Check bmp in am  Check TSH and cortisol         Principal Problem:    Hyponatremia  Resolved Problems:    * No resolved hospital problems. *      [x] High complexity decision making was performed  [x] Patient is at high-risk of decompensation with multiple organ involvement    Subjective:   HPI: Elise Schneider is a 79 y.o. female is admitted with   Chief Complaint   Patient presents with    Joint Swelling     Patient ambulatory with cane to triage c/o bilateral knee swelling that she woke up with this morning. Patient reports pain in bilateral knees, reports pain is chronic. Patient endorses a history of arthritis.     .  She is dx with hyponatremia.  Her na 126--urine osmo 486 and na 36  She has chronic hyponatremia  She is confused.  History per her sister. She has poor po intake at home. She drinks pepsi at home  She is not drinking any ETOH  She is not on any diuretics  She has chronic leg edema per sister      Review of Systems:    Can't eval due to patient's current condition       Past Medical History:   Diagnosis Date    Adverse effect of anesthesia     difficulty breathing during induction    Anxiety     Arthritis     left shoulder and elbow    Breast cancer (HCC)     RIGHT     CAD (coronary artery disease)     stenting cardiac 6/5/2015 PCI 10/16/24    Carotid stenosis, bilateral     Chronic pain     Claustrophobia     Diabetes mellitus (HCC)     GERD  PACK packet Take 1 packet by mouth in the morning and at bedtime    ProviderАндрей MD   amLODIPine (NORVASC) 10 MG tablet Take 1 tablet by mouth daily for blood pressure 11/15/24   Jhon Corrales MD   losartan (COZAAR) 100 MG tablet Take 1 tablet by mouth every morning 11/15/24   Jhon Corrales MD   metoprolol tartrate (LOPRESSOR) 25 MG tablet Take 1 tablet by mouth 2 times daily 11/12/24   John Corrales MD   clopidogrel (PLAVIX) 75 MG tablet Take 1 tablet by mouth daily 10/29/24   Jhon Corrales MD   acetaminophen (TYLENOL) 500 MG tablet Take 1 tablet by mouth every 6 hours as needed for Pain    ProviderАндрей MD   aspirin 81 MG EC tablet Take 1 tablet by mouth daily 7/19/24   Terese Reyes, APRN - NP   sodium chloride (OCEAN, BABY AYR) 0.65 % nasal spray 1 spray by Nasal route as needed for Congestion    ProviderАндрей MD   Multiple Vitamins-Minerals (PRESERVISION AREDS) CAPS 1 capsule 2 times daily    ProviderАндрей MD   famotidine (PEPCID) 40 MG tablet Take 1 tablet by mouth 2/17/23   Андрей Marques MD   fluticasone (FLONASE) 50 MCG/ACT nasal spray 2 sprays by Nasal route daily as needed  Patient not taking: Reported on 3/4/2025    Automatic Reconciliation, Ar   lovastatin (MEVACOR) 40 MG tablet Take 1 tablet by mouth nightly 4/2/19   Automatic Reconciliation, Ar   metFORMIN (GLUCOPHAGE) 500 MG tablet Take 1 tablet by mouth nightly    Automatic Reconciliation, Ar     Allergies   Allergen Reactions    Erythromycin Hives    Penicillins Hives     Has had both as an adult and child- -     Atorvastatin Myalgia    Cephalexin Other (See Comments)     Aching muscles-   Pt states she took abx in 2/2024 and had no reaction, but she was not sure if it was Keflex or not    Cyclobenzaprine     Hydrochlorothiazide Other (See Comments)     hyponatremia    Proton Pump Inhibitors Other (See Comments)     dizziness    Rosuvastatin Myalgia

## 2025-06-22 NOTE — PLAN OF CARE
Problem: Occupational Therapy - Adult  Goal: By Discharge: Performs self-care activities at highest level of function for planned discharge setting.  See evaluation for individualized goals.  Description: FUNCTIONAL STATUS PRIOR TO ADMISSION:  Patient is a poor historian at time of evaluation. Per chart review and family report, patient lives alone and uses walking stick for ambulation. Patient with poor eating habits, doing very little cooking/meal prep. Lives with her cat named Kittens and cares for a goat. Only one neighbor but very limited interaction. Patient is cognitively intact and fiercely independent at baseline.    Receives Help From: Family, Neighbor, Prior Level of Assist for ADLs: Independent, Prior Level of Assist for Homemaking: Independent, Ambulation Assistance: Independent, Prior Level of Assist for Transfers: Independent, Active : Yes     HOME SUPPORT: Patient lived alone with sister ~40 miles away. Sister provides transportation.    Occupational Therapy Goals:  Initiated 6/22/2025  1.  Patient will perform standing grooming, including item retrieval, with Modified [place] within 7 day(s).  2.  Patient will perform upper body dressing and lower body dressing with Modified [place] within 7 day(s).  3.  Patient will perform simple home management with Supervision within 7 day(s).  4.  Patient will perform all aspects of toileting, including transfers, with Modified [place]  within 7 day(s).  5.  Patient will participate in cognitive screen to assess home safety (lives alone with limited local support) within 7 day(s).  6.  Patient will utilize energy conservation techniques during functional activities with verbal cues within 7 day(s).      Outcome: Progressing   OCCUPATIONAL THERAPY EVALUATION    Patient: Elise Schneider (79 y.o. female)  Date: 6/22/2025  Primary Diagnosis: Unstable angina (HCC) [I20.0]  Hyponatremia [E87.1]  Peripheral edema [R60.0]         Precautions: Fall      Hearing:    WFL    Vision/Perceptual:     Patient commenting on seeing trucks located in the hospital courtyard. Unable to accurately assess 2/2 cognition.     Range of Motion:   AROM: Generally decreased, functional    Strength:  Strength: Generally decreased, functional    Coordination:  Coordination: Generally decreased, functional    Tone & Sensation:       Functional Mobility and Transfers for ADLs:    Bed Mobility:  Bed Mobility Training  Bed Mobility Training: No    Transfers:  Transfer Training  Transfer Training: Yes  Sit to Stand: Stand by assistance  Stand to Sit: Stand by assistance  Bed to Chair: Contact guard assistance  Toilet Transfer: Contact guard assistance   Functional Mobility: Contact guard assistance;Increased time to complete          Balance:  Balance  Sitting: Intact  Standing: Impaired  Standing - Static: Fair;Good  Standing - Dynamic: Fair    ADL Assessment:     Feeding: Setup;Supervision       Grooming: Setup;Stand by assistance (in standing)       UE Bathing: Stand by assistance       LE Bathing: Contact guard assistance       UE Dressing: Stand by assistance       LE Dressing: Contact guard assistance       Toileting: Contact guard assistance       Functional Mobility: Contact guard assistance;Increased time to complete    ADL Intervention and task modifications:    Toileting: Contact guard assistance (including hygiene and clothing management; steadying assist of grab bar / sink)    Grooming: Standby assistance (washing hands at sink)    Functional Mobility: Contact guard assistance (using furniture for steadying)    Reorientation w/ limited receptivity    Activity apron    IADL Engagement: participates in folding blankets/towels but demonstrates impaired judgment, sequencing, etc.     Pain Rating:  Reports back pain, unable to specify / rate.      Pain Intervention(s):   rest and repositioning    Activity Tolerance:   Fair  and requires rest breaks    After treatment:   Call bell

## 2025-06-22 NOTE — PLAN OF CARE
Problem: Chronic Conditions and Co-morbidities  Goal: Patient's chronic conditions and co-morbidity symptoms are monitored and maintained or improved  Outcome: Not Progressing     Problem: Discharge Planning  Goal: Discharge to home or other facility with appropriate resources  Outcome: Not Progressing     Problem: Pain  Goal: Verbalizes/displays adequate comfort level or baseline comfort level  Outcome: Not Progressing     Problem: Safety - Adult  Goal: Free from fall injury  Outcome: Not Progressing     Problem: Occupational Therapy - Adult  Goal: By Discharge: Performs self-care activities at highest level of function for planned discharge setting.  See evaluation for individualized goals.  Description: FUNCTIONAL STATUS PRIOR TO ADMISSION:  Patient is a poor historian at time of evaluation. Per chart review and family report, patient lives alone and uses walking stick for ambulation. Patient with poor eating habits, doing very little cooking/meal prep. Lives with her cat named Kittens and cares for a goat. Only one neighbor but very limited interaction. Patient is cognitively intact and fiercely independent at baseline.    Receives Help From: Family, Neighbor, Prior Level of Assist for ADLs: Independent, Prior Level of Assist for Homemaking: Independent, Ambulation Assistance: Independent, Prior Level of Assist for Transfers: Independent, Active : Yes     HOME SUPPORT: Patient lived alone with sister ~40 miles away. Sister provides transportation.    Occupational Therapy Goals:  Initiated 6/22/2025  1.  Patient will perform standing grooming, including item retrieval, with Modified [place] within 7 day(s).  2.  Patient will perform upper body dressing and lower body dressing with Modified [place] within 7 day(s).  3.  Patient will perform simple home management with Supervision within 7 day(s).  4.  Patient will perform all aspects of toileting, including transfers, with Modified [place]

## 2025-06-22 NOTE — PROGRESS NOTES
End of Shift Note    Bedside shift change report given to ALIA Ortiz (oncoming nurse) by Aaynna Murillo RN (offgoing nurse).  Report included the following information SBAR, Kardex, Intake/Output, MAR, Recent Results, and Cardiac Rhythm sinus rhythm/afib    Shift worked:  7P-7A     Shift summary and any significant changes:    Patient tolerated care well. Scheduled medications given per MAR . Patient A&OX4. Patient occasionally confused, thinking she can go to the kitchen and get a loaf of bread. Patient talking about white building and her cigarettes and checkbook being in the last room she was in. Patient complained of knee pain, I offered tylenol and tramadol, patient declined. Patient refused insulin saying she has to talk about it with her PCP. Patient refused labwork this morning. Patient called police department accusing staff of stealing her cardiac medications and checkbook as well as her cigarettes. Police came to patients room, patient reported that she is in her house and that she needs to go to the kitchen to get her goat something to eat. Patient trying to walk off unit, security was able to redirect her back to her room. Sitter placed. Zyprexa ordered for patient and given at 0549. Patient continued to be agitated and attempting to leave room.  still in place. Patient's sister reports that patient has had delirium before when she is in the hospital, patient gets more agitated when sister tries to talk to her per sisters account, sister reported that patient's belongings are at home and her animals are being taken care of by sister. Hourly rounding completed.     Concerns for physician to address:  none     Zone phone for oncoming shift:   [phone]       Activity:  Level of Assistance: Minimal assist, patient does 75% or more  Number times ambulated in hallways past shift: unnumbered- multiple times throughout the night  Number of times OOB to chair past shift: 0    Cardiac:   Cardiac

## 2025-06-22 NOTE — PROGRESS NOTES
Hospitalist Progress Note        Demographics    Patient Name  Elise Schneider   Date of Birth 1945   Medical Record Number  [id number]      Age  79 y.o.   PCP Linda Reynoso LMFT   Admit date:  6/20/2025 10:41 AM     Room Number  1120/01  @ [facility]           Admission Diagnoses:  Hyponatremia   Admission Summary:  \" Elise Schneider is a 79 y.o.  female with PMHx significant for coronary artery disease status post stenting, diabetes mellitus, hypertension, GERD, dyslipidemia is coming the hospital chief complaints of bilateral lower extremity swelling.  She reports being largely available dental about 3 months ago when she started having some gradual swelling in her legs along with some pain.  Symptoms really got worse in the last 2 weeks or so.  She reports that swelling initially started in the ankles and now up to the level of the knees along with some pain in both ankles and also knees.  Does not report any chest pain or shortness of breath.  No orthopnea or PND.  She does report decreased urine output in the last few days or so.  Does not report any chest pain.     On arrival to ED, she was noted to normal vitals, on labs sodium is 124, glucose 131, proBNP is 379, LFTs are within normal limits.  TSH is 0.90.  CBC shows a hemoglobin of 11.3 and platelet count of 189. \"  -Dr. Hodges 6/20      Assessment and plan:      BLE edema, R>L, POA  - xray bilateral knees - NAD  - venous duplex - no DVT  - check TTE (prior in 5/2024 showed nml EF and +diastolic dysfunction)  - continue lasix 20mg BID  - elevate BLE. +dena hose     Acute on chronic hyponatremia, POA  - urine sodium 36 and osmol is high- suggests SIADH  - currently on lasix IV 20mg BID  - nephrology consulted. Appreciate expertise  - perhaps needs sodium chloride tab?     Hospital acquired delirium, not POA  - became disoriented last night 21-22. Called police x 2. Thought she was at home and trying to get up to go to the kitchen

## 2025-06-22 NOTE — PLAN OF CARE
Problem: Chronic Conditions and Co-morbidities  Goal: Patient's chronic conditions and co-morbidity symptoms are monitored and maintained or improved  6/21/2025 2329 by Ayanna Murillo RN  Outcome: Progressing  Flowsheets (Taken 6/21/2025 1930)  Care Plan - Patient's Chronic Conditions and Co-Morbidity Symptoms are Monitored and Maintained or Improved: Monitor and assess patient's chronic conditions and comorbid symptoms for stability, deterioration, or improvement  6/21/2025 1300 by Diana Fraga RN  Outcome: Progressing     Problem: Discharge Planning  Goal: Discharge to home or other facility with appropriate resources  6/21/2025 2329 by Ayanna Murillo RN  Outcome: Progressing  Flowsheets (Taken 6/21/2025 1930)  Discharge to home or other facility with appropriate resources: Identify barriers to discharge with patient and caregiver  6/21/2025 1300 by Diana Fraga RN  Outcome: Progressing     Problem: Pain  Goal: Verbalizes/displays adequate comfort level or baseline comfort level  6/21/2025 2329 by Ayanna Murillo RN  Outcome: Progressing  6/21/2025 1300 by Diana Fraga RN  Outcome: Progressing     Problem: Safety - Adult  Goal: Free from fall injury  6/21/2025 2329 by Ayanna Murillo RN  Outcome: Progressing  Flowsheets (Taken 6/21/2025 1930)  Free From Fall Injury: Instruct family/caregiver on patient safety  6/21/2025 1300 by Diana Fraga RN  Outcome: Progressing     Problem: Physical Therapy - Adult  Goal: By Discharge: Performs mobility at highest level of function for planned discharge setting.  See evaluation for individualized goals.  Description: FUNCTIONAL STATUS PRIOR TO ADMISSION: Patient reports ambulating with walking stick outside, no AD inside. I with ADLs, drives.    HOME SUPPORT PRIOR TO ADMISSION: The patient lived alone in a 1-story home with 4 steps and B rails.    Physical Therapy Goals  Initiated 6/21/2025  1.  Patient will move from supine to sit and sit to supine in bed with modified

## 2025-06-23 LAB
ANION GAP SERPL CALC-SCNC: 7 MMOL/L (ref 2–12)
BUN SERPL-MCNC: 26 MG/DL (ref 6–20)
BUN/CREAT SERPL: 46 (ref 12–20)
CALCIUM SERPL-MCNC: 8.6 MG/DL (ref 8.5–10.1)
CHLORIDE SERPL-SCNC: 95 MMOL/L (ref 97–108)
CO2 SERPL-SCNC: 24 MMOL/L (ref 21–32)
CORTIS AM PEAK SERPL-MCNC: 4.4 UG/DL (ref 4.3–22.45)
CREAT SERPL-MCNC: 0.56 MG/DL (ref 0.55–1.02)
ERYTHROCYTE [DISTWIDTH] IN BLOOD BY AUTOMATED COUNT: 11.9 % (ref 11.5–14.5)
GLUCOSE BLD STRIP.AUTO-MCNC: 165 MG/DL (ref 65–117)
GLUCOSE BLD STRIP.AUTO-MCNC: 167 MG/DL (ref 65–117)
GLUCOSE BLD STRIP.AUTO-MCNC: 225 MG/DL (ref 65–117)
GLUCOSE BLD STRIP.AUTO-MCNC: 301 MG/DL (ref 65–117)
GLUCOSE SERPL-MCNC: 153 MG/DL (ref 65–100)
HCT VFR BLD AUTO: 29.7 % (ref 35–47)
HGB BLD-MCNC: 10.4 G/DL (ref 11.5–16)
MCH RBC QN AUTO: 32.9 PG (ref 26–34)
MCHC RBC AUTO-ENTMCNC: 35 G/DL (ref 30–36.5)
MCV RBC AUTO: 94 FL (ref 80–99)
NRBC # BLD: 0 K/UL (ref 0–0.01)
NRBC BLD-RTO: 0 PER 100 WBC
PLATELET # BLD AUTO: 141 K/UL (ref 150–400)
PMV BLD AUTO: 9.2 FL (ref 8.9–12.9)
POTASSIUM SERPL-SCNC: 3.3 MMOL/L (ref 3.5–5.1)
RBC # BLD AUTO: 3.16 M/UL (ref 3.8–5.2)
SERVICE CMNT-IMP: ABNORMAL
SODIUM SERPL-SCNC: 126 MMOL/L (ref 136–145)
URATE SERPL-MCNC: 2.5 MG/DL (ref 2.6–6)
WBC # BLD AUTO: 5.8 K/UL (ref 3.6–11)

## 2025-06-23 PROCEDURE — 2500000003 HC RX 250 WO HCPCS: Performed by: INTERNAL MEDICINE

## 2025-06-23 PROCEDURE — 36415 COLL VENOUS BLD VENIPUNCTURE: CPT

## 2025-06-23 PROCEDURE — 82962 GLUCOSE BLOOD TEST: CPT

## 2025-06-23 PROCEDURE — 6370000000 HC RX 637 (ALT 250 FOR IP): Performed by: INTERNAL MEDICINE

## 2025-06-23 PROCEDURE — 6370000000 HC RX 637 (ALT 250 FOR IP)

## 2025-06-23 PROCEDURE — 6370000000 HC RX 637 (ALT 250 FOR IP): Performed by: PHYSICIAN ASSISTANT

## 2025-06-23 PROCEDURE — 80048 BASIC METABOLIC PNL TOTAL CA: CPT

## 2025-06-23 PROCEDURE — 84550 ASSAY OF BLOOD/URIC ACID: CPT

## 2025-06-23 PROCEDURE — 1100000003 HC PRIVATE W/ TELEMETRY

## 2025-06-23 PROCEDURE — 6360000002 HC RX W HCPCS: Performed by: INTERNAL MEDICINE

## 2025-06-23 PROCEDURE — 85027 COMPLETE CBC AUTOMATED: CPT

## 2025-06-23 PROCEDURE — 82533 TOTAL CORTISOL: CPT

## 2025-06-23 RX ORDER — TOLVAPTAN 15 MG/1
15 TABLET ORAL ONCE
Status: COMPLETED | OUTPATIENT
Start: 2025-06-23 | End: 2025-06-23

## 2025-06-23 RX ADMIN — CLOPIDOGREL BISULFATE 75 MG: 75 TABLET, FILM COATED ORAL at 20:37

## 2025-06-23 RX ADMIN — ASPIRIN 81 MG: 81 TABLET, DELAYED RELEASE ORAL at 08:22

## 2025-06-23 RX ADMIN — METOPROLOL TARTRATE 25 MG: 25 TABLET, FILM COATED ORAL at 08:22

## 2025-06-23 RX ADMIN — ENOXAPARIN SODIUM 40 MG: 100 INJECTION SUBCUTANEOUS at 08:23

## 2025-06-23 RX ADMIN — Medication 15 G: at 05:47

## 2025-06-23 RX ADMIN — SODIUM CHLORIDE, PRESERVATIVE FREE 10 ML: 5 INJECTION INTRAVENOUS at 08:24

## 2025-06-23 RX ADMIN — METOPROLOL TARTRATE 25 MG: 25 TABLET, FILM COATED ORAL at 20:37

## 2025-06-23 RX ADMIN — INSULIN LISPRO 6 UNITS: 100 INJECTION, SOLUTION INTRAVENOUS; SUBCUTANEOUS at 20:36

## 2025-06-23 RX ADMIN — TRAMADOL HYDROCHLORIDE 25 MG: 50 TABLET, FILM COATED ORAL at 05:59

## 2025-06-23 RX ADMIN — FUROSEMIDE 20 MG: 10 INJECTION, SOLUTION INTRAMUSCULAR; INTRAVENOUS at 17:28

## 2025-06-23 RX ADMIN — FUROSEMIDE 20 MG: 10 INJECTION, SOLUTION INTRAMUSCULAR; INTRAVENOUS at 08:22

## 2025-06-23 RX ADMIN — INSULIN LISPRO 2 UNITS: 100 INJECTION, SOLUTION INTRAVENOUS; SUBCUTANEOUS at 11:08

## 2025-06-23 RX ADMIN — Medication 15 G: at 17:28

## 2025-06-23 RX ADMIN — PANTOPRAZOLE SODIUM 40 MG: 40 TABLET, DELAYED RELEASE ORAL at 05:47

## 2025-06-23 RX ADMIN — TOLVAPTAN 15 MG: 15 TABLET ORAL at 10:38

## 2025-06-23 RX ADMIN — POTASSIUM BICARBONATE 40 MEQ: 782 TABLET, EFFERVESCENT ORAL at 06:50

## 2025-06-23 RX ADMIN — TRAMADOL HYDROCHLORIDE 25 MG: 50 TABLET, FILM COATED ORAL at 10:38

## 2025-06-23 RX ADMIN — LOSARTAN POTASSIUM 100 MG: 100 TABLET, FILM COATED ORAL at 08:22

## 2025-06-23 RX ADMIN — MELATONIN 6 MG: at 20:37

## 2025-06-23 RX ADMIN — FAMOTIDINE 40 MG: 20 TABLET, FILM COATED ORAL at 08:22

## 2025-06-23 RX ADMIN — SODIUM CHLORIDE, PRESERVATIVE FREE 10 ML: 5 INJECTION INTRAVENOUS at 20:38

## 2025-06-23 RX ADMIN — POTASSIUM CHLORIDE 40 MEQ: 1500 TABLET, EXTENDED RELEASE ORAL at 08:24

## 2025-06-23 ASSESSMENT — PAIN SCALES - GENERAL
PAINLEVEL_OUTOF10: 1
PAINLEVEL_OUTOF10: 5

## 2025-06-23 ASSESSMENT — PAIN DESCRIPTION - DESCRIPTORS: DESCRIPTORS: ACHING

## 2025-06-23 ASSESSMENT — PAIN DESCRIPTION - ORIENTATION: ORIENTATION: LEFT

## 2025-06-23 ASSESSMENT — PAIN DESCRIPTION - LOCATION: LOCATION: LEG;KNEE

## 2025-06-23 NOTE — CARE COORDINATION
Transition of Care Plan:    RUR: 14%  Prior Level of Functioning: Independent   Disposition: SNF   LUIS: 6/24/25  If SNF or IPR: Date FOC offered:   Date FOC received:   Accepting facility:   Date authorization started with reference number:   Date authorization received and expires:   Follow up appointments: Defer to facility   DME needed: Defer to facility   Transportation at discharge: Pt's family Vs BLS   IM/IMM Medicare/ letter given: Needs 2nd IMM letter   Is patient a [place] and connected with VA? No   If yes, was  transfer form completed and VA notified? no  Caregiver Contact: Pt's sister   Discharge Caregiver contacted prior to discharge? Pt will contacted   Care Conference needed? No  Barriers to discharge: Medical clearance       CM reviewed pt's chart and pt is not medically stable for d/c due to nephrology, sitter free and ECHO.    CM is aware that the recommendation is for SNF and that MD is agreeable with the recommendation.    Pt will auth for placement.     CM will continue to follow and assist with d/c planning.     Radha Ch    [id number]

## 2025-06-23 NOTE — PLAN OF CARE
Problem: Chronic Conditions and Co-morbidities  Goal: Patient's chronic conditions and co-morbidity symptoms are monitored and maintained or improved  6/23/2025 0123 by Ayanna Murillo RN  Outcome: Progressing  6/23/2025 0123 by Ayanna Murillo RN  Outcome: Progressing  Flowsheets (Taken 6/22/2025 1929)  Care Plan - Patient's Chronic Conditions and Co-Morbidity Symptoms are Monitored and Maintained or Improved: Monitor and assess patient's chronic conditions and comorbid symptoms for stability, deterioration, or improvement  6/22/2025 1736 by Diana Fraga RN  Outcome: Not Progressing     Problem: Discharge Planning  Goal: Discharge to home or other facility with appropriate resources  6/23/2025 0123 by Ayanna Murillo RN  Outcome: Progressing  6/23/2025 0123 by Ayanna Murillo RN  Outcome: Progressing  Flowsheets (Taken 6/22/2025 1929)  Discharge to home or other facility with appropriate resources: Identify barriers to discharge with patient and caregiver  6/22/2025 1736 by Diana Fraga RN  Outcome: Not Progressing     Problem: Pain  Goal: Verbalizes/displays adequate comfort level or baseline comfort level  6/23/2025 0123 by Ayanna Murillo RN  Outcome: Progressing  6/23/2025 0123 by Ayanna Murillo RN  Outcome: Progressing  Flowsheets (Taken 6/22/2025 1929)  Verbalizes/displays adequate comfort level or baseline comfort level:   Encourage patient to monitor pain and request assistance   Assess pain using appropriate pain scale   Administer analgesics based on type and severity of pain and evaluate response  6/22/2025 1736 by Diana Fraga RN  Outcome: Not Progressing     Problem: Safety - Adult  Goal: Free from fall injury  6/23/2025 0123 by Ayanna Murillo RN  Outcome: Progressing  6/23/2025 0123 by Ayanna Murillo RN  Outcome: Progressing  Flowsheets (Taken 6/22/2025 1929)  Free From Fall Injury: Instruct family/caregiver on patient safety  6/22/2025 1736 by Diana Fraga RN  Outcome: Not Progressing     Problem:

## 2025-06-23 NOTE — PROGRESS NOTES
Hospitalist Progress Note        Demographics    Patient Name  Elise Schneider   Date of Birth 1945   Medical Record Number  [id number]      Age  79 y.o.   PCP Linda Reynoso LMFT   Admit date:  6/20/2025 10:41 AM     Room Number  1120/01  @ [facility]           Admission Diagnoses:  Hyponatremia   Admission Summary:  \" Elise Schneider is a 79 y.o.  female with PMHx significant for coronary artery disease status post stenting, diabetes mellitus, hypertension, GERD, dyslipidemia is coming the hospital chief complaints of bilateral lower extremity swelling.  She reports being largely available dental about 3 months ago when she started having some gradual swelling in her legs along with some pain.  Symptoms really got worse in the last 2 weeks or so.  She reports that swelling initially started in the ankles and now up to the level of the knees along with some pain in both ankles and also knees.  Does not report any chest pain or shortness of breath.  No orthopnea or PND.  She does report decreased urine output in the last few days or so.  Does not report any chest pain.     On arrival to ED, she was noted to normal vitals, on labs sodium is 124, glucose 131, proBNP is 379, LFTs are within normal limits.  TSH is 0.90.  CBC shows a hemoglobin of 11.3 and platelet count of 189. \"  -Dr. Hodges 6/20      Assessment and plan:      BLE edema, R>L, POA  Xray bilateral knees - NAD  Venous duplex - no DVT  Check TTE (prior in 5/2024 showed nml EF and +diastolic dysfunction)  Continue lasix 20mg BID  Elevate BLE. +dena hose     Acute on chronic hyponatremia, POA     Sodium - slightly improved to 126   Urine sodium 36 and osmolality is high- suggestive of  SIADH   Nephrology following - Appreciate expertise  Continue Urea tablet   On Lasix 20 mg BID  Trend CMP daily      Hospital acquired delirium, not POA   Disoriented . Per note called police x 2  Sitter 1: 1    Chronic comorbitidies:  Hx CAD s/p

## 2025-06-23 NOTE — PROGRESS NOTES
Physical therapy services attempted 3:22PM. Pt resting comfortably with audible snoring. Deferral further attempt to awaken in interest of patient comfort and recovery. Per cursory review of medical chart, PT Team dc plan recommendation for SNF setting already in place. PT Team will try to provide skilled services at a later date as time permits and as medically appropriate to patient tolerance.    Linda Simeon, PT, DPT

## 2025-06-23 NOTE — PROGRESS NOTES
End of Shift Note    Bedside shift change report given to ALIA Davidson (oncoming nurse) by Ayanna Murillo RN (offgoing nurse).  Report included the following information SBAR, Kardex, MAR, and Recent Results    Shift worked:  7P-7A     Shift summary and any significant changes:     Patient tolerated care well. Scheduled medications given per MAR. Continuous sitter at bedside. Compression stocking still in place. Patient refused hospital gown. Patient cleaned up this morning and agreed to put on hospital gown. Patient reported left knee/leg pain, tramadol given x1. Patient potassium 3.3, potassium replacement protocol followed. Hourly rounding completed.     Concerns for physician to address:       Zone phone for oncoming shift:   [phone]       Activity:  Level of Assistance: Minimal assist, patient does 75% or more  Number times ambulated in hallways past shift: 0  Number of times OOB to chair past shift: 0    Cardiac:   Cardiac Monitoring: No      Cardiac Rhythm: Sinus rhythm    Access:  Current line(s): PIV     Genitourinary:   Urinary Status: Voiding, Bathroom privileges    Respiratory:   O2 Device: None (Room air)  Chronic home O2 use?: NO  Incentive spirometer at bedside: NO    GI:  Last BM (including prior to admit): 06/20/25  Current diet:  ADULT DIET; Regular; 4 carb choices (60 gm/meal)  Passing flatus: YES    Pain Management:   Patient states pain is manageable on current regimen: YES    Skin:  Daniel Scale Score: 20  Interventions: Wound Offloading (Prevention Methods): Pillows, Repositioning    Patient Safety:  Fall Risk: Nursing Judgement-Fall Risk High(Add Comments): Yes  Fall Risk Interventions  Nursing Judgement-Fall Risk High(Add Comments): Yes  Toilet Every 2 Hours-In Advance of Need: Yes  Hourly Visual Checks: Eyes closed, In bed  Fall Visual Posted: Armband, Fall sign posted, Socks  Room Door Open: Yes  Alarm On: Other (Comment) (refusal signed and in chart)  Patient Moved Closer to Nursing Station:  No    Active Consults:   IP CONSULT TO NEPHROLOGY    Length of Stay:  Expected LOS: 3  Actual LOS: 3    Ayanna Murillo RN

## 2025-06-23 NOTE — DISCHARGE INSTRUCTIONS
You have been given a copy of the American Heart Association's Heart Failure Educational Booklet.  Please read over this booklet and take it with you to your next physician appointment with any questions you may have.     For additional resources and to access the American Heart Association's Interactive Workbook \"Healthier Living with Heart Failure - Managing Symptoms and Reducing Risk,\" please scan the QR code below.               Download the Heart Failure [place] Alaina: Search in your Google Play Store ([employer]) or [employer]x Alaina Store ([employer]): Search for- HF [place] Alaina.    https://www.heart.org/en/health-topics/heart-failure/heart-failure-tools-resources/vo-yedrci-ndr    HF [place] is a brand-new phone alaina that helps you track daily symptoms, vitals, mood, energy level and more. You can even add your heart failure care team members to view your data and monitor your condition at home.    HF [place] Lets You:  Track symptoms, medications and more  Share health information with your health care team  Connect with others living with heart failure

## 2025-06-23 NOTE — PLAN OF CARE
Problem: Chronic Conditions and Co-morbidities  Goal: Patient's chronic conditions and co-morbidity symptoms are monitored and maintained or improved  6/23/2025 0849 by Lety Snow RN  Flowsheets (Taken 6/22/2025 1929 by Ayanna Murillo RN)  Care Plan - Patient's Chronic Conditions and Co-Morbidity Symptoms are Monitored and Maintained or Improved: Monitor and assess patient's chronic conditions and comorbid symptoms for stability, deterioration, or improvement  6/23/2025 0123 by Ayanna Murillo RN  Outcome: Progressing  6/23/2025 0123 by Ayanna Murillo RN  Outcome: Progressing  Flowsheets (Taken 6/22/2025 1929)  Care Plan - Patient's Chronic Conditions and Co-Morbidity Symptoms are Monitored and Maintained or Improved: Monitor and assess patient's chronic conditions and comorbid symptoms for stability, deterioration, or improvement     Problem: Discharge Planning  Goal: Discharge to home or other facility with appropriate resources  6/23/2025 0849 by Lety Snow RN  Flowsheets (Taken 6/23/2025 0849)  Discharge to home or other facility with appropriate resources: Identify barriers to discharge with patient and caregiver  6/23/2025 0123 by Ayanna Murillo RN  Outcome: Progressing  6/23/2025 0123 by Ayanna Murillo RN  Outcome: Progressing  Flowsheets (Taken 6/22/2025 1929)  Discharge to home or other facility with appropriate resources: Identify barriers to discharge with patient and caregiver     Problem: Pain  Goal: Verbalizes/displays adequate comfort level or baseline comfort level  6/23/2025 0123 by Ayanna Murillo RN  Outcome: Progressing  6/23/2025 0123 by Ayanna Murillo RN  Outcome: Progressing  Flowsheets (Taken 6/22/2025 1929)  Verbalizes/displays adequate comfort level or baseline comfort level:   Encourage patient to monitor pain and request assistance   Assess pain using appropriate pain scale   Administer analgesics based on type and severity of pain and evaluate response     Problem: Confusion  Goal: Confusion,

## 2025-06-23 NOTE — PROGRESS NOTES
Nephrology Progress Note  [facility] [place] Office  [address]  [place] [address]  Phone - [phone]  Fax - [phone]                   Patient: Elise Schneider                     YOB: 1945        Date- 6/23/2025                                     Admit Date: 6/20/2025   CC: Follow up for Hyponatremia          IMPRESSION & PLAN:   Hyponatremia due tolow solute intake +/- SIADH +/- chf  Confusion  Hypertension  Edema -? Right sided heart failure  H/o chronic HYPONATREMIA      PLAN-  Ordered tolvaptan one-time dose 15 mg  Continue urea powder  Check sodium daily     Subjective:  Interval History:   - Seen and examined today  - Eating breakfast this morning    Objective:   Vitals:    06/22/25 2033 06/23/25 0600 06/23/25 0629 06/23/25 0824   BP: (!) 113/39   (!) 111/58   Pulse: 78   67   Resp:   17 17   Temp:    97.5 °F (36.4 °C)   TempSrc:    Oral   SpO2:    100%   Weight:  60.5 kg (133 lb 6.1 oz)     Height:          I/O last 3 completed shifts:  In: -   Out: 1650 [Urine:1650]  I/O this shift:  In: 240 [P.O.:240]  Out: -       Physical exam:    GEN: NAD  NECK- no mass  RESP: No wheezing, decreased BS b/l  CVS: S1,S2  RRR  NEURO: Normal speech, Non focal  EXT: No Edema   PSYCH: Normal Mood    Chart reviewed.         Pertinent Notes reviewed.       Data Review :  Lab Results   Component Value Date/Time     06/23/2025 05:16 AM    K 3.3 06/23/2025 05:16 AM    CL 95 06/23/2025 05:16 AM    CO2 24 06/23/2025 05:16 AM    BUN 26 06/23/2025 05:16 AM    CREATININE 0.56 06/23/2025 05:16 AM    GLUCOSE 153 06/23/2025 05:16 AM    CALCIUM 8.6 06/23/2025 05:16 AM       Lab Results   Component Value Date    WBC 5.8 06/23/2025    HGB 10.4 (L) 06/23/2025    HCT 29.7 (L) 06/23/2025    MCV 94.0 06/23/2025     (L) 06/23/2025      Recent Labs     06/21/25  0535 06/22/25  1158 06/23/25  0516   * 126* 126*   K 3.7 3.7

## 2025-06-24 ENCOUNTER — APPOINTMENT (OUTPATIENT)
Facility: HOSPITAL | Age: 80
End: 2025-06-24
Payer: MEDICARE

## 2025-06-24 LAB
GLUCOSE BLD STRIP.AUTO-MCNC: 118 MG/DL (ref 65–117)
GLUCOSE BLD STRIP.AUTO-MCNC: 220 MG/DL (ref 65–117)
GLUCOSE BLD STRIP.AUTO-MCNC: 267 MG/DL (ref 65–117)
GLUCOSE BLD STRIP.AUTO-MCNC: 385 MG/DL (ref 65–117)
SERVICE CMNT-IMP: ABNORMAL
SODIUM SERPL-SCNC: 130 MMOL/L (ref 136–145)
SODIUM SERPL-SCNC: 132 MMOL/L (ref 136–145)

## 2025-06-24 PROCEDURE — 36415 COLL VENOUS BLD VENIPUNCTURE: CPT

## 2025-06-24 PROCEDURE — 6370000000 HC RX 637 (ALT 250 FOR IP): Performed by: PHYSICIAN ASSISTANT

## 2025-06-24 PROCEDURE — 82962 GLUCOSE BLOOD TEST: CPT

## 2025-06-24 PROCEDURE — 97535 SELF CARE MNGMENT TRAINING: CPT

## 2025-06-24 PROCEDURE — 6370000000 HC RX 637 (ALT 250 FOR IP): Performed by: INTERNAL MEDICINE

## 2025-06-24 PROCEDURE — 6360000002 HC RX W HCPCS: Performed by: INTERNAL MEDICINE

## 2025-06-24 PROCEDURE — 97110 THERAPEUTIC EXERCISES: CPT

## 2025-06-24 PROCEDURE — 84295 ASSAY OF SERUM SODIUM: CPT

## 2025-06-24 PROCEDURE — 6370000000 HC RX 637 (ALT 250 FOR IP)

## 2025-06-24 PROCEDURE — 97116 GAIT TRAINING THERAPY: CPT

## 2025-06-24 PROCEDURE — 1100000003 HC PRIVATE W/ TELEMETRY

## 2025-06-24 PROCEDURE — 2500000003 HC RX 250 WO HCPCS: Performed by: INTERNAL MEDICINE

## 2025-06-24 PROCEDURE — 93321 DOPPLER ECHO F-UP/LMTD STD: CPT

## 2025-06-24 RX ORDER — FUROSEMIDE 20 MG/1
20 TABLET ORAL 2 TIMES DAILY
Status: DISCONTINUED | OUTPATIENT
Start: 2025-06-24 | End: 2025-06-27 | Stop reason: HOSPADM

## 2025-06-24 RX ADMIN — FUROSEMIDE 20 MG: 10 INJECTION, SOLUTION INTRAMUSCULAR; INTRAVENOUS at 08:46

## 2025-06-24 RX ADMIN — INSULIN LISPRO 4 UNITS: 100 INJECTION, SOLUTION INTRAVENOUS; SUBCUTANEOUS at 21:15

## 2025-06-24 RX ADMIN — INSULIN LISPRO 2 UNITS: 100 INJECTION, SOLUTION INTRAVENOUS; SUBCUTANEOUS at 10:11

## 2025-06-24 RX ADMIN — QUETIAPINE FUMARATE 12.5 MG: 25 TABLET ORAL at 21:16

## 2025-06-24 RX ADMIN — Medication 15 G: at 05:26

## 2025-06-24 RX ADMIN — FAMOTIDINE 40 MG: 20 TABLET, FILM COATED ORAL at 08:46

## 2025-06-24 RX ADMIN — CLOPIDOGREL BISULFATE 75 MG: 75 TABLET, FILM COATED ORAL at 21:16

## 2025-06-24 RX ADMIN — PANTOPRAZOLE SODIUM 40 MG: 40 TABLET, DELAYED RELEASE ORAL at 05:26

## 2025-06-24 RX ADMIN — ASPIRIN 81 MG: 81 TABLET, DELAYED RELEASE ORAL at 08:46

## 2025-06-24 RX ADMIN — INSULIN LISPRO 8 UNITS: 100 INJECTION, SOLUTION INTRAVENOUS; SUBCUTANEOUS at 12:36

## 2025-06-24 RX ADMIN — LOSARTAN POTASSIUM 100 MG: 100 TABLET, FILM COATED ORAL at 08:46

## 2025-06-24 RX ADMIN — METOPROLOL TARTRATE 25 MG: 25 TABLET, FILM COATED ORAL at 21:15

## 2025-06-24 RX ADMIN — FUROSEMIDE 20 MG: 20 TABLET ORAL at 17:27

## 2025-06-24 RX ADMIN — SODIUM CHLORIDE, PRESERVATIVE FREE 10 ML: 5 INJECTION INTRAVENOUS at 21:17

## 2025-06-24 RX ADMIN — METOPROLOL TARTRATE 25 MG: 25 TABLET, FILM COATED ORAL at 08:46

## 2025-06-24 RX ADMIN — MELATONIN 6 MG: at 21:16

## 2025-06-24 RX ADMIN — ENOXAPARIN SODIUM 40 MG: 100 INJECTION SUBCUTANEOUS at 08:45

## 2025-06-24 RX ADMIN — SODIUM CHLORIDE, PRESERVATIVE FREE 10 ML: 5 INJECTION INTRAVENOUS at 08:35

## 2025-06-24 NOTE — PROGRESS NOTES
Hospitalist Progress Note        Demographics    Patient Name  Elise Schneider   Date of Birth 1945   Medical Record Number  [id number]      Age  79 y.o.   PCP Linda Reynoso LMFT   Admit date:  6/20/2025 10:41 AM     Room Number  1120/01  @ [facility]           Admission Diagnoses:  Hyponatremia   Admission Summary:  \" Elise Schneider is a 79 y.o.  female with PMHx significant for coronary artery disease status post stenting, diabetes mellitus, hypertension, GERD, dyslipidemia is coming the hospital chief complaints of bilateral lower extremity swelling.  She reports being largely available dental about 3 months ago when she started having some gradual swelling in her legs along with some pain.  Symptoms really got worse in the last 2 weeks or so.  She reports that swelling initially started in the ankles and now up to the level of the knees along with some pain in both ankles and also knees.  Does not report any chest pain or shortness of breath.  No orthopnea or PND.  She does report decreased urine output in the last few days or so.  Does not report any chest pain.     On arrival to ED, she was noted to normal vitals, on labs sodium is 124, glucose 131, proBNP is 379, LFTs are within normal limits.  TSH is 0.90.  CBC shows a hemoglobin of 11.3 and platelet count of 189. \"  -Dr. Hodges 6/20      Assessment and plan:      BLE edema, R>L, POA  Xray bilateral knees - NAD  Venous duplex - no DVT  Check TTE (prior in 5/2024 showed nml EF and +diastolic dysfunction)  Continue lasix 20mg BID  Elevate BLE. +dena hose     Acute on chronic hyponatremia, POA     Sodium - was 126 yesterday-improved to 132 this am    Urine sodium 36 and osmolality is high- suggestive of  SIADH   Nephrology following - Appreciate expertise  Nephrology signed off today-recommend to continue urea powder at current doses for at least one week after discharge  Continue Urea tablet   On Lasix 20 mg BID  Trend CMP  benefits of SNF placement. [facility] placement      Patient Vitals for the past 24 hrs:   BP   06/24/25 0738 (!) 139/53   06/23/25 2037 (!) 120/57   06/23/25 2004 (!) 120/57      Patient Vitals for the past 24 hrs:   Pulse   06/24/25 0738 78   06/23/25 2037 66   06/23/25 2004 66      Patient Vitals for the past 24 hrs:   Resp   06/24/25 0738 18   06/23/25 2004 18      Patient Vitals for the past 24 hrs:   Temp   06/24/25 0738 98.1 °F (36.7 °C)   06/23/25 2004 97.5 °F (36.4 °C)        SpO2 Readings from Last 6 Encounters:   06/24/25 97%   03/04/25 99%   12/18/24 98%   11/27/24 98%   11/21/24 100%   10/29/24 99%            Body mass index is 23.2 kg/m².   Wt Readings from Last 10 Encounters:   06/24/25 55.7 kg (122 lb 12.7 oz)   03/31/25 59 kg (130 lb)   03/04/25 59.1 kg (130 lb 3.2 oz)   12/27/24 61.1 kg (134 lb 9.6 oz)   12/20/24 62.1 kg (136 lb 12.8 oz)   12/17/24 61.1 kg (134 lb 9.6 oz)   12/18/24 61.2 kg (135 lb)   12/06/24 61.3 kg (135 lb 3.2 oz)   11/27/24 62.3 kg (137 lb 5.6 oz)   11/21/24 60.5 kg (133 lb 6.4 oz)      I/O last 3 completed shifts:  In: 910 [P.O.:910]  Out: 2450 [Urine:2450]  I/O this shift:  In: -   Out: 500 [Urine:500]        Physical Exam:             General:  Awake, alert, NAD, responding to short and direct questions but cannot recall why she is here and what we have been doing/treating  well nourished,   well developed,   appears stated age    Ears/Eyes:  Hearing intact  Sclera and conjunctiva nml.  PERRL   Mouth/Throat:  Mucous membranes normal pink and moist  Nml dentition       Lungs:  No respiratory distress. Currently on RA. Symmetric chest rise.  Lungs CTAB   CVS:  Regular rate and rhythm  No murmur. No click, rub or gallop     Abdomen:  Soft, non-tender. No guarding  Bowel sounds normal     Extremities:  BLE R>L edema noted      Skin:  No rash. No noted wounds  No cyanosis, jaundice   Lymph nodes:  Not examined   Musculoskeletal Muscle bulk nml   Neuro Face symmetrical. No

## 2025-06-24 NOTE — PROGRESS NOTES
Physical therapy services attempted 12:00PM. Pt currently off the unit and unavailable. PT Team will try to provide skilled services at a later date as time permits and as medically appropriate to patient tolerance.    Linda Simeon, PT, DPT

## 2025-06-24 NOTE — PLAN OF CARE
Problem: Physical Therapy - Adult  Goal: By Discharge: Performs mobility at highest level of function for planned discharge setting.  See evaluation for individualized goals.  Description: FUNCTIONAL STATUS PRIOR TO ADMISSION: Patient reports ambulating with walking stick outside, no AD inside. I with ADLs, drives.    HOME SUPPORT PRIOR TO ADMISSION: The patient lived alone in a 1-story home with 4 steps and B rails.    Physical Therapy Goals  Initiated 6/21/2025  1.  Patient will move from supine to sit and sit to supine in bed with modified independence within 7 day(s).    2.  Patient will perform sit to stand with modified independence within 7 day(s).  3.  Patient will transfer from bed to chair and chair to bed with modified independence using the least restrictive device within 7 day(s).  4.  Patient will ambulate with modified independence for 300 feet with the least restrictive device within 7 day(s).   5.  Patient will ascend/descend 4 stairs with 1 handrail(s) with modified independence within 7 day(s).   6/24/2025 1433 by Linda Simeon, PT  Outcome: Progressing     PHYSICAL THERAPY TREATMENT    Patient: Elise Schneider (79 y.o. female)  Date: 6/24/2025  Diagnosis:   Unstable angina (HCC) [I20.0]  Hyponatremia [E87.1]  Peripheral edema [R60.0] Hyponatremia      Precautions: Fall Risk                      ASSESSMENT:  Pt tolerated PT services well and continues to progress toward PT POC goals. Pt received in bed and amenable to therapy services. Pt independent/Jer and ambulatory at baseline. Pt resides alone in a 1SH with 4STE. Pt reports that she has a male goat and reinforced goal to remain independent and \"do things for herself\". \"I don't like to have to ask someone to do things for me\". Compassionate listening, Pt education and redirection to reassure and console with good result.    With increased time/effort, most tasks performed with close cga including functional transfers, ambulation ~

## 2025-06-24 NOTE — PROGRESS NOTES
Occupational Therapy   Chart reviewed; patient currently off unit; will retry later as able. Luann Morales OTR/L

## 2025-06-24 NOTE — PLAN OF CARE
Problem: Occupational Therapy - Adult  Goal: By Discharge: Performs self-care activities at highest level of function for planned discharge setting.  See evaluation for individualized goals.  Description: FUNCTIONAL STATUS PRIOR TO ADMISSION:  Patient is a poor historian at time of evaluation. Per chart review and family report, patient lives alone and uses walking stick for ambulation. Patient with poor eating habits, doing very little cooking/meal prep. Lives with her cat named Kittens and cares for a goat. Only one neighbor but very limited interaction. Patient is cognitively intact and fiercely independent at baseline.    Receives Help From: Family, Neighbor, Prior Level of Assist for ADLs: Independent, Prior Level of Assist for Homemaking: Independent, Ambulation Assistance: Independent, Prior Level of Assist for Transfers: Independent, Active : Yes     HOME SUPPORT: Patient lived alone with sister ~40 miles away. Sister provides transportation.    Occupational Therapy Goals:  Initiated 6/22/2025  1.  Patient will perform standing grooming, including item retrieval, with Modified [place] within 7 day(s).  2.  Patient will perform upper body dressing and lower body dressing with Modified [place] within 7 day(s).  3.  Patient will perform simple home management with Supervision within 7 day(s).  4.  Patient will perform all aspects of toileting, including transfers, with Modified [place]  within 7 day(s).  5.  Patient will participate in cognitive screen to assess home safety (lives alone with limited local support) within 7 day(s).  6.  Patient will utilize energy conservation techniques during functional activities with verbal cues within 7 day(s).      Outcome: Progressing   OCCUPATIONAL THERAPY TREATMENT  Patient: Elise Schneider (79 y.o. female)  Date: 6/24/2025  Primary Diagnosis: Unstable angina (HCC) [I20.0]  Hyponatremia [E87.1]  Peripheral edema [R60.0]       Precautions: Fall Risk,  whatever reason.  3. The need for supervision renders the patient not independent.  4. A patient's performance should be established using the best available evidence. Asking the patient, friends/relatives and nurses are the usual sources, but direct observation and common sense are also important. However direct testing is not needed.  5. Usually the patient's performance over the preceding 24-48 hours is important, but occasionally longer periods will be relevant.  6. Middle categories imply that the patient supplies over 50 per cent of the effort.  7. Use of aids to be independent is allowed.    Score Interpretation (from Shun )    Independent   60-79 Minimally independent   40-59 Partially dependent   20-39 Very dependent   <20 Totally dependent     -Aba Varghese., Barthel, DPATT. (1965). Functional evaluation: the Barthel Index. Md State Med J (142.  -PAMELA Hoffmann, RAJI Beard (). The Barthel activities of daily living index: self-reporting versus actual performance in the old (> or = 75 years). Journal of American Geriatric Society 45(7), 832-836.   -DANIA FullerF, MARCO Colon., Jamari, JFreddyA., Charles, A.J. (1999). Measuring the change in disability after inpatient rehabilitation; comparison of the responsiveness of the Barthel Index and Functional [place] Measure. Journal of Neurology, Neurosurgery, and Psychiatry, 66(4), 480-484.  -ELEAZAR Muñoz.FLAKITO, TRISTAN Mcclellan, & Micha, M.A. (2004) Assessment of post-stroke quality of life in cost-effectiveness studies: The usefulness of the Barthel Index and the EuroQoL-5D. Quality of Life Research, 13, 427-43                                                                                                                                                                                                                                 Pain Ratin/10   Pain Intervention(s):   pain is at a level acceptable to the

## 2025-06-24 NOTE — PLAN OF CARE
Problem: Chronic Conditions and Co-morbidities  Goal: Patient's chronic conditions and co-morbidity symptoms are monitored and maintained or improved  Outcome: Progressing     Problem: Discharge Planning  Goal: Discharge to home or other facility with appropriate resources  Outcome: Progressing     Problem: Pain  Goal: Verbalizes/displays adequate comfort level or baseline comfort level  Outcome: Progressing     Problem: Safety - Adult  Goal: Free from fall injury  Outcome: Progressing     Problem: Physical Therapy - Adult  Goal: By Discharge: Performs mobility at highest level of function for planned discharge setting.  See evaluation for individualized goals.  Description: FUNCTIONAL STATUS PRIOR TO ADMISSION: Patient reports ambulating with walking stick outside, no AD inside. I with ADLs, drives.    HOME SUPPORT PRIOR TO ADMISSION: The patient lived alone in a 1-story home with 4 steps and B rails.    Physical Therapy Goals  Initiated 6/21/2025  1.  Patient will move from supine to sit and sit to supine in bed with modified independence within 7 day(s).    2.  Patient will perform sit to stand with modified independence within 7 day(s).  3.  Patient will transfer from bed to chair and chair to bed with modified independence using the least restrictive device within 7 day(s).  4.  Patient will ambulate with modified independence for 300 feet with the least restrictive device within 7 day(s).   5.  Patient will ascend/descend 4 stairs with 1 handrail(s) with modified independence within 7 day(s).   6/24/2025 1433 by Linda Simeon, PT  Outcome: Progressing  6/24/2025 1433 by Linda Simeon, PT  Outcome: Progressing

## 2025-06-24 NOTE — PROGRESS NOTES
End of Shift Note    Bedside shift change report given to ALIA Meier (oncoming nurse) by Katie Rg RN (offgoing nurse).  Report included the following information SBAR, Kardex, Intake/Output, and MAR    Shift worked:  Day shift     Shift summary and any significant changes:     - Patient frequently disorientated on my shift, trying to get out of bed frequently. Patient was able to be redirected most times. MD aware. Increased melatonin. Lab ordered for AM.   - Patient walker x1 assist to the bathroom.   - Patient went for echo today.   - Patient unable to finish URE- NA.     Concerns for physician to address:  None     Zone phone for oncoming shift:   [phone]       Activity:  Level of Assistance: Standby assist, set-up cues, supervision of patient - no hands on  Number times ambulated in hallways past shift: 2  Number of times OOB to chair past shift: 3    Cardiac:   Cardiac Monitoring: No      Cardiac Rhythm: Sinus rhythm    Access:  Current line(s): PIV    Genitourinary:   Urinary Status: Voiding, Bathroom privileges    Respiratory:   O2 Device: None (Room air)  Chronic home O2 use?: NO  Incentive spirometer at bedside: NO    GI:  Last BM (including prior to admit): 06/24/25  Current diet:  ADULT DIET; Regular; 4 carb choices (60 gm/meal)  Passing flatus: YES    Pain Management:   Patient states pain is manageable on current regimen: YES    Skin:  Daniel Scale Score: 20  Interventions: Wound Offloading (Prevention Methods): Bed, pressure redistribution/air, Bed, pressure reduction mattress, Blankets, Elevate heels, Pillows, Repositioning    Patient Safety:  Fall Risk: Nursing Judgement-Fall Risk High(Add Comments): Yes  Fall Risk Interventions  Nursing Judgement-Fall Risk High(Add Comments): Yes  Toilet Every 2 Hours-In Advance of Need: Yes  Hourly Visual Checks: Awake, In bed  Fall Visual Posted: Armband, Fall sign posted, Socks  Room Door Open: Deferred to promote rest  Alarm On: Bed  Patient Moved Closer to

## 2025-06-24 NOTE — PROGRESS NOTES
End of Shift Note    Bedside shift change report given to ALIA Wilson (oncoming nurse) by SALLY MICHELE RN (offgoing nurse).  Report included the following information SBAR, Kardex, and MAR    Shift worked:  7p-7a     Shift summary and any significant changes:     AAOx2, VSS, no complaint of pain, safety rounding completed.      Concerns for physician to address:  no     Zone phone for oncoming shift:   [phone]       Activity:  Level of Assistance: Minimal assist, patient does 75% or more    Cardiac:   Cardiac Monitoring:  no    Access:  Current line(s): PIV     Genitourinary:   Urinary Status: Voiding, Bathroom privileges    Respiratory:   O2 Device: None (Room air)    GI:  Current diet: ADULT DIET; Regular; 4 carb choices (60 gm/meal)    Pain Management:   Patient states pain is manageable on current regimen: N/A    Skin:  Daniel Scale Score: 19  Interventions: Wound Offloading (Prevention Methods): Pillows    Patient Safety:  Fall Score: Grady Total Score: 45  Fall Risk Interventions  Nursing Judgement-Fall Risk High(Add Comments): Yes  Toilet Every 2 Hours-In Advance of Need: Yes  Hourly Visual Checks: Awake, In bed  Fall Visual Posted: Armband, Fall sign posted, Socks  Room Door Open: Deferred to promote rest  Alarm On: Bed  Patient Moved Closer to Nursing Station: No    Active Consults:  IP CONSULT TO NEPHROLOGY    Length of Stay:  Expected LOS: 4  Actual LOS: 4      SALLY MICHELE RN

## 2025-06-24 NOTE — PLAN OF CARE
Problem: Chronic Conditions and Co-morbidities  Goal: Patient's chronic conditions and co-morbidity symptoms are monitored and maintained or improved  Outcome: Progressing     Problem: Discharge Planning  Goal: Discharge to home or other facility with appropriate resources  Outcome: Progressing     Problem: Pain  Goal: Verbalizes/displays adequate comfort level or baseline comfort level  Outcome: Progressing     Problem: Safety - Adult  Goal: Free from fall injury  Outcome: Progressing     Problem: Confusion  Goal: Confusion, delirium, dementia, or psychosis is improved or at baseline  Description: INTERVENTIONS:  1. Assess for possible contributors to thought disturbance, including medications, impaired vision or hearing, underlying metabolic abnormalities, dehydration, psychiatric diagnoses, and notify attending LIP  2. [place] high risk fall precautions, as indicated  3. Provide frequent short contacts to provide reality reorientation, refocusing and direction  4. Decrease environmental stimuli, including noise as appropriate  5. Monitor and intervene to maintain adequate nutrition, hydration, elimination, sleep and activity  6. If unable to ensure safety without constant attention obtain sitter and review sitter guidelines with assigned personnel  7. Initiate Psychosocial CNS and Spiritual Care consult, as indicated  Outcome: Progressing

## 2025-06-24 NOTE — PROGRESS NOTES
Nephrology Progress Note  [facility] [place] [facility]  [address], [address]  [place] [address]  Phone - [phone]  Fax - [phone]                   Patient: Elise Schneider                     YOB: 1945        Date- 6/24/2025                                     Admit Date: 6/20/2025   CC: Follow up for Hyponatremia          IMPRESSION & PLAN:   Hyponatremia due to low solute intake +/- SIADH +/- chf  Confusion  Hypertension  H/o chronic HYPONATREMIA      PLAN-  Received tolvaptan one-time dose 15 mg yesterday  No further need for tolvaptan today  Sodium almost near normal  Can continue urea powder at current doses for at least 1 week after discharge  If unable to get urea powder then can be discharged on salt tablets 1 g 3 times daily  Check sodium daily  Renal team will sign off call if needed     Subjective:  Interval History:   - Seen and examined today  - Eating breakfast this morning    Objective:   Vitals:    06/23/25 2004 06/23/25 2037 06/24/25 0600 06/24/25 0738   BP: (!) 120/57 (!) 120/57  (!) 139/53   Pulse: 66 66  78   Resp: 18   18   Temp: 97.5 °F (36.4 °C)   98.1 °F (36.7 °C)   TempSrc: Oral      SpO2: 98%   97%   Weight:   55.7 kg (122 lb 12.7 oz)    Height:          I/O last 3 completed shifts:  In: 910 [P.O.:910]  Out: 2450 [Urine:2450]  No intake/output data recorded.      Physical exam:    GEN: NAD  NECK- no mass  RESP: No wheezing, decreased BS b/l  CVS: S1,S2  RRR  NEURO: Normal speech, Non focal  EXT: No Edema   PSYCH: Normal Mood    Chart reviewed.         Pertinent Notes reviewed.       Data Review :  Lab Results   Component Value Date/Time     06/24/2025 05:08 AM    K 3.3 06/23/2025 05:16 AM    CL 95 06/23/2025 05:16 AM    CO2 24 06/23/2025 05:16 AM    BUN 26 06/23/2025 05:16 AM    CREATININE 0.56 06/23/2025 05:16 AM    GLUCOSE 153 06/23/2025 05:16 AM    CALCIUM 8.6 06/23/2025 05:16 AM       Lab

## 2025-06-25 LAB
25(OH)D3 SERPL-MCNC: 30.3 NG/ML (ref 30–100)
ALBUMIN SERPL-MCNC: 2.9 G/DL (ref 3.5–5)
ALBUMIN/GLOB SERPL: 1.1 (ref 1.1–2.2)
ALP SERPL-CCNC: 52 U/L (ref 45–117)
ALT SERPL-CCNC: 39 U/L (ref 12–78)
ANION GAP SERPL CALC-SCNC: 6 MMOL/L (ref 2–12)
AST SERPL-CCNC: 16 U/L (ref 15–37)
BASOPHILS # BLD: 0 K/UL (ref 0–0.1)
BASOPHILS NFR BLD: 0 % (ref 0–1)
BILIRUB SERPL-MCNC: 0.9 MG/DL (ref 0.2–1)
BUN SERPL-MCNC: 29 MG/DL (ref 6–20)
BUN/CREAT SERPL: 57 (ref 12–20)
CALCIUM SERPL-MCNC: 9.1 MG/DL (ref 8.5–10.1)
CHLORIDE SERPL-SCNC: 102 MMOL/L (ref 97–108)
CO2 SERPL-SCNC: 26 MMOL/L (ref 21–32)
CREAT SERPL-MCNC: 0.51 MG/DL (ref 0.55–1.02)
DIFFERENTIAL METHOD BLD: ABNORMAL
ECHO AO ASC DIAM: 3 CM
ECHO AO ASCENDING AORTA INDEX: 1.95 CM/M2
ECHO AO ROOT DIAM: 3 CM
ECHO AO ROOT INDEX: 1.95 CM/M2
ECHO AR MAX VEL PISA: 3.3 M/S
ECHO AV MEAN GRADIENT: 4 MMHG
ECHO AV MEAN VELOCITY: 1 M/S
ECHO AV PEAK GRADIENT: 8 MMHG
ECHO AV PEAK GRADIENT: 9 MMHG
ECHO AV PEAK VELOCITY: 1.4 M/S
ECHO AV PEAK VELOCITY: 1.5 M/S
ECHO AV REGURGITANT PHT: 568.2 MS
ECHO AV VTI: 29.2 CM
ECHO LA DIAMETER INDEX: 2.08 CM/M2
ECHO LA DIAMETER: 3.2 CM
ECHO LA TO AORTIC ROOT RATIO: 1.07
ECHO LA VOL A-L A2C: 45 ML (ref 22–52)
ECHO LA VOL A-L A4C: 68 ML (ref 22–52)
ECHO LA VOL MOD A2C: 42 ML (ref 22–52)
ECHO LA VOL MOD A4C: 61 ML (ref 22–52)
ECHO LA VOLUME AREA LENGTH: 56 ML
ECHO LA VOLUME INDEX A-L A2C: 29 ML/M2 (ref 16–34)
ECHO LA VOLUME INDEX A-L A4C: 44 ML/M2 (ref 16–34)
ECHO LA VOLUME INDEX AREA LENGTH: 36 ML/M2 (ref 16–34)
ECHO LA VOLUME INDEX MOD A2C: 27 ML/M2 (ref 16–34)
ECHO LA VOLUME INDEX MOD A4C: 40 ML/M2 (ref 16–34)
ECHO LV E' LATERAL VELOCITY: 8.56 CM/S
ECHO LV E' SEPTAL VELOCITY: 7.29 CM/S
ECHO LV EF PHYSICIAN: 58 %
ECHO LV FRACTIONAL SHORTENING: 30 % (ref 28–44)
ECHO LV INTERNAL DIMENSION DIASTOLE INDEX: 2.79 CM/M2
ECHO LV INTERNAL DIMENSION DIASTOLIC: 4.3 CM (ref 3.9–5.3)
ECHO LV INTERNAL DIMENSION SYSTOLIC INDEX: 1.95 CM/M2
ECHO LV INTERNAL DIMENSION SYSTOLIC: 3 CM
ECHO LV ISOVOLUMETRIC RELAXATION TIME (IVRT): 114.2 MS
ECHO LV IVSD: 0.8 CM (ref 0.6–0.9)
ECHO LV MASS 2D: 105.3 G (ref 67–162)
ECHO LV MASS INDEX 2D: 68.4 G/M2 (ref 43–95)
ECHO LV POSTERIOR WALL DIASTOLIC: 0.8 CM (ref 0.6–0.9)
ECHO LV RELATIVE WALL THICKNESS RATIO: 0.37
ECHO LVOT AREA: 3.8 CM2
ECHO LVOT DIAM: 2.2 CM
ECHO MV A VELOCITY: 0.98 M/S
ECHO MV E DECELERATION TIME (DT): 289.4 MS
ECHO MV E VELOCITY: 0.65 M/S
ECHO MV E/A RATIO: 0.66
ECHO MV E/E' LATERAL: 7.59
ECHO MV E/E' RATIO (AVERAGED): 8.25
ECHO MV E/E' SEPTAL: 8.92
ECHO RV FREE WALL PEAK S': 14.7 CM/S
ECHO RV INTERNAL DIMENSION: 2.9 CM
ECHO RV LONGITUDINAL DIMENSION: 3.7 CM
ECHO RV TAPSE: 2.5 CM (ref 1.7–?)
ECHO TV REGURGITANT MAX VELOCITY: 2.66 M/S
ECHO TV REGURGITANT PEAK GRADIENT: 28 MMHG
EOSINOPHIL # BLD: 0.06 K/UL (ref 0–0.4)
EOSINOPHIL NFR BLD: 1 % (ref 0–7)
ERYTHROCYTE [DISTWIDTH] IN BLOOD BY AUTOMATED COUNT: 12.6 % (ref 11.5–14.5)
FOLATE SERPL-MCNC: 9.3 NG/ML (ref 5–21)
GLOBULIN SER CALC-MCNC: 2.7 G/DL (ref 2–4)
GLUCOSE BLD STRIP.AUTO-MCNC: 200 MG/DL (ref 65–117)
GLUCOSE BLD STRIP.AUTO-MCNC: 254 MG/DL (ref 65–117)
GLUCOSE BLD STRIP.AUTO-MCNC: 367 MG/DL (ref 65–117)
GLUCOSE SERPL-MCNC: 173 MG/DL (ref 65–100)
HCT VFR BLD AUTO: 27.3 % (ref 35–47)
HGB BLD-MCNC: 9.2 G/DL (ref 11.5–16)
IMM GRANULOCYTES # BLD AUTO: 0 K/UL (ref 0–0.04)
IMM GRANULOCYTES NFR BLD AUTO: 0 % (ref 0–0.5)
LYMPHOCYTES # BLD: 0.87 K/UL (ref 0.8–3.5)
LYMPHOCYTES NFR BLD: 15 % (ref 12–49)
MCH RBC QN AUTO: 32.5 PG (ref 26–34)
MCHC RBC AUTO-ENTMCNC: 33.7 G/DL (ref 30–36.5)
MCV RBC AUTO: 96.5 FL (ref 80–99)
MONOCYTES # BLD: 0.58 K/UL (ref 0–1)
MONOCYTES NFR BLD: 10 % (ref 5–13)
NEUTS BAND NFR BLD MANUAL: 1 %
NEUTS SEG # BLD: 4.29 K/UL (ref 1.8–8)
NEUTS SEG NFR BLD: 73 % (ref 32–75)
NRBC # BLD: 0 K/UL (ref 0–0.01)
NRBC BLD-RTO: 0 PER 100 WBC
PLATELET # BLD AUTO: 159 K/UL (ref 150–400)
PMV BLD AUTO: 9.5 FL (ref 8.9–12.9)
POTASSIUM SERPL-SCNC: 3.6 MMOL/L (ref 3.5–5.1)
PROT SERPL-MCNC: 5.6 G/DL (ref 6.4–8.2)
RBC # BLD AUTO: 2.83 M/UL (ref 3.8–5.2)
RBC MORPH BLD: ABNORMAL
SERVICE CMNT-IMP: ABNORMAL
SODIUM SERPL-SCNC: 134 MMOL/L (ref 136–145)
VIT B12 SERPL-MCNC: 199 PG/ML (ref 193–986)
WBC # BLD AUTO: 5.8 K/UL (ref 3.6–11)

## 2025-06-25 PROCEDURE — 82607 VITAMIN B-12: CPT

## 2025-06-25 PROCEDURE — 6370000000 HC RX 637 (ALT 250 FOR IP): Performed by: INTERNAL MEDICINE

## 2025-06-25 PROCEDURE — 6370000000 HC RX 637 (ALT 250 FOR IP)

## 2025-06-25 PROCEDURE — 97535 SELF CARE MNGMENT TRAINING: CPT

## 2025-06-25 PROCEDURE — 80053 COMPREHEN METABOLIC PANEL: CPT

## 2025-06-25 PROCEDURE — 82746 ASSAY OF FOLIC ACID SERUM: CPT

## 2025-06-25 PROCEDURE — 2500000003 HC RX 250 WO HCPCS: Performed by: INTERNAL MEDICINE

## 2025-06-25 PROCEDURE — 82306 VITAMIN D 25 HYDROXY: CPT

## 2025-06-25 PROCEDURE — 6360000002 HC RX W HCPCS: Performed by: INTERNAL MEDICINE

## 2025-06-25 PROCEDURE — 85025 COMPLETE CBC W/AUTO DIFF WBC: CPT

## 2025-06-25 PROCEDURE — 1100000003 HC PRIVATE W/ TELEMETRY

## 2025-06-25 PROCEDURE — 84295 ASSAY OF SERUM SODIUM: CPT

## 2025-06-25 PROCEDURE — 6370000000 HC RX 637 (ALT 250 FOR IP): Performed by: PHYSICIAN ASSISTANT

## 2025-06-25 PROCEDURE — 36415 COLL VENOUS BLD VENIPUNCTURE: CPT

## 2025-06-25 PROCEDURE — 97116 GAIT TRAINING THERAPY: CPT

## 2025-06-25 RX ADMIN — ACETAMINOPHEN 650 MG: 325 TABLET ORAL at 20:47

## 2025-06-25 RX ADMIN — FUROSEMIDE 20 MG: 20 TABLET ORAL at 08:56

## 2025-06-25 RX ADMIN — SODIUM CHLORIDE, PRESERVATIVE FREE 10 ML: 5 INJECTION INTRAVENOUS at 21:48

## 2025-06-25 RX ADMIN — TRAMADOL HYDROCHLORIDE 25 MG: 50 TABLET, FILM COATED ORAL at 15:26

## 2025-06-25 RX ADMIN — ASPIRIN 81 MG: 81 TABLET, DELAYED RELEASE ORAL at 08:56

## 2025-06-25 RX ADMIN — ACETAMINOPHEN 650 MG: 325 TABLET ORAL at 11:45

## 2025-06-25 RX ADMIN — QUETIAPINE FUMARATE 12.5 MG: 25 TABLET ORAL at 20:47

## 2025-06-25 RX ADMIN — MELATONIN 6 MG: at 20:47

## 2025-06-25 RX ADMIN — CLOPIDOGREL BISULFATE 75 MG: 75 TABLET, FILM COATED ORAL at 20:47

## 2025-06-25 RX ADMIN — INSULIN LISPRO 4 UNITS: 100 INJECTION, SOLUTION INTRAVENOUS; SUBCUTANEOUS at 17:22

## 2025-06-25 RX ADMIN — SODIUM CHLORIDE, PRESERVATIVE FREE 10 ML: 5 INJECTION INTRAVENOUS at 08:57

## 2025-06-25 RX ADMIN — TRAMADOL HYDROCHLORIDE 25 MG: 50 TABLET, FILM COATED ORAL at 09:02

## 2025-06-25 RX ADMIN — FAMOTIDINE 40 MG: 20 TABLET, FILM COATED ORAL at 08:56

## 2025-06-25 RX ADMIN — LOSARTAN POTASSIUM 100 MG: 100 TABLET, FILM COATED ORAL at 08:56

## 2025-06-25 RX ADMIN — INSULIN LISPRO 2 UNITS: 100 INJECTION, SOLUTION INTRAVENOUS; SUBCUTANEOUS at 21:48

## 2025-06-25 RX ADMIN — FUROSEMIDE 20 MG: 20 TABLET ORAL at 17:22

## 2025-06-25 RX ADMIN — ENOXAPARIN SODIUM 40 MG: 100 INJECTION SUBCUTANEOUS at 08:55

## 2025-06-25 RX ADMIN — METOPROLOL TARTRATE 25 MG: 25 TABLET, FILM COATED ORAL at 08:56

## 2025-06-25 RX ADMIN — Medication 15 G: at 05:31

## 2025-06-25 RX ADMIN — PANTOPRAZOLE SODIUM 40 MG: 40 TABLET, DELAYED RELEASE ORAL at 05:31

## 2025-06-25 ASSESSMENT — PAIN - FUNCTIONAL ASSESSMENT
PAIN_FUNCTIONAL_ASSESSMENT: ACTIVITIES ARE NOT PREVENTED
PAIN_FUNCTIONAL_ASSESSMENT: PREVENTS OR INTERFERES SOME ACTIVE ACTIVITIES AND ADLS

## 2025-06-25 ASSESSMENT — PAIN DESCRIPTION - ORIENTATION
ORIENTATION: LEFT
ORIENTATION: RIGHT
ORIENTATION: LEFT;MID;LOWER
ORIENTATION: LEFT

## 2025-06-25 ASSESSMENT — PAIN SCALES - GENERAL
PAINLEVEL_OUTOF10: 10
PAINLEVEL_OUTOF10: 8
PAINLEVEL_OUTOF10: 5
PAINLEVEL_OUTOF10: 6
PAINLEVEL_OUTOF10: 3
PAINLEVEL_OUTOF10: 10
PAINLEVEL_OUTOF10: 0
PAINLEVEL_OUTOF10: 10

## 2025-06-25 ASSESSMENT — PAIN DESCRIPTION - LOCATION
LOCATION: BACK
LOCATION: GENERALIZED
LOCATION: KNEE
LOCATION: EAR
LOCATION: KNEE

## 2025-06-25 ASSESSMENT — PAIN DESCRIPTION - FREQUENCY
FREQUENCY: INTERMITTENT
FREQUENCY: INTERMITTENT

## 2025-06-25 ASSESSMENT — PAIN DESCRIPTION - DESCRIPTORS
DESCRIPTORS: ACHING

## 2025-06-25 ASSESSMENT — PAIN DESCRIPTION - ONSET
ONSET: GRADUAL
ONSET: GRADUAL

## 2025-06-25 ASSESSMENT — PAIN DESCRIPTION - PAIN TYPE
TYPE: ACUTE PAIN
TYPE: ACUTE PAIN

## 2025-06-25 NOTE — PLAN OF CARE
Problem: Occupational Therapy - Adult  Goal: By Discharge: Performs self-care activities at highest level of function for planned discharge setting.  See evaluation for individualized goals.  Description: FUNCTIONAL STATUS PRIOR TO ADMISSION:  Patient is a poor historian at time of evaluation. Per chart review and family report, patient lives alone and uses walking stick for ambulation. Patient with poor eating habits, doing very little cooking/meal prep. Lives with her cat named Kittens and cares for a goat. Only one neighbor but very limited interaction. Patient is cognitively intact and fiercely independent at baseline.    Receives Help From: Family, Neighbor, Prior Level of Assist for ADLs: Independent, Prior Level of Assist for Homemaking: Independent, Ambulation Assistance: Independent, Prior Level of Assist for Transfers: Independent, Active : Yes     HOME SUPPORT: Patient lived alone with sister ~40 miles away. Sister provides transportation.    Occupational Therapy Goals:  Initiated 6/22/2025  1.  Patient will perform standing grooming, including item retrieval, with Modified [place] within 7 day(s).  2.  Patient will perform upper body dressing and lower body dressing with Modified [place] within 7 day(s).  3.  Patient will perform simple home management with Supervision within 7 day(s).  4.  Patient will perform all aspects of toileting, including transfers, with Modified [place]  within 7 day(s).  5.  Patient will participate in cognitive screen to assess home safety (lives alone with limited local support) within 7 day(s).  6.  Patient will utilize energy conservation techniques during functional activities with verbal cues within 7 day(s).      Outcome: Progressing Slowly   OCCUPATIONAL THERAPY TREATMENT  Patient: Elise Schneider (79 y.o. female)  Date: 6/25/2025  Primary Diagnosis: Unstable angina (HCC) [I20.0]  Hyponatremia [E87.1]  Peripheral edema [R60.0]       Precautions: Fall  (occasional)  Standing: Impaired  Standing - Static: Constant support;Fair  Standing - Dynamic: Constant support;Fair      ADL Intervention:                                                    LE Dressing: Other (Comment)  LE Dressing Skilled Clinical Factors: not addressed due to reported back pain, note edema decreased today B LE                   Functional Mobility: Maximum assistance;Minimal assistance  Functional Mobility Skilled Clinical Factors: min A ambulation with RW-max A x 2bed mobility including rolling and supine to sit; marked back pain and fear of falling; nephew present; reports very unusual behavior   Training in use of call bell, \"How do you ask the nurse to come here to help you? \"I yell for her.\" \"But what did I ask you to do yesterday? \"Push the button on the thing.\" Picked up the call bell and pushed volume button  Pain Ratin/10 low mid back in supine and in med mobility; 8/10 up in chair  Pain Intervention(s):   nursing notified and addressing, ice, rest, elevation, repositioning, and NP notified as well      Activity Tolerance:   Poor, requires frequent rest breaks, SpO2 stable on room air, and BP soft but stable  Please refer to the flowsheet for vital signs taken during this treatment.    After treatment:   Patient left in no apparent distress sitting up in chair, Call bell within reach, Bed/ chair alarm activated, Caregiver / family present, Heels elevated for pressure relief, and Updated patient's board on functional status and mobility recommendations    COMMUNICATION/EDUCATION:   The patient's plan of care was discussed with: physical therapist, registered nurse, , and certified nursing assistant/patient care technician    Patient Education  Education Given To: Patient;Family  Education Provided: Plan of Care;Home Exercise Program;Transfer Training;Fall Prevention Strategies;Precautions;Orientation;Mobility Training;ADL Adaptive Strategies  Education Provided

## 2025-06-25 NOTE — PROGRESS NOTES
End of Shift Note    Bedside shift change report given to ALIA Ahumada (oncoming nurse) by Angeles Tejada RN (offgoing nurse).  Report included the following information SBAR, Kardex, and MAR    Shift worked:  7a-7p     Shift summary and any significant changes:     Mrs. Schneider has been confused but cooperative throughout the day.  She has ambulated to the bathroom with the walker and a mostly standby assist.     Concerns for physician to address:  none     Zone phone for oncoming shift:   [phone]           Angeles Tejada RN

## 2025-06-25 NOTE — CARE COORDINATION
Transition of Care Plan:     RUR: 17%  Prior Level of Functioning: Independent   Disposition: SNF   LUIS: 6/26/25  If SNF or IPR: Date FOC offered: 6/25/25  Date FOC received: 6/25/25  Accepting facility: [facility]   Date authorization started with reference number: 6/25  Date authorization received and expires:Auth ID: [id number]  Ref#: [id number]  Service Dates: 06/26/2025-06/30/2025   Follow up appointments: Defer to facility   DME needed: Defer to facility   Transportation at discharge: BLS  IM/IMM Medicare/ letter given: Needs 2nd IMM letter   Is patient a  and connected with VA? No              If yes, was  transfer form completed and VA notified? no  Caregiver Contact: Pt's sister   Discharge Caregiver contacted prior to discharge? Pt will contacted   Care Conference needed? No  Barriers to discharge: Auth/Bed       411: CM has received auth for placement at [facility].    Pending bed at [facility].    408 pm: [facility] has accepted pt.    Pt and pt's sister is agreeable for [facility].    CM has requested auth to be started for placement at [facility].     Initial Note: CM reviewed pt's chart and CM is aware that pt is medically stable for d/c but waiting on auth, bed, and placement.    CM was able to speak to pt's sister regarding choices for placement and sister was agreeable for placement to be either at [facility] or [facility].    Referral was sent to [facility] and [facility].    CM will continue to follow and assist with d/c planning.    Radha Ch    [phone]

## 2025-06-25 NOTE — PROGRESS NOTES
Hospitalist Progress Note        Demographics    Patient Name  Elise Schneider   Date of Birth 1945   Medical Record Number  [id number]      Age  79 y.o.   PCP Linda Reynoso LMFT   Admit date:  6/20/2025 10:41 AM     Room Number  1120/01  @ [facility]           Admission Diagnoses:  Hyponatremia   Admission Summary:  \" Elise Schneider is a 79 y.o.  female with PMHx significant for coronary artery disease status post stenting, diabetes mellitus, hypertension, GERD, dyslipidemia is coming the hospital chief complaints of bilateral lower extremity swelling.  She reports being largely available dental about 3 months ago when she started having some gradual swelling in her legs along with some pain.  Symptoms really got worse in the last 2 weeks or so.  She reports that swelling initially started in the ankles and now up to the level of the knees along with some pain in both ankles and also knees.  Does not report any chest pain or shortness of breath.  No orthopnea or PND.  She does report decreased urine output in the last few days or so.  Does not report any chest pain.     On arrival to ED, she was noted to normal vitals, on labs sodium is 124, glucose 131, proBNP is 379, LFTs are within normal limits.  TSH is 0.90.  CBC shows a hemoglobin of 11.3 and platelet count of 189. \"  -Dr. Hodges 6/20      Assessment and plan:      BLE edema, R>L, POA  Xray bilateral knees - NAD  Venous duplex - no DVT  Check TTE (prior in 5/2024 showed nml EF and +diastolic dysfunction)  Continue lasix 20mg BID  Elevate BLE. +dena hose     Acute on chronic hyponatremia, POA     Sodium - was 130 yesterday-improved to 134 this am    Urine sodium 36 and osmolality is high- suggestive of  SIADH   Nephrology following - Appreciate expertise  Nephrology signed off 6/24-recommend to continue urea powder at current doses for at least one week after discharge  Continue Urea tablet   On Lasix 20 mg BID  Trend CMP daily       Hospital acquired delirium, not POA    Much improvement in mentation today    Nursing staff complained multiple attempts getting up from bed last night . Will Continue Seroquel and melatonin to aid with sleep .    Chronic comorbitidies:  Hx CAD s/p PCI  GERD  Hypertension  Dyslipidemia  Diabetes mellitus type 2  Tobacco use  Continue aspirin, Plavix, PPI,    Home Norvasc was discontinued, as it could be contributing to bilateral pedal edema  She is on lovastatin at home but cannot take atorvastatin so we will hold  Hold home metformin.   Insulin sliding scale with blood sugar checks  Nicotine patch  Encourage cessation                 CODE STATUS   Full   Functional Status:  independent   Capacity:  Pt does NOT decision making capacity at time of assessment   Prophylaxis   Lovenox   Discharge Plan:  Home w/Family,     [facility]  Payor: [employer] MEDICARE / Plan: [employer] GOLD PLUS HMO / Product Type: *No Product type* /   No active isolations No active infections   Query   None noted today    Prognosis   good   Social issues  None  6/23: Sister at bedside - Plan of care discussed  No family at bedside today  6/25: Nephew at bedside     Medical Decision Making:    Chart review:  TTE 5/2024   Relevant labs were reviewed:  Yes cbc, cmp, bnp   Imaging report reviewed  Yes cxr, xray b/l knees, venous duplex BLE   EKG reviewed  Yes    High risk/toxic drug monitoring  Yes IV diuretic   Care plan discussed with  Patient/Family , Nurse, , and Consultant nephrology and Dr. Saenz      Subjective and Objective Data     Report back pain. Much improvement in mentation today.  She still has swelling in legs. No chest pain or SOB. Tolerating her diet.    Review of Systems - Pertinent ROS completed as above.    Impression Discussed with sister at bedside who prefers patient to be discharge to SNF. Patient is adamant to go home. Education provided on benefits of SNF placement. Pending SNF placement      Patient

## 2025-06-25 NOTE — PLAN OF CARE
Problem: Chronic Conditions and Co-morbidities  Goal: Patient's chronic conditions and co-morbidity symptoms are monitored and maintained or improved  6/25/2025 0019 by Hardeep Beard RN  Outcome: Progressing  Flowsheets (Taken 6/24/2025 2115)  Care Plan - Patient's Chronic Conditions and Co-Morbidity Symptoms are Monitored and Maintained or Improved: Monitor and assess patient's chronic conditions and comorbid symptoms for stability, deterioration, or improvement  6/24/2025 1548 by Katie Rg RN  Outcome: Progressing     Problem: Discharge Planning  Goal: Discharge to home or other facility with appropriate resources  6/25/2025 0019 by Hardeep Beard RN  Outcome: Progressing  Flowsheets (Taken 6/24/2025 2115)  Discharge to home or other facility with appropriate resources: Identify barriers to discharge with patient and caregiver  6/24/2025 1548 by Katie Rg RN  Outcome: Progressing     Problem: Pain  Goal: Verbalizes/displays adequate comfort level or baseline comfort level  6/25/2025 0019 by Hardeep Beard RN  Outcome: Progressing  6/24/2025 1548 by Katie Rg RN  Outcome: Progressing     Problem: Safety - Adult  Goal: Free from fall injury  6/25/2025 0019 by Hardeep Beard RN  Outcome: Progressing  6/24/2025 1548 by Katie Rg RN  Outcome: Progressing     Problem: Physical Therapy - Adult  Goal: By Discharge: Performs mobility at highest level of function for planned discharge setting.  See evaluation for individualized goals.  Description: FUNCTIONAL STATUS PRIOR TO ADMISSION: Patient reports ambulating with walking stick outside, no AD inside. I with ADLs, drives.    HOME SUPPORT PRIOR TO ADMISSION: The patient lived alone in a 1-story home with 4 steps and B rails.    Physical Therapy Goals  Initiated 6/21/2025  1.  Patient will move from supine to sit and sit to supine in bed with modified independence within 7 day(s).    2.  Patient will perform sit to stand with

## 2025-06-25 NOTE — PROGRESS NOTES
End of Shift Note    Bedside shift change report given to ALIA Reynoso (oncoming nurse) by SALLY MICHELE RN (offgoing nurse).  Report included the following information SBAR, Kardex, and MAR    Shift worked:  7p-7a     Shift summary and any significant changes:     Patient was confused and getting out of bed frequesntly, removing gown and stated she wanted to leave this place, redirected patient, safety rounding completed.      Concerns for physician to address:  confusion     Zone phone for oncoming shift:   [phone]       Activity:  Level of Assistance: Standby assist, set-up cues, supervision of patient - no hands on    Cardiac:   Cardiac Monitoring:  no    Access:  Current line(s): PIV     Genitourinary:   Urinary Status: Voiding, Bathroom privileges    Respiratory:   O2 Device: None (Room air)    GI:  Current diet: ADULT DIET; Regular; 4 carb choices (60 gm/meal)    Pain Management:   Patient states pain is manageable on current regimen: N/A    Skin:  Daniel Scale Score: 20  Interventions: Wound Offloading (Prevention Methods): Bed, pressure reduction mattress, Elevate heels, Pillows, Repositioning    Patient Safety:  Fall Score: [place] Total Score: 100  Fall Risk Interventions  Nursing Judgement-Fall Risk High(Add Comments): Yes  Toilet Every 2 Hours-In Advance of Need: Yes  Hourly Visual Checks: Awake, In bed  Fall Visual Posted: Armband, Fall sign posted, Socks  Room Door Open: Yes  Alarm On: Bed  Patient Moved Closer to Nursing Station: No    Active Consults:  IP CONSULT TO NEPHROLOGY    Length of Stay:  Expected LOS: 5  Actual LOS: 5      SALLY MICHELE RN

## 2025-06-25 NOTE — PLAN OF CARE
Problem: Physical Therapy - Adult  Goal: By Discharge: Performs mobility at highest level of function for planned discharge setting.  See evaluation for individualized goals.  Description: FUNCTIONAL STATUS PRIOR TO ADMISSION: Patient reports ambulating with walking stick outside, no AD inside. I with ADLs, drives.    HOME SUPPORT PRIOR TO ADMISSION: The patient lived alone in a 1-story home with 4 steps and B rails.    Physical Therapy Goals  Initiated 6/21/2025  1.  Patient will move from supine to sit and sit to supine in bed with modified independence within 7 day(s).    2.  Patient will perform sit to stand with modified independence within 7 day(s).  3.  Patient will transfer from bed to chair and chair to bed with modified independence using the least restrictive device within 7 day(s).  4.  Patient will ambulate with modified independence for 300 feet with the least restrictive device within 7 day(s).   5.  Patient will ascend/descend 4 stairs with 1 handrail(s) with modified independence within 7 day(s).   Outcome: Progressing     PHYSICAL THERAPY TREATMENT    Patient: Elise Schneider (79 y.o. female)  Date: 6/25/2025  Diagnosis:   Unstable angina (HCC) [I20.0]  Hyponatremia [E87.1]  Peripheral edema [R60.0] Hyponatremia      Precautions:  (falls)                      ASSESSMENT:  Pt tolerated PT services and continues to progress toward PT POC goals. Pt received in bed. Nephew also present. Purposeful coordinated care with OT Team per Pt report (and demo) pain with resulting increased hands on assist need ayaka during bed level activity. Pt c/o pain waist level and also lateral/RUQ. Skin assessed with Pt consent and intact. No bruising, swelling, no tender to touch pain noted. Pt unable to pinpoint exactly where on side pain eminating from at time of PT session. Pt agreeable to continued OOB/GT, to tolerance. ( RN Team made aware).    With increased time/effort, most tasks performed with ~Jermaine including  functional transfers, room ambulation. Increased modA required to complete logroll and transition to EOB. Extended EOB sit with fair plus balance. BLE therex performed to include LAQ. When ready, Patient ambulated with an unsteady antalgic gait, decreased step/stride, belen, foot clearance. Pt's nephew reports Pt has \"been like this\" (ie complaining pain since his arrival). This is significant change from presentation day prior when working with Reporting DPT including unit ambulation. RN Team made aware to enable appropriate follow up, etc.    Pt was assisted with transfer to bedside chair at end of session with all needs in reach. Per baseline status and ongoing appreciable functional mobility deficits, recommend dc to a rehab setting prior to return home alone. PT Team will follow    Patient is cleared for discharge from PT standpoint:  YES []     NO [x]      PLAN:  Patient continues to benefit from skilled intervention to address the above impairments.  Continue treatment per established plan of care.    Recommend with staff: continue EOB/OOB as appropriate in between therapy sessions    Recommend next PT session: continue PT POC goals    Recommendation for discharge: (in order for the patient to meet his/her long term goals):   Moderate intensity short-term skilled physical therapy up to 5x/week    Other factors to consider for discharge: lives alone, patient's current support system is unable to meet their requirements for physical assistance, poor safety awareness, high risk for falls, not safe to be alone, concern for safely navigating or managing the home environment, and medical status, pain.    IF patient discharges home will need the following DME: TBD       SUBJECTIVE:   Patient stated, \"It hurts at my waist if you touch it.\"  (RN Team made aware. Logroll undertaken for bed level activity and to transition to EOB).     OBJECTIVE DATA SUMMARY:   Critical Behavior:  Orientation  Overall Orientation

## 2025-06-26 LAB
GLUCOSE BLD STRIP.AUTO-MCNC: 162 MG/DL (ref 65–117)
GLUCOSE BLD STRIP.AUTO-MCNC: 236 MG/DL (ref 65–117)
GLUCOSE BLD STRIP.AUTO-MCNC: 320 MG/DL (ref 65–117)
GLUCOSE BLD STRIP.AUTO-MCNC: 334 MG/DL (ref 65–117)
SERVICE CMNT-IMP: ABNORMAL
SODIUM SERPL-SCNC: 130 MMOL/L (ref 136–145)
SODIUM SERPL-SCNC: 131 MMOL/L (ref 136–145)

## 2025-06-26 PROCEDURE — 2500000003 HC RX 250 WO HCPCS: Performed by: INTERNAL MEDICINE

## 2025-06-26 PROCEDURE — 6370000000 HC RX 637 (ALT 250 FOR IP): Performed by: INTERNAL MEDICINE

## 2025-06-26 PROCEDURE — 6370000000 HC RX 637 (ALT 250 FOR IP): Performed by: PHYSICIAN ASSISTANT

## 2025-06-26 PROCEDURE — 6370000000 HC RX 637 (ALT 250 FOR IP)

## 2025-06-26 PROCEDURE — 1100000003 HC PRIVATE W/ TELEMETRY

## 2025-06-26 PROCEDURE — 84295 ASSAY OF SERUM SODIUM: CPT

## 2025-06-26 PROCEDURE — 82962 GLUCOSE BLOOD TEST: CPT

## 2025-06-26 PROCEDURE — 6360000002 HC RX W HCPCS: Performed by: INTERNAL MEDICINE

## 2025-06-26 PROCEDURE — 36415 COLL VENOUS BLD VENIPUNCTURE: CPT

## 2025-06-26 RX ORDER — QUETIAPINE FUMARATE 25 MG/1
12.5 TABLET, FILM COATED ORAL NIGHTLY PRN
Qty: 60 TABLET | Refills: 0 | Status: SHIPPED | OUTPATIENT
Start: 2025-06-26

## 2025-06-26 RX ORDER — FUROSEMIDE 20 MG/1
20 TABLET ORAL 2 TIMES DAILY
Qty: 60 TABLET | Refills: 0 | Status: SHIPPED | OUTPATIENT
Start: 2025-06-26

## 2025-06-26 RX ORDER — FUROSEMIDE 40 MG/1
40 TABLET ORAL DAILY
Qty: 30 TABLET | Refills: 0 | Status: SHIPPED | OUTPATIENT
Start: 2025-06-26 | End: 2025-06-26

## 2025-06-26 RX ADMIN — PANTOPRAZOLE SODIUM 40 MG: 40 TABLET, DELAYED RELEASE ORAL at 05:23

## 2025-06-26 RX ADMIN — ENOXAPARIN SODIUM 40 MG: 100 INJECTION SUBCUTANEOUS at 08:34

## 2025-06-26 RX ADMIN — SODIUM CHLORIDE, PRESERVATIVE FREE 10 ML: 5 INJECTION INTRAVENOUS at 08:27

## 2025-06-26 RX ADMIN — FAMOTIDINE 40 MG: 20 TABLET, FILM COATED ORAL at 08:26

## 2025-06-26 RX ADMIN — SODIUM CHLORIDE, PRESERVATIVE FREE 10 ML: 5 INJECTION INTRAVENOUS at 21:56

## 2025-06-26 RX ADMIN — INSULIN LISPRO 2 UNITS: 100 INJECTION, SOLUTION INTRAVENOUS; SUBCUTANEOUS at 18:40

## 2025-06-26 RX ADMIN — INSULIN LISPRO 6 UNITS: 100 INJECTION, SOLUTION INTRAVENOUS; SUBCUTANEOUS at 08:23

## 2025-06-26 RX ADMIN — CLOPIDOGREL BISULFATE 75 MG: 75 TABLET, FILM COATED ORAL at 21:56

## 2025-06-26 RX ADMIN — ACETAMINOPHEN 650 MG: 325 TABLET ORAL at 07:17

## 2025-06-26 RX ADMIN — ASPIRIN 81 MG: 81 TABLET, DELAYED RELEASE ORAL at 08:26

## 2025-06-26 RX ADMIN — Medication 15 G: at 08:26

## 2025-06-26 RX ADMIN — TRAMADOL HYDROCHLORIDE 25 MG: 50 TABLET, FILM COATED ORAL at 15:27

## 2025-06-26 RX ADMIN — METOPROLOL TARTRATE 25 MG: 25 TABLET, FILM COATED ORAL at 21:21

## 2025-06-26 RX ADMIN — INSULIN LISPRO 6 UNITS: 100 INJECTION, SOLUTION INTRAVENOUS; SUBCUTANEOUS at 21:56

## 2025-06-26 RX ADMIN — QUETIAPINE FUMARATE 12.5 MG: 25 TABLET ORAL at 18:40

## 2025-06-26 RX ADMIN — MELATONIN 6 MG: at 21:27

## 2025-06-26 ASSESSMENT — PAIN DESCRIPTION - PAIN TYPE
TYPE: ACUTE PAIN
TYPE: ACUTE PAIN

## 2025-06-26 ASSESSMENT — PAIN - FUNCTIONAL ASSESSMENT: PAIN_FUNCTIONAL_ASSESSMENT: PREVENTS OR INTERFERES SOME ACTIVE ACTIVITIES AND ADLS

## 2025-06-26 ASSESSMENT — PAIN SCALES - GENERAL
PAINLEVEL_OUTOF10: 0
PAINLEVEL_OUTOF10: 3
PAINLEVEL_OUTOF10: 5
PAINLEVEL_OUTOF10: 8
PAINLEVEL_OUTOF10: 3

## 2025-06-26 ASSESSMENT — PAIN DESCRIPTION - ORIENTATION
ORIENTATION: RIGHT
ORIENTATION: RIGHT;MID;LOWER

## 2025-06-26 ASSESSMENT — PAIN DESCRIPTION - ONSET
ONSET: GRADUAL
ONSET: GRADUAL

## 2025-06-26 ASSESSMENT — PAIN DESCRIPTION - DESCRIPTORS
DESCRIPTORS: ACHING
DESCRIPTORS: ACHING

## 2025-06-26 ASSESSMENT — PAIN DESCRIPTION - FREQUENCY
FREQUENCY: INTERMITTENT
FREQUENCY: INTERMITTENT

## 2025-06-26 ASSESSMENT — PAIN DESCRIPTION - LOCATION
LOCATION: SHOULDER
LOCATION: BACK

## 2025-06-26 NOTE — PROGRESS NOTES
Overall, Mrs. Schneider was calm and cooperative throughout the day, except for when the subject of moving to rehab came up.  She became quite emotional and stated that she was not ready.  Her family was initially quite upset as well; however, after multiple explanations from the doctor, NP, and RN, the family became much more positive.  Mrs. Schneider did not change her thinking, and remained upset.    Around 6 p.m., Mrs. Schneider began getting up on her own and talking louder and more aggressively.  She was given a dose of Seroquel, which was effective at reducing her level of agitation.

## 2025-06-26 NOTE — PROGRESS NOTES
Occupational Therapy Note:    Chart reviewed, OT session attempted at 1335. Note MD and pt's family outside of her room having just completed a family meeting. MD requesting hold of therapy service at this time. Will continue to follow and attempt again tomorrow as appropriate. Thank you.    Rafaela Silvestre, OTR/L

## 2025-06-26 NOTE — PROGRESS NOTES
End of Shift Note    Bedside shift change report given to Jany (oncoming nurse) by Zita Kahn RN (offgoing nurse).  Report included the following information SBAR, Kardex, and MAR    Shift worked:  [id number]     Shift summary and any significant changes:     Pt slept well, PRN seroquel given last night with ordered melatonin, still confused, but cooperative.  X1 assist to the restroom with RW.       Concerns for physician to address:  none     Zone phone for oncoming shift:          Activity:  Level of Assistance: Standby assist, set-up cues, supervision of patient - no hands on  Number times ambulated in hallways past shift: 0  Number of times OOB to chair past shift: 0    Cardiac:   Cardiac Monitoring: No      Cardiac Rhythm: Sinus rhythm    Access:  Current line(s): PIV     Genitourinary:   Urinary Status: Voiding, Bathroom privileges    Respiratory:   O2 Device: None (Room air)  Chronic home O2 use?: NO  Incentive spirometer at bedside: NO    GI:  Last BM (including prior to admit): 06/24/25  Current diet:  ADULT DIET; Regular; 4 carb choices (60 gm/meal)  Passing flatus: YES    Pain Management:   Patient states pain is manageable on current regimen: YES    Skin:  Daniel Scale Score: 20  Interventions: Wound Offloading (Prevention Methods): Elevate heels, Repositioning, Bed, pressure reduction mattress    Patient Safety:  Fall Risk: Nursing Judgement-Fall Risk High(Add Comments): Yes  Fall Risk Interventions  Nursing Judgement-Fall Risk High(Add Comments): Yes  Toilet Every 2 Hours-In Advance of Need: Yes  Hourly Visual Checks: Awake, In chair  Fall Visual Posted: Socks, Fall sign posted  Room Door Open: Yes  Alarm On: Chair  Patient Moved Closer to Nursing Station: No    Active Consults:   IP CONSULT TO NEPHROLOGY    Length of Stay:  Expected LOS: 6  Actual LOS: 6    Zita Kahn RN

## 2025-06-26 NOTE — CARE COORDINATION
CM contacted pt family to update them on pt discharge.  Pt family who was in the room with pt felt pt was not ready for discharge.  CM made RN staff aware of the family members concerns.  Plan was for [place] room 315B.  Liaison made aware.    CM will continue to follow and assist with additional discharge needs.     ANJEL Waldrop

## 2025-06-26 NOTE — PLAN OF CARE
Problem: Chronic Conditions and Co-morbidities  Goal: Patient's chronic conditions and co-morbidity symptoms are monitored and maintained or improved  Outcome: Progressing     Problem: Discharge Planning  Goal: Discharge to home or other facility with appropriate resources  Outcome: Progressing     Problem: Pain  Goal: Verbalizes/displays adequate comfort level or baseline comfort level  Outcome: Progressing     Problem: Safety - Adult  Goal: Free from fall injury  Outcome: Progressing     Problem: Confusion  Goal: Confusion, delirium, dementia, or psychosis is improved or at baseline  Description: INTERVENTIONS:  1. Assess for possible contributors to thought disturbance, including medications, impaired vision or hearing, underlying metabolic abnormalities, dehydration, psychiatric diagnoses, and notify attending LIP  2. [place] high risk fall precautions, as indicated  3. Provide frequent short contacts to provide reality reorientation, refocusing and direction  4. Decrease environmental stimuli, including noise as appropriate  5. Monitor and intervene to maintain adequate nutrition, hydration, elimination, sleep and activity  6. If unable to ensure safety without constant attention obtain sitter and review sitter guidelines with assigned personnel  7. Initiate Psychosocial CNS and Spiritual Care consult, as indicated  Outcome: Progressing

## 2025-06-26 NOTE — PROGRESS NOTES
Physical therapy services attempted 1:35PM. Pt completing MD Team/family (sister) visit. Family visibly teary. MD Team requesting PT Team HOLD therapy services at this time. PT Team will follow up tomorrow as time permits and as medically appropriate to patient tolerance.    Linda Simeon, PT, DPT

## 2025-06-26 NOTE — DISCHARGE SUMMARY
Physician Discharge Summary     Pt Name  Elise Schneider   Admit date:  6/20/2025   Discharge date and time:   6/26/2025   Room Number  1120/01    Medical Record Number  [id number] @ [facility]   Age  79 y.o.   Date of Birth 1945   PCP Linda Reynoso LMFT     Admission Diagnoses:                        Hyponatremia   Present on Admission:   Hyponatremia       Allergies   Allergen Reactions    Erythromycin Hives    Penicillins Hives     Has had both as an adult and child- -     Atorvastatin Myalgia    Cephalexin Other (See Comments)     Aching muscles-   Pt states she took abx in 2/2024 and had no reaction, but she was not sure if it was Keflex or not    Cyclobenzaprine     Hydrochlorothiazide Other (See Comments)     hyponatremia    Proton Pump Inhibitors Other (See Comments)     dizziness    Rosuvastatin Myalgia    Sulfamethoxazole-Trimethoprim Other (See Comments)     Unsure of reaction         Excerpt from HPI :   \" Elise Schneider is a 79 y.o.  female with PMHx significant for coronary artery disease status post stenting, diabetes mellitus, hypertension, GERD, dyslipidemia is coming the hospital chief complaints of bilateral lower extremity swelling.  She reports being largely available dental about 3 months ago when she started having some gradual swelling in her legs along with some pain.  Symptoms really got worse in the last 2 weeks or so.  She reports that swelling initially started in the ankles and now up to the level of the knees along with some pain in both ankles and also knees.  Does not report any chest pain or shortness of breath.  No orthopnea or PND.  She does report decreased urine output in the last few days or so.  Does not report any chest pain.     On arrival to ED, she was noted to normal vitals, on labs sodium is 124, glucose 131, proBNP is 379, LFTs are within normal limits.  TSH is 0.90.  CBC shows a hemoglobin of 11.3 and platelet count of 189. \"       Hospital

## 2025-06-27 ENCOUNTER — APPOINTMENT (OUTPATIENT)
Facility: HOSPITAL | Age: 80
End: 2025-06-27
Payer: MEDICARE

## 2025-06-27 VITALS
HEART RATE: 75 BPM | RESPIRATION RATE: 16 BRPM | BODY MASS INDEX: 23.81 KG/M2 | HEIGHT: 61 IN | WEIGHT: 126.1 LBS | SYSTOLIC BLOOD PRESSURE: 138 MMHG | OXYGEN SATURATION: 100 % | TEMPERATURE: 97.7 F | DIASTOLIC BLOOD PRESSURE: 56 MMHG

## 2025-06-27 LAB
ANION GAP SERPL CALC-SCNC: 4 MMOL/L (ref 2–12)
BASOPHILS # BLD: 0 K/UL (ref 0–0.1)
BASOPHILS NFR BLD: 0 % (ref 0–1)
BUN SERPL-MCNC: 23 MG/DL (ref 6–20)
BUN/CREAT SERPL: 34 (ref 12–20)
CALCIUM SERPL-MCNC: 8.8 MG/DL (ref 8.5–10.1)
CHLORIDE SERPL-SCNC: 102 MMOL/L (ref 97–108)
CO2 SERPL-SCNC: 26 MMOL/L (ref 21–32)
CREAT SERPL-MCNC: 0.68 MG/DL (ref 0.55–1.02)
DIFFERENTIAL METHOD BLD: ABNORMAL
EOSINOPHIL # BLD: 0.06 K/UL (ref 0–0.4)
EOSINOPHIL NFR BLD: 1 % (ref 0–7)
ERYTHROCYTE [DISTWIDTH] IN BLOOD BY AUTOMATED COUNT: 13.1 % (ref 11.5–14.5)
GLUCOSE BLD STRIP.AUTO-MCNC: 155 MG/DL (ref 65–117)
GLUCOSE BLD STRIP.AUTO-MCNC: 292 MG/DL (ref 65–117)
GLUCOSE SERPL-MCNC: 292 MG/DL (ref 65–100)
HCT VFR BLD AUTO: 29 % (ref 35–47)
HGB BLD-MCNC: 9.5 G/DL (ref 11.5–16)
IMM GRANULOCYTES # BLD AUTO: 0 K/UL (ref 0–0.04)
IMM GRANULOCYTES NFR BLD AUTO: 0 % (ref 0–0.5)
LYMPHOCYTES # BLD: 0.89 K/UL (ref 0.8–3.5)
LYMPHOCYTES NFR BLD: 15 % (ref 12–49)
MCH RBC QN AUTO: 32.8 PG (ref 26–34)
MCHC RBC AUTO-ENTMCNC: 32.8 G/DL (ref 30–36.5)
MCV RBC AUTO: 100 FL (ref 80–99)
MONOCYTES # BLD: 0.41 K/UL (ref 0–1)
MONOCYTES NFR BLD: 7 % (ref 5–13)
NEUTS BAND NFR BLD MANUAL: 1 %
NEUTS SEG # BLD: 4.54 K/UL (ref 1.8–8)
NEUTS SEG NFR BLD: 76 % (ref 32–75)
NRBC # BLD: 0 K/UL (ref 0–0.01)
NRBC BLD-RTO: 0 PER 100 WBC
PLATELET # BLD AUTO: 205 K/UL (ref 150–400)
PMV BLD AUTO: 9.6 FL (ref 8.9–12.9)
POTASSIUM SERPL-SCNC: 4.1 MMOL/L (ref 3.5–5.1)
RBC # BLD AUTO: 2.9 M/UL (ref 3.8–5.2)
RBC MORPH BLD: ABNORMAL
SERVICE CMNT-IMP: ABNORMAL
SERVICE CMNT-IMP: ABNORMAL
SODIUM SERPL-SCNC: 132 MMOL/L (ref 136–145)
WBC # BLD AUTO: 5.9 K/UL (ref 3.6–11)

## 2025-06-27 PROCEDURE — 6370000000 HC RX 637 (ALT 250 FOR IP): Performed by: PHYSICIAN ASSISTANT

## 2025-06-27 PROCEDURE — 97535 SELF CARE MNGMENT TRAINING: CPT

## 2025-06-27 PROCEDURE — 6370000000 HC RX 637 (ALT 250 FOR IP): Performed by: INTERNAL MEDICINE

## 2025-06-27 PROCEDURE — 72100 X-RAY EXAM L-S SPINE 2/3 VWS: CPT

## 2025-06-27 PROCEDURE — 36415 COLL VENOUS BLD VENIPUNCTURE: CPT

## 2025-06-27 PROCEDURE — 82962 GLUCOSE BLOOD TEST: CPT

## 2025-06-27 PROCEDURE — 85025 COMPLETE CBC W/AUTO DIFF WBC: CPT

## 2025-06-27 PROCEDURE — 6370000000 HC RX 637 (ALT 250 FOR IP)

## 2025-06-27 PROCEDURE — 97110 THERAPEUTIC EXERCISES: CPT

## 2025-06-27 PROCEDURE — 6360000002 HC RX W HCPCS: Performed by: INTERNAL MEDICINE

## 2025-06-27 PROCEDURE — 80048 BASIC METABOLIC PNL TOTAL CA: CPT

## 2025-06-27 PROCEDURE — 2500000003 HC RX 250 WO HCPCS: Performed by: INTERNAL MEDICINE

## 2025-06-27 PROCEDURE — 97116 GAIT TRAINING THERAPY: CPT

## 2025-06-27 RX ADMIN — ASPIRIN 81 MG: 81 TABLET, DELAYED RELEASE ORAL at 10:25

## 2025-06-27 RX ADMIN — Medication 15 G: at 10:25

## 2025-06-27 RX ADMIN — METOPROLOL TARTRATE 25 MG: 25 TABLET, FILM COATED ORAL at 10:24

## 2025-06-27 RX ADMIN — ACETAMINOPHEN 650 MG: 325 TABLET ORAL at 15:08

## 2025-06-27 RX ADMIN — INSULIN LISPRO 4 UNITS: 100 INJECTION, SOLUTION INTRAVENOUS; SUBCUTANEOUS at 13:48

## 2025-06-27 RX ADMIN — PANTOPRAZOLE SODIUM 40 MG: 40 TABLET, DELAYED RELEASE ORAL at 06:46

## 2025-06-27 RX ADMIN — FUROSEMIDE 20 MG: 20 TABLET ORAL at 10:30

## 2025-06-27 RX ADMIN — LOSARTAN POTASSIUM 100 MG: 100 TABLET, FILM COATED ORAL at 10:24

## 2025-06-27 RX ADMIN — SODIUM CHLORIDE, PRESERVATIVE FREE 10 ML: 5 INJECTION INTRAVENOUS at 10:26

## 2025-06-27 RX ADMIN — FAMOTIDINE 40 MG: 20 TABLET, FILM COATED ORAL at 10:24

## 2025-06-27 RX ADMIN — ENOXAPARIN SODIUM 40 MG: 100 INJECTION SUBCUTANEOUS at 10:24

## 2025-06-27 ASSESSMENT — PAIN DESCRIPTION - LOCATION: LOCATION: BACK

## 2025-06-27 ASSESSMENT — PAIN DESCRIPTION - ORIENTATION: ORIENTATION: MID

## 2025-06-27 ASSESSMENT — PAIN - FUNCTIONAL ASSESSMENT: PAIN_FUNCTIONAL_ASSESSMENT: PREVENTS OR INTERFERES SOME ACTIVE ACTIVITIES AND ADLS

## 2025-06-27 ASSESSMENT — PAIN SCALES - GENERAL: PAINLEVEL_OUTOF10: 6

## 2025-06-27 NOTE — CARE COORDINATION
[facility], room 313B  Call report to [phone]  [facility]- pickup    Transition of Care Plan: SNF at [facility]    1131 - Transport set up through [facility] for 1500  pickup. PCS completed and in transport packet, copy placed on chart.    1110 - Reviewed [employer] Medicare prior auth website to determine whether prior auth is needed for transport; per website tool, [facility] Medicare policies in [place] do not require prior auth for CPT code  non-emergency BLS transport. This confirmation sheet printed, placed on chart, CM to arrange transport. Received call from [facility] admissions director providing room number 313B and advising that pt may admit at or after 1400.    Initial note - Discussed in IDT rounds; pt medically ready for discharge to [facility]. Called pt's sister Xenia Trammell [phone] to review discharge plan. Xenia reported preference for [facility], CM educated on differences between [facility] and SNF including pt's appropriateness for SNF, Xenia verbalized understanding and voiced continued preference for [facility] as post-acute facility. Plan is for discharge to [facility] today, CM confirming bed assignment with [facility] liaison, will contact pt's [employer] Medicare plan for transportation coverage.    Moni Lucia, [facility]  Care Management  [phone]

## 2025-06-27 NOTE — PLAN OF CARE
Problem: Occupational Therapy - Adult  Goal: By Discharge: Performs self-care activities at highest level of function for planned discharge setting.  See evaluation for individualized goals.  Description: FUNCTIONAL STATUS PRIOR TO ADMISSION:  Patient is a poor historian at time of evaluation. Per chart review and family report, patient lives alone and uses walking stick for ambulation. Patient with poor eating habits, doing very little cooking/meal prep. Lives with her cat named Kittens and cares for a goat. Only one neighbor but very limited interaction. Patient is cognitively intact and fiercely independent at baseline.    Receives Help From: Family, Neighbor, Prior Level of Assist for ADLs: Independent, Prior Level of Assist for Homemaking: Independent, Ambulation Assistance: Independent, Prior Level of Assist for Transfers: Independent, Active : Yes     HOME SUPPORT: Patient lived alone with sister ~40 miles away. Sister provides transportation.    Occupational Therapy Goals:  Initiated 6/22/2025  1.  Patient will perform standing grooming, including item retrieval, with Modified [place] within 7 day(s).  2.  Patient will perform upper body dressing and lower body dressing with Modified [place] within 7 day(s).  3.  Patient will perform simple home management with Supervision within 7 day(s).  4.  Patient will perform all aspects of toileting, including transfers, with Modified [place]  within 7 day(s).  5.  Patient will participate in cognitive screen to assess home safety (lives alone with limited local support) within 7 day(s).  6.  Patient will utilize energy conservation techniques during functional activities with verbal cues within 7 day(s).      Outcome: Progressing   OCCUPATIONAL THERAPY TREATMENT  Patient: Elise Schneider (79 y.o. female)  Date: 6/27/2025  Primary Diagnosis: Unstable angina (HCC) [I20.0]  Hyponatremia [E87.1]  Peripheral edema [R60.0]       Precautions: Fall Risk,

## 2025-06-27 NOTE — PROGRESS NOTES
Patient determined to be stable for discharge by attending provider. I have reviewed the discharge instructions and follow-up appointments with the Felipe, JENNIFER @ [facility] [phone]. They verbalized understanding and all questions were answered to their satisfaction. No complaints or further questions were expressed.       New medications: Appropriate educational materials and medication side effect teaching were provided.       PIV were removed prior to discharge. Pt transported [facility] .    All personal items collected during admission were returned to the patient prior to discharge.    Gloria Tong RN

## 2025-06-27 NOTE — PLAN OF CARE
Problem: Chronic Conditions and Co-morbidities  Goal: Patient's chronic conditions and co-morbidity symptoms are monitored and maintained or improved  6/27/2025 0718 by Glen Chairez RN  Outcome: Progressing  6/27/2025 0718 by Glen Chairez RN  Outcome: Progressing  Flowsheets (Taken 6/26/2025 2121)  Care Plan - Patient's Chronic Conditions and Co-Morbidity Symptoms are Monitored and Maintained or Improved: Collaborate with multidisciplinary team to address chronic and comorbid conditions and prevent exacerbation or deterioration     Problem: Discharge Planning  Goal: Discharge to home or other facility with appropriate resources  6/27/2025 0718 by Glen Chairez RN  Outcome: Progressing  6/27/2025 0718 by Glen Chairez RN  Outcome: Progressing  Flowsheets (Taken 6/26/2025 2121)  Discharge to home or other facility with appropriate resources: Identify discharge learning needs (meds, wound care, etc)     Problem: Pain  Goal: Verbalizes/displays adequate comfort level or baseline comfort level  6/27/2025 0718 by Glen Chairez RN  Outcome: Progressing  6/27/2025 0718 by Glen Chairez RN  Outcome: Progressing  Flowsheets (Taken 6/26/2025 2121)  Verbalizes/displays adequate comfort level or baseline comfort level: Encourage patient to monitor pain and request assistance     Problem: Safety - Adult  Goal: Free from fall injury  6/27/2025 0718 by Glen Chairez RN  Outcome: Progressing  6/27/2025 0718 by Glen Chairez RN  Outcome: Progressing     Problem: Confusion  Goal: Confusion, delirium, dementia, or psychosis is improved or at baseline  Description: INTERVENTIONS:  1. Assess for possible contributors to thought disturbance, including medications, impaired vision or hearing, underlying metabolic abnormalities, dehydration, psychiatric diagnoses, and notify attending LIP  2. [place] high risk fall precautions, as indicated  3. Provide frequent

## 2025-06-27 NOTE — PROGRESS NOTES
End of Shift Note    Bedside shift change report given to ALIA Castro (oncoming nurse) by Glen Byrd RN (offgoing nurse).  Report included the following information SBAR, Kardex, and MAR    Shift worked:  7p-7a     Shift summary and any significant changes:    Met calm and resting in bed, VSS with soft BP, prescribed medications served per MAR, Patient became impulsive but was redirected. She tolerated her care fairly well. No acute changes during the night     Concerns for physician to address:       Zone phone for oncoming shift:   [phone]       Activity:  Level of Assistance: Standby assist, set-up cues, supervision of patient - no hands on  Number times ambulated in hallways past shift: 0  Number of times OOB to chair past shift: 2    Cardiac:   Cardiac Monitoring: No      Cardiac Rhythm: Sinus rhythm    Access:  Current line(s): PIV     Genitourinary:   Urinary Status: Bathroom privileges    Respiratory:   O2 Device: None (Room air)  Chronic home O2 use?: NO  Incentive spirometer at bedside: NO    GI:  Last BM (including prior to admit): 06/26/25  Current diet:  ADULT DIET; Regular; 4 carb choices (60 gm/meal)  Passing flatus: YES    Pain Management:   Patient states pain is manageable on current regimen: N/A    Skin:  Daniel Scale Score: 20  Interventions: Wound Offloading (Prevention Methods): Bed, pressure reduction mattress    Patient Safety:  Fall Risk: Nursing Judgement-Fall Risk High(Add Comments): Yes  Fall Risk Interventions  Nursing Judgement-Fall Risk High(Add Comments): Yes  Toilet Every 2 Hours-In Advance of Need: Yes  Hourly Visual Checks: Eyes closed, In bed  Fall Visual Posted: Socks, Fall sign posted  Room Door Open: Yes  Alarm On: Bed  Patient Moved Closer to Nursing Station: No    Active Consults:   IP CONSULT TO NEPHROLOGY    Length of Stay:  Expected LOS: 6  Actual LOS: 7    Glen Byrd, ALIA

## 2025-06-27 NOTE — PROGRESS NOTES
Occupational Therapy   Chart reviewed; cleared for tx; currently with labwork on approach; will retry later as able. Luann Morales OTR/L

## 2025-06-27 NOTE — PROGRESS NOTES
Comprehensive Nutrition Assessment    Type and Reason for Visit: Initial, LOS    Nutrition Recommendations/Plan:   CC diet as tolerated       Malnutrition Assessment:  Malnutrition Status:  No malnutrition (06/27/25 0833)           Nutrition Assessment:    Pt admitted with BLE swelling (no DVT, No SOB) hyponatremia; sodium was up to 134 yesterday and pt was prepared for d/c.  When she learned of d/c plans to rehab she and family became upset.  Working toward d/c.  Hx notable for CAD s/p stents, DM, HLD, HTN, GERD.    Nutrition Related Findings:    Pt toleerating diet, meds: plavix, lasix, pepcid, protonix, insulin Wound Type: None       Lab Results   Component Value Date/Time     06/26/2025 10:03 AM    K 3.6 06/25/2025 04:28 AM     06/25/2025 04:28 AM    CO2 26 06/25/2025 04:28 AM    BUN 29 06/25/2025 04:28 AM    CREATININE 0.51 06/25/2025 04:28 AM    GLUCOSE 173 06/25/2025 04:28 AM    CALCIUM 9.1 06/25/2025 04:28 AM    MG 2.0 06/20/2025 11:41 AM          Estimated Daily Nutrient Needs:  Energy Requirements Based On: Kcal/kg  Weight Used for Energy Requirements: Current  Energy (kcal/day): 1880  Weight Used for Protein Requirements: Current  Protein (g/day): 75  Method Used for Fluid Requirements: Defer to provider  Fluid (ml/day):      Nutrition Related Findings:   Edema: Right lower extremity, Left upper extremity   Edema Generalized: Trace        RLE Edema: +3, Pitting  LLE Edema: +2    Last BM: 06/26/25    Wounds:   Wound Type: None      Current Nutrition Intake & Therapies:    Average Meal Intake: %  Average Supplements Intake: None Ordered  ADULT DIET; Regular; 4 carb choices (60 gm/meal)  Meal Intake:   Patient Vitals for the past 168 hrs:   PO Meals Eaten (%)   06/23/25 1858 76 - 100%   06/23/25 1237 76 - 100%   06/23/25 0935 76 - 100%     Supplement Intake:  No data found.  Nutrition Support: none      Anthropometric Measures:  Height: 154.9 cm (5' 1\")  Ideal Body Weight (IBW): 105 lbs (48  kg)       Current Body Weight: 57.2 kg (126 lb 1.7 oz), 120.1 % IBW. Weight Source: Standing scale  Current BMI (kg/m2): 23.8        Weight Adjustment For: No Adjustment                 BMI Categories: Normal Weight (BMI 22.0 to 24.9) age over 65    Wt Readings from Last 10 Encounters:   06/27/25 57.2 kg (126 lb 1.7 oz)   03/31/25 59 kg (130 lb)   03/04/25 59.1 kg (130 lb 3.2 oz)   12/27/24 61.1 kg (134 lb 9.6 oz)   12/20/24 62.1 kg (136 lb 12.8 oz)   12/17/24 61.1 kg (134 lb 9.6 oz)   12/18/24 61.2 kg (135 lb)   12/06/24 61.3 kg (135 lb 3.2 oz)   11/27/24 62.3 kg (137 lb 5.6 oz)   11/21/24 60.5 kg (133 lb 6.4 oz)           Nutrition Diagnosis:   No nutrition diagnosis at this time related to   as evidenced by      Nutrition Interventions:   Food and/or Nutrient Delivery: Continue Current Diet  Nutrition Education/Counseling: No recommendation at this time  Coordination of Nutrition Care: Continue to monitor while inpatient       Goals:  Previous Goal Met: Progressing toward Goal(s)  Goals: PO intake 75% or greater, prior to discharge       Nutrition Monitoring and Evaluation:   Behavioral-Environmental Outcomes: None Identified  Food/Nutrient Intake Outcomes: None Identified  Physical Signs/Symptoms Outcomes: None Identified    Discharge Planning:    No discharge needs at this time     Oizel Soto, RD  [phone]

## 2025-06-27 NOTE — DISCHARGE SUMMARY
Physician Discharge Summary     Pt Name  Elise Schneider   Admit date:  6/20/2025   Discharge date and time:  6/27/2025 6/27/2025  4:33 PM   Room Number  1120/01    [facility]    Medical Record Number  [id number]    Age  79 y.o.   Date of Birth 1945   PCP Linda Reynoso LMFT   Admission Diagnoses: Hyponatremia   Present on Admission:   Hyponatremia     Allergies   Allergen Reactions    Erythromycin Hives    Penicillins Hives     Has had both as an adult and child- -     Atorvastatin Myalgia    Cephalexin Other (See Comments)     Aching muscles-   Pt states she took abx in 2/2024 and had no reaction, but she was not sure if it was Keflex or not    Cyclobenzaprine     Hydrochlorothiazide Other (See Comments)     hyponatremia    Proton Pump Inhibitors Other (See Comments)     dizziness    Rosuvastatin Myalgia    Sulfamethoxazole-Trimethoprim Other (See Comments)     Unsure of reaction          Excerpt from HPI :     \" Elise Schneider is a 79 y.o.  female with PMHx significant for coronary artery disease status post stenting, diabetes mellitus, hypertension, GERD, dyslipidemia is coming the hospital chief complaints of bilateral lower extremity swelling.  She reports being largely available dental about 3 months ago when she started having some gradual swelling in her legs along with some pain.  Symptoms really got worse in the last 2 weeks or so.  She reports that swelling initially started in the ankles and now up to the level of the knees along with some pain in both ankles and also knees.  Does not report any chest pain or shortness of breath.  No orthopnea or PND.  She does report decreased urine output in the last few days or so.  Does not report any chest pain.     On arrival to ED, she was noted to normal vitals, on labs sodium is 124, glucose 131, proBNP is 379, LFTs are within normal limits.  TSH is 0.90.  CBC shows a hemoglobin of 11.3 and platelet count of 189.  We were asked to  admit for work up and evaluation of the above problems. \" - Dr. Hodges, 6/20/25     Hospital Course: This pt was admitted with  Hyponatremia on 6/20/2025.    Present on Admission:   Hyponatremia    Acute on chronic hyponatremia, POA, improving   Sodium 124 > 132  Urine sodium 36 and osmolality are high- suggestive of  SIADH  Nephrology consulted, appreciate expertise and recommendations. Signed off 6/24 with recommendations to continue urea powder at current doses for at least one week following discharge   Continue Urea tablets  On Lasix 20 mg p.o. bid    BLE edema, R>L, POA  TTE - LVEF 55-60%   Xray bilateral knees - NAD  Venous duplex - no DVT  Continue lasix 20mg BID  Elevate BLE. +[facility] acquired delirium, not POA  Seroquel at bedtime    Low back pain  XR lumbar spine - lumbar scoliosis with asymmetric degenerative changes     Chronic comorbitidies:  Hx CAD s/p PCI  GERD  Hypertension  Dyslipidemia  Diabetes mellitus type 2  Tobacco use  Continue aspirin, Plavix, PPI  Continue amlodipine, losartan   Continue PTA metformin  Was on nicotine patch inpatient   Encourage cessation          Treatment team Treatment Team:   Snow Ward Danielle, PATOBI Paredes, Ufei A, APRN - CNP  Angeles Power, Gloria Castelan, Bruna Hanson, GRACIELA Le, Domenica Simeon, Linda Flores, PT   Consultations  IP CONSULT TO NEPHROLOGY   Procedures/Surgeries  * No surgery found *     Condition at the time of discharge  Improved and stable       Query  None noted today        Disposition SNF/LTC   Diet cardiac diet, renal diet, and encourage fluids   Care Plan discussed with Patient/Family, Nurse, , and Other attending Dr. Saenz   Follow up Follow-up Information       Follow up With Specialties Details Why Contact Info    Linda Reynoso LMFT  Follow up  [address]  [facility] [address] [phone]      Nnamdi Solano DPM Foot and Ankle Surgery Schedule an

## 2025-06-27 NOTE — PLAN OF CARE
Problem: Physical Therapy - Adult  Goal: By Discharge: Performs mobility at highest level of function for planned discharge setting.  See evaluation for individualized goals.  Description: FUNCTIONAL STATUS PRIOR TO ADMISSION: Patient reports ambulating with walking stick outside, no AD inside. I with ADLs, drives.    HOME SUPPORT PRIOR TO ADMISSION: The patient lived alone in a 1-story home with 4 steps and B rails.    Physical Therapy Goals  Initiated 6/21/2025  1.  Patient will move from supine to sit and sit to supine in bed with modified independence within 7 day(s).    2.  Patient will perform sit to stand with modified independence within 7 day(s).  3.  Patient will transfer from bed to chair and chair to bed with modified independence using the least restrictive device within 7 day(s).  4.  Patient will ambulate with modified independence for 300 feet with the least restrictive device within 7 day(s).   5.  Patient will ascend/descend 4 stairs with 1 handrail(s) with modified independence within 7 day(s).   Outcome: Progressing     PHYSICAL THERAPY TREATMENT    Patient: Elise Schneider (79 y.o. female)  Date: 6/27/2025  Diagnosis:   Unstable angina (HCC) [I20.0]  Hyponatremia [E87.1]  Peripheral edema [R60.0] Hyponatremia      Precautions: Fall Risk, General Precautions, Bed Alarm (low back pain!! B LE tender with recent ++ edema)                      ASSESSMENT:  Pt tolerated PT services well and continues to progress toward PT POC goals. Progress to date less than anticipated 2/2 underlying medical status, pain. Pt received in bed. Upon arrival, Pt c/o LBP and pointed to L4-L5 area. Education provided and Pt amenable to trial logroll to transition to EOB. Pt reports and demo pain onset with bed level activity and required ~ modA for roll, transition to EOB. Extended seated rest break to calm and regroup prior to stand, room ambulation with cga. When ready, Pt ambulated with a slow antalgic gait. Deferral

## 2025-06-28 ENCOUNTER — OFFICE VISIT (OUTPATIENT)
Facility: CLINIC | Age: 80
End: 2025-06-28

## 2025-06-28 VITALS
BODY MASS INDEX: 23.81 KG/M2 | WEIGHT: 126 LBS | OXYGEN SATURATION: 97 % | DIASTOLIC BLOOD PRESSURE: 54 MMHG | TEMPERATURE: 97.8 F | SYSTOLIC BLOOD PRESSURE: 106 MMHG | HEART RATE: 68 BPM | RESPIRATION RATE: 16 BRPM

## 2025-06-28 DIAGNOSIS — J43.1 PANLOBULAR EMPHYSEMA (HCC): Chronic | ICD-10-CM

## 2025-06-28 DIAGNOSIS — M51.362 DEGENERATION OF INTERVERTEBRAL DISC OF LUMBAR REGION WITH DISCOGENIC BACK PAIN AND LOWER EXTREMITY PAIN: Chronic | ICD-10-CM

## 2025-06-28 DIAGNOSIS — E78.2 MIXED HYPERLIPIDEMIA: Chronic | ICD-10-CM

## 2025-06-28 DIAGNOSIS — I10 ESSENTIAL HYPERTENSION, BENIGN: Chronic | ICD-10-CM

## 2025-06-28 DIAGNOSIS — E11.8 DIABETES MELLITUS TYPE 2 WITH COMPLICATIONS (HCC): Chronic | ICD-10-CM

## 2025-06-28 DIAGNOSIS — Z85.3 HISTORY OF MALIGNANT NEOPLASM OF RIGHT BREAST: Chronic | ICD-10-CM

## 2025-06-28 DIAGNOSIS — E87.1 HYPONATREMIA: ICD-10-CM

## 2025-06-28 DIAGNOSIS — I25.10 CORONARY ARTERY DISEASE INVOLVING NATIVE CORONARY ARTERY OF NATIVE HEART WITHOUT ANGINA PECTORIS: Primary | Chronic | ICD-10-CM

## 2025-06-28 DIAGNOSIS — M15.9 GENERALIZED OSTEOARTHRITIS: Chronic | ICD-10-CM

## 2025-06-28 DIAGNOSIS — F01.B4 MODERATE VASCULAR DEMENTIA WITH ANXIETY (HCC): Chronic | ICD-10-CM

## 2025-06-28 DIAGNOSIS — I50.32 CHRONIC HEART FAILURE WITH PRESERVED EJECTION FRACTION (HCC): Chronic | ICD-10-CM

## 2025-06-28 PROBLEM — Z98.890 S/P CAROTID ENDARTERECTOMY: Chronic | Status: ACTIVE | Noted: 2024-09-30

## 2025-06-28 PROBLEM — I65.21 CAROTID ARTERY STENOSIS, ASYMPTOMATIC, RIGHT: Chronic | Status: ACTIVE | Noted: 2024-07-17

## 2025-06-28 PROBLEM — Z96.641 S/P TOTAL RIGHT HIP ARTHROPLASTY: Chronic | Status: ACTIVE | Noted: 2021-11-22

## 2025-06-28 PROBLEM — I20.0 UNSTABLE ANGINA (HCC): Status: RESOLVED | Noted: 2024-10-15 | Resolved: 2025-06-28

## 2025-06-28 PROBLEM — R07.9 ACUTE CHEST PAIN: Status: RESOLVED | Noted: 2024-10-15 | Resolved: 2025-06-28

## 2025-06-28 NOTE — PROGRESS NOTES
[facility] SENIOR CARE SERVICES      Skilled Nursing Facility Admission History and Physical Examination      [facility]      Elise Schneider        Room: H 313B  YOB: 1945  MRN: [id number]        ASSESSMENT:     Diagnosis Orders   1. Coronary artery disease involving native coronary artery of native heart without angina pectoris        2. Chronic heart failure with preserved ejection fraction (HCC)        3. Essential hypertension, benign        4. Panlobular emphysema (HCC)        5. Mixed hyperlipidemia        6. Diabetes mellitus type 2 with complications (HCC)        7. Hyponatremia        8. Generalized osteoarthritis        9. Degeneration of intervertebral disc of lumbar region with discogenic back pain and lower extremity pain        10. Moderate vascular dementia with anxiety ([facility])        11. History of malignant neoplasm of right breast              PLAN:        The patient is admitted to the SNF for rehabilitation for multiple medical issues as listed above.  will be consulted for discharge planning.       SUBJECTIVE:    Chief Complaint:  Chief Complaint   Patient presents with    Other     SNF ADMISSION: CAD, CHF, HTN, DM, DEMENTIA       History of Present Illness:    Elise Schneider is a 79 y.o. female who presents for admission to [facility] following a recent admission to the hospital for several medical issues including CAD, CHF, HTN, DM and Dementia. She had a thorough evaluation and, in view of her Dementia, it was felt that she should be admitted for treatment and social service evaluation. She is a poor historian, but she is comfortable in a WC this morning.     The patient has a number of chronic medical problems as listed above.    The hospital records contain the following comment:    \" Elise Schneider is a 79 y.o.  female with PMHx significant for coronary artery disease status post stenting, diabetes

## 2025-07-01 ENCOUNTER — OFFICE VISIT (OUTPATIENT)
Facility: CLINIC | Age: 80
End: 2025-07-01
Payer: MEDICARE

## 2025-07-01 VITALS
TEMPERATURE: 97.7 F | RESPIRATION RATE: 16 BRPM | SYSTOLIC BLOOD PRESSURE: 114 MMHG | HEART RATE: 67 BPM | BODY MASS INDEX: 23.81 KG/M2 | OXYGEN SATURATION: 100 % | WEIGHT: 126 LBS | DIASTOLIC BLOOD PRESSURE: 64 MMHG

## 2025-07-01 DIAGNOSIS — I10 ESSENTIAL HYPERTENSION, BENIGN: Chronic | ICD-10-CM

## 2025-07-01 DIAGNOSIS — L01.00 IMPETIGO: ICD-10-CM

## 2025-07-01 DIAGNOSIS — E11.8 DIABETES MELLITUS TYPE 2 WITH COMPLICATIONS (HCC): Chronic | ICD-10-CM

## 2025-07-01 DIAGNOSIS — I25.10 CORONARY ARTERY DISEASE INVOLVING NATIVE CORONARY ARTERY OF NATIVE HEART WITHOUT ANGINA PECTORIS: Chronic | ICD-10-CM

## 2025-07-01 DIAGNOSIS — F01.B4 MODERATE VASCULAR DEMENTIA WITH ANXIETY (HCC): Chronic | ICD-10-CM

## 2025-07-01 DIAGNOSIS — F17.200 TOBACCO USE DISORDER: ICD-10-CM

## 2025-07-01 DIAGNOSIS — Z98.61 S/P PTCA (PERCUTANEOUS TRANSLUMINAL CORONARY ANGIOPLASTY): Chronic | ICD-10-CM

## 2025-07-01 DIAGNOSIS — K21.9 GASTROESOPHAGEAL REFLUX DISEASE WITHOUT ESOPHAGITIS: ICD-10-CM

## 2025-07-01 DIAGNOSIS — E87.1 HYPONATREMIA: Primary | ICD-10-CM

## 2025-07-01 PROCEDURE — 99309 SBSQ NF CARE MODERATE MDM 30: CPT | Performed by: FAMILY MEDICINE

## 2025-07-01 PROCEDURE — 1123F ACP DISCUSS/DSCN MKR DOCD: CPT | Performed by: FAMILY MEDICINE

## 2025-07-01 NOTE — ASSESSMENT & PLAN NOTE
Possibly due to SIADH, CHF, low sodium intake  Neg CXR in the hospital, but might benefit from non-contrast chest CT with her smoking history

## 2025-07-01 NOTE — ASSESSMENT & PLAN NOTE
Head CT from 7/24/24 shows small vessel ischemic changes.  Sister Xenia \"Key Mackey\" Sears [phone]  Xenia is concerned that even if the delirium clears, Ms Schneider is still unsafe to live alone.  I will request the Psych NP to see her on his next available opportunity. It sounds like we'll eventually need to determine competence.

## 2025-07-01 NOTE — PROGRESS NOTES
[facility] SENIOR CARE SERVICES    Skilled Nursing Facility     PLACE OF SERVICE:  [facility] [address] [place] [address]    Name: Elise Schneider      Room: 313B  YOB: 1945  MRN: [id number]    ASSESSMENT/PLAN:  Assessment & Plan  Hyponatremia     Possibly due to SIADH, CHF, low sodium intake  Neg CXR in the hospital, but might benefit from non-contrast chest CT with her smoking history     Diabetes mellitus type 2 with complications (HCC)     Was taken off metformin due to lactic acidosis. Will monitor for now. Last A1c 6.3 last October       Moderate vascular dementia with anxiety (HCC)     Head CT from 7/24/24 shows small vessel ischemic changes.  Sister Xenia \"Fe\" Sears [phone]  Xenia is concerned that even if the delirium clears, Ms Schneider is still unsafe to live alone.  I will request the Psych NP to see her on his next available opportunity. It sounds like we'll eventually need to determine competence.     Essential hypertension, benign     At goal, on losartan, metoprolol     Coronary artery disease involving native coronary artery of native heart without angina pectoris     Continue lovastatin, losartan, aspirin, clopidogrel     S/P PTCA (percutaneous transluminal coronary angioplasty)     See above     Tobacco use disorder     Encouraged nicotine cessation     Gastroesophageal reflux disease without esophagitis     Continue famotidine & protonix     Impetigo     Bactroban to the ear canal       The patient is admitted to the SNF for rehabilitation for multiple medical issues as listed above.  will be consulted for discharge planning.     SUBJECTIVE:    Chief Complaint:  Chief Complaint   Patient presents with    Follow-up     History of Present Illness:    Elise Schneider is a 79 y.o. female who presents for admission to [facility] following a recent admission to the hospital for several

## 2025-07-01 NOTE — ASSESSMENT & PLAN NOTE
Was taken off metformin due to lactic acidosis. Will monitor for now. Last A1c 6.3 last October

## 2025-07-03 ENCOUNTER — OFFICE VISIT (OUTPATIENT)
Facility: CLINIC | Age: 80
End: 2025-07-03

## 2025-07-03 VITALS
TEMPERATURE: 97.5 F | RESPIRATION RATE: 17 BRPM | DIASTOLIC BLOOD PRESSURE: 55 MMHG | HEART RATE: 68 BPM | OXYGEN SATURATION: 98 % | SYSTOLIC BLOOD PRESSURE: 123 MMHG

## 2025-07-03 DIAGNOSIS — F01.B4 MODERATE VASCULAR DEMENTIA WITH ANXIETY (HCC): Primary | Chronic | ICD-10-CM

## 2025-07-03 DIAGNOSIS — I10 ESSENTIAL HYPERTENSION, BENIGN: Chronic | ICD-10-CM

## 2025-07-03 DIAGNOSIS — K59.01 SLOW TRANSIT CONSTIPATION: ICD-10-CM

## 2025-07-03 DIAGNOSIS — E87.1 CHRONIC HYPONATREMIA: ICD-10-CM

## 2025-07-03 DIAGNOSIS — I25.10 CORONARY ARTERY DISEASE INVOLVING NATIVE CORONARY ARTERY OF NATIVE HEART WITHOUT ANGINA PECTORIS: Chronic | ICD-10-CM

## 2025-07-03 DIAGNOSIS — R60.0 BILATERAL LOWER EXTREMITY EDEMA: ICD-10-CM

## 2025-07-03 PROBLEM — L01.00 IMPETIGO: Status: RESOLVED | Noted: 2025-07-01 | Resolved: 2025-07-03

## 2025-07-03 NOTE — ASSESSMENT & PLAN NOTE
Family has noted decline in the last 6 months, and they are concerned about a return to her home, and independent living, which they believe is unsafe.

## 2025-07-03 NOTE — ASSESSMENT & PLAN NOTE
BP slightly low today, and that may be due to improved compliance with treatment. I am lowering dose of amlodipine today.

## 2025-07-03 NOTE — PROGRESS NOTES
97.5 °F (36.4 °C)   SpO2: 98%           Exam:  Constitutional: No acute distress;   Eyes: Sclera clear, PERRLA;   Cardiovascular: RRR,nml S1 and S2, no rubs murmurs or gallops, no edema, no cyanosis;   Respiratory: Clear to auscultation, symmetric, no respiratory distress;  Gastrointestinal: Abdomen soft, NT, ND, no masses, normal bowel sounds;  Neurologic: Cranial nerves II through VII grossly intact, no speech or motor deficits A&O in person. Conversational, but tangential, not able to give a reliable clear history.  Skin: Scattered purpura  Musculoskeletal: No erythema swelling or joint tenderness, extremities without cyanosis, neck supple, ROM intact spine and extremities;  Psychiatric: Not agitated.  Suspicious, but friendly. She has poor insight into her circumstances.    Labs:  pending RFT      Assessment/Plans:   Assessment & Plan  Moderate vascular dementia with anxiety (HCC)     Family has noted decline in the last 6 months, and they are concerned about a return to her home, and independent living, which they believe is unsafe.     Essential hypertension, benign     BP slightly low today, and that may be due to improved compliance with treatment. I am lowering dose of amlodipine today.     Coronary artery disease involving native coronary artery of native heart without angina pectoris     Continue statin, aspirin, plavix.       Bilateral lower extremity edema     Lasix 20mg bid  Leg elevation stressed       Chronic hyponatremia     Labs pending     Slow transit constipation     Start docusate bid       I spent a total of _30__ minutes with the patient today, discussing their symptoms, conducting an examination, and reviewing the patient's diagnostic test results.     Jael Garcia MD

## 2025-07-07 ENCOUNTER — OFFICE VISIT (OUTPATIENT)
Facility: CLINIC | Age: 80
End: 2025-07-07
Payer: MEDICARE

## 2025-07-07 ENCOUNTER — OFFICE VISIT (OUTPATIENT)
Facility: CLINIC | Age: 80
End: 2025-07-07

## 2025-07-07 VITALS
DIASTOLIC BLOOD PRESSURE: 57 MMHG | RESPIRATION RATE: 16 BRPM | SYSTOLIC BLOOD PRESSURE: 118 MMHG | TEMPERATURE: 97.9 F | HEART RATE: 66 BPM | OXYGEN SATURATION: 98 %

## 2025-07-07 DIAGNOSIS — F01.B4 MODERATE VASCULAR DEMENTIA WITH ANXIETY (HCC): Primary | Chronic | ICD-10-CM

## 2025-07-07 DIAGNOSIS — E87.1 CHRONIC HYPONATREMIA: ICD-10-CM

## 2025-07-07 DIAGNOSIS — D50.8 IRON DEFICIENCY ANEMIA SECONDARY TO INADEQUATE DIETARY IRON INTAKE: ICD-10-CM

## 2025-07-07 DIAGNOSIS — R60.0 BILATERAL LOWER EXTREMITY EDEMA: ICD-10-CM

## 2025-07-07 DIAGNOSIS — Z76.89 ENCOUNTER FOR SOCIAL WORK INTERVENTION: Primary | ICD-10-CM

## 2025-07-07 PROCEDURE — 99309 SBSQ NF CARE MODERATE MDM 30: CPT | Performed by: FAMILY MEDICINE

## 2025-07-07 PROCEDURE — 1123F ACP DISCUSS/DSCN MKR DOCD: CPT | Performed by: FAMILY MEDICINE

## 2025-07-07 NOTE — ASSESSMENT & PLAN NOTE
I have asked psych NP to see her, but will start low dose aricept to see if that will help with her irritability.

## 2025-07-07 NOTE — PROGRESS NOTES
[facility]  [address] [place] [address]  Phone: [phone]  Fax: [phone]  Also available via Perfect Serve     PLACE OF SERVICE:  [facility] [address] [place] [address]    SKILLED VISIT    Chief Complaint:   Chief Complaint   Patient presents with    Follow-up     Dementia, lower extremity swelling, weakness         HPI : Elise Schneider is a 79 y.o. female here for follow up.    Previous history prior to admission:  increasing LE swelling and pain, hyponatremia secondary to CHF/SIADH, +tobacco, hypertension, HLD.  Her hospital course was notable for hospital acquired delirium, and a negative LE duplex scans and nephrology consultation.    Current visit:  C/o Bilat LE swelling R>L    PMH:   Past Medical History:   Diagnosis Date    Adverse effect of anesthesia     difficulty breathing during induction    Anxiety     Arthritis     left shoulder and elbow    Breast cancer (HCC)     RIGHT     CAD (coronary artery disease)     stenting cardiac 6/5/2015 PCI 10/16/24    Carotid stenosis, bilateral     Chronic pain     Claustrophobia     Diabetes mellitus (HCC)     GERD (gastroesophageal reflux disease)     History of supraventricular tachycardia 2013    Hyperlipidemia     Hypertension     Hyponatremia     Pre-diabetes     Squamous cell carcinoma in situ of skin of face     Squamous cell carcinoma of right female breast     Thromboembolus (HCC)     left leg injury- DVT    UTI (urinary tract infection) 2020    Vertigo 05/11/2012         ROS:   Systems reviewed were negative unless noted below.    Medications: Reviewed in EMR from Skilled Nursing Facility       Vitals:   Vitals:    07/07/25 1132   BP: (!) 118/57   Pulse: 66   Resp: 16   Temp: 97.9 °F (36.6 °C)   SpO2: 98%           Exam:  Constitutional: No acute distress;   Cardiovascular: RRR,nml S1 and S2, no rubs murmurs or gallops, 3+ LE edema, no cyanosis;   Respiratory: Clear to

## 2025-07-07 NOTE — ASSESSMENT & PLAN NOTE
On bid lasix. She may not tolerate compression stockings but that might be an option.  Reduced amlodipine given 'soft' BP  Albumin 2.9, so that is also a factor. Nutritional supplement given

## 2025-07-07 NOTE — PROGRESS NOTES
Social Work note     MSW met with Ms. Schneider to introduce her to [facility] Social Work.  Patient was alert and able to engage in conversation.        Ms. Schneider provided information that did not appear to be accurate.      - Ms. Schneider stated that the month was March.  It is actually July.     - Ms. Schneider stated that she is currently being held by the authorities for calling the police on two men that were trespassing             on her property      Identified Needs:   Appropriate discharge  Social Work Plan:  Work with [facility] and [facility] on appropriate discharge    Internal/External organizations consulted/involved  Dr. Garcia, [facility] physician    Progress toward goals  In progress    Analilia Starks MSW, LMSW, [facility]    Senior Care Services  [facility] Office Building   [address] [place] [address]  Cell [email or web address]

## 2025-07-09 ENCOUNTER — OFFICE VISIT (OUTPATIENT)
Facility: CLINIC | Age: 80
End: 2025-07-09
Payer: MEDICARE

## 2025-07-09 VITALS
SYSTOLIC BLOOD PRESSURE: 146 MMHG | BODY MASS INDEX: 24.85 KG/M2 | WEIGHT: 131.5 LBS | HEART RATE: 76 BPM | DIASTOLIC BLOOD PRESSURE: 63 MMHG | RESPIRATION RATE: 18 BRPM | TEMPERATURE: 97.8 F | OXYGEN SATURATION: 98 %

## 2025-07-09 DIAGNOSIS — F01.B4 MODERATE VASCULAR DEMENTIA WITH ANXIETY (HCC): Primary | Chronic | ICD-10-CM

## 2025-07-09 DIAGNOSIS — K21.9 GASTROESOPHAGEAL REFLUX DISEASE WITHOUT ESOPHAGITIS: ICD-10-CM

## 2025-07-09 DIAGNOSIS — E87.1 CHRONIC HYPONATREMIA: ICD-10-CM

## 2025-07-09 DIAGNOSIS — R60.0 BILATERAL LOWER EXTREMITY EDEMA: ICD-10-CM

## 2025-07-09 DIAGNOSIS — I25.10 CORONARY ARTERY DISEASE INVOLVING NATIVE CORONARY ARTERY OF NATIVE HEART WITHOUT ANGINA PECTORIS: Chronic | ICD-10-CM

## 2025-07-09 DIAGNOSIS — F17.200 TOBACCO USE DISORDER: ICD-10-CM

## 2025-07-09 DIAGNOSIS — E11.8 DIABETES MELLITUS TYPE 2 WITH COMPLICATIONS (HCC): Chronic | ICD-10-CM

## 2025-07-09 PROCEDURE — 3077F SYST BP >= 140 MM HG: CPT | Performed by: FAMILY MEDICINE

## 2025-07-09 PROCEDURE — 3078F DIAST BP <80 MM HG: CPT | Performed by: FAMILY MEDICINE

## 2025-07-09 PROCEDURE — 99309 SBSQ NF CARE MODERATE MDM 30: CPT | Performed by: FAMILY MEDICINE

## 2025-07-09 PROCEDURE — 1123F ACP DISCUSS/DSCN MKR DOCD: CPT | Performed by: FAMILY MEDICINE

## 2025-07-09 NOTE — ASSESSMENT & PLAN NOTE
I started donepezil, in hopes that it might reduce suspicion/paranoia, but it likely is not going to be helpful to her, so I will discontinue, as she is slated to discharge in a couple days.  Paperwork done for CHARLENE. Jassi pending

## 2025-07-09 NOTE — ASSESSMENT & PLAN NOTE
Due to lymphedema. Trial of compression stockings failed. She is reminded to elevate her legs as much as possible.  If she were able, a lymphedema therapy program might be helpful to her.

## 2025-07-09 NOTE — PROGRESS NOTES
[facility] [address] [place] [address]  Phone: [phone]  Fax: [phone]  Also available via Perfect Serve     PLACE OF SERVICE:  [facility] [address] [place] [address]    SKILLED VISIT    Chief Complaint:   Chief Complaint   Patient presents with    Follow-up     C/o bilateral lower extremity edema.          HPI : Elise Schneider is a 79 y.o. female here for follow up.    Previous history prior to admission:  Previous history prior to admission:  increasing LE swelling and pain, hyponatremia secondary to CHF/SIADH, +tobacco, hypertension, HLD.  Her hospital course was notable for hospital acquired delirium, and a negative LE duplex scans and nephrology consultation.    Current visit:  She continues to c/o R ear pain, which is chronic and was finally attributed to jaw DJD, as well as bilat LE R>L edema.   I ordered compression stockings which I hoped she would tolerate, but it looks like she may have refused these.    PMH:   Past Medical History:   Diagnosis Date    Adverse effect of anesthesia     difficulty breathing during induction    Anxiety     Arthritis     left shoulder and elbow    Breast cancer (HCC)     RIGHT     CAD (coronary artery disease)     stenting cardiac 6/5/2015 PCI 10/16/24    Carotid stenosis, bilateral     Chronic pain     Claustrophobia     Diabetes mellitus (HCC)     GERD (gastroesophageal reflux disease)     History of supraventricular tachycardia 2013    Hyperlipidemia     Hypertension     Hyponatremia     Pre-diabetes     Squamous cell carcinoma in situ of skin of face     Squamous cell carcinoma of right female breast     Thromboembolus (HCC)     left leg injury- DVT    UTI (urinary tract infection) 2020    Vertigo 05/11/2012         ROS:   Systems reviewed were negative unless noted below.    Medications: Reviewed in EMR from Skilled Nursing Facility    Social History:  previously living on her own and

## 2025-07-10 RX ORDER — AMLODIPINE BESYLATE 2.5 MG/1
2.5 TABLET ORAL DAILY
Qty: 30 TABLET | Refills: 0 | Status: SHIPPED | OUTPATIENT
Start: 2025-07-10

## 2025-07-10 RX ORDER — ASPIRIN 81 MG/1
81 TABLET ORAL DAILY
Qty: 30 TABLET | Refills: 0 | Status: SHIPPED | OUTPATIENT
Start: 2025-07-10

## 2025-07-10 RX ORDER — LOSARTAN POTASSIUM 100 MG/1
100 TABLET ORAL EVERY MORNING
Qty: 30 TABLET | Refills: 0 | Status: SHIPPED | OUTPATIENT
Start: 2025-07-10

## 2025-07-10 RX ORDER — FUROSEMIDE 20 MG/1
20 TABLET ORAL 2 TIMES DAILY
Qty: 60 TABLET | Refills: 0 | Status: SHIPPED | OUTPATIENT
Start: 2025-07-10

## 2025-07-10 RX ORDER — LOVASTATIN 40 MG/1
40 TABLET ORAL NIGHTLY
Qty: 30 TABLET | Refills: 0 | Status: SHIPPED | OUTPATIENT
Start: 2025-07-10

## 2025-07-10 RX ORDER — QUETIAPINE FUMARATE 25 MG/1
12.5 TABLET, FILM COATED ORAL NIGHTLY PRN
Qty: 15 TABLET | Refills: 0 | Status: SHIPPED | OUTPATIENT
Start: 2025-07-10

## 2025-07-10 RX ORDER — METOPROLOL TARTRATE 25 MG/1
25 TABLET, FILM COATED ORAL 2 TIMES DAILY
Qty: 60 TABLET | Refills: 0 | Status: SHIPPED | OUTPATIENT
Start: 2025-07-10

## 2025-07-10 RX ORDER — FAMOTIDINE 40 MG/1
40 TABLET, FILM COATED ORAL DAILY
Qty: 30 TABLET | Refills: 0 | Status: SHIPPED | OUTPATIENT
Start: 2025-07-10

## 2025-07-10 RX ORDER — CLOPIDOGREL BISULFATE 75 MG/1
75 TABLET ORAL DAILY
Qty: 30 TABLET | Refills: 0 | Status: SHIPPED | OUTPATIENT
Start: 2025-07-10

## 2025-07-11 ENCOUNTER — OFFICE VISIT (OUTPATIENT)
Facility: CLINIC | Age: 80
End: 2025-07-11
Payer: MEDICARE

## 2025-07-11 VITALS
DIASTOLIC BLOOD PRESSURE: 70 MMHG | SYSTOLIC BLOOD PRESSURE: 130 MMHG | HEART RATE: 65 BPM | WEIGHT: 130 LBS | TEMPERATURE: 97.8 F | BODY MASS INDEX: 24.56 KG/M2 | OXYGEN SATURATION: 96 % | RESPIRATION RATE: 18 BRPM

## 2025-07-11 DIAGNOSIS — I25.10 CORONARY ARTERY DISEASE INVOLVING NATIVE CORONARY ARTERY OF NATIVE HEART WITHOUT ANGINA PECTORIS: Chronic | ICD-10-CM

## 2025-07-11 DIAGNOSIS — K59.01 SLOW TRANSIT CONSTIPATION: ICD-10-CM

## 2025-07-11 DIAGNOSIS — K21.9 GASTROESOPHAGEAL REFLUX DISEASE WITHOUT ESOPHAGITIS: ICD-10-CM

## 2025-07-11 DIAGNOSIS — I10 ESSENTIAL HYPERTENSION, BENIGN: Chronic | ICD-10-CM

## 2025-07-11 DIAGNOSIS — E78.2 MIXED HYPERLIPIDEMIA: Chronic | ICD-10-CM

## 2025-07-11 DIAGNOSIS — R60.0 BILATERAL LOWER EXTREMITY EDEMA: ICD-10-CM

## 2025-07-11 DIAGNOSIS — E87.1 CHRONIC HYPONATREMIA: ICD-10-CM

## 2025-07-11 DIAGNOSIS — F01.B4 MODERATE VASCULAR DEMENTIA WITH ANXIETY (HCC): Primary | Chronic | ICD-10-CM

## 2025-07-11 DIAGNOSIS — D50.8 IRON DEFICIENCY ANEMIA SECONDARY TO INADEQUATE DIETARY IRON INTAKE: ICD-10-CM

## 2025-07-11 PROCEDURE — 99316 NF DSCHRG MGMT 30 MIN+: CPT | Performed by: FAMILY MEDICINE

## 2025-07-11 NOTE — PROGRESS NOTES
[facility]  [address] [place] [address]  Phone: [phone]  Fax: [phone]  Also available via Perfect Serve     Place of Service:  [facility] [address] [place] [address]                                                                     Discharge Summary       Admit Dx:  Chronic hyponatremia, dementia, bilateral lower extremity edema    Disposition: Assisted Living    Discharge time : > 30 mins    Brief SNF Course:  This is an 79 y.o. female who presented from the hospital for stabilization and rehabilitation. She was continued on admission medication of urea powder and follow up sodium was 133. She was recommended to remain on urea tablets. She participated in PT/OT for transfer and ambulation safety, upper extremity and core strength, functional mobility and cognitive processing. She has short term memory deficits but good compensatory strategies at present.    Code Status:  Full    Exam:  Constitutional: No acute distress;   Eyes: Sclera clear, PERRLA;   Ears/nose/mouth/throat:mmm, OP clear, trachea midline;  Cardiovascular: RRR,nml S1 and S2, no rubs murmurs or gallops, minimal edema after elevation overnight, no cyanosis;   Respiratory: Clear to auscultation, symmetric, no respiratory distress;  Gastrointestinal: Abdomen soft, NT, ND, no masses, normal bowel sounds;  Neurologic: Cranial nerves II through VII grossly intact, no speech or motor deficits A&O in person Clock draw 2/3  Skin: No rash, warm and dry;  Musculoskeletal: No erythema swelling or joint tenderness, extremities without cyanosis, neck supple, ROM intact spine and extremities;  Psychiatric: Not agitated.  Appropriate affect, mood, judgment and poor insight.  Current Outpatient Medications   Medication Sig Dispense Refill    amLODIPine (NORVASC) 2.5 MG tablet Take 1 tablet by mouth daily 30 tablet 0    clopidogrel (PLAVIX) 75 MG tablet Take 1 tablet by mouth daily

## 2025-09-04 ENCOUNTER — OFFICE VISIT (OUTPATIENT)
Age: 80
End: 2025-09-04
Payer: MEDICARE

## 2025-09-04 VITALS
SYSTOLIC BLOOD PRESSURE: 121 MMHG | HEART RATE: 73 BPM | BODY MASS INDEX: 24.56 KG/M2 | WEIGHT: 130 LBS | RESPIRATION RATE: 16 BRPM | OXYGEN SATURATION: 93 % | DIASTOLIC BLOOD PRESSURE: 67 MMHG

## 2025-09-04 DIAGNOSIS — F01.B4 MODERATE VASCULAR DEMENTIA WITH ANXIETY (HCC): Chronic | ICD-10-CM

## 2025-09-04 DIAGNOSIS — E11.8 DIABETES MELLITUS TYPE 2 WITH COMPLICATIONS (HCC): Chronic | ICD-10-CM

## 2025-09-04 DIAGNOSIS — E78.2 MIXED HYPERLIPIDEMIA: Chronic | ICD-10-CM

## 2025-09-04 DIAGNOSIS — I25.10 CORONARY ARTERY DISEASE INVOLVING NATIVE CORONARY ARTERY OF NATIVE HEART WITHOUT ANGINA PECTORIS: Primary | Chronic | ICD-10-CM

## 2025-09-04 DIAGNOSIS — I10 ESSENTIAL HYPERTENSION, BENIGN: Chronic | ICD-10-CM

## 2025-09-04 DIAGNOSIS — R60.0 BILATERAL LOWER EXTREMITY EDEMA: ICD-10-CM

## 2025-09-04 PROCEDURE — 4004F PT TOBACCO SCREEN RCVD TLK: CPT

## 2025-09-04 PROCEDURE — 99214 OFFICE O/P EST MOD 30 MIN: CPT

## 2025-09-04 PROCEDURE — G8420 CALC BMI NORM PARAMETERS: HCPCS

## 2025-09-04 PROCEDURE — G8427 DOCREV CUR MEDS BY ELIG CLIN: HCPCS

## 2025-09-04 PROCEDURE — 1159F MED LIST DOCD IN RCRD: CPT

## 2025-09-04 PROCEDURE — 3074F SYST BP LT 130 MM HG: CPT

## 2025-09-04 PROCEDURE — 1123F ACP DISCUSS/DSCN MKR DOCD: CPT

## 2025-09-04 PROCEDURE — 3078F DIAST BP <80 MM HG: CPT

## 2025-09-04 PROCEDURE — 1125F AMNT PAIN NOTED PAIN PRSNT: CPT

## 2025-09-04 PROCEDURE — G8400 PT W/DXA NO RESULTS DOC: HCPCS

## 2025-09-04 PROCEDURE — 1090F PRES/ABSN URINE INCON ASSESS: CPT

## (undated) DEVICE — SUTURE VICRYL + SZ 3-0 L27IN ABSRB UD L26MM SH 1/2 CIR VCP416H

## (undated) DEVICE — Device

## (undated) DEVICE — COVER US PRB W12XL244CM FLD IORT STR TIP

## (undated) DEVICE — CONTAINER SPEC 20 ML LID NEUT BUFF FORMALIN 10 % POLYPR STS

## (undated) DEVICE — ELECTRODE BLDE L4IN NONINSULATED EDGE

## (undated) DEVICE — NEEDLE SCLERO 25GA L240CM OD0.51MM ID0.24MM EXTN L4MM SHTH

## (undated) DEVICE — CATH BLLN ANGIO 2X12MM NC EUPHORIA RX

## (undated) DEVICE — HAND-MRMCASU: Brand: MEDLINE INDUSTRIES, INC.

## (undated) DEVICE — GLOVE SURG SZ 8 L12IN FNGR THK79MIL GRN LTX FREE

## (undated) DEVICE — BIPOLAR FORCEPS CORD: Brand: VALLEYLAB

## (undated) DEVICE — SYRINGE TB 1ML TRNSLUC BRL WHT PLUNG BLK MRK CONVENTIONAL

## (undated) DEVICE — SYR 10ML LUER LOK 1/5ML GRAD --

## (undated) DEVICE — SOL IRR SOD CL 0.9% 1000ML BTL --

## (undated) DEVICE — ELECTRODE,RADIOTRANSLUCENT,FOAM,5PK: Brand: MEDLINE

## (undated) DEVICE — NON-REM POLYHESIVE PATIENT RETURN ELECTRODE: Brand: VALLEYLAB

## (undated) DEVICE — TOTAL JOINT-MRMC: Brand: MEDLINE INDUSTRIES, INC.

## (undated) DEVICE — SUT SLK 2-0SH 30IN BLK --

## (undated) DEVICE — PREP SKN CHLRAPRP APL 26ML STR --

## (undated) DEVICE — SOLUTION IRRIG 1000ML 0.9% SOD CHL USP POUR PLAS BTL

## (undated) DEVICE — DERMABOND SKIN ADH 0.7ML -- DERMABOND ADVANCED 12/BX

## (undated) DEVICE — DRAIN SURG W10MMXL20CM SIL FULL PERF HUBLESS FLAT RADPQ

## (undated) DEVICE — SPONGE GZ W4XL4IN COT 12 PLY TYP VII WVN C FLD DSGN

## (undated) DEVICE — GARMENT,MEDLINE,DVT,INT,CALF,MED, GEN2: Brand: MEDLINE

## (undated) DEVICE — 1200 GUARD II KIT W/5MM TUBE W/O VAC TUBE: Brand: GUARDIAN

## (undated) DEVICE — DRAPE,REIN 53X77,STERILE: Brand: MEDLINE

## (undated) DEVICE — CATHETER IV 22GA L1IN OD0.8382-0.9144MM ID0.6096-0.6858MM 382523

## (undated) DEVICE — STRAINER URIN CALC RNL MSH -- CONVERT TO ITEM 357634

## (undated) DEVICE — SYR 3ML LL TIP 1/10ML GRAD --

## (undated) DEVICE — CATH BLLN ANGIO 3.50X15MM NC EUPHORA RX

## (undated) DEVICE — LOOP,VESSEL,MAXI,BLUE,2/PK,STERILE: Brand: MEDLINE

## (undated) DEVICE — REM POLYHESIVE ADULT PATIENT RETURN ELECTRODE: Brand: VALLEYLAB

## (undated) DEVICE — SOL INJ L R 1000ML BG --

## (undated) DEVICE — GLOVE SURG SZ 7 L12IN FNGR THK79MIL GRN LTX FREE

## (undated) DEVICE — ZIMMER® STERILE DISPOSABLE TOURNIQUET CUFF WITH PLC, DUAL PORT, SINGLE BLADDER, 18 IN. (46 CM)

## (undated) DEVICE — SYSTEM EKG CBL L144IN 2 CONN 5 LD

## (undated) DEVICE — GLIDESHEATH SLENDER ACCESS KIT: Brand: GLIDESHEATH SLENDER

## (undated) DEVICE — MARKER,SKIN,WI/RULER AND LABELS: Brand: MEDLINE

## (undated) DEVICE — STAIN INDIA INK IN NACL 10ML --

## (undated) DEVICE — PROBE VASC 8MHZ WTRPRF

## (undated) DEVICE — 450 ML BOTTLE OF 0.05% CHLORHEXIDINE GLUCONATE IN 99.95% STERILE WATER FOR IRRIGATION, USP AND APPLICATOR.: Brand: IRRISEPT ANTIMICROBIAL WOUND LAVAGE

## (undated) DEVICE — CATH BLLN ANGIO 4X15MM NC EUPHORIA RX

## (undated) DEVICE — PACK,BASIC,SIRUS,V: Brand: MEDLINE

## (undated) DEVICE — SOLUTION IV 500ML 0.9% SOD CHL PH 5 INJ USP VIAFLX PLAS

## (undated) DEVICE — BAG SPEC BIOHZRD 10 X 10 IN --

## (undated) DEVICE — SUTURE NONABSORBABLE MONOFILAMENT 5-0 C-1 1X24 IN PROLENE 8725H

## (undated) DEVICE — SUTURE ABSORBABLE MONOFILAMENT 2-0 WND CLOSURE GRN V-LOC 180 VLOCL0315

## (undated) DEVICE — HYPODERMIC SAFETY NEEDLE: Brand: MAGELLAN

## (undated) DEVICE — NDL SPNE QNCKE 18GX3.5IN LF --

## (undated) DEVICE — HI-TORQUE VERSACORE FLOPPY GUIDE WIRE SYSTEM 145 CM: Brand: HI-TORQUE VERSACORE

## (undated) DEVICE — GLOVE SURG SZ 75 CRM LTX FREE POLYISOPRENE POLYMER BEAD ANTI

## (undated) DEVICE — SPONGE,PEANUT,XRAY,ST,SM,3/8",5/CARD: Brand: MEDLINE INDUSTRIES, INC.

## (undated) DEVICE — TOWEL 4 PLY TISS 19X30 SUE WHT

## (undated) DEVICE — Z DISCONTINUED PER MEDLINE LINE GAS SAMPLING O2/CO2 LNG AD 13 FT NSL W/ TBNG FILTERLINE

## (undated) DEVICE — AGENT HEMSTAT W4XL4IN OXIDIZED REGENERATED CELOS ABSRB SFT

## (undated) DEVICE — TUBING PRSS MON L6IN PVC M FEM CONN

## (undated) DEVICE — CATH IV AUTOGRD BC PNK 20GA 25 -- INSYTE

## (undated) DEVICE — INSTRUMENT BATTERY

## (undated) DEVICE — INSULATED BLADE ELECTRODE: Brand: EDGE

## (undated) DEVICE — OSCILLATING TIP SAW CARTRIDGE: Brand: PRECISION FALCON

## (undated) DEVICE — SPONGE LAP 18X18IN STRL -- 5/PK

## (undated) DEVICE — 1010 S-DRAPE TOWEL DRAPE 10/BX: Brand: STERI-DRAPE™

## (undated) DEVICE — SUTURE MCRYL SZ 4-0 L27IN ABSRB UD L19MM PS-2 1/2 CIR PRIM Y426H

## (undated) DEVICE — FORCEPS BX L160CM DIA8MM GRSP DISECT CUP TIP NONLOCKING ROT

## (undated) DEVICE — AEGIS 1" DISK 4MM HOLE, PEEL OPEN: Brand: MEDLINE

## (undated) DEVICE — NEONATAL-ADULT SPO2 SENSOR: Brand: NELLCOR

## (undated) DEVICE — TOWEL SURG W17XL27IN STD BLU COT NONFENESTRATED PREWASHED

## (undated) DEVICE — PIN EXT FIX SCHNZ 3 MM

## (undated) DEVICE — BOWL MED L 32OZ PLAS W/ MOLD GRAD EZ OPN PEEL PCH

## (undated) DEVICE — GOWN,SIRUS,NONRNF,SETINSLV,XL,20/CS: Brand: MEDLINE

## (undated) DEVICE — TUBING, SUCTION, 1/4" X 12', STRAIGHT: Brand: MEDLINE

## (undated) DEVICE — DRESSING WND ISLAND STD 4X10 IN MULT LAYR STRL SILVERLON

## (undated) DEVICE — YANKAUER,TAPERED BULBOUS TIP,W/O VENT: Brand: MEDLINE

## (undated) DEVICE — RUNTHROUGH NS EXTRA FLOPPY PTCA GUIDEWIRE: Brand: RUNTHROUGH

## (undated) DEVICE — SET ADMIN 16ML TBNG L100IN 2 Y INJ SITE IV PIGGY BK DISP

## (undated) DEVICE — SUTURE VCRL SZ 2-0 L27IN ABSRB UD L26MM SH 1/2 CIR J417H

## (undated) DEVICE — BASIN EMSIS 16OZ GRAPHITE PLAS KID SHP MOLD GRAD FOR ORAL

## (undated) DEVICE — SUTURE PROL SZ 6-0 L24IN NONABSORBABLE BLU L9.3MM BV-1 3/8 8805H

## (undated) DEVICE — BLOCK BITE ENDOSCP AD 21 MM W/ DIL BLU LF DISP

## (undated) DEVICE — GLOVE ORTHO 8   MSG9480

## (undated) DEVICE — 3M™ IOBAN™ 2 ANTIMICROBIAL INCISE DRAPE 6651EZ: Brand: IOBAN™ 2

## (undated) DEVICE — 3M™ IOBAN™ 2 ANTIMICROBIAL INCISE DRAPE 6650EZ: Brand: IOBAN™ 2

## (undated) DEVICE — DRAPE,CHEST,FENES,15X10,STERIL: Brand: MEDLINE

## (undated) DEVICE — BRA SURG POST-OP XL 46IN --

## (undated) DEVICE — TRAP,MUCUS SPECIMEN, 80CC: Brand: MEDLINE

## (undated) DEVICE — ADULT SPO2 SENSOR: Brand: NELLCOR

## (undated) DEVICE — CATHETER IV 18GA L1.16IN OD1.27-1.3462MM ID0.9398-1.016MM

## (undated) DEVICE — LIQUIBAND RAPID ADHESIVE 36/CS 0.8ML: Brand: MEDLINE

## (undated) DEVICE — CATHETER COR DIAG TRANSFORMER 3.5 5FR L100CM 0 SIDE H

## (undated) DEVICE — HANDLE LT SNAP ON ULT DURABLE LENS FOR TRUMPF ALC DISPOSABLE

## (undated) DEVICE — STERILE COTTON BALLS LARGE 5/P: Brand: MEDLINE

## (undated) DEVICE — WIPE INSTR W73XL73CM VISITEC

## (undated) DEVICE — COVIDIEN KENDALL DL DISPOSABLE 3 LEAD SY: Brand: MEDLINE RENEWAL

## (undated) DEVICE — GLOVE SURG SZ 65 THK91MIL LTX FREE SYN POLYISOPRENE

## (undated) DEVICE — GLOVE ORANGE PI 7   MSG9070

## (undated) DEVICE — CORONARY IMAGING CATHETER: Brand: OPTICROSS™ HD

## (undated) DEVICE — SOLUTION IRRIG 1000ML STRL H2O USP PLAS POUR BTL

## (undated) DEVICE — TRANSFER SET 3": Brand: MEDLINE INDUSTRIES, INC.

## (undated) DEVICE — PREP KIT PEEL PTCH POVIDONE IOD

## (undated) DEVICE — Z DISCONTINUED USE 2717541 SUTURE STRATAFIX SZ 3-0 L30CM NONABSORBABLE UD L26MM FS 3/8

## (undated) DEVICE — SUTURE VCRL SZ 3-0 L27IN ABSRB UD L26MM SH 1/2 CIR J416H

## (undated) DEVICE — (D)AGENT INJECTN ELEVIEW 10ML -- DISC BY MFG

## (undated) DEVICE — MAGNETIC INSTR DRAPE 20X16: Brand: MEDLINE INDUSTRIES, INC.

## (undated) DEVICE — FLOSEAL MATRIX IS INDICATED IN SURGICAL PROCEDURES (OTHER THAN IN OPHTHALMIC) AS AN ADJUNCT TO HEMOSTASIS WHEN CONTROL OF BLEEDING BY LIGATURE OR CONVENTIONALPROCEDURES IS INEFFECTIVE OR IMPRACTICAL.: Brand: FLOSEAL HEMOSTATIC MATRIX

## (undated) DEVICE — BLANKET WRM AD PREM MISTRAL-AIR

## (undated) DEVICE — SUTURE STRATAFIX SYMMETRIC SZ 1 L18IN ABSRB VLT CT1 L36CM SXPP1A404

## (undated) DEVICE — SHUNT CV SIL EXT LOOP L30CM DIA3X4MM FULL SPR REINF SUNDT

## (undated) DEVICE — SET GRAV CK VLV NEEDLESS ST 3 GANGED 4WAY STPCOCK HI FLO 10

## (undated) DEVICE — BLADE CLIPPER GEN PURP NS

## (undated) DEVICE — CATHETER ETER IV 20GA L125IN POLYUR STR RADPQ INTROCAN SFTY

## (undated) DEVICE — SUT ETHLN 3-0 18IN PS2 BLK --

## (undated) DEVICE — 3M™ TEGADERM™ TRANSPARENT FILM DRESSING FRAME STYLE, 1626W, 4 IN X 4-3/4 IN (10 CM X 12 CM), 50/CT 4CT/CASE: Brand: 3M™ TEGADERM™

## (undated) DEVICE — VASCULAR-MRMC: Brand: MEDLINE INDUSTRIES, INC.

## (undated) DEVICE — SUTURE VCRL SZ 0 L27IN ABSRB UD L36MM CT-1 1/2 CIR J260H

## (undated) DEVICE — INFECTION CONTROL KIT SYS

## (undated) DEVICE — BNDG ELAS HK LOOP 2X5YD NS -- MATRIX

## (undated) DEVICE — IV START KIT: Brand: MEDLINE

## (undated) DEVICE — SUTURE MONOCRYL SZ 4-0 L27IN ABSRB UD L19MM PS-2 1/2 CIR PRIM Y426H

## (undated) DEVICE — CURVED, SMALL JAW, OPEN SEALER/DIVIDER: Brand: LIGASURE

## (undated) DEVICE — SYSTEM SKIN CLSR 22CM DERMBND PRINEO

## (undated) DEVICE — SNAP KOVER: Brand: UNBRANDED

## (undated) DEVICE — YANKAUER,SMOOTH HANDLE,HIGH CAPACITY: Brand: MEDLINE INDUSTRIES, INC.

## (undated) DEVICE — CATHETER IV 20GA L1.16IN OD1.0414-1.1176MM ID0.762-0.8382MM

## (undated) DEVICE — SOLIDIFIER FLD 2OZ 1500CC N DISINF IN BTL DISP SAFESORB

## (undated) DEVICE — STERILE POLYISOPRENE POWDER-FREE SURGICAL GLOVES WITH EMOLLIENT COATING: Brand: PROTEXIS

## (undated) DEVICE — SURGICAL PROCEDURE PACK BASIN MAJ SET CUST NO CAUT

## (undated) DEVICE — CATHETER GUID 6FR 0.071IN COR AMPLATZ L 0.75 MID

## (undated) DEVICE — CATHETER IV 24GA L0.75IN OD0.6604-0.7366MM

## (undated) DEVICE — PENCIL SMK EVAC L10FT DIA95MM TBNG NONSTICK W ADPT TO 22MM

## (undated) DEVICE — SNARE ENDOSCP M L240CM W27MM SHTH DIA2.4MM CHN 2.8MM OVL